# Patient Record
Sex: FEMALE | Race: WHITE | NOT HISPANIC OR LATINO | Employment: OTHER | URBAN - METROPOLITAN AREA
[De-identification: names, ages, dates, MRNs, and addresses within clinical notes are randomized per-mention and may not be internally consistent; named-entity substitution may affect disease eponyms.]

---

## 2018-01-18 ENCOUNTER — RECORDS - HEALTHEAST (OUTPATIENT)
Dept: LAB | Facility: CLINIC | Age: 73
End: 2018-01-18

## 2018-01-18 ENCOUNTER — RECORDS - HEALTHEAST (OUTPATIENT)
Dept: ADMINISTRATIVE | Facility: OTHER | Age: 73
End: 2018-01-18

## 2018-01-19 LAB
25(OH)D3 SERPL-MCNC: 27.7 NG/ML (ref 30–80)
ALBUMIN SERPL-MCNC: 3.6 G/DL (ref 3.5–5)
ALP SERPL-CCNC: 88 U/L (ref 45–120)
ALT SERPL W P-5'-P-CCNC: 15 U/L (ref 0–45)
ANION GAP SERPL CALCULATED.3IONS-SCNC: 9 MMOL/L (ref 5–18)
AST SERPL W P-5'-P-CCNC: 24 U/L (ref 0–40)
BASOPHILS # BLD AUTO: 0 THOU/UL (ref 0–0.2)
BASOPHILS NFR BLD AUTO: 1 % (ref 0–2)
BILIRUB SERPL-MCNC: 0.7 MG/DL (ref 0–1)
BUN SERPL-MCNC: 18 MG/DL (ref 8–28)
CALCIUM SERPL-MCNC: 9.5 MG/DL (ref 8.5–10.5)
CHLORIDE BLD-SCNC: 105 MMOL/L (ref 98–107)
CHOLEST SERPL-MCNC: 191 MG/DL
CO2 SERPL-SCNC: 27 MMOL/L (ref 22–31)
CREAT SERPL-MCNC: 0.99 MG/DL (ref 0.6–1.1)
EOSINOPHIL # BLD AUTO: 0.1 THOU/UL (ref 0–0.4)
EOSINOPHIL NFR BLD AUTO: 3 % (ref 0–6)
ERYTHROCYTE [DISTWIDTH] IN BLOOD BY AUTOMATED COUNT: 12.4 % (ref 11–14.5)
FASTING STATUS PATIENT QL REPORTED: NORMAL
GFR SERPL CREATININE-BSD FRML MDRD: 55 ML/MIN/1.73M2
GLUCOSE BLD-MCNC: 105 MG/DL (ref 70–125)
HBA1C MFR BLD: 6 % (ref 4.2–6.1)
HCT VFR BLD AUTO: 37.3 % (ref 35–47)
HDLC SERPL-MCNC: 50 MG/DL
HGB BLD-MCNC: 12.2 G/DL (ref 12–16)
LDLC SERPL CALC-MCNC: 115 MG/DL
LYMPHOCYTES # BLD AUTO: 1.3 THOU/UL (ref 0.8–4.4)
LYMPHOCYTES NFR BLD AUTO: 31 % (ref 20–40)
MCH RBC QN AUTO: 31.8 PG (ref 27–34)
MCHC RBC AUTO-ENTMCNC: 32.7 G/DL (ref 32–36)
MCV RBC AUTO: 97 FL (ref 80–100)
MONOCYTES # BLD AUTO: 0.5 THOU/UL (ref 0–0.9)
MONOCYTES NFR BLD AUTO: 11 % (ref 2–10)
NEUTROPHILS # BLD AUTO: 2.4 THOU/UL (ref 2–7.7)
NEUTROPHILS NFR BLD AUTO: 55 % (ref 50–70)
PLATELET # BLD AUTO: 164 THOU/UL (ref 140–440)
PMV BLD AUTO: 10 FL (ref 8.5–12.5)
POTASSIUM BLD-SCNC: 4.3 MMOL/L (ref 3.5–5)
PROT SERPL-MCNC: 6.6 G/DL (ref 6–8)
RBC # BLD AUTO: 3.84 MILL/UL (ref 3.8–5.4)
SODIUM SERPL-SCNC: 141 MMOL/L (ref 136–145)
TRIGL SERPL-MCNC: 132 MG/DL
WBC: 4.3 THOU/UL (ref 4–11)

## 2019-01-17 ENCOUNTER — RECORDS - HEALTHEAST (OUTPATIENT)
Dept: LAB | Facility: CLINIC | Age: 74
End: 2019-01-17

## 2019-01-17 ENCOUNTER — RECORDS - HEALTHEAST (OUTPATIENT)
Dept: ADMINISTRATIVE | Facility: OTHER | Age: 74
End: 2019-01-17

## 2019-01-17 LAB
ALBUMIN SERPL-MCNC: 3.6 G/DL (ref 3.5–5)
ALP SERPL-CCNC: 98 U/L (ref 45–120)
ALT SERPL W P-5'-P-CCNC: 13 U/L (ref 0–45)
ANION GAP SERPL CALCULATED.3IONS-SCNC: 15 MMOL/L (ref 5–18)
AST SERPL W P-5'-P-CCNC: 20 U/L (ref 0–40)
BASOPHILS # BLD AUTO: 0 THOU/UL (ref 0–0.2)
BASOPHILS NFR BLD AUTO: 1 % (ref 0–2)
BILIRUB SERPL-MCNC: 0.7 MG/DL (ref 0–1)
BUN SERPL-MCNC: 29 MG/DL (ref 8–28)
CALCIUM SERPL-MCNC: 9.6 MG/DL (ref 8.5–10.5)
CHLORIDE BLD-SCNC: 104 MMOL/L (ref 98–107)
CHOLEST SERPL-MCNC: 185 MG/DL
CO2 SERPL-SCNC: 21 MMOL/L (ref 22–31)
CREAT SERPL-MCNC: 1.27 MG/DL (ref 0.6–1.1)
EOSINOPHIL # BLD AUTO: 0.1 THOU/UL (ref 0–0.4)
EOSINOPHIL NFR BLD AUTO: 3 % (ref 0–6)
ERYTHROCYTE [DISTWIDTH] IN BLOOD BY AUTOMATED COUNT: 12.2 % (ref 11–14.5)
FASTING STATUS PATIENT QL REPORTED: NORMAL
GFR SERPL CREATININE-BSD FRML MDRD: 41 ML/MIN/1.73M2
GLUCOSE BLD-MCNC: 118 MG/DL (ref 70–125)
HCT VFR BLD AUTO: 40.5 % (ref 35–47)
HDLC SERPL-MCNC: 53 MG/DL
HGB BLD-MCNC: 13 G/DL (ref 12–16)
LDLC SERPL CALC-MCNC: 113 MG/DL
LYMPHOCYTES # BLD AUTO: 1.3 THOU/UL (ref 0.8–4.4)
LYMPHOCYTES NFR BLD AUTO: 25 % (ref 20–40)
MCH RBC QN AUTO: 31.5 PG (ref 27–34)
MCHC RBC AUTO-ENTMCNC: 32.1 G/DL (ref 32–36)
MCV RBC AUTO: 98 FL (ref 80–100)
MONOCYTES # BLD AUTO: 0.5 THOU/UL (ref 0–0.9)
MONOCYTES NFR BLD AUTO: 9 % (ref 2–10)
NEUTROPHILS # BLD AUTO: 3.2 THOU/UL (ref 2–7.7)
NEUTROPHILS NFR BLD AUTO: 63 % (ref 50–70)
PLATELET # BLD AUTO: 183 THOU/UL (ref 140–440)
PMV BLD AUTO: 10 FL (ref 8.5–12.5)
POTASSIUM BLD-SCNC: 4.6 MMOL/L (ref 3.5–5)
PROT SERPL-MCNC: 6.6 G/DL (ref 6–8)
RBC # BLD AUTO: 4.13 MILL/UL (ref 3.8–5.4)
SODIUM SERPL-SCNC: 140 MMOL/L (ref 136–145)
TRIGL SERPL-MCNC: 97 MG/DL
WBC: 5.1 THOU/UL (ref 4–11)

## 2019-01-18 LAB
25(OH)D3 SERPL-MCNC: 35 NG/ML (ref 30–80)
HBA1C MFR BLD: 5.9 % (ref 4.2–6.1)

## 2019-01-23 ENCOUNTER — RECORDS - HEALTHEAST (OUTPATIENT)
Dept: ADMINISTRATIVE | Facility: OTHER | Age: 74
End: 2019-01-23

## 2020-07-20 ENCOUNTER — RECORDS - HEALTHEAST (OUTPATIENT)
Dept: LAB | Facility: CLINIC | Age: 75
End: 2020-07-20

## 2020-07-20 LAB
ALBUMIN SERPL-MCNC: 3.8 G/DL (ref 3.5–5)
ALP SERPL-CCNC: 90 U/L (ref 45–120)
ALT SERPL W P-5'-P-CCNC: 13 U/L (ref 0–45)
ANION GAP SERPL CALCULATED.3IONS-SCNC: 14 MMOL/L (ref 5–18)
AST SERPL W P-5'-P-CCNC: 25 U/L (ref 0–40)
BILIRUB SERPL-MCNC: 0.7 MG/DL (ref 0–1)
BUN SERPL-MCNC: 22 MG/DL (ref 8–28)
CALCIUM SERPL-MCNC: 9.7 MG/DL (ref 8.5–10.5)
CHLORIDE BLD-SCNC: 106 MMOL/L (ref 98–107)
CHOLEST SERPL-MCNC: 184 MG/DL
CO2 SERPL-SCNC: 22 MMOL/L (ref 22–31)
CREAT SERPL-MCNC: 1.08 MG/DL (ref 0.6–1.1)
FASTING STATUS PATIENT QL REPORTED: ABNORMAL
GFR SERPL CREATININE-BSD FRML MDRD: 50 ML/MIN/1.73M2
GLUCOSE BLD-MCNC: 101 MG/DL (ref 70–125)
HDLC SERPL-MCNC: 46 MG/DL
LDLC SERPL CALC-MCNC: 119 MG/DL
POTASSIUM BLD-SCNC: 4.3 MMOL/L (ref 3.5–5)
PROT SERPL-MCNC: 6.8 G/DL (ref 6–8)
SODIUM SERPL-SCNC: 142 MMOL/L (ref 136–145)
TRIGL SERPL-MCNC: 97 MG/DL

## 2020-07-21 LAB — 25(OH)D3 SERPL-MCNC: 28.9 NG/ML (ref 30–80)

## 2021-05-25 ENCOUNTER — RECORDS - HEALTHEAST (OUTPATIENT)
Dept: ADMINISTRATIVE | Facility: CLINIC | Age: 76
End: 2021-05-25

## 2021-05-26 ENCOUNTER — RECORDS - HEALTHEAST (OUTPATIENT)
Dept: ADMINISTRATIVE | Facility: CLINIC | Age: 76
End: 2021-05-26

## 2021-05-28 ENCOUNTER — RECORDS - HEALTHEAST (OUTPATIENT)
Dept: ADMINISTRATIVE | Facility: CLINIC | Age: 76
End: 2021-05-28

## 2021-05-29 ENCOUNTER — RECORDS - HEALTHEAST (OUTPATIENT)
Dept: ADMINISTRATIVE | Facility: CLINIC | Age: 76
End: 2021-05-29

## 2021-09-27 ENCOUNTER — LAB REQUISITION (OUTPATIENT)
Dept: LAB | Facility: CLINIC | Age: 76
End: 2021-09-27
Payer: COMMERCIAL

## 2021-09-27 ENCOUNTER — TRANSFERRED RECORDS (OUTPATIENT)
Dept: HEALTH INFORMATION MANAGEMENT | Facility: CLINIC | Age: 76
End: 2021-09-27

## 2021-09-27 DIAGNOSIS — M06.9 RHEUMATOID ARTHRITIS, UNSPECIFIED (H): ICD-10-CM

## 2021-09-27 DIAGNOSIS — E78.2 MIXED HYPERLIPIDEMIA: ICD-10-CM

## 2021-09-27 DIAGNOSIS — E55.9 VITAMIN D DEFICIENCY, UNSPECIFIED: ICD-10-CM

## 2021-09-27 DIAGNOSIS — I10 ESSENTIAL (PRIMARY) HYPERTENSION: ICD-10-CM

## 2021-09-27 LAB
ALBUMIN SERPL-MCNC: 3.4 G/DL (ref 3.5–5)
ALP SERPL-CCNC: 68 U/L (ref 45–120)
ALT SERPL W P-5'-P-CCNC: 11 U/L (ref 0–45)
ANION GAP SERPL CALCULATED.3IONS-SCNC: 14 MMOL/L (ref 5–18)
AST SERPL W P-5'-P-CCNC: 20 U/L (ref 0–40)
BILIRUB SERPL-MCNC: 0.7 MG/DL (ref 0–1)
BUN SERPL-MCNC: 30 MG/DL (ref 8–28)
CALCIUM SERPL-MCNC: 9.3 MG/DL (ref 8.5–10.5)
CHLORIDE BLD-SCNC: 107 MMOL/L (ref 98–107)
CHOLEST SERPL-MCNC: 174 MG/DL
CO2 SERPL-SCNC: 20 MMOL/L (ref 22–31)
CREAT SERPL-MCNC: 1.16 MG/DL (ref 0.6–1.1)
ERYTHROCYTE [DISTWIDTH] IN BLOOD BY AUTOMATED COUNT: 12.9 % (ref 10–15)
GFR SERPL CREATININE-BSD FRML MDRD: 46 ML/MIN/1.73M2
GLUCOSE BLD-MCNC: 114 MG/DL (ref 70–125)
HCT VFR BLD AUTO: 37 % (ref 35–47)
HDLC SERPL-MCNC: 49 MG/DL
HGB BLD-MCNC: 12 G/DL (ref 11.7–15.7)
LDLC SERPL CALC-MCNC: 106 MG/DL
MCH RBC QN AUTO: 31 PG (ref 26.5–33)
MCHC RBC AUTO-ENTMCNC: 32.4 G/DL (ref 31.5–36.5)
MCV RBC AUTO: 96 FL (ref 78–100)
PLATELET # BLD AUTO: 149 10E3/UL (ref 150–450)
POTASSIUM BLD-SCNC: 4.2 MMOL/L (ref 3.5–5)
PROT SERPL-MCNC: 6.4 G/DL (ref 6–8)
RBC # BLD AUTO: 3.87 10E6/UL (ref 3.8–5.2)
SODIUM SERPL-SCNC: 141 MMOL/L (ref 136–145)
TRIGL SERPL-MCNC: 97 MG/DL
WBC # BLD AUTO: 4.2 10E3/UL (ref 4–11)

## 2021-09-27 PROCEDURE — 82306 VITAMIN D 25 HYDROXY: CPT | Mod: ORL | Performed by: FAMILY MEDICINE

## 2021-09-27 PROCEDURE — 80053 COMPREHEN METABOLIC PANEL: CPT | Mod: ORL | Performed by: FAMILY MEDICINE

## 2021-09-27 PROCEDURE — 85027 COMPLETE CBC AUTOMATED: CPT | Mod: ORL | Performed by: FAMILY MEDICINE

## 2021-09-27 PROCEDURE — 36415 COLL VENOUS BLD VENIPUNCTURE: CPT | Mod: ORL | Performed by: FAMILY MEDICINE

## 2021-09-27 PROCEDURE — 80061 LIPID PANEL: CPT | Mod: ORL | Performed by: FAMILY MEDICINE

## 2021-09-28 LAB — DEPRECATED CALCIDIOL+CALCIFEROL SERPL-MC: 49 UG/L (ref 30–80)

## 2022-11-22 ENCOUNTER — LAB REQUISITION (OUTPATIENT)
Dept: LAB | Facility: CLINIC | Age: 77
End: 2022-11-22
Payer: COMMERCIAL

## 2022-11-22 ENCOUNTER — TRANSFERRED RECORDS (OUTPATIENT)
Dept: HEALTH INFORMATION MANAGEMENT | Facility: CLINIC | Age: 77
End: 2022-11-22

## 2022-11-22 DIAGNOSIS — M06.9 RHEUMATOID ARTHRITIS, UNSPECIFIED (H): ICD-10-CM

## 2022-11-22 DIAGNOSIS — I10 ESSENTIAL (PRIMARY) HYPERTENSION: ICD-10-CM

## 2022-11-22 DIAGNOSIS — E78.2 MIXED HYPERLIPIDEMIA: ICD-10-CM

## 2022-11-22 LAB
ALBUMIN SERPL BCG-MCNC: 3.7 G/DL (ref 3.5–5.2)
ALP SERPL-CCNC: 95 U/L (ref 35–104)
ALT SERPL W P-5'-P-CCNC: 25 U/L (ref 10–35)
ANION GAP SERPL CALCULATED.3IONS-SCNC: 12 MMOL/L (ref 7–15)
AST SERPL W P-5'-P-CCNC: 44 U/L (ref 10–35)
BILIRUB SERPL-MCNC: 0.8 MG/DL
BUN SERPL-MCNC: 31.1 MG/DL (ref 8–23)
CALCIUM SERPL-MCNC: 9.4 MG/DL (ref 8.8–10.2)
CHLORIDE SERPL-SCNC: 106 MMOL/L (ref 98–107)
CHOLEST SERPL-MCNC: 142 MG/DL
CREAT SERPL-MCNC: 1.33 MG/DL (ref 0.51–0.95)
DEPRECATED HCO3 PLAS-SCNC: 26 MMOL/L (ref 22–29)
ERYTHROCYTE [DISTWIDTH] IN BLOOD BY AUTOMATED COUNT: 15.3 % (ref 10–15)
GFR SERPL CREATININE-BSD FRML MDRD: 42 ML/MIN/1.73M2
GLUCOSE SERPL-MCNC: 86 MG/DL (ref 70–99)
HCT VFR BLD AUTO: 38.5 % (ref 35–47)
HDLC SERPL-MCNC: 47 MG/DL
HGB BLD-MCNC: 11.9 G/DL (ref 11.7–15.7)
LDLC SERPL CALC-MCNC: 79 MG/DL
MCH RBC QN AUTO: 31.6 PG (ref 26.5–33)
MCHC RBC AUTO-ENTMCNC: 30.9 G/DL (ref 31.5–36.5)
MCV RBC AUTO: 102 FL (ref 78–100)
NONHDLC SERPL-MCNC: 95 MG/DL
PLATELET # BLD AUTO: 119 10E3/UL (ref 150–450)
POTASSIUM SERPL-SCNC: 4.4 MMOL/L (ref 3.4–5.3)
PROT SERPL-MCNC: 6.2 G/DL (ref 6.4–8.3)
RBC # BLD AUTO: 3.77 10E6/UL (ref 3.8–5.2)
SODIUM SERPL-SCNC: 144 MMOL/L (ref 136–145)
TRIGL SERPL-MCNC: 78 MG/DL
WBC # BLD AUTO: 4.5 10E3/UL (ref 4–11)

## 2022-11-22 PROCEDURE — 80053 COMPREHEN METABOLIC PANEL: CPT | Mod: ORL | Performed by: STUDENT IN AN ORGANIZED HEALTH CARE EDUCATION/TRAINING PROGRAM

## 2022-11-22 PROCEDURE — 85027 COMPLETE CBC AUTOMATED: CPT | Mod: ORL | Performed by: STUDENT IN AN ORGANIZED HEALTH CARE EDUCATION/TRAINING PROGRAM

## 2022-11-22 PROCEDURE — 80061 LIPID PANEL: CPT | Mod: ORL | Performed by: STUDENT IN AN ORGANIZED HEALTH CARE EDUCATION/TRAINING PROGRAM

## 2023-01-30 ENCOUNTER — APPOINTMENT (OUTPATIENT)
Dept: CT IMAGING | Facility: CLINIC | Age: 78
DRG: 291 | End: 2023-01-30
Attending: EMERGENCY MEDICINE
Payer: COMMERCIAL

## 2023-01-30 ENCOUNTER — HOSPITAL ENCOUNTER (INPATIENT)
Facility: CLINIC | Age: 78
LOS: 7 days | Discharge: NURSING, REHAB OR LONG-TERM CARE | DRG: 291 | End: 2023-02-06
Attending: EMERGENCY MEDICINE | Admitting: HOSPITALIST
Payer: COMMERCIAL

## 2023-01-30 ENCOUNTER — TRANSFERRED RECORDS (OUTPATIENT)
Dept: HEALTH INFORMATION MANAGEMENT | Facility: CLINIC | Age: 78
End: 2023-01-30

## 2023-01-30 ENCOUNTER — APPOINTMENT (OUTPATIENT)
Dept: RADIOLOGY | Facility: CLINIC | Age: 78
DRG: 291 | End: 2023-01-30
Attending: EMERGENCY MEDICINE
Payer: COMMERCIAL

## 2023-01-30 DIAGNOSIS — R06.2 WHEEZING: ICD-10-CM

## 2023-01-30 DIAGNOSIS — G62.9 NEUROPATHY: Primary | ICD-10-CM

## 2023-01-30 DIAGNOSIS — I10 BENIGN ESSENTIAL HYPERTENSION: ICD-10-CM

## 2023-01-30 DIAGNOSIS — R09.02 HYPOXIA: ICD-10-CM

## 2023-01-30 DIAGNOSIS — I48.91 ATRIAL FIBRILLATION, UNSPECIFIED TYPE (H): ICD-10-CM

## 2023-01-30 DIAGNOSIS — I50.31 ACUTE HEART FAILURE WITH PRESERVED EJECTION FRACTION (HFPEF) (H): ICD-10-CM

## 2023-01-30 DIAGNOSIS — F51.01 PRIMARY INSOMNIA: ICD-10-CM

## 2023-01-30 LAB
ANION GAP SERPL CALCULATED.3IONS-SCNC: 13 MMOL/L (ref 5–18)
ATRIAL RATE - MUSE: 51 BPM
ATRIAL RATE - MUSE: 84 BPM
BASOPHILS # BLD AUTO: 0 10E3/UL (ref 0–0.2)
BASOPHILS NFR BLD AUTO: 0 %
BNP SERPL-MCNC: 1597 PG/ML (ref 0–137)
BUN SERPL-MCNC: 26 MG/DL (ref 8–28)
CALCIUM SERPL-MCNC: 9.5 MG/DL (ref 8.5–10.5)
CHLORIDE BLD-SCNC: 102 MMOL/L (ref 98–107)
CO2 SERPL-SCNC: 27 MMOL/L (ref 22–31)
CREAT SERPL-MCNC: 1.03 MG/DL (ref 0.6–1.1)
D DIMER PPP FEU-MCNC: 1.02 UG/ML FEU (ref 0–0.5)
DIASTOLIC BLOOD PRESSURE - MUSE: 100 MMHG
DIASTOLIC BLOOD PRESSURE - MUSE: 73 MMHG
EOSINOPHIL # BLD AUTO: 0 10E3/UL (ref 0–0.7)
EOSINOPHIL NFR BLD AUTO: 0 %
ERYTHROCYTE [DISTWIDTH] IN BLOOD BY AUTOMATED COUNT: 14.3 % (ref 10–15)
FLUAV RNA SPEC QL NAA+PROBE: NEGATIVE
FLUBV RNA RESP QL NAA+PROBE: NEGATIVE
GFR SERPL CREATININE-BSD FRML MDRD: 56 ML/MIN/1.73M2
GLUCOSE BLD-MCNC: 114 MG/DL (ref 70–125)
HCT VFR BLD AUTO: 37.1 % (ref 35–47)
HGB BLD-MCNC: 11.9 G/DL (ref 11.7–15.7)
HOLD SPECIMEN: NORMAL
IMM GRANULOCYTES # BLD: 0 10E3/UL
IMM GRANULOCYTES NFR BLD: 0 %
INTERPRETATION ECG - MUSE: NORMAL
INTERPRETATION ECG - MUSE: NORMAL
LYMPHOCYTES # BLD AUTO: 0.8 10E3/UL (ref 0.8–5.3)
LYMPHOCYTES NFR BLD AUTO: 13 %
MCH RBC QN AUTO: 31.3 PG (ref 26.5–33)
MCHC RBC AUTO-ENTMCNC: 32.1 G/DL (ref 31.5–36.5)
MCV RBC AUTO: 98 FL (ref 78–100)
MONOCYTES # BLD AUTO: 0.6 10E3/UL (ref 0–1.3)
MONOCYTES NFR BLD AUTO: 9 %
NEUTROPHILS # BLD AUTO: 5 10E3/UL (ref 1.6–8.3)
NEUTROPHILS NFR BLD AUTO: 78 %
NRBC # BLD AUTO: 0 10E3/UL
NRBC BLD AUTO-RTO: 0 /100
P AXIS - MUSE: NORMAL DEGREES
P AXIS - MUSE: NORMAL DEGREES
PLATELET # BLD AUTO: 134 10E3/UL (ref 150–450)
POTASSIUM BLD-SCNC: 3.1 MMOL/L (ref 3.5–5)
PR INTERVAL - MUSE: NORMAL MS
PR INTERVAL - MUSE: NORMAL MS
QRS DURATION - MUSE: 102 MS
QRS DURATION - MUSE: 104 MS
QT - MUSE: 370 MS
QT - MUSE: 566 MS
QTC - MUSE: 341 MS
QTC - MUSE: 546 MS
R AXIS - MUSE: -45 DEGREES
R AXIS - MUSE: -46 DEGREES
RBC # BLD AUTO: 3.8 10E6/UL (ref 3.8–5.2)
RSV RNA SPEC NAA+PROBE: NEGATIVE
SARS-COV-2 RNA RESP QL NAA+PROBE: NEGATIVE
SODIUM SERPL-SCNC: 142 MMOL/L (ref 136–145)
SYSTOLIC BLOOD PRESSURE - MUSE: 139 MMHG
SYSTOLIC BLOOD PRESSURE - MUSE: 172 MMHG
T AXIS - MUSE: -8 DEGREES
T AXIS - MUSE: 61 DEGREES
TROPONIN I SERPL-MCNC: 0.12 NG/ML (ref 0–0.29)
VENTRICULAR RATE- MUSE: 51 BPM
VENTRICULAR RATE- MUSE: 56 BPM
WBC # BLD AUTO: 6.4 10E3/UL (ref 4–11)

## 2023-01-30 PROCEDURE — 99285 EMERGENCY DEPT VISIT HI MDM: CPT | Mod: 25,CS

## 2023-01-30 PROCEDURE — 84484 ASSAY OF TROPONIN QUANT: CPT | Performed by: EMERGENCY MEDICINE

## 2023-01-30 PROCEDURE — 85014 HEMATOCRIT: CPT | Performed by: EMERGENCY MEDICINE

## 2023-01-30 PROCEDURE — 87637 SARSCOV2&INF A&B&RSV AMP PRB: CPT | Performed by: EMERGENCY MEDICINE

## 2023-01-30 PROCEDURE — C9803 HOPD COVID-19 SPEC COLLECT: HCPCS

## 2023-01-30 PROCEDURE — 250N000011 HC RX IP 250 OP 636: Performed by: EMERGENCY MEDICINE

## 2023-01-30 PROCEDURE — 71045 X-RAY EXAM CHEST 1 VIEW: CPT

## 2023-01-30 PROCEDURE — 85379 FIBRIN DEGRADATION QUANT: CPT | Performed by: EMERGENCY MEDICINE

## 2023-01-30 PROCEDURE — 93005 ELECTROCARDIOGRAM TRACING: CPT

## 2023-01-30 PROCEDURE — 250N000013 HC RX MED GY IP 250 OP 250 PS 637: Performed by: EMERGENCY MEDICINE

## 2023-01-30 PROCEDURE — 71275 CT ANGIOGRAPHY CHEST: CPT

## 2023-01-30 PROCEDURE — 83880 ASSAY OF NATRIURETIC PEPTIDE: CPT | Performed by: EMERGENCY MEDICINE

## 2023-01-30 PROCEDURE — 120N000013 HC R&B IMCU

## 2023-01-30 PROCEDURE — 93005 ELECTROCARDIOGRAM TRACING: CPT | Performed by: EMERGENCY MEDICINE

## 2023-01-30 PROCEDURE — 96374 THER/PROPH/DIAG INJ IV PUSH: CPT | Mod: 59

## 2023-01-30 PROCEDURE — 80048 BASIC METABOLIC PNL TOTAL CA: CPT | Performed by: EMERGENCY MEDICINE

## 2023-01-30 PROCEDURE — 36415 COLL VENOUS BLD VENIPUNCTURE: CPT | Performed by: EMERGENCY MEDICINE

## 2023-01-30 RX ORDER — MULTIVIT-MIN/IRON/FOLIC ACID/K 18-600-40
1000 CAPSULE ORAL DAILY
COMMUNITY

## 2023-01-30 RX ORDER — TRAMADOL HYDROCHLORIDE 50 MG/1
50-100 TABLET ORAL EVERY 6 HOURS PRN
Status: ON HOLD | COMMUNITY
End: 2024-02-07

## 2023-01-30 RX ORDER — IOPAMIDOL 755 MG/ML
100 INJECTION, SOLUTION INTRAVASCULAR ONCE
Status: COMPLETED | OUTPATIENT
Start: 2023-01-30 | End: 2023-01-30

## 2023-01-30 RX ORDER — FUROSEMIDE 10 MG/ML
20 INJECTION INTRAMUSCULAR; INTRAVENOUS ONCE
Status: COMPLETED | OUTPATIENT
Start: 2023-01-30 | End: 2023-01-30

## 2023-01-30 RX ORDER — HYDROXYCHLOROQUINE SULFATE 200 MG/1
200 TABLET, FILM COATED ORAL 2 TIMES DAILY
Status: ON HOLD | COMMUNITY
End: 2023-02-03

## 2023-01-30 RX ORDER — ATENOLOL 50 MG/1
50 TABLET ORAL DAILY
Status: ON HOLD | COMMUNITY
End: 2023-02-03

## 2023-01-30 RX ORDER — LORAZEPAM 1 MG/1
1 TABLET ORAL ONCE
Status: COMPLETED | OUTPATIENT
Start: 2023-01-30 | End: 2023-01-30

## 2023-01-30 RX ORDER — LISINOPRIL/HYDROCHLOROTHIAZIDE 10-12.5 MG
1 TABLET ORAL DAILY
Status: ON HOLD | COMMUNITY
End: 2023-02-03

## 2023-01-30 RX ORDER — GABAPENTIN 300 MG/1
1200 CAPSULE ORAL AT BEDTIME
Status: ON HOLD | COMMUNITY
End: 2023-02-03

## 2023-01-30 RX ADMIN — FUROSEMIDE 20 MG: 10 INJECTION, SOLUTION INTRAVENOUS at 21:07

## 2023-01-30 RX ADMIN — LORAZEPAM 1 MG: 1 TABLET ORAL at 22:28

## 2023-01-30 RX ADMIN — IOPAMIDOL 100 ML: 755 INJECTION, SOLUTION INTRAVENOUS at 20:46

## 2023-01-30 ASSESSMENT — ENCOUNTER SYMPTOMS
FEVER: 0
COUGH: 1
SHORTNESS OF BREATH: 1
APPETITE CHANGE: 1

## 2023-01-30 ASSESSMENT — ACTIVITIES OF DAILY LIVING (ADL)
ADLS_ACUITY_SCORE: 35
ADLS_ACUITY_SCORE: 35

## 2023-01-31 ENCOUNTER — TELEPHONE (OUTPATIENT)
Dept: CARDIOLOGY | Facility: CLINIC | Age: 78
End: 2023-01-31
Payer: COMMERCIAL

## 2023-01-31 ENCOUNTER — APPOINTMENT (OUTPATIENT)
Dept: CARDIOLOGY | Facility: CLINIC | Age: 78
DRG: 291 | End: 2023-01-31
Attending: HOSPITALIST
Payer: COMMERCIAL

## 2023-01-31 ENCOUNTER — APPOINTMENT (OUTPATIENT)
Dept: OCCUPATIONAL THERAPY | Facility: CLINIC | Age: 78
DRG: 291 | End: 2023-01-31
Attending: HOSPITALIST
Payer: COMMERCIAL

## 2023-01-31 DIAGNOSIS — I50.9 ACUTE DECOMPENSATED HEART FAILURE (H): Primary | ICD-10-CM

## 2023-01-31 PROBLEM — I27.20 PULMONARY HYPERTENSION (H): Status: ACTIVE | Noted: 2023-01-31

## 2023-01-31 PROBLEM — E87.6 HYPOKALEMIA: Status: ACTIVE | Noted: 2023-01-31

## 2023-01-31 LAB
ANION GAP SERPL CALCULATED.3IONS-SCNC: 11 MMOL/L (ref 5–18)
BASOPHILS # BLD AUTO: 0 10E3/UL (ref 0–0.2)
BASOPHILS NFR BLD AUTO: 0 %
BUN SERPL-MCNC: 20 MG/DL (ref 8–28)
CALCIUM SERPL-MCNC: 9.2 MG/DL (ref 8.5–10.5)
CHLORIDE BLD-SCNC: 103 MMOL/L (ref 98–107)
CO2 SERPL-SCNC: 30 MMOL/L (ref 22–31)
CREAT SERPL-MCNC: 0.87 MG/DL (ref 0.6–1.1)
CREAT SERPL-MCNC: 0.91 MG/DL (ref 0.6–1.1)
EOSINOPHIL # BLD AUTO: 0 10E3/UL (ref 0–0.7)
EOSINOPHIL NFR BLD AUTO: 0 %
ERYTHROCYTE [DISTWIDTH] IN BLOOD BY AUTOMATED COUNT: 14 % (ref 10–15)
GFR SERPL CREATININE-BSD FRML MDRD: 65 ML/MIN/1.73M2
GFR SERPL CREATININE-BSD FRML MDRD: 69 ML/MIN/1.73M2
GLUCOSE BLD-MCNC: 90 MG/DL (ref 70–125)
HCT VFR BLD AUTO: 35.8 % (ref 35–47)
HGB BLD-MCNC: 11.2 G/DL (ref 11.7–15.7)
HOLD SPECIMEN: NORMAL
IMM GRANULOCYTES # BLD: 0 10E3/UL
IMM GRANULOCYTES NFR BLD: 1 %
LVEF ECHO: NORMAL
LYMPHOCYTES # BLD AUTO: 0.9 10E3/UL (ref 0.8–5.3)
LYMPHOCYTES NFR BLD AUTO: 14 %
MAGNESIUM SERPL-MCNC: 1.8 MG/DL (ref 1.8–2.6)
MAGNESIUM SERPL-MCNC: 1.8 MG/DL (ref 1.8–2.6)
MCH RBC QN AUTO: 31.3 PG (ref 26.5–33)
MCHC RBC AUTO-ENTMCNC: 31.3 G/DL (ref 31.5–36.5)
MCV RBC AUTO: 100 FL (ref 78–100)
MONOCYTES # BLD AUTO: 0.7 10E3/UL (ref 0–1.3)
MONOCYTES NFR BLD AUTO: 11 %
NEUTROPHILS # BLD AUTO: 5 10E3/UL (ref 1.6–8.3)
NEUTROPHILS NFR BLD AUTO: 74 %
NRBC # BLD AUTO: 0 10E3/UL
NRBC BLD AUTO-RTO: 0 /100
PLATELET # BLD AUTO: 121 10E3/UL (ref 150–450)
POTASSIUM BLD-SCNC: 3.2 MMOL/L (ref 3.5–5)
POTASSIUM BLD-SCNC: 3.2 MMOL/L (ref 3.5–5)
POTASSIUM BLD-SCNC: 3.9 MMOL/L (ref 3.5–5)
PROCALCITONIN SERPL-MCNC: 0.13 NG/ML (ref 0–0.49)
RBC # BLD AUTO: 3.58 10E6/UL (ref 3.8–5.2)
SODIUM SERPL-SCNC: 144 MMOL/L (ref 136–145)
TROPONIN I SERPL-MCNC: 0.11 NG/ML (ref 0–0.29)
TROPONIN I SERPL-MCNC: 0.12 NG/ML (ref 0–0.29)
WBC # BLD AUTO: 6.7 10E3/UL (ref 4–11)

## 2023-01-31 PROCEDURE — 250N000013 HC RX MED GY IP 250 OP 250 PS 637: Performed by: FAMILY MEDICINE

## 2023-01-31 PROCEDURE — 97166 OT EVAL MOD COMPLEX 45 MIN: CPT | Mod: GO

## 2023-01-31 PROCEDURE — 250N000011 HC RX IP 250 OP 636: Performed by: HOSPITALIST

## 2023-01-31 PROCEDURE — 120N000013 HC R&B IMCU

## 2023-01-31 PROCEDURE — 255N000002 HC RX 255 OP 636: Performed by: FAMILY MEDICINE

## 2023-01-31 PROCEDURE — 99207 PR APP CREDIT; MD BILLING SHARED VISIT: CPT | Performed by: FAMILY MEDICINE

## 2023-01-31 PROCEDURE — 99223 1ST HOSP IP/OBS HIGH 75: CPT | Mod: AI | Performed by: HOSPITALIST

## 2023-01-31 PROCEDURE — 93306 TTE W/DOPPLER COMPLETE: CPT | Mod: 26 | Performed by: INTERNAL MEDICINE

## 2023-01-31 PROCEDURE — 82565 ASSAY OF CREATININE: CPT | Performed by: FAMILY MEDICINE

## 2023-01-31 PROCEDURE — 85004 AUTOMATED DIFF WBC COUNT: CPT | Performed by: HOSPITALIST

## 2023-01-31 PROCEDURE — 97535 SELF CARE MNGMENT TRAINING: CPT | Mod: GO

## 2023-01-31 PROCEDURE — 84132 ASSAY OF SERUM POTASSIUM: CPT | Performed by: FAMILY MEDICINE

## 2023-01-31 PROCEDURE — 83735 ASSAY OF MAGNESIUM: CPT | Performed by: HOSPITALIST

## 2023-01-31 PROCEDURE — 250N000013 HC RX MED GY IP 250 OP 250 PS 637: Performed by: HOSPITALIST

## 2023-01-31 PROCEDURE — 250N000013 HC RX MED GY IP 250 OP 250 PS 637: Performed by: INTERNAL MEDICINE

## 2023-01-31 PROCEDURE — 84484 ASSAY OF TROPONIN QUANT: CPT | Performed by: HOSPITALIST

## 2023-01-31 PROCEDURE — 99222 1ST HOSP IP/OBS MODERATE 55: CPT | Performed by: INTERNAL MEDICINE

## 2023-01-31 PROCEDURE — 250N000011 HC RX IP 250 OP 636: Performed by: FAMILY MEDICINE

## 2023-01-31 PROCEDURE — 84145 PROCALCITONIN (PCT): CPT | Performed by: FAMILY MEDICINE

## 2023-01-31 PROCEDURE — 80048 BASIC METABOLIC PNL TOTAL CA: CPT | Performed by: HOSPITALIST

## 2023-01-31 PROCEDURE — 36415 COLL VENOUS BLD VENIPUNCTURE: CPT | Performed by: FAMILY MEDICINE

## 2023-01-31 PROCEDURE — 36415 COLL VENOUS BLD VENIPUNCTURE: CPT | Performed by: HOSPITALIST

## 2023-01-31 RX ORDER — ONDANSETRON 2 MG/ML
4 INJECTION INTRAMUSCULAR; INTRAVENOUS EVERY 6 HOURS PRN
Status: DISCONTINUED | OUTPATIENT
Start: 2023-01-31 | End: 2023-02-06 | Stop reason: HOSPADM

## 2023-01-31 RX ORDER — LISINOPRIL 10 MG/1
10 TABLET ORAL DAILY
Status: DISCONTINUED | OUTPATIENT
Start: 2023-01-31 | End: 2023-02-01

## 2023-01-31 RX ORDER — ATORVASTATIN CALCIUM 10 MG/1
20 TABLET, FILM COATED ORAL EVERY EVENING
Status: DISCONTINUED | OUTPATIENT
Start: 2023-01-31 | End: 2023-02-06 | Stop reason: HOSPADM

## 2023-01-31 RX ORDER — POTASSIUM CHLORIDE 1500 MG/1
40 TABLET, EXTENDED RELEASE ORAL ONCE
Status: COMPLETED | OUTPATIENT
Start: 2023-01-31 | End: 2023-01-31

## 2023-01-31 RX ORDER — HYDROXYCHLOROQUINE SULFATE 200 MG/1
200 TABLET, FILM COATED ORAL 2 TIMES DAILY
Status: DISCONTINUED | OUTPATIENT
Start: 2023-01-31 | End: 2023-02-06 | Stop reason: HOSPADM

## 2023-01-31 RX ORDER — ATROPINE SULFATE 0.1 MG/ML
0.4 INJECTION INTRAVENOUS EVERY 5 MIN PRN
Status: DISCONTINUED | OUTPATIENT
Start: 2023-01-31 | End: 2023-02-06 | Stop reason: HOSPADM

## 2023-01-31 RX ORDER — NALOXONE HYDROCHLORIDE 0.4 MG/ML
0.4 INJECTION, SOLUTION INTRAMUSCULAR; INTRAVENOUS; SUBCUTANEOUS
Status: DISCONTINUED | OUTPATIENT
Start: 2023-01-31 | End: 2023-02-06 | Stop reason: HOSPADM

## 2023-01-31 RX ORDER — NALOXONE HYDROCHLORIDE 0.4 MG/ML
0.2 INJECTION, SOLUTION INTRAMUSCULAR; INTRAVENOUS; SUBCUTANEOUS
Status: DISCONTINUED | OUTPATIENT
Start: 2023-01-31 | End: 2023-02-06 | Stop reason: HOSPADM

## 2023-01-31 RX ORDER — GABAPENTIN 400 MG/1
1200 CAPSULE ORAL AT BEDTIME
Status: DISCONTINUED | OUTPATIENT
Start: 2023-01-31 | End: 2023-01-31

## 2023-01-31 RX ORDER — LIDOCAINE 40 MG/G
CREAM TOPICAL
Status: DISCONTINUED | OUTPATIENT
Start: 2023-01-31 | End: 2023-02-06 | Stop reason: HOSPADM

## 2023-01-31 RX ORDER — DOCUSATE SODIUM 100 MG/1
100 CAPSULE, LIQUID FILLED ORAL 2 TIMES DAILY PRN
Status: DISCONTINUED | OUTPATIENT
Start: 2023-01-31 | End: 2023-02-06 | Stop reason: HOSPADM

## 2023-01-31 RX ORDER — PROCHLORPERAZINE 25 MG
12.5 SUPPOSITORY, RECTAL RECTAL EVERY 12 HOURS PRN
Status: DISCONTINUED | OUTPATIENT
Start: 2023-01-31 | End: 2023-02-06 | Stop reason: HOSPADM

## 2023-01-31 RX ORDER — ACETAMINOPHEN 650 MG/1
650 SUPPOSITORY RECTAL EVERY 4 HOURS PRN
Status: DISCONTINUED | OUTPATIENT
Start: 2023-01-31 | End: 2023-02-06 | Stop reason: HOSPADM

## 2023-01-31 RX ORDER — GABAPENTIN 300 MG/1
600 CAPSULE ORAL AT BEDTIME
Status: DISCONTINUED | OUTPATIENT
Start: 2023-01-31 | End: 2023-02-06 | Stop reason: HOSPADM

## 2023-01-31 RX ORDER — ENOXAPARIN SODIUM 100 MG/ML
40 INJECTION SUBCUTANEOUS EVERY 24 HOURS
Status: DISCONTINUED | OUTPATIENT
Start: 2023-01-31 | End: 2023-02-04

## 2023-01-31 RX ORDER — GUAIFENESIN 200 MG/10ML
10 LIQUID ORAL EVERY 4 HOURS PRN
Status: DISCONTINUED | OUTPATIENT
Start: 2023-01-31 | End: 2023-02-06 | Stop reason: HOSPADM

## 2023-01-31 RX ORDER — PROCHLORPERAZINE MALEATE 5 MG
5 TABLET ORAL EVERY 6 HOURS PRN
Status: DISCONTINUED | OUTPATIENT
Start: 2023-01-31 | End: 2023-02-06 | Stop reason: HOSPADM

## 2023-01-31 RX ORDER — ONDANSETRON 4 MG/1
4 TABLET, ORALLY DISINTEGRATING ORAL EVERY 6 HOURS PRN
Status: DISCONTINUED | OUTPATIENT
Start: 2023-01-31 | End: 2023-02-06 | Stop reason: HOSPADM

## 2023-01-31 RX ORDER — ACETAMINOPHEN 325 MG/1
650 TABLET ORAL EVERY 4 HOURS PRN
Status: DISCONTINUED | OUTPATIENT
Start: 2023-01-31 | End: 2023-02-06 | Stop reason: HOSPADM

## 2023-01-31 RX ORDER — TRAMADOL HYDROCHLORIDE 50 MG/1
100 TABLET ORAL EVERY MORNING
Status: DISCONTINUED | OUTPATIENT
Start: 2023-01-31 | End: 2023-02-06 | Stop reason: HOSPADM

## 2023-01-31 RX ORDER — POTASSIUM CHLORIDE 1.5 G/1.58G
40 POWDER, FOR SOLUTION ORAL ONCE
Status: COMPLETED | OUTPATIENT
Start: 2023-01-31 | End: 2023-01-31

## 2023-01-31 RX ORDER — LANOLIN ALCOHOL/MO/W.PET/CERES
3 CREAM (GRAM) TOPICAL
Status: DISCONTINUED | OUTPATIENT
Start: 2023-01-31 | End: 2023-02-06 | Stop reason: HOSPADM

## 2023-01-31 RX ORDER — FUROSEMIDE 10 MG/ML
40 INJECTION INTRAMUSCULAR; INTRAVENOUS EVERY 12 HOURS
Status: DISCONTINUED | OUTPATIENT
Start: 2023-01-31 | End: 2023-02-02

## 2023-01-31 RX ADMIN — POTASSIUM CHLORIDE 40 MEQ: 1500 TABLET, EXTENDED RELEASE ORAL at 02:07

## 2023-01-31 RX ADMIN — PERFLUTREN 3 ML: 6.52 INJECTION, SUSPENSION INTRAVENOUS at 11:20

## 2023-01-31 RX ADMIN — TRAMADOL HYDROCHLORIDE 100 MG: 50 TABLET, COATED ORAL at 08:57

## 2023-01-31 RX ADMIN — GABAPENTIN 600 MG: 300 CAPSULE ORAL at 02:07

## 2023-01-31 RX ADMIN — GUAIFENESIN 10 ML: 100 SOLUTION ORAL at 18:38

## 2023-01-31 RX ADMIN — ATORVASTATIN CALCIUM 20 MG: 10 TABLET, FILM COATED ORAL at 22:55

## 2023-01-31 RX ADMIN — GABAPENTIN 600 MG: 300 CAPSULE ORAL at 22:55

## 2023-01-31 RX ADMIN — ENOXAPARIN SODIUM 40 MG: 100 INJECTION SUBCUTANEOUS at 18:40

## 2023-01-31 RX ADMIN — GUAIFENESIN 10 ML: 100 SOLUTION ORAL at 11:53

## 2023-01-31 RX ADMIN — FUROSEMIDE 40 MG: 10 INJECTION, SOLUTION INTRAVENOUS at 18:38

## 2023-01-31 RX ADMIN — FLUOXETINE 20 MG: 20 CAPSULE ORAL at 08:57

## 2023-01-31 RX ADMIN — POTASSIUM CHLORIDE 40 MEQ: 1500 TABLET, EXTENDED RELEASE ORAL at 11:53

## 2023-01-31 RX ADMIN — FUROSEMIDE 40 MG: 10 INJECTION, SOLUTION INTRAVENOUS at 05:47

## 2023-01-31 RX ADMIN — POTASSIUM CHLORIDE 40 MEQ: 1.5 POWDER, FOR SOLUTION ORAL at 18:57

## 2023-01-31 RX ADMIN — LISINOPRIL 10 MG: 10 TABLET ORAL at 08:57

## 2023-01-31 ASSESSMENT — ACTIVITIES OF DAILY LIVING (ADL)
ADLS_ACUITY_SCORE: 49
ADLS_ACUITY_SCORE: 35
FALL_HISTORY_WITHIN_LAST_SIX_MONTHS: YES
VISION_MANAGEMENT: GLASSES
TOILETING_ASSISTANCE: TOILETING DIFFICULTY, ASSISTANCE 1 PERSON
ADLS_ACUITY_SCORE: 35
WALKING_OR_CLIMBING_STAIRS_DIFFICULTY: YES
TOILETING: 1-->ASSISTANCE (EQUIPMENT/PERSON) NEEDED
TOILETING: 1-->ASSISTANCE (EQUIPMENT/PERSON) NEEDED (NOT DEVELOPMENTALLY APPROPRIATE)
ADLS_ACUITY_SCORE: 35
TRANSFERRING: 0-->INDEPENDENT
DOING_ERRANDS_INDEPENDENTLY_DIFFICULTY: YES
ADLS_ACUITY_SCORE: 39
DRESS: 1-->ASSISTANCE (EQUIPMENT/PERSON) NEEDED (NOT DEVELOPMENTALLY APPROPRIATE)
WEAR_GLASSES_OR_BLIND: YES
DEPENDENT_IADLS:: TRANSPORTATION
ADLS_ACUITY_SCORE: 35
TOILETING_ISSUES: YES
ADLS_ACUITY_SCORE: 45
ADLS_ACUITY_SCORE: 37
ADLS_ACUITY_SCORE: 39
DRESSING/BATHING_DIFFICULTY: YES
ADLS_ACUITY_SCORE: 35
NUMBER_OF_TIMES_PATIENT_HAS_FALLEN_WITHIN_LAST_SIX_MONTHS: 2
ADLS_ACUITY_SCORE: 49
DRESSING/BATHING: BATHING DIFFICULTY, ASSISTANCE 1 PERSON
TRANSFERRING: 1-->ASSISTANCE (EQUIPMENT/PERSON) NEEDED (NOT DEVELOPMENTALLY APPROPRIATE)
DIFFICULTY_EATING/SWALLOWING: NO
DRESS: 1-->ASSISTANCE (EQUIPMENT/PERSON) NEEDED
ADLS_ACUITY_SCORE: 35
EQUIPMENT_CURRENTLY_USED_AT_HOME: WALKER, STANDARD
WALKING_OR_CLIMBING_STAIRS: AMBULATION DIFFICULTY, ASSISTANCE 1 PERSON;TRANSFERRING DIFFICULTY, ASSISTANCE 1 PERSON
BATHING: 1-->ASSISTANCE NEEDED
CHANGE_IN_FUNCTIONAL_STATUS_SINCE_ONSET_OF_CURRENT_ILLNESS/INJURY: NO
CONCENTRATING,_REMEMBERING_OR_MAKING_DECISIONS_DIFFICULTY: NO

## 2023-01-31 NOTE — PHARMACY-ADMISSION MEDICATION HISTORY
Pharmacy Note - Admission Medication History    Pertinent Provider Information:      ______________________________________________________________________    Prior To Admission (PTA) med list completed and updated in EMR.       PTA Med List   Medication Sig Last Dose     atenolol (TENORMIN) 50 MG tablet Take 50 mg by mouth daily 1/29/2023     FLUoxetine (PROZAC) 20 MG capsule Take 20 mg by mouth daily 1/29/2023     gabapentin (NEURONTIN) 300 MG capsule Take 1,200 mg by mouth At Bedtime 1/29/2023     hydroxychloroquine (PLAQUENIL) 200 MG tablet Take 200 mg by mouth 2 times daily 1/29/2023 at am     lisinopril-hydrochlorothiazide (ZESTORETIC) 10-12.5 MG tablet Take 1 tablet by mouth daily 1/29/2023     traMADol (ULTRAM) 50 MG tablet Take 100 mg by mouth every morning 1/29/2023 at am     Vitamin D, Cholecalciferol, 25 MCG (1000 UT) TABS Take 1,000 Units by mouth daily 1/29/2023       Information source(s): Patient and CareEverywhere/[facility]  Method of interview communication: in-person    Summary of Changes to PTA Med List  New: All the medications listed were added to the PTA Med List during this encounter.    Discontinued: none  Changed: none    Patient was asked about OTC/herbal products specifically.  PTA med list reflects this.    In the past week, patient estimated taking medication this percent of the time:  greater than 90%.    Medication Affordability:  Not including over the counter (OTC) medications, was there a time in the past 12 months when you did not take your medications as prescribed because of cost?: No    Allergies were reviewed, assessed, and updated with the patient.      Patient does not use any multi-dose medications prior to admission.    The information provided in this note is only as accurate as the sources available at the time of the update(s).    Thank you for the opportunity to participate in the care of this patient.    Leonid Rinaldi RP  1/30/2023 8:58 PM

## 2023-01-31 NOTE — PROGRESS NOTES
01/31/23 1515   Appointment Info   Signing Clinician's Name / Credentials (OT) Joe Canchola, OTR/L   Living Environment   People in Home other (see comments)  (Pt's Niece and kids live in the basement.  Available to help as needed.)   Current Living Arrangements house  (Able to live on one floor.)   Living Environment Comments Tub/shower that is a walk in with seat and GB, RTS with GB   Self-Care   Usual Activity Tolerance moderate   Current Activity Tolerance fair   Equipment Currently Used at Home walker, rolling  (4WW)   Instrumental Activities of Daily Living (IADL)   IADL Comments Pt's family assist with all IADLs.  Also has hired help coming to the house.   General Information   Onset of Illness/Injury or Date of Surgery 01/30/23   Referring Physician Dr. Bhupinder Sanchez   Patient/Family Therapy Goal Statement (OT) To return home.   Additional Occupational Profile Info/Pertinent History of Current Problem Adm with acute on chronic CHF, hypoxia.  PMH:  HTN, chronic joint pain, CHF, arthritis, obesity, hypokalemia.   Cognitive Status Examination   Orientation Status person;place;time   Follows Commands WFL   Visual Perception   Visual Impairment/Limitations corrective lenses full-time   Sensory   Sensory Comments Numbness in her R hand 2,3,and 4th fingertips   Range of Motion Comprehensive   General Range of Motion upper extremity range of motion deficits identified   General Upper Extremity Assessment (Range of Motion)   Comment: Upper Extremity ROM Shoulder flex 0-110 flex.   Upper Extremity: Range of Motion shoulder, left: UE ROM;shoulder, right: UE ROM   Bed Mobility   Bed Mobility rolling left;rolling right;supine-sit;sit-supine   Rolling Left [place] (Bed Mobility) maximum assist (25% patient effort);2 person assist   Rolling Right [place] (Bed Mobility) maximum assist (25% patient effort);2 person assist   Supine-Sit [place] (Bed Mobility) maximum assist (25% patient effort)    Sit-Supine [place] (Bed Mobility) maximum assist (25% patient effort)   Assistive Device (Bed Mobility) bed rails   Transfers   Transfers   (Pt not ready to stand today.  Did agree to sit EOB with help.)   Balance   Balance Assessment sitting balance: static   Balance Comments good   Activities of Daily Living   BADL Assessment/Intervention lower body dressing   Lower Body Dressing Assessment/Training   Position (Lower Body Dressing) supine   [place] Level (Lower Body Dressing) dependent (less than 25% patient effort)   Clinical Impression   Criteria for Skilled Therapeutic Interventions Met (OT) Yes, treatment indicated   OT Diagnosis decreased indep with ADLs and trsfs due to acute on chronic CHF.   OT Problem List-Impairments impacting ADL problems related to;activity tolerance impaired;balance;flexibility;mobility;sensation;strength   Assessment of Occupational Performance 5 or more Performance Deficits   Identified Performance Deficits dressing, bathing, toileting, trsfs, G/G   Planned Therapy Interventions (OT) ADL retraining;bed mobility training;ROM;strengthening;transfer training;home program guidelines;progressive activity/exercise;risk factor education   Clinical Decision Making Complexity (OT) moderate complexity   Risk & Benefits of therapy have been explained evaluation/treatment results reviewed;participants included;patient;daughter   OT Total Evaluation Time   OT Eval, Moderate Complexity Minutes (50569) 15   OT Goals   Therapy Frequency (OT) Daily   OT Predicted Duration/Target Date for Goal Attainment 02/03/23   OT Goals Hygiene/Grooming;Lower Body Dressing;Bed Mobility;Transfers;OT Goal 1;OT Goal 2   OT: Hygiene/Grooming supervision/stand-by assist;from wheelchair   OT: Lower Body Dressing Moderate assist   OT: Bed Mobility Moderate assist   OT: Transfer Moderate assist   OT: Goal 1 Pt will be SBA with home exercise program for HF   OT: Goal 2 Pt will be able to state how to manage  her signs and sx of HF using the stoplight form.   Interventions   Interventions Quick Adds Self-Care/Home Management   Self-Care/Home Management   Self-Care/Home Mgmt/ADL, Compensatory, Meal Prep Minutes (23779) 30   Symptoms Noted During/After Treatment (Meal Preparation/Planning Training) fatigue;shortness of breath   Treatment Detail/Skilled Intervention Pt in bed when therapist arrived.  Pt's Dtr present and supportive.  Pt on 3L NC O2 with sats 96% at rest.  With activity O2 sats drop into the mid 80's.  Reviewed PLB with Pt and Dtr.  Pt had a positive response to using PLB returning O2 sats into the 90's.  Therapist reviewed HF handouts with Pt and Dtr with focus on the Stoplight form to monitor signs and sx of HF at home.  Will need further review.  Concern that Pt has been loosing wt and does not have a scale at home to weigh herself.  Worked on bed mobility, rolling from side to side with max A of 2 and use of the bedside rails.  Pt has difficulty using her legs to help her roll.  At home Pt sleeps in a recliner that also is a lift chair.  Max A of one to trsf from supine to sitting EOB.  Sat EOB for approx 5 min with focus on PLB.  Able to keep midline sitting balance.  Max A to return to supine.  Dep for boost up in the bed.  NA present when therapist left the room.   Lower Body Dressing Training Assistance dependent (less than 25% patient effort)   [place] Level (Toilet Training)   (Using Purewick.)   OT Discharge Planning   OT Plan Try sit to stand and if able trsf to chair or commode.  Review HF stoplight form.  Begin home exercise program for HF.  G/H from chair.  Monitor vitals.   OT Discharge Recommendation (DC Rec) (S)  Transitional Care Facility   OT Rationale for DC Rec Pt is weak and needing max A of 2 to move in the bed and to sit EOB.  Unable to stand today.  Oriented x3.  On 3L NC O2 with sats dropping into the mid 80's with activity.  Will need further OT to increase indep with ADLs  and trsfs at the TCU.   OT Brief overview of current status Max A of 2 for bed mobility.  Dep for dressing.  Dep for toileting - using Purewick.   Total Session Time   Timed Code Treatment Minutes 30   Total Session Time (sum of timed and untimed services) 45

## 2023-01-31 NOTE — ED NOTES
Bed: WWED-03  Expected date: 1/30/23  Expected time: 6:59 PM  Means of arrival:   Comments:  Ki ContrerasF JOSE LUIS/Weak

## 2023-01-31 NOTE — CONSULTS
[facility] [address], [place] [address]   [email or web address]   OFFICE: [phone]        Impression and Plan     1.  Shortness of breath/pulmonary edema.  B-type natriuretic peptide elevated at 1597 pg/mL consistent with some degree of myocardial strain.  CT radiographic imaging suggests pulmonary edema.  Echocardiogram has already been ordered and will follow up results accordingly.  Agree with trial of diuresis and will follow clinical efficacy.    2.  Coronary artery disease.  Dulce has known coronary artery disease by virtue of CT scan this admission revealing severe coronary calcification.  Troponins x3 on presentation suggest against recent myocardial injury.  May consider additional ischemic evaluation pending clinical course and echocardiographic findings.    3.  Arrhythmia.  ECGs reviewed.  Sinus rhythm with sinus arrhythmia/bradycardia and intermittent high junctional escape.    Continue to defer ß-blocker therapy, atenolol, which patient had been on prior to admission.  Will simply continue to follow and monitor/normalize electrolytes as needed.    4.  Hypertension.  Blood pressure has been trending high since presentation.  Continue home lisinopril, but increase from 10 mg daily to 20 mg daily.    5.  Dyslipidemia.  Lipid profile 22 November 2022 revealed LDL 79 mg/dL and HDL 47 mg/dL. Although lipid profile fairly reasonable, given evidence of severe coronary calcification would advocate trying to suppress LDL less than 70 mg/dL if possible.  Specifically, would advocate initiation of statin therapy in part given the acute plaque stabilizing as well as other pleiotropic effects of the therapy.  Initiate atorvastatin 20 mg daily.    Plan was discussed with daughter, Irais.    Primary Cardiologist: Dr. Hank Wooten (initial consultation this admission)    History of Present Illness    Dulce Pritchard is a 77 year old female  "who was admitted with worsening shortness of breath and cough.  Shortness of breath present for approximately 2 weeks.    On interview, Dulce denies any chest pain.  She feels her lower extremity edema and shortness of breath is already somewhat improved.  Presently denies fevers or chills.  Denies any subjective palpitations or lightheadedness.    Further review of systems is otherwise negative/noncontributory (medical record and 13 point review of systems reviewed as well and pertinent positives noted).    Cardiac Diagnostics   Telemetry (personally reviewed): Sinus rhythm.    Twelve-lead ECG (personally reviewed) 30 January 2023: Sinus rhythm.  PACs.      Medical History  Surgical History   Past Medical History:   Diagnosis Date     Depressive disorder      Hypertension              Family History/Social History/Risk Factors       Physical Examination   BP (!) 146/81   Pulse (!) 45   Temp 97.7  F (36.5  C) (Oral)   Resp 28   Ht 1.6 m (5' 3\")   Wt 98.9 kg (218 lb)   SpO2 95%   BMI 38.62 kg/m    Wt Readings from Last 5 Encounters:   01/30/23 98.9 kg (218 lb)     Wt Readings from Last 3 Encounters:   01/30/23 98.9 kg (218 lb)       Intake/Output Summary (Last 24 hours) at 1/31/2023 0722  Last data filed at 1/31/2023 0642  Gross per 24 hour   Intake 0 ml   Output 1400 ml   Net -1400 ml         The patient is alert and oriented times three. Sclerae are anicteric. Mucosal membranes are moist. No significant adenopathy/thyromegally appreciated. Lungs are diminished with some scattered fine crackles. On cardiovascular exam, the patient has a slightly irregular S1 and S2. Abdomen is soft and non-tender. Extremities reveal no clubbing, cyanosis.         Imaging      CT scan of chest 30 January 2023:  No acute pulmonary embolism.  Enlarged right and left atria. Severe atheromatous coronary calcifications.  Small right trace left pleural effusions.  Bronchial wall thickening and peribronchial and interstitial " opacities in both lungs, most mixed interstitial and alveolar lung edema. Pattern suggests acute congestive failure.  Coronary calcification: Severe    Chest radiograph 30 January 2023:  Modest cardiomegaly.   Slight interstitial prominence but no focal pneumonia or pleural effusion evident.  DJD both shoulders.      Lab Results     Recent Labs   Lab 01/30/23 1926   WBC 6.4   HGB 11.9   MCV 98   *      POTASSIUM 3.1*   CHLORIDE 102   CO2 27   BUN 26   CR 1.03   ANIONGAP 13   EDGAR 9.5        Recent Labs   Lab Test 01/30/23 1926   BNP 1,597*     No lab results found in last 7 days.    Invalid input(s): LDLCALC  Lab Results   Component Value Date     01/30/2023    CO2 27 01/30/2023    BUN 26 01/30/2023     Lab Results   Component Value Date    WBC 6.4 01/30/2023    HGB 11.9 01/30/2023    HCT 37.1 01/30/2023    MCV 98 01/30/2023     01/30/2023     Lab Results   Component Value Date    CHOL 142 11/22/2022    TRIG 78 11/22/2022    HDL 47 11/22/2022     Recent Labs   Lab Test 11/22/22  1354   CHOL 142   HDL 47*   LDL 79   TRIG 78     No results found for: INR  Lab Results   Component Value Date    TROPONINI 0.12 01/31/2023    TROPONINI 0.12 01/30/2023     No results found for: TSH      Current Inpatient Scheduled Medications   Scheduled Meds:    FLUoxetine  20 mg Oral Daily     furosemide  40 mg Intravenous Q12H     gabapentin  600 mg Oral At Bedtime     [Held by provider] hydroxychloroquine  200 mg Oral BID     lisinopril  10 mg Oral Daily     sodium chloride (PF)  3 mL Intracatheter Q8H     traMADol  100 mg Oral QAM     Continuous Infusions:       Medications Prior to Admission   Prior to Admission medications    Medication Sig Start Date End Date Taking? Authorizing Provider   atenolol (TENORMIN) 50 MG tablet Take 50 mg by mouth daily   Yes Unknown, Entered By History   FLUoxetine (PROZAC) 20 MG capsule Take 20 mg by mouth daily   Yes Unknown, Entered By History   gabapentin (NEURONTIN)  300 MG capsule Take 1,200 mg by mouth At Bedtime   Yes Unknown, Entered By History   hydroxychloroquine (PLAQUENIL) 200 MG tablet Take 200 mg by mouth 2 times daily   Yes Unknown, Entered By History   lisinopril-hydrochlorothiazide (ZESTORETIC) 10-12.5 MG tablet Take 1 tablet by mouth daily   Yes Unknown, Entered By History   traMADol (ULTRAM) 50 MG tablet Take 100 mg by mouth every morning   Yes Unknown, Entered By History   Vitamin D, Cholecalciferol, 25 MCG (1000 UT) TABS Take 1,000 Units by mouth daily   Yes Unknown, Entered By History

## 2023-01-31 NOTE — PLAN OF CARE
Problem: Fatigue  Goal: Improved Activity Tolerance  Outcome: Progressing     Problem: Gas Exchange Impaired  Goal: Optimal Gas Exchange  Outcome: Progressing     Patient Aox4, pleasant, cooperative. Denies pain. Vitally stable on 3L oxygen via NC. Tolerating 2g sodium diet. Up with assist of 2, joe, walker.

## 2023-01-31 NOTE — CONSULTS
Care Management Initial Consult    General Information  Assessment completed with: Irais Morris  Type of CM/SW Visit: Initial Assessment    Primary Care Provider verified and updated as needed: Yes   Readmission within the last 30 days: no previous admission in last 30 days      Reason for Consult: discharge planning  Advance Care Planning:            Communication Assessment  Patient's communication style: spoken language (English or Bilingual)             Cognitive  Cognitive/Neuro/Behavioral: WDL                      Living Environment:   People in home: alone     Current living Arrangements: house      Able to return to prior arrangements: yes       Family/Social Support:  Care provided by: self  Provides care for: no one, unable/limited ability to care for self  Marital Status:   Children          Description of Support System: Supportive, Involved         Current Resources:   Patient receiving home care services: No     Community Resources: None  Equipment currently used at home: walker, standard  Supplies currently used at home: None    Employment/Financial:  Employment Status: retired        Financial Concerns: No concerns identified   Referral to Financial Worker: No       Lifestyle & Psychosocial Needs:  Social Determinants of Health     Tobacco Use: Not on file   Alcohol Use: Not on file   Financial Resource Strain: Not on file   Food Insecurity: Not on file   Transportation Needs: Not on file   Physical Activity: Not on file   Stress: Not on file   Social Connections: Not on file   Intimate Partner Violence: Not on file   Depression: Not on file   Housing Stability: Not on file       Functional Status:  Prior to admission patient needed assistance:   Dependent ADLs:: Ambulation-walker  Dependent IADLs:: Transportation       Mental Health Status:  Mental Health Status: No Current Concerns       Chemical Dependency Status:  Chemical Dependency Status: No Current Concerns          "    Values/Beliefs:  Spiritual, Cultural Beliefs, [employer] Practices, Values that affect care: no               Additional Information:  SW completed initial assessment with Pt's daughter, Irais.  Pt was living in a house alone prior to admit (pt rents her basement to family friends.  At baseline, Pt was not receiving any in home services, however Irais states, \"she was failing at home\".  Pt uses a walker at baseline.  Irais lives in [place], and her sister lives in [place].  Irais has questions about Pt potentially transitioning to comfort cares and discharging home on hospice.  SW answered questions regarding private pay for caregivers, etc.  Irais is working on setting up care givers.  Irais reports they would need a hospital bed at home and reports they would like to use [facility] if that is the appropriate plan of care at time of discharge.   CM following care progression and will follow up with Irais pending progression of care.      Likely will need [facility] transport.     JUSTIN Bolom        "

## 2023-01-31 NOTE — ED PROVIDER NOTES
EMERGENCY DEPARTMENT ENCOUNTER      NAME: Dulce Pritchard  AGE: 77 year old female  YOB: 1945  MRN: [id number]  EVALUATION DATE & TIME: 2023  7:07 PM    PCP: Colin Christie    ED PROVIDER: Jamaal Nelson M.D.      Chief Complaint   Patient presents with     Shortness of Breath     Cough         FINAL IMPRESSION:  1.  Acute dyspnea.  2.  Acute hypoxia.  3.  Acute CHF.    ED COURSE & MEDICAL DECISION MAKIN:21 PM I met with the patient to gather history and to perform my initial exam. We discussed plans for the ED course, including diagnostic testing and treatment. PPE worn: cloth mask.  Patient with shortness of breath and cough for the last 2 weeks which has been increasing day by day.  Medics noted oxygenation 85% on room air.  Patient states vaccinated for influenza and COVID.  9:59 PM I rechecked and updated the patient.  Patient will congestive heart failure findings.  Patient will be admitted to cardiac telemetry inpatient.  Hospitalist is in agreement.  Patient and family in agreement.  BNP elevated at almost 1600.  D-dimer 1.02.  No evidence for PE.  Findings of congestive heart failure.      Pertinent Labs & Imaging studies reviewed. (See chart for details)      77 year old female presents to the Emergency Department for evaluation of shortness of breath.    At the conclusion of the encounter I discussed the results of all of the tests and the disposition. The questions were answered. The patient or family acknowledged understanding and was agreeable with the care plan.       Medical Decision Making    History:    Supplemental history from: Documented in chart, if applicable and Family Member/Significant Other    External Record(s) reviewed: Documented in chart, if applicable. and Other: Both inpatient and outpatient computer records reviewed.    Work Up:    Chart documentation includes differential considered and any EKGs or imaging independently interpreted by provider,  where specified.  Differential diagnosis includes viral syndrome, pneumonia, CHF, cardiac causes, etc.    In additional to work up documented, I considered the following work up: Documented in chart, if applicable.    External consultation:    Discussion of management with another provider: Patient will require admission given hypoxia and we will speak with admitting hospitalist.    Complicating factors:    Care impacted by chronic illness: N/A    Care affected by social determinants of health: N/A    Disposition considerations: Patient will require admission given hypoxia.              MEDICATIONS GIVEN IN THE EMERGENCY:  Medications   furosemide (LASIX) injection 20 mg (20 mg Intravenous Given 1/30/23 2107)   iopamidol (ISOVUE-370) solution 100 mL (100 mLs Intravenous Given 1/30/23 2046)       NEW PRESCRIPTIONS STARTED AT TODAY'S ER VISIT  New Prescriptions    No medications on file          =================================================================    HPI    Patient information was obtained from: Patient and daughter    Use of : N/A           Dulce Pritchard is a 77 year old female with a pertinent history of CHF who presents to this ED via EMS for evaluation of shortness of breath and cough.    Patient presents with shortness of breath. 85% on room air at home. She reports a productive cough with beige/tan phlegm. She endorses her symptoms have been worsening over the past 2 weeks with loss of appetite and increased falls. Patient denies blood in her cough, though her daughter thinks she has had some today. No fevers. Patient last vaccinated in November. She endorses new bilateral leg swelling. No tobacco, alcohol, or drug use.    Patient does not identify any waxing or waning symptoms otherwise, exacerbating or alleviating features,associated symptoms except as mentioned. She denies any pain related complaints.    REVIEW OF SYSTEMS   Review of Systems   Constitutional: Positive for appetite  "change. Negative for fever.   Respiratory: Positive for cough and shortness of breath.    Cardiovascular: Positive for leg swelling.   Musculoskeletal: Positive for gait problem.   All other systems reviewed and are negative.       PAST MEDICAL HISTORY:  No past medical history on file.    PAST SURGICAL HISTORY:  No past surgical history on file.        CURRENT MEDICATIONS:    atenolol (TENORMIN) 50 MG tablet  FLUoxetine (PROZAC) 20 MG capsule  gabapentin (NEURONTIN) 300 MG capsule  hydroxychloroquine (PLAQUENIL) 200 MG tablet  lisinopril-hydrochlorothiazide (ZESTORETIC) 10-12.5 MG tablet  traMADol (ULTRAM) 50 MG tablet  Vitamin D, Cholecalciferol, 25 MCG (1000 UT) TABS        ALLERGIES:  No Known Allergies    FAMILY HISTORY:  No family history on file.    SOCIAL HISTORY:   Social History     Socioeconomic History     Marital status:    No drugs, alcohol, or tobacco.    VITALS:  BP (!) 165/72   Pulse (!) 47   Temp 97.8  F (36.6  C) (Oral)   Resp 28   Ht 1.6 m (5' 3\")   Wt 98.9 kg (218 lb)   SpO2 97%   BMI 38.62 kg/m      PHYSICAL EXAM    Vital Signs:  BP (!) 165/72   Pulse (!) 47   Temp 97.8  F (36.6  C) (Oral)   Resp 28   Ht 1.6 m (5' 3\")   Wt 98.9 kg (218 lb)   SpO2 97%   BMI 38.62 kg/m    General:  On entering the room  is in no apparent distress.    Neck:  Neck supple with full range of motion and nontender.    Back:  Back and spine are nontender.  No costovertebral angle tenderness.    HEENT:  Oropharynx clear with moist mucous membranes.  HEENT unremarkable.  Perhaps 6 cm JVD.  Pulmonary:  Chest clear to auscultation without rhonchi rales or wheezing.    Cardiovascular:  Cardiac regular rate and rhythm without murmurs rubs or gallops.    Abdomen:  Abdomen soft nontender.  There is no rebound or guarding.    Muskuloskeletal:  she moves all 4 without any difficulty and has normal neurovascular exams.  Extremities without clubbing, cyanosis.  2-3+ ankle edema bilaterally.  Legs and calves are " nontender.    Neuro:  she is alert and oriented ×3 and moves all extremities symmetrically.    Psych:  Normal affect.    Skin:  Unremarkable and warm and dry.       LAB:  All pertinent labs reviewed and interpreted.  Labs Ordered and Resulted from Time of ED Arrival to Time of ED Departure   BASIC METABOLIC PANEL - Abnormal       Result Value    Sodium 142      Potassium 3.1 (*)     Chloride 102      Carbon Dioxide (CO2) 27      Anion Gap 13      Urea Nitrogen 26      Creatinine 1.03      Calcium 9.5      Glucose 114      GFR Estimate 56 (*)    B-TYPE NATRIURETIC PEPTIDE ([facility] ONLY) - Abnormal    BNP 1,597 (*)    D DIMER QUANTITATIVE - Abnormal    D-Dimer Quantitative 1.02 (*)    CBC WITH PLATELETS AND DIFFERENTIAL - Abnormal    WBC Count 6.4      RBC Count 3.80      Hemoglobin 11.9      Hematocrit 37.1      MCV 98      MCH 31.3      MCHC 32.1      RDW 14.3      Platelet Count 134 (*)     % Neutrophils 78      % Lymphocytes 13      % Monocytes 9      % Eosinophils 0      % Basophils 0      % Immature Granulocytes 0      NRBCs per 100 WBC 0      Absolute Neutrophils 5.0      Absolute Lymphocytes 0.8      Absolute Monocytes 0.6      Absolute Eosinophils 0.0      Absolute Basophils 0.0      Absolute Immature Granulocytes 0.0      Absolute NRBCs 0.0     TROPONIN I - Normal    Troponin I 0.12     INFLUENZA A/B & SARS-COV2 PCR MULTIPLEX - Normal    Influenza A PCR Negative      Influenza B PCR Negative      RSV PCR Negative      SARS CoV2 PCR Negative         RADIOLOGY:  Reviewed all pertinent imaging. Please see official radiology report.  CT Chest Pulmonary Embolism w Contrast   Final Result   IMPRESSION:      1.  No acute pulmonary embolism.   2.  Enlarged right and left atria. Severe atheromatous coronary calcifications.   3.  Small right trace left pleural effusions.   4.  Bronchial wall thickening and peribronchial and interstitial opacities in both lungs, most mixed interstitial and alveolar lung edema. Pattern  suggests acute congestive failure.      XR Chest Port 1 View   Final Result   IMPRESSION: Modest cardiomegaly. Slight interstitial prominence but no focal pneumonia or pleural effusion evident.   DJD both shoulders.                 EKG:    Atrial fibrillation 56, left axis deviation, nonspecific ST segment changes.  Waveforms very similar to previous EKG but A. fib is replaced previous normal sinus rhythm on May 25, 2005.    I have independently reviewed and interpreted the EKG(s) documented above.    PROCEDURES:         I, Nan Kidd, am serving as a scribe to document services personally performed by Dr. Nelson based on my observation and the provider's statements to me. I, Jamaal Nelson MD attest that Nan Kidd is acting in a scribe capacity, has observed my performance of the services and has documented them in accordance with my direction.    Jamaal Nelson M.D.  Emergency Medicine  [facility] EMERGENCY ROOM  [address] [address]  [phone]  Dept: [phone]     Jamaal Nelson MD  01/30/23 3562

## 2023-01-31 NOTE — PROGRESS NOTES
[facility] MEDICINE  PROGRESS NOTE     Code Status: Full Code       Identification/Summary:   Dulce Pritchard is a 77 year old female PMH significant for HTN, chronic joint pains, inflammatory arthritis, depression. 1/30 presented with complaints of cough, weakness, shortness of breath x1 month. Oxygen saturation of 85% on room air per EMS, blood pressure 172/73, heart rate 55.  BNP elevated at 1600. CT chest negative for PE, findings consistent with congestive heart failure, also severe coronary calcifications.  EKG looks like possibly intermittent heart block versus A. fib with slow ventricular response.  Initiated on Lasix.  Cardiology consultation.  Echo showed EF 55% but evidence of severe pulmonary hypertension and abnormal septal wall motion.  Palliative care consulted.    Assessment and plan:  Acute diastolic congestive heart failure  Acute hypoxemic respiratory failure  Severe coronary calcifications  Sinus arrhythmia  Essential hypertension  Questionable intermittent heart block, SSS?  Echocardiogram shows EF of 55% with septal wall motion abnormality consistent with conduction defect and severe pulmonary hypertension.  Receiving intravenous Lasix.    Negative troponins.  Procalcitonin 0.13.  No fevers.  Hold off on antibiotics.  Appreciate cardiology consultation.  Hold atenolol and HCTZ.    Continue lisinopril and dose increased to 20 mg daily.  Continue intravenous Lasix diuresis.  May need ischemic work-up prior to discharge.  Severe pulmonary hypertension  Changes noted on echocardiogram.  Patient reports she is a heavy snorer.  Has never had a sleep study before.  Hyperlipidemia  Given severe coronary calcification atorvastatin 20 mg daily started per cardiology.  Recheck lipid panel in 1 month.  Inflammatory arthritis  Followed by rheumatology  Takes Plaquenil, tramadol, gabapentin  Continue tramadol, decrease gabapentin dose to maximum recommended at 600 mg at  "bedtime  Hold Plaquenil given possible intermittent heart block  Hypokalemia  Replacement via protocol.  Goals of care  Patient notes she was doing fairly well up until about a month ago.  Since then has been progressively getting weaker and weaker.  Requires a power chair to get up and down stairs.  Agreeable to meet with palliative care services.    Anticoagulation   Enoxaparin (Lovenox) SQ    COVID-19 PCR influenza A/B/RSV negative from 1/30/2023  Noted.  Standard precautions.  Fluids: Saline lock  Pain meds: Tylenol as needed  Therapy: Once clinically improving will order PT OT and care management.  [place]:Not present  Lines: None       Current Diet  Orders Placed This Encounter      2 Gram Sodium Diet Other - please comment    Supplements  None    Barriers to Discharge: Intravenous diuresis, hypoxia    Disposition: Likely here at least 2 to 3 days    Clinically Significant Risk Factors Present on Admission        # Hypokalemia: Lowest K = 3.1 mmol/L in last 2 days, will replace as needed         # Thrombocytopenia: Lowest platelets = 121 in last 2 days, will monitor for bleeding   # Hypertension: home medication list includes antihypertensive(s)      # Obesity: Estimated body mass index is 38.62 kg/m  as calculated from the following:    Height as of this encounter: 1.6 m (5' 3\").    Weight as of this encounter: 98.9 kg (218 lb).           Interval History/Subjective:  Patient feels like her breathing is doing a little bit better but still coughing quite a bit.  Notes that she was doing fairly well up until about 1 month ago when her strength seemed to decrease.  Patient has been needing to use a power chair to navigate stairs.  No chest pain.  Notes that she does sleep in a recliner.  She is a heavy snorer.  Daughter present and supportive.  Patient and daughter agreeable to meeting with palliative care services.  Questions answered to verbalized satisfaction.    Physical Exam/Objective:  Temp:  [97.6  F (36.4 "  C)-98.4  F (36.9  C)] 98.4  F (36.9  C)  Pulse:  [43-73] 68  Resp:  [24-34] 26  BP: (120-178)/(57-81) 167/73  SpO2:  [89 %-99 %] 95 %  Wt Readings from Last 4 Encounters:   01/30/23 98.9 kg (218 lb)     Body mass index is 38.62 kg/m .    Constitutional: awake, alert, cooperative, no apparent distress, and appears stated age, fatigued and morbidly obese  ENT: Normocephalic, without obvious abnormality, atraumatic, external ears without lesions, oral pharynx with moist mucous membranes, tonsils without erythema or exudates, gums normal and good dentition.  Respiratory: Rhonchi at the bases.  Clear upper fields.  Cardiovascular: Irregular rhythm  GI: No scars, normal bowel sounds, soft, non-distended, non-tender, no masses palpated, no hepatosplenomegally  Skin: normal skin color, texture, turgor, no redness, warmth, or swelling, and no rashes  Musculoskeletal: Strength 3 out of 5 and equal.  2+ lower extremity edema bilaterally.    Neurologic: Cranial nerves II-XII are grossly intact. Sensory:  Sensory intact  Neuropsychiatric: General: normal, calm and normal eye contact Level of consciousness: alert / normal Affect: normal Orientation: oriented to self, place, time and situation Memory and insight: impaired:       Medications:   Personally Reviewed.  Medications       atorvastatin  20 mg Oral QPM     FLUoxetine  20 mg Oral Daily     furosemide  40 mg Intravenous Q12H     gabapentin  600 mg Oral At Bedtime     [Held by provider] hydroxychloroquine  200 mg Oral BID     lisinopril  10 mg Oral Daily     sodium chloride (PF)  3 mL Intracatheter Q8H     traMADol  100 mg Oral QAM       Data reviewed today: I personally reviewed all new medications, labs, imaging/diagnostics reports over the past 24 hours. Pertinent findings include:    Imaging:   Recent Results (from the past 24 hour(s))   XR Chest Port 1 View    Narrative    EXAM: XR CHEST PORT 1 VIEW  LOCATION: [facility]  DATE/TIME: 1/30/2023  8:00 PM    INDICATION: Short of breath and coughing  COMPARISON: None.      Impression    IMPRESSION: Modest cardiomegaly. Slight interstitial prominence but no focal pneumonia or pleural effusion evident.  DJD both shoulders.   CT Chest Pulmonary Embolism w Contrast    Narrative    EXAM: CT CHEST PULMONARY EMBOLISM W CONTRAST  LOCATION: [facility]  DATE/TIME: 1/30/2023 8:58 PM    INDICATION: Short of breath, elevated D dimer  COMPARISON: None.  TECHNIQUE: CT chest pulmonary angiogram during arterial phase injection of IV contrast. Multiplanar reformats and MIP reconstructions were performed. Dose reduction techniques were used.   CONTRAST: 100 mL [id number]    FINDINGS:  ANGIOGRAM CHEST: Pulmonary arteries are normal caliber and negative for pulmonary emboli. Minimal degenerative calcification of the aortic root. Mild patchy atheromatous calcifications of the aortic arch, proximal great vessels and descending thoracic   aorta. No thoracic aortic aneurysm.    LUNGS AND PLEURA: Small right pleural effusion and trace left pleural fluid. Related atelectasis. Anterior bowing of the membranous airways indicates imaging at the expiratory phase of the respiratory cycle. In addition there is symmetric airway wall   thickening. The lung parenchyma is heterogeneous attenuation. Patchy multifocal peribronchial inflammation is present in both lungs.    MEDIASTINUM: Both the right and left atria are enlarged. There are mild mitral annular calcifications. Cardiac ventricles are normal in size. No pericardial effusion. Esophagus is decompressed. There are mildly enlarged lymph nodes at both [place] and in   the AP window. No enlarged paratracheal or subcarinal lymph nodes.    CORONARY ARTERY CALCIFICATION: Severe.    UPPER ABDOMEN: Reflux of IV contrast into the distended hepatic veins and IVC. Splenomegaly.    MUSCULOSKELETAL: Diffuse thoracic spine degenerative osteophytes and disc space narrowing. No  aggressive or destructive bone lesions. Bilateral glenohumeral osteoarthrosis.      Impression    IMPRESSION:    1.  No acute pulmonary embolism.  2.  Enlarged right and left atria. Severe atheromatous coronary calcifications.  3.  Small right trace left pleural effusions.  4.  Bronchial wall thickening and peribronchial and interstitial opacities in both lungs, most mixed interstitial and alveolar lung edema. Pattern suggests acute congestive failure.   Echocardiogram Complete   Result Value    LVEF  50-55% (borderline)    [facility]    [id number]  [id number]  [id number]^PJ^DERRICK^[place] [address]     Name: STEFANIE DUMONT  MRN: [id number]  : 1945  Study Date: 2023 10:14 AM  Age: 77 yrs  Gender: Female  Patient [facility]  Reason For Study: Heart Failure  Ordering Physician: DERRICK OLIVA  Performed By: MYCHAL     BSA: 2.0 m2  Height: 63 in  Weight: 218 lb  HR: 51  BP: 167/73 mmHg  ______________________________________________________________________________  Procedure  Complete Portable Echo Adult. Definity (NDC #78122-341) given intravenously.  ______________________________________________________________________________  Interpretation Summary     The left ventricle is mildly dilated.  Left ventricular function is decreased. The ejection fraction is 50-55%  (borderline).  The left atrium is mildly dilated.  The right atrium is mildly dilated.  Severe (>55mmHg) pulmonary hypertension is present.  IVC diameter >2.1 cm collapsing <50% with sniff suggests a high RA pressure  estimated at 15 mmHg or greater.  There is mild to moderate (1-2+) mitral regurgitation.  ______________________________________________________________________________  Left Ventricle  The left ventricle is mildly dilated. Left ventricular function is decreased.  The ejection fraction is 50-55% (borderline). There is normal left ventricular  wall thickness. Left  ventricular diastolic function is abnormal. There is  borderline global hypokinesia of the left ventricle. Septal motion is  consistent with conduction abnormality.     Right Ventricle  Normal right ventricle size and systolic function.     Atria  The left atrium is mildly dilated. The right atrium is mildly dilated. There  is no color Doppler evidence of an atrial shunt.     Mitral Valve  Mitral valve leaflets appear normal. There is mild to moderate mitral annular  calcification. There is mild to moderate (1-2+) mitral regurgitation.     Tricuspid Valve  Tricuspid valve leaflets appear normal. There is mild (1+) tricuspid  regurgitation. The right ventricular systolic pressure is approximated at 49.7  mmHg plus the right atrial pressure. IVC diameter >2.1 cm collapsing <50% with  sniff suggests a high RA pressure estimated at 15 mmHg or greater. Severe  (>55mmHg) pulmonary hypertension is present.     Aortic Valve  There is mild trileaflet aortic sclerosis.     Pulmonic Valve  Normal pulmonic valve.     Vessels  Normal size ascending aorta.     Pericardium  There is no pericardial effusion.     ______________________________________________________________________________  MMode/2D Measurements & Calculations  IVSd: 0.94 cm  LVIDd: 5.2 cm  LVIDs: 3.4 cm  LVPWd: 1.00 cm  FS: 34.4 %     LV mass(C)d: 186.1 grams  LV mass(C)dI: 92.8 grams/m2  Ao root diam: 2.6 cm  LA dimension: 4.6 cm  asc Aorta Diam: 3.0 cm  LA/Ao: 1.8  LVOT diam: 1.7 cm  LVOT area: 2.3 cm2  LA Volume Indexed (AL/bp): 31.9 ml/m2  RWT: 0.38     Time Measurements  MM HR: 52.0 BPM     Doppler Measurements & Calculations  MV E max dominga: 130.0 cm/sec  MV A max dominga: 42.2 cm/sec  MV E/A: 3.1  MV dec time: 0.26 sec  LV V1 max PG: 3.2 mmHg  LV V1 max: 89.4 cm/sec  LV V1 VTI: 17.7 cm  SV(LVOT): 41.4 ml  SI(LVOT): 20.6 ml/m2  PA acc time: 0.11 sec  TR max dominga: 352.5 cm/sec  TR max P.7 mmHg  E/E' av.0  Lateral E/e': 16.6  Medial E/e': 21.3      ______________________________________________________________________________  Report approved by: Josselyn Serrano 01/31/2023 11:35 AM             Labs:  Echocardiogram Complete   Final Result      CT Chest Pulmonary Embolism w Contrast   Final Result   IMPRESSION:      1.  No acute pulmonary embolism.   2.  Enlarged right and left atria. Severe atheromatous coronary calcifications.   3.  Small right trace left pleural effusions.   4.  Bronchial wall thickening and peribronchial and interstitial opacities in both lungs, most mixed interstitial and alveolar lung edema. Pattern suggests acute congestive failure.      XR Chest Port 1 View   Final Result   IMPRESSION: Modest cardiomegaly. Slight interstitial prominence but no focal pneumonia or pleural effusion evident.   DJD both shoulders.        Recent Results (from the past 24 hour(s))   ECG 12-LEAD WITH MUSE ([facility])    Collection Time: 01/30/23  7:12 PM   Result Value Ref Range    Systolic Blood Pressure 139 mmHg    Diastolic Blood Pressure 100 mmHg    Ventricular Rate 56 BPM    Atrial Rate 51 BPM    PA Interval  ms    QRS Duration 102 ms     ms    QTc 546 ms    P Axis  degrees    R AXIS -45 degrees    T Axis -8 degrees    Interpretation ECG       Atrial fibrillation with slow ventricular response with premature ventricular or aberrantly conducted complexes  Left axis deviation  Nonspecific T wave abnormality  Abnormal ECG  When compared with ECG of 25-MAY-2005 07:37,  Atrial fibrillation has replaced Sinus rhythm  Nonspecific T wave abnormality, worse in Inferior leads  Nonspecific T wave abnormality now evident in Anterolateral leads  QT has lengthened  Confirmed by SEE ED PROVIDER NOTE FOR, ECG INTERPRETATION ([id number]),  Gwen Watts ([id number]) on 1/30/2023 9:05:47 PM     ECG 12-LEAD WITH MUSE ([facility])    Collection Time: 01/30/23  7:19 PM   Result Value Ref Range    Systolic Blood Pressure 172 mmHg    Diastolic Blood Pressure 73 mmHg    Ventricular  Rate 51 BPM    Atrial Rate 84 BPM    MD Interval  ms    QRS Duration 104 ms     ms    QTc 341 ms    P Axis  degrees    R AXIS -46 degrees    T Axis 61 degrees    Interpretation ECG       Atrial fibrillation with slow ventricular response with a competing junctional pacemaker  Left anterior fascicular block  Nonspecific T wave abnormality  Abnormal ECG  When compared with ECG of 30-JAN-2023 19:12,  Nonspecific T wave abnormality no longer evident in Inferior leads  QT has shortened  Confirmed by SEE ED PROVIDER NOTE FOR, ECG INTERPRETATION ([id number]),  Gwen Watts ([id number]) on 1/30/2023 9:06:01 PM     Extra Blue Top Tube    Collection Time: 01/30/23  7:26 PM   Result Value Ref Range    Hold Specimen JIC    Extra Red Top Tube    Collection Time: 01/30/23  7:26 PM   Result Value Ref Range    Hold Specimen JIC    Extra Green Top (Lithium Heparin) Tube    Collection Time: 01/30/23  7:26 PM   Result Value Ref Range    Hold Specimen JIC    Extra Purple Top Tube    Collection Time: 01/30/23  7:26 PM   Result Value Ref Range    Hold Specimen JIC    Extra Purple Top Tube    Collection Time: 01/30/23  7:26 PM   Result Value Ref Range    Hold Specimen JIC    Extra Green Top (Lithium Heparin) ON ICE    Collection Time: 01/30/23  7:26 PM   Result Value Ref Range    Hold Specimen JIC    Basic metabolic panel    Collection Time: 01/30/23  7:26 PM   Result Value Ref Range    Sodium 142 136 - 145 mmol/L    Potassium 3.1 (L) 3.5 - 5.0 mmol/L    Chloride 102 98 - 107 mmol/L    Carbon Dioxide (CO2) 27 22 - 31 mmol/L    Anion Gap 13 5 - 18 mmol/L    Urea Nitrogen 26 8 - 28 mg/dL    Creatinine 1.03 0.60 - 1.10 mg/dL    Calcium 9.5 8.5 - 10.5 mg/dL    Glucose 114 70 - 125 mg/dL    GFR Estimate 56 (L) >60 mL/min/1.73m2   B-Type Natriuretic Peptide ([facility] Only)    Collection Time: 01/30/23  7:26 PM   Result Value Ref Range    BNP 1,597 (H) 0 - 137 pg/mL   Troponin I    Collection Time: 01/30/23  7:26 PM   Result Value Ref Range     Troponin I 0.12 0.00 - 0.29 ng/mL   D dimer quantitative    Collection Time: 01/30/23  7:26 PM   Result Value Ref Range    D-Dimer Quantitative 1.02 (H) 0.00 - 0.50 ug/mL FEU   CBC with platelets and differential    Collection Time: 01/30/23  7:26 PM   Result Value Ref Range    WBC Count 6.4 4.0 - 11.0 10e3/uL    RBC Count 3.80 3.80 - 5.20 10e6/uL    Hemoglobin 11.9 11.7 - 15.7 g/dL    Hematocrit 37.1 35.0 - 47.0 %    MCV 98 78 - 100 fL    MCH 31.3 26.5 - 33.0 pg    MCHC 32.1 31.5 - 36.5 g/dL    RDW 14.3 10.0 - 15.0 %    Platelet Count 134 (L) 150 - 450 10e3/uL    % Neutrophils 78 %    % Lymphocytes 13 %    % Monocytes 9 %    % Eosinophils 0 %    % Basophils 0 %    % Immature Granulocytes 0 %    NRBCs per 100 WBC 0 <1 /100    Absolute Neutrophils 5.0 1.6 - 8.3 10e3/uL    Absolute Lymphocytes 0.8 0.8 - 5.3 10e3/uL    Absolute Monocytes 0.6 0.0 - 1.3 10e3/uL    Absolute Eosinophils 0.0 0.0 - 0.7 10e3/uL    Absolute Basophils 0.0 0.0 - 0.2 10e3/uL    Absolute Immature Granulocytes 0.0 <=0.4 10e3/uL    Absolute NRBCs 0.0 10e3/uL   Symptomatic Influenza A/B & SARS-CoV2 (COVID-19) Virus PCR Multiplex Nasopharyngeal    Collection Time: 01/30/23  7:49 PM    Specimen: Nasopharyngeal; Swab   Result Value Ref Range    Influenza A PCR Negative Negative    Influenza B PCR Negative Negative    RSV PCR Negative Negative    SARS CoV2 PCR Negative Negative   Troponin I    Collection Time: 01/31/23  1:10 AM   Result Value Ref Range    Troponin I 0.12 0.00 - 0.29 ng/mL   Magnesium    Collection Time: 01/31/23  1:10 AM   Result Value Ref Range    Magnesium 1.8 1.8 - 2.6 mg/dL   Basic metabolic panel    Collection Time: 01/31/23  7:22 AM   Result Value Ref Range    Sodium 144 136 - 145 mmol/L    Potassium 3.2 (L) 3.5 - 5.0 mmol/L    Chloride 103 98 - 107 mmol/L    Carbon Dioxide (CO2) 30 22 - 31 mmol/L    Anion Gap 11 5 - 18 mmol/L    Urea Nitrogen 20 8 - 28 mg/dL    Creatinine 0.91 0.60 - 1.10 mg/dL    Calcium 9.2 8.5 - 10.5 mg/dL     Glucose 90 70 - 125 mg/dL    GFR Estimate 65 >60 mL/min/1.73m2   Troponin I    Collection Time: 01/31/23  7:22 AM   Result Value Ref Range    Troponin I 0.11 0.00 - 0.29 ng/mL   CBC with platelets and differential    Collection Time: 01/31/23  7:22 AM   Result Value Ref Range    WBC Count 6.7 4.0 - 11.0 10e3/uL    RBC Count 3.58 (L) 3.80 - 5.20 10e6/uL    Hemoglobin 11.2 (L) 11.7 - 15.7 g/dL    Hematocrit 35.8 35.0 - 47.0 %     78 - 100 fL    MCH 31.3 26.5 - 33.0 pg    MCHC 31.3 (L) 31.5 - 36.5 g/dL    RDW 14.0 10.0 - 15.0 %    Platelet Count 121 (L) 150 - 450 10e3/uL    % Neutrophils 74 %    % Lymphocytes 14 %    % Monocytes 11 %    % Eosinophils 0 %    % Basophils 0 %    % Immature Granulocytes 1 %    NRBCs per 100 WBC 0 <1 /100    Absolute Neutrophils 5.0 1.6 - 8.3 10e3/uL    Absolute Lymphocytes 0.9 0.8 - 5.3 10e3/uL    Absolute Monocytes 0.7 0.0 - 1.3 10e3/uL    Absolute Eosinophils 0.0 0.0 - 0.7 10e3/uL    Absolute Basophils 0.0 0.0 - 0.2 10e3/uL    Absolute Immature Granulocytes 0.0 <=0.4 10e3/uL    Absolute NRBCs 0.0 10e3/uL   Magnesium    Collection Time: 01/31/23  7:22 AM   Result Value Ref Range    Magnesium 1.8 1.8 - 2.6 mg/dL   Echocardiogram Complete    Collection Time: 01/31/23 11:19 AM   Result Value Ref Range    LVEF  50-55% (borderline)        Pending Labs:  Unresulted Labs Ordered in the Past 30 Days of this Admission     No orders found for last 31 day(s).          Bhupinder Sanchez MD  [facility]  Phone: #[phone]

## 2023-01-31 NOTE — UTILIZATION REVIEW
Admission Status; Secondary Review Determination   Under the authority of the Utilization Management Committee, the utilization review process indicated a secondary review on Dulce Pritchard. The review outcome is based on review of the medical records, discussions with staff, and applying clinical experience noted on the date of the review.   (x) Inpatient Status Appropriate - This patient's medical care is consistent with medical management for inpatient care and reasonable inpatient medical practice.     RATIONALE FOR DETERMINATION   Dulce Pritchard is a 77 yr old female with HTN who presented with cough, SOB and weakness.  BNP 1,597 and CT with bronchial wall thickening and peribronchial and interstitial opacities in both lungs, most mixed interstitial and alveolar lung edema. Pattern suggests acute congestive failure.  Patient seen by cardiology who agrees to continue IV furosemide twice daily and close electrolyte monitoring as patient with hypokalemia due to diuresis.  Patient also was hypoxic to 85% on RA on presentation and continues on 2L NC.    At the time of admission with the information available to the attending physician more than 2 nights Hospital complex care was anticipated, based on patient risk of adverse outcome if treated as outpatient and complex care required. Inpatient admission is appropriate based on the Medicare guidelines.   The information on this document is developed by the utilization review team in order for the business office to ensure compliance. This only denotes the appropriateness of proper admission status and does not reflect the quality of care rendered.   The definitions of Inpatient Status and Observation Status used in making the determination above are those provided in the CMS Coverage Manual, Chapter 1 and Chapter 6, section 70.4.   Sincerely,   Larisa Figueroa MD  Utilization Review  Physician Advisor  [facility]

## 2023-01-31 NOTE — H&P
"[facility] [place] MEDICINE ADMISSION HISTORY AND PHYSICAL     Assessment & Plan       Dulce Pritchard is a 77 year old female who presented with complaints of cough, weakness, shortness of breath.    Medical history is notable for hypertension, chronic joint pains.  Her home med list includes atenolol, fluoxetine, gabapentin, Plaquenil, lisinopril/HCTZ, tramadol.    Initial evaluation revealed oxygen saturation of 85% on room air per EMS, blood pressure elevated at 172/73, heart rate slow at 55 initially.  Potassium slightly low at 3.1, BNP elevated at 1600, influenza, RSV, COVID-negative.  CT chest negative for PE, findings consistent with congestive heart failure, also severe coronary calcifications.  EKG looks like possibly intermittent heart block versus A. fib with slow ventricular response.    Initial treatment included Lasix, Ativan.      Assessment and plan:  Acute congestive heart failure  Essential hypertension  Severe coronary calcifications  Questionable intermittent heart block, SSS?  Check echocardiogram  Monitor on telemetry  Consult cardiology  Trend troponins  Hold atenolol for now  Continue lisinopril without HCTZ  Continue to treat with IV Lasix  Hold Plaquenil given possible intermittent heart block    Inflammatory arthritis  Followed by rheumatology  Takes Plaquenil, tramadol, gabapentin  Continue tramadol, decrease gabapentin dose to maximum recommended at 600 mg at bedtime  Hold Plaquenil    Clinically Significant Risk Factors Present on Admission        # Hypokalemia: Lowest K = 3.1 mmol/L in last 2 days, will replace as needed           # Hypertension: home medication list includes antihypertensive(s)      # Obesity: Estimated body mass index is 38.62 kg/m  as calculated from the following:    Height as of this encounter: 1.6 m (5' 3\").    Weight as of this encounter: 98.9 kg (218 lb).             DVTP: Mechanical Prophylaxis/ Sequential Compression Devices  Code " Status: No Order  Disposition: Inpatient   Expected LOS: 2 days   Goals for the hospitalization: Improvement in shortness of breath  Diet: Cardiac  Fluids: None    Disposition Plan      Expected Discharge Date: 02/01/2023               Chief Complaint  weakness, cough, shortness of breath     HISTORY   Dulce Pritchard is a 77 year old female who presented with complaints of shortness of breath.    Per ED provider:  Dulce Pritchard is a 77 year old female with a pertinent history of CHF who presents to this ED via EMS for evaluation of shortness of breath and cough.     Patient presents with shortness of breath. 85% on room air at home. She reports a productive cough with beige/tan phlegm. She endorses her symptoms have been worsening over the past 2 weeks with loss of appetite and increased falls. Patient denies blood in her cough, though her daughter thinks she has had some today. No fevers. Patient last vaccinated in November. She endorses new bilateral leg swelling. No tobacco, alcohol, or drug use.     Patient does not identify any waxing or waning symptoms otherwise, exacerbating or alleviating features,associated symptoms except as mentioned. She denies any pain related complaints.    She describes a cough and shortness of breath for about 2 weeks.  She has been sleeping in a chair due to shortness of breath when she lays down.  No fever.  Denies any significant chest pain.  She does have some lower extremity swelling.  Denies any dysuria.  No abdominal pain, nausea, vomiting.     Past Medical History     History of essential hypertension, inflammatory arthritis    Surgical History   History of cholecystectomy  Family History    No family history on file.   Social History        Allergies   No Known Allergies  Prior to Admission Medications      Prior to Admission Medications   Prescriptions Last Dose Informant Patient Reported? Taking?   FLUoxetine (PROZAC) 20 MG capsule 1/29/2023  Yes Yes   Sig: Take 20  mg by mouth daily   Vitamin D, Cholecalciferol, 25 MCG (1000 UT) TABS 1/29/2023  Yes Yes   Sig: Take 1,000 Units by mouth daily   atenolol (TENORMIN) 50 MG tablet 1/29/2023  Yes Yes   Sig: Take 50 mg by mouth daily   gabapentin (NEURONTIN) 300 MG capsule 1/29/2023  Yes Yes   Sig: Take 1,200 mg by mouth At Bedtime   hydroxychloroquine (PLAQUENIL) 200 MG tablet 1/29/2023 at am  Yes Yes   Sig: Take 200 mg by mouth 2 times daily   lisinopril-hydrochlorothiazide (ZESTORETIC) 10-12.5 MG tablet 1/29/2023  Yes Yes   Sig: Take 1 tablet by mouth daily   traMADol (ULTRAM) 50 MG tablet 1/29/2023 at am  Yes Yes   Sig: Take 100 mg by mouth every morning      Facility-Administered Medications: None      Review of Systems     A 12 point comprehensive review of systems was negative except as noted above in HPI.    PHYSICAL EXAMINATION     Vitals      Temp:  [97.6  F (36.4  C)-97.8  F (36.6  C)] 97.6  F (36.4  C)  Pulse:  [47-73] 47  Resp:  [24-34] 28  BP: (120-178)/(57-73) 130/60  SpO2:  [89 %-99 %] 96 %    Examination   Physical Exam:    Gen: no acute distress, comfortable, slightly drowsy  ENT: no scleral icterus  Pulm: lungs are at apices  CV: Heart rate is irregular at times, slow and regular at other times, heart rate during my encounter varied from the 40s to the 90s.  GI: abdomen is soft, non-tender, non-distended with active bowel sounds.  MSK: no obvious deformities of the extremities  Derm: Not pale, no jaundice  Psych: appropriate affect      Pertinent Radiology     Radiology Results:   Recent Results (from the past 24 hour(s))   XR Chest Port 1 View    Narrative    EXAM: XR CHEST PORT 1 VIEW  LOCATION: [facility]  DATE/TIME: 1/30/2023 8:00 PM    INDICATION: Short of breath and coughing  COMPARISON: None.      Impression    IMPRESSION: Modest cardiomegaly. Slight interstitial prominence but no focal pneumonia or pleural effusion evident.  DJD both shoulders.   CT Chest Pulmonary Embolism w  Contrast    Narrative    EXAM: CT CHEST PULMONARY EMBOLISM W CONTRAST  LOCATION: [facility]  DATE/TIME: 1/30/2023 8:58 PM    INDICATION: Short of breath, elevated D dimer  COMPARISON: None.  TECHNIQUE: CT chest pulmonary angiogram during arterial phase injection of IV contrast. Multiplanar reformats and MIP reconstructions were performed. Dose reduction techniques were used.   CONTRAST: 100 mL [id number]    FINDINGS:  ANGIOGRAM CHEST: Pulmonary arteries are normal caliber and negative for pulmonary emboli. Minimal degenerative calcification of the aortic root. Mild patchy atheromatous calcifications of the aortic arch, proximal great vessels and descending thoracic   aorta. No thoracic aortic aneurysm.    LUNGS AND PLEURA: Small right pleural effusion and trace left pleural fluid. Related atelectasis. Anterior bowing of the membranous airways indicates imaging at the expiratory phase of the respiratory cycle. In addition there is symmetric airway wall   thickening. The lung parenchyma is heterogeneous attenuation. Patchy multifocal peribronchial inflammation is present in both lungs.    MEDIASTINUM: Both the right and left atria are enlarged. There are mild mitral annular calcifications. Cardiac ventricles are normal in size. No pericardial effusion. Esophagus is decompressed. There are mildly enlarged lymph nodes at both [place] and in   the AP window. No enlarged paratracheal or subcarinal lymph nodes.    CORONARY ARTERY CALCIFICATION: Severe.    UPPER ABDOMEN: Reflux of IV contrast into the distended hepatic veins and IVC. Splenomegaly.    MUSCULOSKELETAL: Diffuse thoracic spine degenerative osteophytes and disc space narrowing. No aggressive or destructive bone lesions. Bilateral glenohumeral osteoarthrosis.      Impression    IMPRESSION:    1.  No acute pulmonary embolism.  2.  Enlarged right and left atria. Severe atheromatous coronary calcifications.  3.  Small right trace left pleural  effusions.  4.  Bronchial wall thickening and peribronchial and interstitial opacities in both lungs, most mixed interstitial and alveolar lung edema. Pattern suggests acute congestive failure.     EKG Results: personally reviewed.     Advance Care Planning        Bonifacio Garcia DO  [facility]   Phone: #[phone]

## 2023-02-01 ENCOUNTER — APPOINTMENT (OUTPATIENT)
Dept: OCCUPATIONAL THERAPY | Facility: CLINIC | Age: 78
DRG: 291 | End: 2023-02-01
Payer: COMMERCIAL

## 2023-02-01 PROBLEM — R06.2 WHEEZING: Status: ACTIVE | Noted: 2023-02-01

## 2023-02-01 LAB
HOLD SPECIMEN: NORMAL
MAGNESIUM SERPL-MCNC: 1.6 MG/DL (ref 1.8–2.6)
POTASSIUM BLD-SCNC: 3.4 MMOL/L (ref 3.5–5)
POTASSIUM BLD-SCNC: 3.6 MMOL/L (ref 3.5–5)

## 2023-02-01 PROCEDURE — 250N000012 HC RX MED GY IP 250 OP 636 PS 637: Performed by: FAMILY MEDICINE

## 2023-02-01 PROCEDURE — 99418 PROLNG IP/OBS E/M EA 15 MIN: CPT | Performed by: CLINICAL NURSE SPECIALIST

## 2023-02-01 PROCEDURE — 97535 SELF CARE MNGMENT TRAINING: CPT | Mod: GO

## 2023-02-01 PROCEDURE — 99232 SBSQ HOSP IP/OBS MODERATE 35: CPT | Performed by: INTERNAL MEDICINE

## 2023-02-01 PROCEDURE — 250N000009 HC RX 250: Performed by: FAMILY MEDICINE

## 2023-02-01 PROCEDURE — 84132 ASSAY OF SERUM POTASSIUM: CPT | Performed by: FAMILY MEDICINE

## 2023-02-01 PROCEDURE — 94640 AIRWAY INHALATION TREATMENT: CPT

## 2023-02-01 PROCEDURE — 250N000011 HC RX IP 250 OP 636: Performed by: HOSPITALIST

## 2023-02-01 PROCEDURE — 250N000013 HC RX MED GY IP 250 OP 250 PS 637: Performed by: INTERNAL MEDICINE

## 2023-02-01 PROCEDURE — 250N000011 HC RX IP 250 OP 636: Performed by: FAMILY MEDICINE

## 2023-02-01 PROCEDURE — 97110 THERAPEUTIC EXERCISES: CPT | Mod: GO

## 2023-02-01 PROCEDURE — 250N000013 HC RX MED GY IP 250 OP 250 PS 637: Performed by: HOSPITALIST

## 2023-02-01 PROCEDURE — 94640 AIRWAY INHALATION TREATMENT: CPT | Mod: 76

## 2023-02-01 PROCEDURE — 99233 SBSQ HOSP IP/OBS HIGH 50: CPT | Performed by: FAMILY MEDICINE

## 2023-02-01 PROCEDURE — 36415 COLL VENOUS BLD VENIPUNCTURE: CPT | Performed by: FAMILY MEDICINE

## 2023-02-01 PROCEDURE — 99223 1ST HOSP IP/OBS HIGH 75: CPT | Performed by: CLINICAL NURSE SPECIALIST

## 2023-02-01 PROCEDURE — 120N000013 HC R&B IMCU

## 2023-02-01 PROCEDURE — 83735 ASSAY OF MAGNESIUM: CPT | Performed by: FAMILY MEDICINE

## 2023-02-01 PROCEDURE — 250N000013 HC RX MED GY IP 250 OP 250 PS 637: Performed by: FAMILY MEDICINE

## 2023-02-01 PROCEDURE — 999N000157 HC STATISTIC RCP TIME EA 10 MIN

## 2023-02-01 RX ORDER — DOXYCYCLINE 100 MG/1
100 CAPSULE ORAL 2 TIMES DAILY
Status: DISCONTINUED | OUTPATIENT
Start: 2023-02-01 | End: 2023-02-06 | Stop reason: HOSPADM

## 2023-02-01 RX ORDER — GUAIFENESIN 600 MG/1
1200 TABLET, EXTENDED RELEASE ORAL 2 TIMES DAILY
Status: DISCONTINUED | OUTPATIENT
Start: 2023-02-01 | End: 2023-02-06 | Stop reason: HOSPADM

## 2023-02-01 RX ORDER — ALBUTEROL SULFATE 0.83 MG/ML
2.5 SOLUTION RESPIRATORY (INHALATION) EVERY 4 HOURS PRN
Status: DISCONTINUED | OUTPATIENT
Start: 2023-02-01 | End: 2023-02-06 | Stop reason: HOSPADM

## 2023-02-01 RX ORDER — POTASSIUM CHLORIDE 1.5 G/1.58G
40 POWDER, FOR SOLUTION ORAL ONCE
Status: COMPLETED | OUTPATIENT
Start: 2023-02-01 | End: 2023-02-01

## 2023-02-01 RX ORDER — PREDNISONE 20 MG/1
40 TABLET ORAL DAILY
Status: DISCONTINUED | OUTPATIENT
Start: 2023-02-01 | End: 2023-02-06 | Stop reason: HOSPADM

## 2023-02-01 RX ORDER — LISINOPRIL 20 MG/1
20 TABLET ORAL DAILY
Status: DISCONTINUED | OUTPATIENT
Start: 2023-02-01 | End: 2023-02-06 | Stop reason: HOSPADM

## 2023-02-01 RX ORDER — IPRATROPIUM BROMIDE AND ALBUTEROL SULFATE 2.5; .5 MG/3ML; MG/3ML
3 SOLUTION RESPIRATORY (INHALATION)
Status: DISCONTINUED | OUTPATIENT
Start: 2023-02-01 | End: 2023-02-06 | Stop reason: HOSPADM

## 2023-02-01 RX ORDER — ASPIRIN 81 MG/1
81 TABLET ORAL DAILY
Status: DISCONTINUED | OUTPATIENT
Start: 2023-02-01 | End: 2023-02-06 | Stop reason: HOSPADM

## 2023-02-01 RX ADMIN — DOXYCYCLINE 100 MG: 100 CAPSULE ORAL at 21:07

## 2023-02-01 RX ADMIN — GUAIFENESIN 1200 MG: 600 TABLET ORAL at 21:07

## 2023-02-01 RX ADMIN — GUAIFENESIN 10 ML: 100 SOLUTION ORAL at 14:16

## 2023-02-01 RX ADMIN — ENOXAPARIN SODIUM 40 MG: 100 INJECTION SUBCUTANEOUS at 17:04

## 2023-02-01 RX ADMIN — FUROSEMIDE 40 MG: 10 INJECTION, SOLUTION INTRAVENOUS at 06:23

## 2023-02-01 RX ADMIN — POTASSIUM CHLORIDE 40 MEQ: 1.5 POWDER, FOR SOLUTION ORAL at 06:23

## 2023-02-01 RX ADMIN — IPRATROPIUM BROMIDE AND ALBUTEROL SULFATE 3 ML: 2.5; .5 SOLUTION RESPIRATORY (INHALATION) at 19:42

## 2023-02-01 RX ADMIN — GUAIFENESIN 10 ML: 100 SOLUTION ORAL at 08:32

## 2023-02-01 RX ADMIN — Medication 3 MG: at 21:07

## 2023-02-01 RX ADMIN — FLUOXETINE 20 MG: 20 CAPSULE ORAL at 08:29

## 2023-02-01 RX ADMIN — ATORVASTATIN CALCIUM 20 MG: 10 TABLET, FILM COATED ORAL at 21:06

## 2023-02-01 RX ADMIN — GUAIFENESIN 10 ML: 100 SOLUTION ORAL at 01:58

## 2023-02-01 RX ADMIN — PREDNISONE 40 MG: 20 TABLET ORAL at 14:16

## 2023-02-01 RX ADMIN — GABAPENTIN 600 MG: 300 CAPSULE ORAL at 21:07

## 2023-02-01 RX ADMIN — FUROSEMIDE 40 MG: 10 INJECTION, SOLUTION INTRAVENOUS at 17:04

## 2023-02-01 RX ADMIN — ASPIRIN 81 MG: 81 TABLET, COATED ORAL at 08:29

## 2023-02-01 RX ADMIN — IPRATROPIUM BROMIDE AND ALBUTEROL SULFATE 3 ML: 2.5; .5 SOLUTION RESPIRATORY (INHALATION) at 13:38

## 2023-02-01 RX ADMIN — GUAIFENESIN 10 ML: 100 SOLUTION ORAL at 21:17

## 2023-02-01 RX ADMIN — LISINOPRIL 20 MG: 20 TABLET ORAL at 08:29

## 2023-02-01 ASSESSMENT — ACTIVITIES OF DAILY LIVING (ADL)
ADLS_ACUITY_SCORE: 49

## 2023-02-01 NOTE — PROGRESS NOTES
SP[facility]UAL HE[facility] SERVICES ([facility])  SPIRITUAL ASSESSMENT Progress Note  [facility]. [address]    REFERRAL SOURCE: Consult     Dulce had not yet received communion or anointing at the time of my visit. I have left a message for the Staff  to see her when next on-site.     PLAN: Left a sticky note message for the Staff .      Colleen Chin, Ph.D., [facility]      [facility] available 24/7 for emergency requests/referrals, either by having the on-call  paged or by entering an ASAP/STAT consult in Epic (this will also page the on-call ).

## 2023-02-01 NOTE — PROGRESS NOTES
[facility] MEDICINE  PROGRESS NOTE     Code Status: Full Code       Identification/Summary:   Dulce Pritchard is a 77 year old female PMH significant for HTN, chronic joint pains, inflammatory arthritis, depression. 1/30 presented with complaints of cough, weakness, shortness of breath x1 month. Oxygen saturation of 85% on room air per EMS, blood pressure 172/73, heart rate 55.  BNP elevated at 1600. CT chest negative for PE, findings consistent with congestive heart failure, also severe coronary calcifications.  EKG looks like possibly intermittent heart block versus A. fib with slow ventricular response.  Initiated on Lasix.  Cardiology consultation.  Echo showed EF 55% but evidence of severe pulmonary hypertension and abnormal septal wall motion.  Palliative care consulted.  2/1 adding DuoNebs, prednisone and doxycycline for COPD exacerbation.  Monitor response.  Likely here 2-3 more days.    Assessment and plan:  Acute diastolic congestive heart failure  Acute hypoxemic respiratory failure  Severe coronary calcifications  Sinus arrhythmia  Essential hypertension  Questionable intermittent heart block, SSS?  Echocardiogram shows EF of 55% with septal wall motion abnormality consistent with conduction defect and severe pulmonary hypertension.  Receiving intravenous Lasix.    Negative troponins.  Procalcitonin 0.13.  No fevers.  Hold off on antibiotics.  Hold atenolol and HCTZ.    Continue lisinopril and dose increased to 20 mg daily.  Continue intravenous Lasix diuresis.  May need ischemic work-up prior to discharge.  Appreciate cardiology consultation.  Expiratory wheezing  No prior history of COPD but was a longtime smoker.  Wheezing noted today on exam.  We will empirically treat with duo nebs 4 times daily, prednisone 40 mg daily and doxycycline per COPD protocols.  Guaifenesin as needed.  Monitor response.  Severe pulmonary hypertension  Changes noted on  "echocardiogram.  Patient reports she is a heavy snorer.  Has never had a sleep study before.  Recommend outpatient sleep study.  Hyperlipidemia  Given severe coronary calcification atorvastatin 20 mg daily started per cardiology.  Recheck lipid panel in 1 month.  Inflammatory arthritis  Followed by rheumatology as an outpatient.  Takes Plaquenil, tramadol, gabapentin  Continue tramadol, decrease gabapentin dose to maximum recommended at 600 mg at bedtime  Hold Plaquenil given possible intermittent heart block.  Hypokalemia  Hypomagnesemia  Replacement via protocols.  Goals of care  Patient notes she was doing fairly well up until about a month ago.  Since then has been progressively getting weaker and weaker.  Requires a power chair to get up and down stairs.  Appreciated meeting with palliative care services.     Anticoagulation   Enoxaparin (Lovenox) SQ     COVID-19 PCR influenza A/B/RSV negative from 1/30/2023  Noted.  Standard precautions.  Fluids: Saline lock  Pain meds: Tylenol as needed  Therapy:  PT OT consult for 2/2.  May need TCU.  [place]:Not present  Lines: None       Current Diet  Orders Placed This Encounter      2 Gram Sodium Diet Other - please comment    Supplements  None    Barriers to Discharge: Hypoxia, intravenous diuresis, steroids and DuoNebs    Disposition: Likely here at least 2-3 more days    Clinically Significant Risk Factors        # Hypokalemia: Lowest K = 3.1 mmol/L in last 2 days, will replace as needed     # Hypomagnesemia: Lowest Mg = 1.6 mg/dL in last 2 days, will replace as needed     # Thrombocytopenia: Lowest platelets = 121 in last 2 days, will monitor for bleeding         # Obesity: Estimated body mass index is 35.96 kg/m  as calculated from the following:    Height as of this encounter: 1.6 m (5' 3\").    Weight as of this encounter: 92.1 kg (203 lb)., PRESENT ON ADMISSION         Interval History/Subjective:  Patient feeling a little bit better today but still quite weak.  " Cough less severe.  Still requiring supplemental oxygen.  Daughter present and supportive.  Denies any prior history of emphysema, COPD.  Was a regular smoker until approximately 15 years ago.  Questions answered to verbalized satisfaction.    Physical Exam/Objective:  Temp:  [98.2  F (36.8  C)-98.9  F (37.2  C)] 98.5  F (36.9  C)  Pulse:  [53-78] 54  Resp:  [18-26] 18  BP: (133-190)/(59-81) 142/65  SpO2:  [93 %-97 %] 93 %  Wt Readings from Last 4 Encounters:   02/01/23 92.1 kg (203 lb)     Body mass index is 35.96 kg/m .    Constitutional: fatigued, alert, cooperative, no apparent distress, appears stated age and moderately obese, weak voice.  Not coughing as frequently as previously.  ENT: Normocephalic, without obvious abnormality, atraumatic, external ears without lesions, oral pharynx with moist mucous membranes, tonsils without erythema or exudates, gums normal and good dentition.  Respiratory: Expiratory wheezes heard throughout.  Rhonchi as well.  Cardiovascular: Normal apical impulse, regular rate and rhythm, normal S1 and S2, no S3 or S4, and no murmur noted  GI: No scars, normal bowel sounds, soft, non-distended, non-tender, no masses palpated, no hepatosplenomegally  Skin: normal skin color, texture, turgor, no redness, warmth, or swelling, and no rashes  Musculoskeletal: There is no redness, warmth, or swelling of the joints.  Motor strength is 4 out of 5 all extremities bilaterally.  Tone is normal.  Trace lower extremity edema bilaterally  Neurologic: Cranial nerves II-XII are grossly intact. Sensory:  Sensory intact  Neuropsychiatric: General: normal, calm and normal eye contact Level of consciousness: alert / normal Affect: normal Orientation: oriented to self, place, time and situation Memory and insight: normal, memory for past and recent events intact and thought process normal      Medications:   Personally Reviewed.  Medications       aspirin  81 mg Oral Daily     atorvastatin  20 mg Oral QPM      enoxaparin ANTICOAGULANT  40 mg Subcutaneous Q24H     FLUoxetine  20 mg Oral Daily     furosemide  40 mg Intravenous Q12H     gabapentin  600 mg Oral At Bedtime     [Held by provider] hydroxychloroquine  200 mg Oral BID     lisinopril  20 mg Oral Daily     sodium chloride (PF)  3 mL Intracatheter Q8H     traMADol  100 mg Oral QAM       Data reviewed today: I personally reviewed all new medications, labs, imaging/diagnostics reports over the past 24 hours. Pertinent findings include:    Imaging:   No results found for this or any previous visit (from the past 24 hour(s)).    Labs:  Echocardiogram Complete   Final Result      CT Chest Pulmonary Embolism w Contrast   Final Result   IMPRESSION:      1.  No acute pulmonary embolism.   2.  Enlarged right and left atria. Severe atheromatous coronary calcifications.   3.  Small right trace left pleural effusions.   4.  Bronchial wall thickening and peribronchial and interstitial opacities in both lungs, most mixed interstitial and alveolar lung edema. Pattern suggests acute congestive failure.      XR Chest Port 1 View   Final Result   IMPRESSION: Modest cardiomegaly. Slight interstitial prominence but no focal pneumonia or pleural effusion evident.   DJD both shoulders.        Recent Results (from the past 24 hour(s))   Potassium    Collection Time: 01/31/23  2:47 PM   Result Value Ref Range    Potassium 3.2 (L) 3.5 - 5.0 mmol/L   Creatinine    Collection Time: 01/31/23  2:47 PM   Result Value Ref Range    Creatinine 0.87 0.60 - 1.10 mg/dL    GFR Estimate 69 >60 mL/min/1.73m2   Procalcitonin    Collection Time: 01/31/23  3:14 PM   Result Value Ref Range    Procalcitonin 0.13 0.00 - 0.49 ng/mL   Extra Purple Top Tube    Collection Time: 01/31/23  3:14 PM   Result Value Ref Range    Hold Specimen JI    Potassium    Collection Time: 01/31/23 10:44 PM   Result Value Ref Range    Potassium 3.9 3.5 - 5.0 mmol/L   Magnesium    Collection Time: 02/01/23  5:19 AM   Result  Value Ref Range    Magnesium 1.6 (L) 1.8 - 2.6 mg/dL   Potassium    Collection Time: 02/01/23  5:19 AM   Result Value Ref Range    Potassium 3.4 (L) 3.5 - 5.0 mmol/L   Potassium    Collection Time: 02/01/23 11:20 AM   Result Value Ref Range    Potassium 3.6 3.5 - 5.0 mmol/L   Extra Purple Top Tube    Collection Time: 02/01/23 11:20 AM   Result Value Ref Range    Hold Specimen JIC        Pending Labs:  Unresulted Labs Ordered in the Past 30 Days of this Admission     No orders found from 12/31/2022 to 1/31/2023.          Bhupinder Sanchez MD  [facility]  Phone: #[phone]

## 2023-02-01 NOTE — PLAN OF CARE
Goal Outcome Evaluation: ongoing.       Plan of Care Reviewed With: patient    Overall Patient Progress: improvingOverall Patient Progress: improving     Pt A&Ox4. Denies chest pain. States SOB is the same. Denies pain. On 3-4L of O2 via NC. Purewick in place. Repositioning every 2 hours. Blanchable redness on coccyx- mepilex in place. Tele reads NSR to SB to junctional- asymptomatic. Non-productive cough. Will continue to monitor.       Problem: Heart Failure  Goal: Optimal Coping  Outcome: Progressing  Goal: Optimal Cardiac Output  Outcome: Progressing  Goal: Stable Heart Rate and Rhythm  Outcome: Progressing  Goal: Optimal Functional Ability  Outcome: Progressing  Intervention: Optimize Functional Ability  Recent Flowsheet Documentation  Taken 1/31/2023 2343 by Johanna Mayers RN  Activity Management: activity adjusted per tolerance  Taken 1/31/2023 1930 by Johanna Mayers RN  Activity Management: activity adjusted per tolerance  Goal: Fluid and Electrolyte Balance  Outcome: Progressing  Goal: Improved Oral Intake  Outcome: Progressing  Goal: Effective Oxygenation and Ventilation  Outcome: Progressing  Intervention: Promote Airway Secretion Clearance  Recent Flowsheet Documentation  Taken 1/31/2023 2343 by Johanna Mayers RN  Cough And Deep Breathing: done with encouragement  Activity Management: activity adjusted per tolerance  Taken 1/31/2023 1930 by Johanna Mayers RN  Cough And Deep Breathing: done with encouragement  Activity Management: activity adjusted per tolerance  Intervention: Optimize Oxygenation and Ventilation  Recent Flowsheet Documentation  Taken 1/31/2023 2343 by Johanna Mayers, RN  Head of Bed (HOB) Positioning: HOB at 30-45 degrees  Goal: Effective Breathing Pattern During Sleep  Outcome: Progressing  Intervention: Monitor and Manage Obstructive Sleep Apnea  Recent Flowsheet Documentation  Taken 1/31/2023 2343 by Johanna Mayers, RN  Medication Review/Management:  medications reviewed  Taken 1/31/2023 1930 by Johanna Mayers, RN  Medication Review/Management: medications reviewed

## 2023-02-01 NOTE — PLAN OF CARE
Problem: Fatigue  Goal: Improved Activity Tolerance  Outcome: Progressing     Problem: Gas Exchange Impaired  Goal: Optimal Gas Exchange  Outcome: Progressing     Patient alert, oriented, calm, cooperative. Denies pain. Vitally stable on 3L oxy mask. Patient is a mouth breather. Oxygen improves via oxy mask vs. NC. Have been using bedpan d/t heavy assist of 3 prior. Q2 turning. Nutritional supplements ordered.

## 2023-02-01 NOTE — PLAN OF CARE
Goal Outcome Evaluation:      Plan of Care Reviewed With: patient, child    Overall Patient Progress: improvingOverall Patient Progress: improving    Outcome Evaluation: Remains on 3L O2 via NC. SB on tele. Other VSS. Cough congested, non-productive. Up to bedside commode with A3. Purewick in place. CORE consult placed. Daughter at bedside and supportive.      Problem: Plan of Care - These are the overarching goals to be used throughout the patient stay.    Goal: Optimal Comfort and Wellbeing  Outcome: Progressing     Problem: Fatigue  Goal: Improved Activity Tolerance  Outcome: Progressing  Intervention: Promote Improved Energy  Recent Flowsheet Documentation  Taken 1/31/2023 1601 by Gracie Hein, RN  Activity Management: activity encouraged     Problem: Gas Exchange Impaired  Goal: Optimal Gas Exchange  Outcome: Progressing     Problem: Heart Failure  Goal: Fluid and Electrolyte Balance  Outcome: Progressing

## 2023-02-01 NOTE — PROGRESS NOTES
[facility] HEART CARE [address], [place] [address]   [email or web address]   OFFICE: [phone]            Impression and Plan     1.  Shortness of breath/pulmonary edema.  B-type natriuretic peptide elevated at 1597 pg/mL consistent with some degree of myocardial strain.  CT radiographic imaging on admission suggests pulmonary edema.  Echocardiogram this admission revealed normal left ventricular systolic performance with ejection fraction of 50-55% though with findings consistent with impaired diastolic filling.    Dulce had 3.3 L urine output through the day yesterday and additional 0.7 liters urine output thus far this morning.  Suspect some continued fluid retention.  Would advocate continuing current diuretic regimen for now.     2.  Coronary artery disease.  Dulce has known coronary artery disease by virtue of CT scan this admission revealing severe coronary calcification.  Troponins x3 on presentation suggest against recent myocardial injury.    Would advocate initiation of daily 81 mg aspirin.     3.  Arrhythmia.  ECGs reviewed.  Sinus rhythm with sinus arrhythmia/bradycardia and intermittent high junctional escape.    Telemetry this morning sinus rhythm with heart rate in the 50s.  Continue to defer ß-blocker therapy, atenolol, which patient had been on prior to admission.  Will simply continue to follow and monitor/normalize electrolytes as needed.     4.  Mitral insufficiency.  This is felt mild-moderate in degree on echocardiogram this admission.    5.  Hypertension.  Blood pressure has been trending high since presentation.  Continue lisinopril 20 mg daily (increased from 10 mg yesterday).     6.  Dyslipidemia.  Lipid profile 22 November 2022 revealed LDL 79 mg/dL and HDL 47 mg/dL. Although lipid profile fairly reasonable, given evidence of severe coronary calcification would advocate trying to suppress LDL less than 70 mg/dL if possible.  "  Continue atorvastatin 20 mg daily (initiated this admission).     Plan was discussed with daughter, Irais, yesterday.     Primary Cardiologist: Dr. Hank Wooten (initial consultation this admission)       Anupama Cardona states that subjectively she does feel somewhat improved today in terms of her breathing.  Continues to deny chest pain    Cardiac Diagnostics   Telemetry (personally reviewed): Sinus rhythm.    Echocardiogram 31 January 2023:  Mild left ventricular enlargement with normal left ventricular systolic performance.  Ejection fraction 50-55%.  Abnormal septal motion consistent with conduction abnormality.  Mild-moderate mitral insufficiency.  Normal right ventricular size and systolic performance.  Mild biatrial enlargement.  Right ventricular systolic pressure relative to right atrial pressure is mildly increased.  The pulmonary artery pressure is estimated to be 45-50 mmHg plus right atrial pressure.    Twelve-lead ECG (personally reviewed) 30 January 2023: Sinus rhythm.  PACs.       Physical Examination       BP (!) 159/67 (BP Location: Left arm)   Pulse 53   Temp 98.2  F (36.8  C) (Oral)   Resp 22   Ht 1.6 m (5' 3\")   Wt 92.1 kg (203 lb)   SpO2 94%   BMI 35.96 kg/m          Vitals:    01/30/23 1917 02/01/23 0206   Weight: 98.9 kg (218 lb) 92.1 kg (203 lb)              Intake/Output Summary (Last 24 hours) at 2/1/2023 0620  Last data filed at 2/1/2023 0428  Gross per 24 hour   Intake 0 ml   Output 3775 ml   Net -3775 ml       The patient is alert. Sclerae are anicteric. Mucosal membranes are moist.No significant adenopathy/thyromegally appreciated. Lungs with some expiratory wheezes and scattered fine crackles. On cardiovascular exam, the patient has a regular S1 and S2. Abdomen is soft and non-tender. Extremities reveal no clubbing, cyanosis           Imaging      CT scan of chest 30 January 2023:  No acute pulmonary embolism.  Enlarged right and left atria. Severe " atheromatous coronary calcifications.  Small right trace left pleural effusions.  Bronchial wall thickening and peribronchial and interstitial opacities in both lungs, most mixed interstitial and alveolar lung edema. Pattern suggests acute congestive failure.  Coronary calcification: Severe     Chest radiograph 30 January 2023:  Modest cardiomegaly.   Slight interstitial prominence but no focal pneumonia or pleural effusion evident.  DJD both shoulders.     Lab Results     Recent Labs   Lab 02/01/23  0519 01/31/23  2244 01/31/23  1447 01/31/23  0722 01/30/23 1926   WBC  --   --   --  6.7 6.4   HGB  --   --   --  11.2* 11.9   MCV  --   --   --  100 98   PLT  --   --   --  121* 134*   NA  --   --   --  144 142   POTASSIUM 3.4* 3.9 3.2* 3.2* 3.1*   CHLORIDE  --   --   --  103 102   CO2  --   --   --  30 27   BUN  --   --   --  20 26   CR  --   --  0.87 0.91 1.03   ANIONGAP  --   --   --  11 13   EDGAR  --   --   --  9.2 9.5   GLC  --   --   --  90 114     Recent Labs   Lab Test 01/30/23 1926   BNP 1,597*     No lab results found in last 7 days.    Invalid input(s): LDLCALC  Lab Results   Component Value Date     01/31/2023    CO2 30 01/31/2023    BUN 20 01/31/2023     Lab Results   Component Value Date    WBC 6.7 01/31/2023    HGB 11.2 01/31/2023    HCT 35.8 01/31/2023     01/31/2023     01/31/2023     Lab Results   Component Value Date    CHOL 142 11/22/2022    TRIG 78 11/22/2022    HDL 47 11/22/2022     No results found for: INR  Lab Results   Component Value Date    TROPONINI 0.11 01/31/2023    TROPONINI 0.12 01/31/2023    TROPONINI 0.12 01/30/2023     No results found for: TSH        Current Inpatient Scheduled Medications   Scheduled Meds:    atorvastatin  20 mg Oral QPM     enoxaparin ANTICOAGULANT  40 mg Subcutaneous Q24H     FLUoxetine  20 mg Oral Daily     furosemide  40 mg Intravenous Q12H     gabapentin  600 mg Oral At Bedtime     [Held by provider] hydroxychloroquine  200 mg Oral BID      lisinopril  10 mg Oral Daily     potassium chloride  40 mEq Oral or Feeding Tube Once     sodium chloride (PF)  3 mL Intracatheter Q8H     traMADol  100 mg Oral QAM     Continuous Infusions:         Medications Prior to Admission   Prior to Admission medications    Medication Sig Start Date End Date Taking? Authorizing Provider   atenolol (TENORMIN) 50 MG tablet Take 50 mg by mouth daily   Yes Unknown, Entered By History   FLUoxetine (PROZAC) 20 MG capsule Take 20 mg by mouth daily   Yes Unknown, Entered By History   gabapentin (NEURONTIN) 300 MG capsule Take 1,200 mg by mouth At Bedtime   Yes Unknown, Entered By History   hydroxychloroquine (PLAQUENIL) 200 MG tablet Take 200 mg by mouth 2 times daily   Yes Unknown, Entered By History   lisinopril-hydrochlorothiazide (ZESTORETIC) 10-12.5 MG tablet Take 1 tablet by mouth daily   Yes Unknown, Entered By History   traMADol (ULTRAM) 50 MG tablet Take 100 mg by mouth every morning   Yes Unknown, Entered By History   Vitamin D, Cholecalciferol, 25 MCG (1000 UT) TABS Take 1,000 Units by mouth daily   Yes Unknown, Entered By History

## 2023-02-01 NOTE — CONSULTS
[facility]  Palliative Care Consultation Note    Patient: Dulce Pritchard  Date of Admission:  1/30/2023    Requesting Clinician / Team: Dr. Sanchez  Reason for consult: Goals of care  Patient and family support    Code status: Full Code    Impression & Recommendations:  SYMPTOM ASSESSMENT  1.  Shortness of breath secondary to pulmonary edema, CHF exacerbation, pulmonary hypertension  -Management per cardiology and HMS  -Continue Lasix 40 mg IV every 12 hours.  Patient diuresing well.  -Continue oxygen to keep saturations greater than 92%.    2.  Anorexia and weight loss-75 pound weight loss since summer 2022.  15 pound weight loss since Christmas 2022.  - Nutrition consult ordered.    3. Generalized weakness 2/2 FTT, weight loss, CHF, CAD  - Recommend PT and OT when more stable.  - Reposition for comfort.    ADVANCED CARE PLANNING  - Code status: FULL CODE  - Surrogate decision makers: Irais   - No HCD or POLST on file.    GOALS OF CARE DISCUSSION  - Goals are life prolonging.  - Discussions regarding goals of care ongoing.  - Patient struggling with new knowledge of severe CAD and lacks insight in significance of her current condition.    Discussed with Giovani Silva RN and Dr. Sanchez.      Thank you for the opportunity to participate in the care of this patient and family. Our team: will continue to follow.     During regular M-F work hours -- if you are not sure who specifically to contact -- please contact us by calling us directly at the Palliative Care Main Line [phone]    After regular work hours and on weekends/holidays, you can leave a message at [phone]      History of Present Illness:  History gathered today from: patient, family/loved ones, medical chart  Dulce Pritchard is a 77 year old female admitted to [facility] on 1/30/2023 with complaints of increased SOB, cough and weakness. BNP 1597 on admission and imaging demonstrating pulmonary edema. Now  found to have severe CAD on CT scan. Echocardiogram shows LVEF 50-55%, mild-moderate mitral insufficiency, abnormal septal motion and elevated pulmonary artery pressure. PMH of depression and HTN.    Today, the patient was seen for:  Goals of care discussion; patient and family support, SOB, anorexia, weight loss    Prognosis, Goals, & Planning:      Functional Status just prior to hospitalization: 3 (Capable of only limited self-care; needs help with ADLs; in bed/chair >50% of waking hours)      Prognosis, Goals, and/or Advance Care Planning were addressed today: Yes        Summary/Comments: Met inpatient room with patient and her daughter Irais.  Reviewed Palliative Care is specialized medical care for people with serious illness, focused on providing patients with relief from symptoms, pain and stresses of serious illness.  The goal is to improve quality of life for both the patient and the family.  Attempted to assess patient's understanding of her medical condition.  She is quite fatigued and dyspneic.  Voice is wet sounding and whispers.  In reviewing the new information of severe CAD, patient appears to lack clinical insight into the severity of her current physical condition.  At this time, goals are restorative and life-prolonging.  Discussed benefit versus burden of resuscitation and intubation.  Patient would want to be resuscitated and intubated at this time.    Stepped out of the room to offer some support to daughter, Irais.  She feels that her mother has been declining over the last year and especially over the last 7 months.  She acknowledges that patient has had a 75 pound weight loss since summer 2022.  Patient has lost 2 of her brothers, 2 nephews and niece in law all in the last year.  Patient is only eating about 200-500 rosales a day and has been spending more and more time sitting in chair and laying in bed.  Weakness has been progressive.  Knowledge of heart disease equates to aziza Irais  and her sister, Em, have seen over the last year.  Irais expresses concern that patient is nearing end-of-life.  Stressed that presently goals are to continue to work on diuresis and see how her condition changes.    Irais also describes that patient has been at times in conflict with various family members and that there is some mental health concern.  This is been a long life situation for the patient.  Feels that her depression is currently controlled but that other potential mental health issues impede patient's ability to adequately understand the complexity of medical treatments and problems.  She does feel that her mother can make her own decisions yet this may be complicated by not having a higher health literacy.      Patient's decision making preferences: independently          Patient has decision-making capacity today for complex decisions: Partial (needs assistance with complex decisions)            I have concerns about the patient/family's health literacy today: Yes           Patient has a completed Health Care Directive: No.       Code status: Full Code    Coping, Meaning, & Spirituality:   Mood, coping, and/or meaning in the context of serious illness were addressed today: Yes  Summary/Comments: Patient became slightly anxious when talking about her medical problems and treatments here.    Key Palliative Symptom Data:  # Pain severity the last 12 hours: none  # Dyspnea severity the last 12 hours: low  # Nausea severity the last 12 hours: none  # Anxiety severity the last 12 hours: low  Anorexia: Moderate-severe  Weight loss: 75 pounds since summer 2022; 15 pounds since Christmas 2022  Weakness: Severe    ROS:  Comprehensive ROS is reviewed and is negative except as here & per HPI: Patient denies dizziness, headache, blurred vision, difficulty urinating or having BM or numbness or tingling in hands or feet.     Past Medical History:  Past Medical History:   Diagnosis Date     Depressive disorder       Hypertension         Past Surgical History:  History reviewed. No pertinent surgical history.      Family History:  History reviewed. No pertinent family history.     Social:     Living situation: Lives in her own home with a family member who lives in basement, a cousin.    Key family / caregivers: daughterIrais     Allergies:  No Known Allergies     Medications:  I have reviewed this patient's medication profile and medications from this hospitalization.     Lab Results: personally reviewed.   Lab Results   Component Value Date     2023    CO2 30 2023    BUN 20 2023     Lab Results   Component Value Date    WBC 6.7 2023    HGB 11.2 2023    HCT 35.8 2023     2023     2023     AST   Date Value Ref Range Status   2022 44 (H) 10 - 35 U/L Final     ALT   Date Value Ref Range Status   2022 25 10 - 35 U/L Final     Alkaline Phosphatase   Date Value Ref Range Status   2022 95 35 - 104 U/L Final     Albumin   Date Value Ref Range Status   2022 3.7 3.5 - 5.2 g/dL Final   2021 3.4 (L) 3.5 - 5.0 g/dL Final       RADIOLOGY:  Echocardiogram Complete    Result Date: 2023  [id number]^PJ^[place]^[place] [address]  Name: STEFANIE DUMONT MRN: [id number] : 1945 Study Date: 2023 10:14 AM Age: 77 yrs Gender: Female Patient Location: [facility] Reason For Study: Heart Failure Ordering Physician: DERRICK OLIVA Performed By: MYCHAL  BSA: 2.0 m2 Height: 63 in Weight: 218 lb HR: 51 BP: 167/73 mmHg ______________________________________________________________________________ Procedure Complete Portable Echo Adult. Definity (NDC #22236-264) given intravenously. ______________________________________________________________________________ Interpretation Summary  The left ventricle is mildly dilated. Left ventricular function is decreased. The ejection  fraction is 50-55% (borderline). The left atrium is mildly dilated. The right atrium is mildly dilated. Severe (>55mmHg) pulmonary hypertension is present. IVC diameter >2.1 cm collapsing <50% with sniff suggests a high RA pressure estimated at 15 mmHg or greater. There is mild to moderate (1-2+) mitral regurgitation. ______________________________________________________________________________ Left Ventricle The left ventricle is mildly dilated. Left ventricular function is decreased. The ejection fraction is 50-55% (borderline). There is normal left ventricular wall thickness. Left ventricular diastolic function is abnormal. There is borderline global hypokinesia of the left ventricle. Septal motion is consistent with conduction abnormality.  Right Ventricle Normal right ventricle size and systolic function.  Atria The left atrium is mildly dilated. The right atrium is mildly dilated. There is no color Doppler evidence of an atrial shunt.  Mitral Valve Mitral valve leaflets appear normal. There is mild to moderate mitral annular calcification. There is mild to moderate (1-2+) mitral regurgitation.  Tricuspid Valve Tricuspid valve leaflets appear normal. There is mild (1+) tricuspid regurgitation. The right ventricular systolic pressure is approximated at 49.7 mmHg plus the right atrial pressure. IVC diameter >2.1 cm collapsing <50% with sniff suggests a high RA pressure estimated at 15 mmHg or greater. Severe (>55mmHg) pulmonary hypertension is present.  Aortic Valve There is mild trileaflet aortic sclerosis.  Pulmonic Valve Normal pulmonic valve.  Vessels Normal size ascending aorta.  Pericardium There is no pericardial effusion.  ______________________________________________________________________________ MMode/2D Measurements & Calculations IVSd: 0.94 cm LVIDd: 5.2 cm LVIDs: 3.4 cm LVPWd: 1.00 cm FS: 34.4 %  LV mass(C)d: 186.1 grams LV mass(C)dI: 92.8 grams/m2 Ao root diam: 2.6 cm LA dimension: 4.6 cm asc  Aorta Diam: 3.0 cm LA/Ao: 1.8 LVOT diam: 1.7 cm LVOT area: 2.3 cm2 LA Volume Indexed (AL/bp): 31.9 ml/m2 RWT: 0.38  Time Measurements MM HR: 52.0 BPM  Doppler Measurements & Calculations MV E max dominga: 130.0 cm/sec MV A max dominga: 42.2 cm/sec MV E/A: 3.1 MV dec time: 0.26 sec LV V1 max PG: 3.2 mmHg LV V1 max: 89.4 cm/sec LV V1 VTI: 17.7 cm SV(LVOT): 41.4 ml SI(LVOT): 20.6 ml/m2 PA acc time: 0.11 sec TR max dominga: 352.5 cm/sec TR max P.7 mmHg E/E' av.0 Lateral E/e': 16.6 Medial E/e': 21.3  ______________________________________________________________________________ Report approved by: Josselyn Serrano 2023 11:35 AM       XR Chest Port 1 View    Result Date: 2023  EXAM: XR CHEST PORT 1 VIEW LOCATION: [facility] DATE/TIME: 2023 8:00 PM INDICATION: Short of breath and coughing COMPARISON: None.     IMPRESSION: Modest cardiomegaly. Slight interstitial prominence but no focal pneumonia or pleural effusion evident. DJD both shoulders.    CT Chest Pulmonary Embolism w Contrast    Result Date: 2023  EXAM: CT CHEST PULMONARY EMBOLISM W CONTRAST LOCATION: [facility] DATE/TIME: 2023 8:58 PM INDICATION: Short of breath, elevated D dimer COMPARISON: None. TECHNIQUE: CT chest pulmonary angiogram during arterial phase injection of IV contrast. Multiplanar reformats and MIP reconstructions were performed. Dose reduction techniques were used. CONTRAST: 100 mL Isovue-370 FINDINGS: ANGIOGRAM CHEST: Pulmonary arteries are normal caliber and negative for pulmonary emboli. Minimal degenerative calcification of the aortic root. Mild patchy atheromatous calcifications of the aortic arch, proximal great vessels and descending thoracic aorta. No thoracic aortic aneurysm. LUNGS AND PLEURA: Small right pleural effusion and trace left pleural fluid. Related atelectasis. Anterior bowing of the membranous airways indicates imaging at the expiratory phase of  the respiratory cycle. In addition there is symmetric airway wall thickening. The lung parenchyma is heterogeneous attenuation. Patchy multifocal peribronchial inflammation is present in both lungs. MEDIASTINUM: Both the right and left atria are enlarged. There are mild mitral annular calcifications. Cardiac ventricles are normal in size. No pericardial effusion. Esophagus is decompressed. There are mildly enlarged lymph nodes at both [place] and in the AP window. No enlarged paratracheal or subcarinal lymph nodes. CORONARY ARTERY CALCIFICATION: Severe. UPPER ABDOMEN: Reflux of IV contrast into the distended hepatic veins and IVC. Splenomegaly. MUSCULOSKELETAL: Diffuse thoracic spine degenerative osteophytes and disc space narrowing. No aggressive or destructive bone lesions. Bilateral glenohumeral osteoarthrosis.     IMPRESSION: 1.  No acute pulmonary embolism. 2.  Enlarged right and left atria. Severe atheromatous coronary calcifications. 3.  Small right trace left pleural effusions. 4.  Bronchial wall thickening and peribronchial and interstitial opacities in both lungs, most mixed interstitial and alveolar lung edema. Pattern suggests acute congestive failure.        Physical Exam:  Temp:  [98.2  F (36.8  C)-98.9  F (37.2  C)] 98.4  F (36.9  C)  Pulse:  [53-78] 53  Resp:  [22-26] 22  BP: (133-190)/(59-81) 160/70  SpO2:  [94 %-97 %] 94 %  Wt Readings from Last 3 Encounters:   02/01/23 92.1 kg (203 lb)      General appearance: alert, appears stated age, cooperative, fatigued and mild distress  Head: Normocephalic, without obvious abnormality, atraumatic  Eyes: Lids and lashes normal.  Sclera anicteric  Nose: no discharge  Throat: lips, mucosa, and tongue normal; teeth and gums normal  Lungs: Labored, accessory muscle use noted.  Prolonged expiratory phase.  Wheezes and coarse anteriorly bilaterally  Heart: Regular rate,  irregular rhythm  Abdomen: Soft, nontender, nondistended, positive bowel sounds  Extremities: 1+  lower extremity edema noted  Pulses: 2+ and symmetric  Neurologic: Alert.  Oriented x4    TTS: I have personally spent a total of 90 minutes on unit in review of medical record, consultation with the medical providers and assessment of patient today, with more than 50% of this time spent in counseling, coordination of care, and discussion with patient and family re: diagnostic results, prognosis, symptom management, risks and benefits of management options, and development of plan of care as noted above    BRE Sánchez, CNS  Palliative Care  [phone]

## 2023-02-01 NOTE — CONSULTS
"CLINICAL NUTRITION SERVICES - ASSESSMENT NOTE     Nutrition Prescription    RECOMMENDATIONS FOR MDs/PROVIDERS TO ORDER:  Encourage oral intake    Malnutrition Status:    Severe malnutrition in the context of chronic illness    Recommendations already ordered by Registered Dietitian (RD):  Chocolate ensure enlive and cherry gelatein daily    Future/Additional Recommendations:  Monitor oral intake and weight trends     REASON FOR ASSESSMENT  Dulce Pritchard is a/an 77 year old female assessed by the dietitian for Provider Order - anorexia, 75 lb unintentional weight loss since summer 2022    NUTRITION HISTORY  Patient did not speak much during RD visit and family member at bedside provided much of the nutrition history. Patient has had a very poor appetite since last January. There have been many traumatic deaths in the family recently and the patient has emotional distress which has resulted in poor appetite. Family member reports that the patient is only eating very small bites of meals which amount to 200-500 kcals per day. RD recommended a nutrition supplement and patient agreed to have ensure enlive and Gelatein. She likes chocolate, vanilla, cherry, and coconut flavored foods. RD gave brief diet education about nutrition supplements available at stores and approaches to increase oral intake.     Skin intact, last BM 2/1    CURRENT NUTRITION ORDERS  Diet: 2 g Sodium  Intake/Tolerance: 25% intake noted    LABS  Labs reviewed - Mg 1.6(L)    MEDICATIONS  Medications reviewed - lipitor, lasix, prednisone    ANTHROPOMETRICS  Height: 160 cm (5' 3\")  Most Recent Weight: 92.1 kg (203 lb)    IBW: 52 kg  BMI: Obesity Grade II BMI 35-39.9  Weight History: No other current weight history on file  Wt Readings from Last 10 Encounters:   02/01/23 92.1 kg (203 lb)   Dosing Weight: 62 kg adjusted weight    ASSESSED NUTRITION NEEDS  Estimated Energy Needs: 5396-3768 kcals/day (25 - 30 kcals/kg)  Justification: " Maintenance  Estimated Protein Needs: 62-74 grams protein/day (1 - 1.2 grams of pro/kg)  Justification: Maintenance  Estimated Fluid Needs: (1 mL/kcal)   Justification: Maintenance    MALNUTRITION  % Intake: </=50% for >/= 1 month (severe)  % Weight Loss: Unable to assess d/t lack of weight records  Subcutaneous Fat Loss: None observed  Muscle Loss: None observed  Fluid Accumulation/Edema: Moderate  Malnutrition Diagnosis: Severe malnutrition in the context of chronic illness    NUTRITION DIAGNOSIS  Malnutrition related to poor appetite caused by emotional distress as evidenced by severe weight loss reported by patient, decreased oral intake for >1 year, and moderate edema     INTERVENTIONS  Implementation  Chocolate ensure enlive and cherry gelatein daily    Goals  Patient to consume % of nutritionally adequate meal trays TID, or the equivalent with supplements/snacks.  Maintain weight     Monitoring/Evaluation  Monitor oral intake and weight trends  Brittany Couch RDN, LD

## 2023-02-01 NOTE — PROGRESS NOTES
Care Management Follow Up    Length of Stay (days): 2    Expected Discharge Date: 02/02/2023     Concerns to be Addressed: discharge planning     Patient plan of care discussed at interdisciplinary rounds: Yes    Anticipated Discharge Disposition:  TCU     Anticipated Discharge Services:  TCU  Anticipated Discharge DME:  Per treatment team     Patient/family educated on Medicare website which has current facility and service quality ratings:  Not at this time   Education Provided on the Discharge Plan:  yes  Patient/Family in Agreement with the Plan:  yes    Referrals Placed by CM/KEERTHI:  Awaiting final recs and choices from family   Private pay costs discussed: not applicable at this time     Additional Information:  SW reviewed palliative care note indicating Pt's wishes for life prolonging treatment.  OT recs TCU.  PT consulted, pending recommendations.  KEERTHI met with Pt and daughter, Irais to discuss discharge planning and TCU.  Pt and daughter were receptive to plan for TCU.  Unable to finish discussion with Pt / daughter due to additional visitor being present.  CM to follow up for TCU choices.       JUSTIN Bloom

## 2023-02-01 NOTE — CONSULTS
Clinical Nutrition Services - Education Note      Assessed learning needs, learning preferences, and willingness to learn    Nutrition Education (Content): 2g sodium diet  a) Provided handout on heart failure nutrition therapy from the nutrition case manual.  b) Discussed low sodium diet guidelines and food recommendations.     Nutrition Education (Application):  a) Discussed eating habits and recommended alternative food choices    Patient verbalizes understanding of diet by stating that she will look over the handout provided and will not salt her foods.    Anticipate good compliance    Diet Education - refer to Education Flowsheet    Brittany Couch RDN, LD

## 2023-02-02 ENCOUNTER — APPOINTMENT (OUTPATIENT)
Dept: OCCUPATIONAL THERAPY | Facility: CLINIC | Age: 78
DRG: 291 | End: 2023-02-02
Payer: COMMERCIAL

## 2023-02-02 ENCOUNTER — APPOINTMENT (OUTPATIENT)
Dept: PHYSICAL THERAPY | Facility: CLINIC | Age: 78
DRG: 291 | End: 2023-02-02
Attending: FAMILY MEDICINE
Payer: COMMERCIAL

## 2023-02-02 DIAGNOSIS — F33.9 EPISODE OF RECURRENT MAJOR DEPRESSIVE DISORDER, UNSPECIFIED DEPRESSION EPISODE SEVERITY (H): ICD-10-CM

## 2023-02-02 DIAGNOSIS — I50.31 ACUTE HEART FAILURE WITH PRESERVED EJECTION FRACTION (HFPEF) (H): Primary | ICD-10-CM

## 2023-02-02 DIAGNOSIS — I27.20 PULMONARY HYPERTENSION (H): ICD-10-CM

## 2023-02-02 LAB
MAGNESIUM SERPL-MCNC: 1.6 MG/DL (ref 1.8–2.6)
POTASSIUM BLD-SCNC: 3.4 MMOL/L (ref 3.5–5)
POTASSIUM BLD-SCNC: 4.1 MMOL/L (ref 3.5–5)

## 2023-02-02 PROCEDURE — 94640 AIRWAY INHALATION TREATMENT: CPT

## 2023-02-02 PROCEDURE — 250N000013 HC RX MED GY IP 250 OP 250 PS 637: Performed by: INTERNAL MEDICINE

## 2023-02-02 PROCEDURE — 84132 ASSAY OF SERUM POTASSIUM: CPT | Performed by: STUDENT IN AN ORGANIZED HEALTH CARE EDUCATION/TRAINING PROGRAM

## 2023-02-02 PROCEDURE — 83735 ASSAY OF MAGNESIUM: CPT | Performed by: FAMILY MEDICINE

## 2023-02-02 PROCEDURE — 97535 SELF CARE MNGMENT TRAINING: CPT | Mod: GO

## 2023-02-02 PROCEDURE — 250N000011 HC RX IP 250 OP 636: Performed by: HOSPITALIST

## 2023-02-02 PROCEDURE — 999N000032 HC STATISTIC CHRONIC DISEASE SPECIALIST RT CONSULT

## 2023-02-02 PROCEDURE — 97162 PT EVAL MOD COMPLEX 30 MIN: CPT | Mod: GP

## 2023-02-02 PROCEDURE — 84132 ASSAY OF SERUM POTASSIUM: CPT | Performed by: FAMILY MEDICINE

## 2023-02-02 PROCEDURE — 120N000013 HC R&B IMCU

## 2023-02-02 PROCEDURE — 94640 AIRWAY INHALATION TREATMENT: CPT | Mod: 76

## 2023-02-02 PROCEDURE — 99233 SBSQ HOSP IP/OBS HIGH 50: CPT | Performed by: CLINICAL NURSE SPECIALIST

## 2023-02-02 PROCEDURE — 999N000033 HC STATISTIC CHRONIC PULMONARY DISEASE SPECIALIST

## 2023-02-02 PROCEDURE — 250N000011 HC RX IP 250 OP 636: Performed by: FAMILY MEDICINE

## 2023-02-02 PROCEDURE — 36415 COLL VENOUS BLD VENIPUNCTURE: CPT | Performed by: FAMILY MEDICINE

## 2023-02-02 PROCEDURE — 99232 SBSQ HOSP IP/OBS MODERATE 35: CPT | Performed by: STUDENT IN AN ORGANIZED HEALTH CARE EDUCATION/TRAINING PROGRAM

## 2023-02-02 PROCEDURE — 250N000013 HC RX MED GY IP 250 OP 250 PS 637: Performed by: FAMILY MEDICINE

## 2023-02-02 PROCEDURE — 250N000009 HC RX 250: Performed by: FAMILY MEDICINE

## 2023-02-02 PROCEDURE — 97530 THERAPEUTIC ACTIVITIES: CPT | Mod: GP

## 2023-02-02 PROCEDURE — 999N000157 HC STATISTIC RCP TIME EA 10 MIN

## 2023-02-02 PROCEDURE — 99232 SBSQ HOSP IP/OBS MODERATE 35: CPT | Performed by: INTERNAL MEDICINE

## 2023-02-02 PROCEDURE — 250N000013 HC RX MED GY IP 250 OP 250 PS 637: Performed by: HOSPITALIST

## 2023-02-02 PROCEDURE — 97110 THERAPEUTIC EXERCISES: CPT | Mod: GO

## 2023-02-02 PROCEDURE — 36415 COLL VENOUS BLD VENIPUNCTURE: CPT | Performed by: STUDENT IN AN ORGANIZED HEALTH CARE EDUCATION/TRAINING PROGRAM

## 2023-02-02 PROCEDURE — 250N000012 HC RX MED GY IP 250 OP 636 PS 637: Performed by: FAMILY MEDICINE

## 2023-02-02 RX ORDER — FUROSEMIDE 40 MG
40 TABLET ORAL DAILY
Status: DISCONTINUED | OUTPATIENT
Start: 2023-02-03 | End: 2023-02-06 | Stop reason: HOSPADM

## 2023-02-02 RX ORDER — POTASSIUM CHLORIDE 1500 MG/1
40 TABLET, EXTENDED RELEASE ORAL ONCE
Status: COMPLETED | OUTPATIENT
Start: 2023-02-02 | End: 2023-02-02

## 2023-02-02 RX ADMIN — ASPIRIN 81 MG: 81 TABLET, COATED ORAL at 08:18

## 2023-02-02 RX ADMIN — IPRATROPIUM BROMIDE AND ALBUTEROL SULFATE 3 ML: 2.5; .5 SOLUTION RESPIRATORY (INHALATION) at 20:48

## 2023-02-02 RX ADMIN — FLUOXETINE 20 MG: 20 CAPSULE ORAL at 08:18

## 2023-02-02 RX ADMIN — DOXYCYCLINE 100 MG: 100 CAPSULE ORAL at 08:17

## 2023-02-02 RX ADMIN — ENOXAPARIN SODIUM 40 MG: 100 INJECTION SUBCUTANEOUS at 17:06

## 2023-02-02 RX ADMIN — LISINOPRIL 20 MG: 20 TABLET ORAL at 08:17

## 2023-02-02 RX ADMIN — POTASSIUM CHLORIDE 40 MEQ: 1500 TABLET, EXTENDED RELEASE ORAL at 07:07

## 2023-02-02 RX ADMIN — FUROSEMIDE 40 MG: 10 INJECTION, SOLUTION INTRAVENOUS at 07:07

## 2023-02-02 RX ADMIN — PREDNISONE 40 MG: 20 TABLET ORAL at 08:18

## 2023-02-02 RX ADMIN — ATORVASTATIN CALCIUM 20 MG: 10 TABLET, FILM COATED ORAL at 21:03

## 2023-02-02 RX ADMIN — IPRATROPIUM BROMIDE AND ALBUTEROL SULFATE 3 ML: 2.5; .5 SOLUTION RESPIRATORY (INHALATION) at 07:40

## 2023-02-02 RX ADMIN — IPRATROPIUM BROMIDE AND ALBUTEROL SULFATE 3 ML: 2.5; .5 SOLUTION RESPIRATORY (INHALATION) at 13:26

## 2023-02-02 RX ADMIN — IPRATROPIUM BROMIDE AND ALBUTEROL SULFATE 3 ML: 2.5; .5 SOLUTION RESPIRATORY (INHALATION) at 02:30

## 2023-02-02 RX ADMIN — GUAIFENESIN 1200 MG: 600 TABLET ORAL at 08:18

## 2023-02-02 RX ADMIN — GABAPENTIN 600 MG: 300 CAPSULE ORAL at 21:02

## 2023-02-02 RX ADMIN — GUAIFENESIN 1200 MG: 600 TABLET ORAL at 21:03

## 2023-02-02 RX ADMIN — Medication 3 MG: at 21:03

## 2023-02-02 RX ADMIN — DOXYCYCLINE 100 MG: 100 CAPSULE ORAL at 21:03

## 2023-02-02 ASSESSMENT — ACTIVITIES OF DAILY LIVING (ADL)
ADLS_ACUITY_SCORE: 45
ADLS_ACUITY_SCORE: 51
ADLS_ACUITY_SCORE: 45
ADLS_ACUITY_SCORE: 49
ADLS_ACUITY_SCORE: 45
ADLS_ACUITY_SCORE: 49
ADLS_ACUITY_SCORE: 49
ADLS_ACUITY_SCORE: 45
ADLS_ACUITY_SCORE: 51
ADLS_ACUITY_SCORE: 49
ADLS_ACUITY_SCORE: 51
ADLS_ACUITY_SCORE: 51

## 2023-02-02 NOTE — PROGRESS NOTES
[facility]  Palliative Care Daily Progress Note      Code status: Full Code     Impressions, Recommendations & Counseling     SYMPTOM ASSESSMENT:  1.  Shortness of breath secondary to pulmonary edema, CHF exacerbation, pulmonary hypertension  -Management per cardiology and [facility]  - Lasix 40 mg every 12 hours - changed to oral today.  -Continue oxygen to keep saturations greater than 92%.     2.  Anorexia and weight loss-75 pound weight loss since summer 2022.  15 pound weight loss since Christmas 2022.  - Nutrition consult ordered. Appreciate recommendations.  - Patient drinking nutritional shakes.     3. Generalized weakness 2/2 FTT, weight loss, CHF, CAD  - Up to recliner today.  - working with PT and OT.  - Agrees with TCU at discharge.     ADVANCED CARE PLANNING  - Code status: FULL CODE  - Surrogate decision makers: Irais Howard and Em Pritchard  - No HCD or POLST on file.  - Blank copy of Honoring Choices given to patient and daughter, Em. They will review and work to complete. Also discussed ongoing conversations can occur in outpatient palliative care clinic.     GOALS OF CARE DISCUSSION  - Goals are life prolonging.  - Discussions regarding goals of care ongoing.  - Daughter interested in following up with outpatient palliative care clinic with medical and psychosocial provider for ongoing goals of care discussion and symptom management. Referral placed for both.    Palliative care will follow along peripherally. Please call with questions or needs [phone].      HPI          Dulce Pritchard is a 77 year old female admitted to [facility] on 1/30/2023 with complaints of increased SOB, cough and weakness. BNP 1597 on admission and imaging demonstrating pulmonary edema. Now found to have severe CAD on CT scan. Echocardiogram shows LVEF 50-55%, mild-moderate mitral insufficiency, abnormal septal motion and elevated pulmonary artery pressure. PMH of depression and  HTN.     Today, the patient was seen for:  Goals of care discussion; patient and family support, SOB, anorexia, weight loss    Prognosis, Goals, or Advance Care Planning was addressed today with: Yes.  Mood, coping, and/or meaning in the context of serious illness were addressed today: Yes.            Interval History:     Chart review/discussion with unit or clinical team members:   UO >5.5 L in last 48 hours. Less congested and dyspneic. VSS. Afebrile. Up to recliner. Alert. NAD. Diuretics changed to po. Working with PT and OT.    Per patient or family/caregivers today:  Met with patient and daughter, Em, with [facility].  Reviewed Palliative Care is specialized medical care for people with serious illness, focused on providing patients with relief from symptoms, pain and stresses of serious illness.  The goal is to improve quality of life for both the patient and the family.  Current medical condition and treatments reviewed and questions answered. Reviewed that pulmonary HTN and CHF are chronic conditions, that there is fine balance with medication management and there will be exacerbations with possible hospitalizations going forward.   Reviewed Role of honoring choices document. Encouraged patient to complete document identifying her 2 daughters, Irais and Em, as her decision makers based on discussion today.   Briefly reviewed benefit vs burden of resuscitation and intubation. DTREm, verbalized understanding. Patient unable to discuss this further presently as all this information overwhelming. Stressed no decisions needed to be made. Providing information for more discussion in the future.    Key Palliative Symptoms:  # Pain severity the last 12 hours: none  # Dyspnea severity the last 12 hours: low  # Nausea severity the last 12 hours: none  # Anxiety severity the last 12 hours: moderate  Constipation: None  Weakness and fatigue: severe           Review of Systems:      Additional ROS was reviewed and is unremarkable.          Medications:     I have reviewed this patient's medication profile and medications during this hospitalization.           Physical Exam:   Temp:  [97.9  F (36.6  C)-98.5  F (36.9  C)] 98.4  F (36.9  C)  Pulse:  [54-61] 55  Resp:  [18-20] 20  BP: (124-177)/(60-74) 146/73  SpO2:  [92 %-98 %] 97 %  General appearance: alert, cooperative, fatigued   Head: Normocephalic, without obvious abnormality, atraumatic  Eyes: Lids and lashes normal.  Sclera anicteric  Nose: no discharge  Throat: lips, mucosa, and tongue normal  Lungs: slightly labored, no accessory muscle use noted.   Heart: Regular rate,  irregular rhythm  Neurologic: Alert.  Oriented x4           Data Reviewed:     Pertinent Labs  Lab Results: personally reviewed.   Lab Results   Component Value Date     2023    CO2 30 2023    BUN 20 2023     Lab Results   Component Value Date    WBC 6.7 2023    HGB 11.2 2023    HCT 35.8 2023     2023     2023     AST   Date Value Ref Range Status   2022 44 (H) 10 - 35 U/L Final     ALT   Date Value Ref Range Status   2022 25 10 - 35 U/L Final     Alkaline Phosphatase   Date Value Ref Range Status   2022 95 35 - 104 U/L Final     Albumin   Date Value Ref Range Status   2022 3.7 3.5 - 5.2 g/dL Final   2021 3.4 (L) 3.5 - 5.0 g/dL Final         Radiology Results  Echocardiogram Complete    Result Date: 2023  [id number]^PJ^DERRICK^[place] [address]  Name: STEFANIE DUMONT MRN: [id number] : 1945 Study Date: 2023 10:14 AM Age: 77 yrs Gender: Female Patient Location: [facility] Reason For Study: Heart Failure Ordering Physician: DERRICK OLIVA Performed By: MYCHAL  BSA: 2.0 m2 Height: 63 in Weight: 218 lb HR: 51 BP: 167/73 mmHg  ______________________________________________________________________________ Procedure Complete Portable Echo Adult. Definity (NDC #13881-195) given intravenously. ______________________________________________________________________________ Interpretation Summary  The left ventricle is mildly dilated. Left ventricular function is decreased. The ejection fraction is 50-55% (borderline). The left atrium is mildly dilated. The right atrium is mildly dilated. Severe (>55mmHg) pulmonary hypertension is present. IVC diameter >2.1 cm collapsing <50% with sniff suggests a high RA pressure estimated at 15 mmHg or greater. There is mild to moderate (1-2+) mitral regurgitation. ______________________________________________________________________________ Left Ventricle The left ventricle is mildly dilated. Left ventricular function is decreased. The ejection fraction is 50-55% (borderline). There is normal left ventricular wall thickness. Left ventricular diastolic function is abnormal. There is borderline global hypokinesia of the left ventricle. Septal motion is consistent with conduction abnormality.  Right Ventricle Normal right ventricle size and systolic function.  Atria The left atrium is mildly dilated. The right atrium is mildly dilated. There is no color Doppler evidence of an atrial shunt.  Mitral Valve Mitral valve leaflets appear normal. There is mild to moderate mitral annular calcification. There is mild to moderate (1-2+) mitral regurgitation.  Tricuspid Valve Tricuspid valve leaflets appear normal. There is mild (1+) tricuspid regurgitation. The right ventricular systolic pressure is approximated at 49.7 mmHg plus the right atrial pressure. IVC diameter >2.1 cm collapsing <50% with sniff suggests a high RA pressure estimated at 15 mmHg or greater. Severe (>55mmHg) pulmonary hypertension is present.  Aortic Valve There is mild trileaflet aortic sclerosis.  Pulmonic Valve Normal pulmonic valve.  Vessels  Normal size ascending aorta.  Pericardium There is no pericardial effusion.  ______________________________________________________________________________ MMode/2D Measurements & Calculations IVSd: 0.94 cm LVIDd: 5.2 cm LVIDs: 3.4 cm LVPWd: 1.00 cm FS: 34.4 %  LV mass(C)d: 186.1 grams LV mass(C)dI: 92.8 grams/m2 Ao root diam: 2.6 cm LA dimension: 4.6 cm asc Aorta Diam: 3.0 cm LA/Ao: 1.8 LVOT diam: 1.7 cm LVOT area: 2.3 cm2 LA Volume Indexed (AL/bp): 31.9 ml/m2 RWT: 0.38  Time Measurements MM HR: 52.0 BPM  Doppler Measurements & Calculations MV E max dominga: 130.0 cm/sec MV A max dominga: 42.2 cm/sec MV E/A: 3.1 MV dec time: 0.26 sec LV V1 max PG: 3.2 mmHg LV V1 max: 89.4 cm/sec LV V1 VTI: 17.7 cm SV(LVOT): 41.4 ml SI(LVOT): 20.6 ml/m2 PA acc time: 0.11 sec TR max dominga: 352.5 cm/sec TR max P.7 mmHg E/E' av.0 Lateral E/e': 16.6 Medial E/e': 21.3  ______________________________________________________________________________ Report approved by: Josselyn Serrano 2023 11:35 AM       XR Chest Port 1 View    Result Date: 2023  EXAM: XR CHEST PORT 1 VIEW LOCATION: [facility] DATE/TIME: 2023 8:00 PM INDICATION: Short of breath and coughing COMPARISON: None.     IMPRESSION: Modest cardiomegaly. Slight interstitial prominence but no focal pneumonia or pleural effusion evident. DJD both shoulders.    CT Chest Pulmonary Embolism w Contrast    Result Date: 2023  EXAM: CT CHEST PULMONARY EMBOLISM W CONTRAST LOCATION: [facility] DATE/TIME: 2023 8:58 PM INDICATION: Short of breath, elevated D dimer COMPARISON: None. TECHNIQUE: CT chest pulmonary angiogram during arterial phase injection of IV contrast. Multiplanar reformats and MIP reconstructions were performed. Dose reduction techniques were used. CONTRAST: 100 mL Isovue-370 FINDINGS: ANGIOGRAM CHEST: Pulmonary arteries are normal caliber and negative for pulmonary emboli. Minimal degenerative  calcification of the aortic root. Mild patchy atheromatous calcifications of the aortic arch, proximal great vessels and descending thoracic aorta. No thoracic aortic aneurysm. LUNGS AND PLEURA: Small right pleural effusion and trace left pleural fluid. Related atelectasis. Anterior bowing of the membranous airways indicates imaging at the expiratory phase of the respiratory cycle. In addition there is symmetric airway wall thickening. The lung parenchyma is heterogeneous attenuation. Patchy multifocal peribronchial inflammation is present in both lungs. MEDIASTINUM: Both the right and left atria are enlarged. There are mild mitral annular calcifications. Cardiac ventricles are normal in size. No pericardial effusion. Esophagus is decompressed. There are mildly enlarged lymph nodes at both [place] and in the AP window. No enlarged paratracheal or subcarinal lymph nodes. CORONARY ARTERY CALCIFICATION: Severe. UPPER ABDOMEN: Reflux of IV contrast into the distended hepatic veins and IVC. Splenomegaly. MUSCULOSKELETAL: Diffuse thoracic spine degenerative osteophytes and disc space narrowing. No aggressive or destructive bone lesions. Bilateral glenohumeral osteoarthrosis.     IMPRESSION: 1.  No acute pulmonary embolism. 2.  Enlarged right and left atria. Severe atheromatous coronary calcifications. 3.  Small right trace left pleural effusions. 4.  Bronchial wall thickening and peribronchial and interstitial opacities in both lungs, most mixed interstitial and alveolar lung edema. Pattern suggests acute congestive failure.     TTS: I have personally spent a total of 50 minutes today on unit in review of medical record, consultation with the medical providers and assessment of patient today with more than 50% of this time spent in counseling and discussion with patient and family re: diagnostic results, prognosis and development of plan of care as noted above.    SAMSON Baptiste, BRE, CNS  Palliative  Care  [phone]

## 2023-02-02 NOTE — PROGRESS NOTES
02/02/23 1030   Appointment Info   Signing Clinician's Name / Credentials (PT) Nay Skaggs, PT, DPT   Living Environment   People in Home alone  (Patient's niece lives in basement)   Current Living Arrangements house   Home Accessibility   (ramp to enter, stair lift to second)   Self-Care   Equipment Currently Used at Home walker, rolling  (4WW)   Fall history within last six months yes   Number of times patient has fallen within last six months 3   Activity/Exercise/Self-Care Comment wheelchair used for going to and from appointment   General Information   Onset of Illness/Injury or Date of Surgery 01/30/23   Referring Physician Dr. Alcides Verduzco   Patient/Family Therapy Goals Statement (PT) Be able to move and get out of bed   Pertinent History of Current Problem (include personal factors and/or comorbidities that impact the POC) Hypoxia, A. Fib, CHF   Existing Precautions/Restrictions fall   Weight-Bearing Status - LLE full weight-bearing   Weight-Bearing Status - RLE full weight-bearing   Bed Mobility   Bed Mobility supine-sit   Supine-Sit [place] (Bed Mobility) minimum assist (75% patient effort);2 person assist;verbal cues   Transfers   Transfers sit-stand transfer   Maintains Weight-bearing Status (Transfers) able to maintain   Sit-Stand Transfer   Sit-Stand [place] (Transfers) moderate assist (50% patient effort);2 person assist;verbal cues   Assistive Device (Sit-Stand Transfers) walker, front-wheeled   Gait/Stairs (Locomotion)   [place] Level (Gait) moderate assist (50% patient effort);verbal cues;2 person assist   Assistive Device (Gait) walker, front-wheeled   Distance in Feet SPT left   Pattern (Gait) step-to   Deviations/Abnormal Patterns (Gait) gait speed decreased   Maintains Weight-bearing Status (Gait) able to maintain   Clinical Impression   Criteria for Skilled Therapeutic Intervention Yes, treatment indicated   PT Diagnosis (PT) Impaired functional mobility   Influenced by  the following impairments weakness, pain, impaired balance   Functional limitations due to impairments transfers, gait, bed mobility   Clinical Presentation (PT Evaluation Complexity) Evolving/Changing   Clinical Presentation Rationale Pt presents as medically diagnosed   Clinical Decision Making (Complexity) moderate complexity   Planned Therapy Interventions (PT) gait training;home exercise program;bed mobility training;patient/family education;transfer training;strengthening;balance training   Anticipated Equipment Needs at Discharge (PT) walker, rolling;gait belt   Risk & Benefits of therapy have been explained evaluation/treatment results reviewed;care plan/treatment goals reviewed;patient   PT Total Evaluation Time   PT Eval, Moderate Complexity Minutes (03070) 10   Physical Therapy Goals   PT Frequency Daily   PT Predicted Duration/Target Date for Goal Attainment 02/09/23   PT Goals Transfers;Gait   PT: Transfers Supervision/stand-by assist;Sit to/from stand;Assistive device   PT: Gait Rolling walker;50 feet  (CGA)   Interventions   Interventions Quick Adds Gait Training;Therapeutic Activity   Therapeutic Activity   Therapeutic Activities: dynamic activities to improve functional performance Minutes (15585) 25   Symptoms Noted During/After Treatment Fatigue   Treatment Detail/Skilled Intervention Supine to sit with use of bed rail, min assist of 2, verbal cueing for use of rail and technique. Sitting balance on EOB with CGA, verbal cueing for breathing techniques. Sp02 84% on 2.5L 02, increased to 3L 02 for rest of session. Sit<>stand with FWW, mod assist of 2 from EOB, verbal cueing for posture and hand placement. Stand pivot transfer with FWW, mod assist of 2 to right, verbal cueing for posture, LE advancement. Sit<>Stand with FWW, from chair with min assist of 2, verbal cueing for hand placement.   PT Discharge Planning   PT Plan Transfers, gait with assist, LE exercises   PT Discharge Recommendation (DC  Rec) (S)  Transitional Care Facility   PT Rationale for DC Rec Patient needing assist of 2 for bed mobility and transfers, no stairs at home, assist from family. TCU for continued strengthening, balance and mobility training   PT Brief overview of current status SPT with mod assist of 2 and FWW   Total Session Time   Timed Code Treatment Minutes 25   Total Session Time (sum of timed and untimed services) 35

## 2023-02-02 NOTE — PLAN OF CARE
Problem: Fatigue  Goal: Improved Activity Tolerance  Outcome: Not Progressing     Problem: Malnutrition  Goal: Improved Nutritional Intake  Outcome: Not Progressing    Pt noted to be very fatigued this shift. After working with PT & OT, pt reported wanting to rest as much as possible. Bed pan & saad utilized this shift. Writer noted moisture damage found underneath her R abdominal fold, interdry placed. Declined dinner tonight stating she had no appetite. Oxygen saturations WDL on 3L oxymask. Tele showing sinus vaishali. Awaiting TCU placement.

## 2023-02-02 NOTE — PROGRESS NOTES
Pt remain on 4L NC with sat in the mid 90's. Neb treatment given, pt tolerated well.     02/02/23 0230   Tech Time   $Tech Time (10 minute increments) 2   Nebulizer Assessment & Treatment   $RT Use ONLY Delivery Method Nebulizer - Initial   Nebulizer Device Mask   Pretreatment Heart Rate (beats/min) 58   Pretreatment Resp Rate (breaths/min) 18   Pretreatment O2 sats - (TCU only) 98   Pretreat Breath Sounds - Bilat - All Lobes crackles, coarse;wheezes, expiratory   Breath Sounds Post-Respiratory Treatment   Posttreatment Heart Rate (beats/min) 57   Posttreatment Resp Rate (breaths/min) 18   Post treatment O2 Sats - (TCU only) 99   Breath Sounds Posttreatment All Fields All Fields   Breath Sounds Posttreatment All Fields no change       Jenise Lambert, RT

## 2023-02-02 NOTE — PLAN OF CARE
Problem: Plan of Care - These are the overarching goals to be used throughout the patient stay.    Goal: Absence of Hospital-Acquired Illness or Injury  Intervention: Prevent Skin Injury  Recent Flowsheet Documentation  Taken 2/2/2023 0439 by Felicia Spencer RN  Body Position: neutral body alignment   Goal Outcome Evaluation:  Pt is a/ox4. Pt denied any pain and SOB. Pt refused repositioning and applying pillows to help maintain pressure. Nurse encouraged repositioning and deep breathing.   Tele: SB  Resp: coarse, crackles, 4L via NC  Continue POC

## 2023-02-02 NOTE — PROGRESS NOTES
[facility] HEART Formerly [facility]   [address], [place] [address]   [email or web address]   OFFICE: [phone]            Impression and Plan     1.  Shortness of breath/pulmonary edema.  B-type natriuretic peptide elevated at 1597 pg/mL consistent with some degree of myocardial strain.  CT radiographic imaging on admission suggests pulmonary edema.  Echocardiogram this admission revealed normal left ventricular systolic performance with ejection fraction of 50-55% though with findings consistent with impaired diastolic filling.    Dulce had an additional 2.5 L urine output through the day yesterday.  Weight down from admission.  Jugular venous pressure does appear more reasonable.    Will transition to enteral furosemide 40 mg twice daily and continue to follow.     2.  Coronary artery disease.  Dulce has known coronary artery disease by virtue of CT scan this admission revealing severe coronary calcification.  Troponins x3 on presentation suggest against recent myocardial injury.      Continue 81 mg aspirin.     3.  Arrhythmia.  ECGs reviewed.  Sinus rhythm with sinus arrhythmia/bradycardia and intermittent high junctional escape.    Telemetry this morning sinus rhythm with heart rate in the 50s.    Continue to defer ?-blocker therapy, atenolol, which patient had been on prior to admission.    Will simply continue to follow and monitor/normalize electrolytes as needed.     4.  Mitral insufficiency.  This is felt mild-moderate in degree on echocardiogram this admission.    5.  Hypertension.    Blood pressures most recently more reasonable.  Blood pressure this morning 124/60 mmHg.    Continue lisinopril 20 mg daily (increased from 10 mg this admission).     6.  Dyslipidemia.  Lipid profile 22 November 2022 revealed LDL 79 mg/dL and HDL 47 mg/dL. Although lipid profile fairly reasonable, given evidence of severe coronary calcification would advocate trying to suppress LDL  "less than 70 mg/dL if possible.     Continue atorvastatin 20 mg daily (initiated this admission).     7.  COPD exacerbation.  Management plan as outlined by Hospital Service.     Primary Cardiologist: Dr. Hank Wooten (initial consultation this admission)       Anupama Cardona states that subjectively she does feel somewhat improved today in terms of her breathing.  Continues to deny chest pain    Cardiac Diagnostics   Telemetry (personally reviewed): Sinus rhythm.    Echocardiogram 31 January 2023:  1. Mild left ventricular enlargement with normal left ventricular systolic performance.  Ejection fraction 50-55%.  2. Abnormal septal motion consistent with conduction abnormality.  3. Mild-moderate mitral insufficiency.  4. Normal right ventricular size and systolic performance.  5. Mild biatrial enlargement.  6. Right ventricular systolic pressure relative to right atrial pressure is mildly increased.  The pulmonary artery pressure is estimated to be 45-50 mmHg plus right atrial pressure.    Twelve-lead ECG (personally reviewed) 30 January 2023: Sinus rhythm.  PACs.       Physical Examination       /60 (BP Location: Right arm)   Pulse 55   Temp 98.2  F (36.8  C) (Oral)   Resp 18   Ht 1.6 m (5' 3\")   Wt 93.6 kg (206 lb 6.4 oz)   SpO2 97%   BMI 36.56 kg/m          Vitals:    01/30/23 1917 02/01/23 0206 02/02/23 0013   Weight: 98.9 kg (218 lb) 92.1 kg (203 lb) 93.6 kg (206 lb 6.4 oz)              Intake/Output Summary (Last 24 hours) at 2/1/2023 0620  Last data filed at 2/1/2023 0428  Gross per 24 hour   Intake 0 ml   Output 3775 ml   Net -3775 ml       The patient is alert. Sclerae are anicteric. Mucosal membranes are moist.No significant adenopathy/thyromegally appreciated. Lungs with some expiratory wheezes with coarse bronchogenic breath sounds and some scattered fine crackles. On cardiovascular exam, the patient has a regular S1 and S2. Abdomen is soft and non-tender. Extremities " reveal no clubbing, cyanosis.  Lower extremity edema would appear improved.           Imaging      CT scan of chest 30 January 2023:  1. No acute pulmonary embolism.  2. Enlarged right and left atria. Severe atheromatous coronary calcifications.  3. Small right trace left pleural effusions.  4. Bronchial wall thickening and peribronchial and interstitial opacities in both lungs, most mixed interstitial and alveolar lung edema. Pattern suggests acute congestive failure.  5. Coronary calcification: Severe     Chest radiograph 30 January 2023:  1. Modest cardiomegaly.   2. Slight interstitial prominence but no focal pneumonia or pleural effusion evident.  3. DJD both shoulders.     Lab Results     Recent Labs   Lab 02/02/23  0514 02/01/23  1120 02/01/23  0519 01/31/23  2244 01/31/23  1447 01/31/23  0722 01/30/23 1926   WBC  --   --   --   --   --  6.7 6.4   HGB  --   --   --   --   --  11.2* 11.9   MCV  --   --   --   --   --  100 98   PLT  --   --   --   --   --  121* 134*   NA  --   --   --   --   --  144 142   POTASSIUM 3.4* 3.6 3.4*   < > 3.2* 3.2* 3.1*   CHLORIDE  --   --   --   --   --  103 102   CO2  --   --   --   --   --  30 27   BUN  --   --   --   --   --  20 26   CR  --   --   --   --  0.87 0.91 1.03   ANIONGAP  --   --   --   --   --  11 13   EDGAR  --   --   --   --   --  9.2 9.5   GLC  --   --   --   --   --  90 114    < > = values in this interval not displayed.     Recent Labs   Lab Test 01/30/23 1926   BNP 1,597*     No lab results found in last 7 days.    Invalid input(s): LDLCALC  Lab Results   Component Value Date     01/31/2023    CO2 30 01/31/2023    BUN 20 01/31/2023     Lab Results   Component Value Date    WBC 6.7 01/31/2023    HGB 11.2 01/31/2023    HCT 35.8 01/31/2023     01/31/2023     01/31/2023     Lab Results   Component Value Date    CHOL 142 11/22/2022    TRIG 78 11/22/2022    HDL 47 11/22/2022     Lab Results   Component Value Date    TROPONINI 0.11 01/31/2023     TROPONINI 0.12 01/31/2023    TROPONINI 0.12 01/30/2023           Current Inpatient Scheduled Medications   Scheduled Meds:    aspirin  81 mg Oral Daily     atorvastatin  20 mg Oral QPM     doxycycline hyclate  100 mg Oral BID     enoxaparin ANTICOAGULANT  40 mg Subcutaneous Q24H     FLUoxetine  20 mg Oral Daily     furosemide  40 mg Intravenous Q12H     gabapentin  600 mg Oral At Bedtime     guaiFENesin  1,200 mg Oral BID     [Held by provider] hydroxychloroquine  200 mg Oral BID     ipratropium - albuterol 0.5 mg/2.5 mg/3 mL  3 mL Nebulization Q6H     lisinopril  20 mg Oral Daily     potassium chloride  40 mEq Oral Once     predniSONE  40 mg Oral Daily     sodium chloride (PF)  3 mL Intracatheter Q8H     traMADol  100 mg Oral QAM     Continuous Infusions:         Medications Prior to Admission   Prior to Admission medications    Medication Sig Start Date End Date Taking? Authorizing Provider   atenolol (TENORMIN) 50 MG tablet Take 50 mg by mouth daily   Yes Unknown, Entered By History   FLUoxetine (PROZAC) 20 MG capsule Take 20 mg by mouth daily   Yes Unknown, Entered By History   gabapentin (NEURONTIN) 300 MG capsule Take 1,200 mg by mouth At Bedtime   Yes Unknown, Entered By History   hydroxychloroquine (PLAQUENIL) 200 MG tablet Take 200 mg by mouth 2 times daily   Yes Unknown, Entered By History   lisinopril-hydrochlorothiazide (ZESTORETIC) 10-12.5 MG tablet Take 1 tablet by mouth daily   Yes Unknown, Entered By History   traMADol (ULTRAM) 50 MG tablet Take 100 mg by mouth every morning   Yes Unknown, Entered By History   Vitamin D, Cholecalciferol, 25 MCG (1000 UT) TABS Take 1,000 Units by mouth daily   Yes Unknown, Entered By History

## 2023-02-02 NOTE — PROGRESS NOTES
Care Management Follow Up    Length of Stay (days): 3    Expected Discharge Date: 02/03/2023     Concerns to be Addressed: discharge planning       Additional Information:  CM met with daughter Mita and patient. TCU requests given, and I sent referrals to the ones they requested. Patient not quite medically cleared for discharge. Needs PT/OT rec's, as will be helpful with placement.       Stacie Boswell RN

## 2023-02-02 NOTE — PLAN OF CARE
Problem: Fatigue  Goal: Improved Activity Tolerance  Outcome: Progressing     Problem: Gas Exchange Impaired  Goal: Optimal Gas Exchange  Outcome: Progressing   Goal Outcome Evaluation:             Patient VSS. Patient denies pain. O2 at 2L Patient refused tramadol this AM. Patient up to chair toda with PT. Stand and pivot to commode with assist of 2. RN gave patient bed bath and washed hair.

## 2023-02-02 NOTE — PROGRESS NOTES
PALLIATIVE CARE SOCIAL WORK Progress Note     Covisit with Palliative NP, Pts dtrs and Pt.     Met individually with dtr Mita earlier in the day. Provided listening and emotional support. Mita heading back to [place] tomorrow. She is hoping for team to provide support to her sister Em as well. Made plan to return later in the day to meet with Pt and Em.     Met with Em and Pt. Pt was awake, alert, oriented. NP discussed overall medical status and things to think of moving forward. Discussed HCD and goals of care for future. Left HCD/honoring choices with family for review. They are aware that PC is available for more support as needed.   Pt states that her mood is low, given all the bad news she has been given. She does not identify any ways she mariluz, but is open to family support.     Plan: PCSW remains available for needs and support.     LILLI Banks, [facility]  Palliative Care Team  Team Pager: [phone]

## 2023-02-02 NOTE — CONSULTS
COPD Consult Note    2/2/2023, 9:02 AM     Reason for Consult: COPD education  Admission diagnosis: Hypoxia [R09.02]  Atrial fibrillation, unspecified type (H) [I48.91]  Acute heart failure with preserved ejection fraction (HFpEF) (H) [I50.31]     History: Dulce is a 77 year old with a history of hypertension, CHF with preserved EF, depression, pulmonary hypertension, CAD, former smoker, and arthritis. Dulce is on room air at baseline, has never seen pulmonary and has never had a lung condition mentioned to her she might have in the past.She presented to the ED with increasing cough, and weakness x2 weeks. Found to be hypoxic with room air saturations in the mid 80's. She has also had a loss of appetite for awhile now losing a significant amount of weight over the last several months, increasing amount of falls, and having some lower extremity swelling. Dulce is being treated with IV antibiotics, steroids, flutter valve, supplemental oxygen, and bronchodilators.     Imaging:  XR CHEST PORT 1 VIEW  DATE/TIME: 1/30/2023 8:00 PM  COMPARISON: None  IMPRESSION: Modest cardiomegaly. Slight interstitial prominence but no focal pneumonia or pleural effusion evident.  DJD both shoulders.    CT CHEST PULMONARY EMBOLISM W CONTRAST  DATE/TIME: 1/30/2023 8:58 PM  COMPARISON: None.                                                             IMPRESSION:   1.  No acute pulmonary embolism.  2.  Enlarged right and left atria. Severe atheromatous coronary calcifications.  3.  Small right trace left pleural effusions.  4.  Bronchial wall thickening and peribronchial and interstitial opacities in both lungs, most mixed interstitial and alveolar lung edema. Pattern suggests acute congestive failure.    Assessment:  Patient will not be seen at this time due to not having any indicators that she has COPD or any form of lung disease per imaging and no mention seen in chart review also. Dulce has had no prior COPD symptoms other than  cough which could also be her CHF considering her BNP is elevated at 1597, this can cause cough and/or wheezing, cardiology has been consulted and is following with patient. Palliative has been consulted and per their note it appears that patient has been declining over the last year and more significantly over the last several months. Therefore, we will not see patient at this time.     Recommendations:  -OT/PT evaluation prior to discharge  -On going conversations with Palliative care about goals of care  -Hospice consult, informational at least  -Consider outpatient pulmonary consult with full lung function testing if it is strongly believed patient may have COPD.  -Home oxygen evaluation prior to discharge    We ask that you please update the patient's chart to reflect an alternative diagnosis.  If you have questions please call COPD Education at [phone], thank you.    Aurelia Kee, RT, Chronic Pulmonary Disease Specialist

## 2023-02-02 NOTE — PROGRESS NOTES
[facility] MEDICINE  PROGRESS NOTE     Code Status: Full Code    Identification/Summary:   Dulce Pritchard is a 77 year old female PMH significant for HTN, chronic joint pains, inflammatory arthritis, depression. 1/30 presented with complaints of cough, weakness, shortness of breath x1 month. Oxygen saturation of 85% on room air per EMS, blood pressure 172/73, heart rate 55.  BNP elevated at 1600. CT chest negative for PE, findings consistent with congestive heart failure, also severe coronary calcifications.  EKG possibly intermittent heart block versus A. fib with slow ventricular response.  Initiated on Lasix.  Cardiology consultated.  Echo showed EF 55% but evidence of severe pulmonary hypertension and abnormal septal wall motion.  Palliative care consulted.  On 2/1 added DuoNebs, prednisone and doxycycline for possible COPD exacerbation (vs symptom control) though recommend outpatient follow-up with pulmonary to establish a formal diagnosis. Cardiology transitioning to PO diuresis 2/2.    dispo- possibly next few days-- pending TCU but clinically improving    Assessment and plan:  Acute diastolic congestive heart failure  Acute hypoxemic respiratory failure  Severe coronary calcifications  Sinus arrhythmia  Essential hypertension  Questionable intermittent heart block, SSS?  Echocardiogram shows EF of 55% with septal wall motion abnormality consistent with conduction defect and severe pulmonary hypertension.  Receiving intravenous Lasix.    Negative troponins.  Procalcitonin 0.13.  No fevers.  Hold off on antibiotics.  Hold atenolol and HCTZ.    Continue lisinopril and dose increased to 20 mg daily.  Continue intravenous Lasix diuresis.  May need ischemic work-up prior to discharge.  Appreciate cardiology consultation.   -Transition to 40 mg p.o. twice  Expiratory wheezing  No prior history of COPD but was a longtime smoker.  Wheezing noted today on exam.  Hospitalist on  2/1 empirically treat with duo nebs 4 times daily, prednisone 40 mg daily and doxycycline per COPD protocols.  Guaifenesin as needed.  Patient new to writer 2/2/2023 and discussed w/ copd RT team; recommend formal diagnosis w/ PFTs  Monitor response.    Severe pulmonary hypertension  Changes noted on echocardiogram.  Patient reports she is a heavy snorer.  Has never had a sleep study before.  Recommend outpatient sleep study.    Hyperlipidemia  Given severe coronary calcification atorvastatin 20 mg daily started per cardiology.  Recheck lipid panel in 1 month.    Inflammatory arthritis  Followed by rheumatology as an outpatient.  Takes Plaquenil, tramadol, gabapentin  Continue tramadol, decrease gabapentin dose to maximum recommended at 600 mg at bedtime  Hold Plaquenil given possible intermittent heart block.    Hypokalemia  Hypomagnesemia  Replacement via protocols.    Goals of care  Patient notes she was doing fairly well up until about a month ago.  Since then has been progressively getting weaker and weaker.  Requires a power chair to get up and down stairs.  Appreciated meeting with palliative care services.     Anticoagulation   Enoxaparin (Lovenox) SQ     COVID-19 PCR influenza A/B/RSV negative from 1/30/2023  Noted.  Standard precautions.  Fluids: Saline lock  Pain meds: Tylenol as needed  Therapy:  PT OT consulted.  May need TCU.  [place]:Not present  Lines: None       Current Diet  Orders Placed This Encounter      2 Gram Sodium Diet Other - please comment    Supplements  Active Nourishment Order   Procedures     Snacks/Supplements Adult: Gelatein Plus; With Meals     Snacks/Supplements Adult: Ensure Enlive; With Meals     Barriers to Discharge: Hypoxia, intravenous diuresis, steroids and DuoNebs    Disposition: Likely here a few more days 2-3?    Clinically Significant Risk Factors             # Hypomagnesemia: Lowest Mg = 1.6 mg/dL in last 2 days, will replace as needed             # Obesity: Estimated  "body mass index is 36.56 kg/m  as calculated from the following:    Height as of this encounter: 1.6 m (5' 3\").    Weight as of this encounter: 93.6 kg (206 lb 6.4 oz)., PRESENT ON ADMISSION  # Severe Malnutrition: based on nutrition assessment, PRESENT ON ADMISSION         Alcides Verduzco   [facility]   Securely message with the Vocera Web Console (learn more here)  Text page via Fire[employer] Paging/Directory       ------------------------  Interval History/Subjective:  Patient feeling a lot better than yesterday; still on 3L.  Daughter present and supportive.  Denies any prior history of emphysema, COPD.  Was a regular smoker until approximately 15 years ago.  Questions answered to verbalized satisfaction.    Physical Exam/Objective:  Temp:  [97.9  F (36.6  C)-98.6  F (37  C)] 98.6  F (37  C)  Pulse:  [55-61] 55  Resp:  [18-20] 20  BP: (124-177)/(56-74) 129/56  SpO2:  [94 %-98 %] 97 %  Wt Readings from Last 4 Encounters:   02/02/23 93.6 kg (206 lb 6.4 oz)     Body mass index is 36.56 kg/m .    Constitutional: fatigued, alert, cooperative, no apparent distress, appears stated age and moderately obese, weak voice.  Not coughing as frequently as previously.  ENT: Normocephalic, without obvious abnormality, atraumatic, external ears without lesions, oral pharynx with moist mucous membranes, tonsils without erythema or exudates, gums normal and good dentition.  Respiratory: Expiratory wheezes throughout.  Rhonchi as well.  Cardiovascular: Normal apical impulse, regular rate and rhythm, normal S1 and S2, no S3 or S4, and no murmur noted  GI: No scars, normal bowel sounds, soft, non-distended, non-tender, no masses palpated, no hepatosplenomegally  Skin: normal skin color, texture, turgor, no redness, warmth, or swelling, and no rashes  Musculoskeletal: There is no redness, warmth, or swelling of the joints.  Motor strength is 4 out of 5 all extremities bilaterally.  Tone is normal.  " Trace lower extremity edema bilaterally  Neurologic: Cranial nerves II-XII are grossly intact. Sensory:  Sensory intact  Neuropsychiatric: General: normal, calm and normal eye contact Level of consciousness: alert / normal Affect: normal Orientation: oriented to self, place, time and situation Memory and insight: normal, memory for past and recent events intact and thought process normal      Medications:   Personally Reviewed.  Medications       aspirin  81 mg Oral Daily     atorvastatin  20 mg Oral QPM     doxycycline hyclate  100 mg Oral BID     enoxaparin ANTICOAGULANT  40 mg Subcutaneous Q24H     FLUoxetine  20 mg Oral Daily     [START ON 2/3/2023] furosemide  40 mg Oral Daily     gabapentin  600 mg Oral At Bedtime     guaiFENesin  1,200 mg Oral BID     [Held by provider] hydroxychloroquine  200 mg Oral BID     ipratropium - albuterol 0.5 mg/2.5 mg/3 mL  3 mL Nebulization Q6H     lisinopril  20 mg Oral Daily     predniSONE  40 mg Oral Daily     sodium chloride (PF)  3 mL Intracatheter Q8H     traMADol  100 mg Oral QAM       Data reviewed today: I personally reviewed all new medications, labs, imaging/diagnostics reports over the past 24 hours. Pertinent findings include:    Imaging:   No results found for this or any previous visit (from the past 24 hour(s)).    Labs:  Echocardiogram Complete   Final Result      CT Chest Pulmonary Embolism w Contrast   Final Result   IMPRESSION:      1.  No acute pulmonary embolism.   2.  Enlarged right and left atria. Severe atheromatous coronary calcifications.   3.  Small right trace left pleural effusions.   4.  Bronchial wall thickening and peribronchial and interstitial opacities in both lungs, most mixed interstitial and alveolar lung edema. Pattern suggests acute congestive failure.      XR Chest Port 1 View   Final Result   IMPRESSION: Modest cardiomegaly. Slight interstitial prominence but no focal pneumonia or pleural effusion evident.   DJD both shoulders.         Recent Results (from the past 24 hour(s))   Magnesium    Collection Time: 02/02/23  5:14 AM   Result Value Ref Range    Magnesium 1.6 (L) 1.8 - 2.6 mg/dL   Potassium    Collection Time: 02/02/23  5:14 AM   Result Value Ref Range    Potassium 3.4 (L) 3.5 - 5.0 mmol/L   Potassium    Collection Time: 02/02/23 11:17 AM   Result Value Ref Range    Potassium 4.1 3.5 - 5.0 mmol/L       Pending Labs:  Unresulted Labs Ordered in the Past 30 Days of this Admission     No orders found from 12/31/2022 to 1/31/2023.

## 2023-02-03 ENCOUNTER — APPOINTMENT (OUTPATIENT)
Dept: PHYSICAL THERAPY | Facility: CLINIC | Age: 78
DRG: 291 | End: 2023-02-03
Payer: COMMERCIAL

## 2023-02-03 ENCOUNTER — APPOINTMENT (OUTPATIENT)
Dept: OCCUPATIONAL THERAPY | Facility: CLINIC | Age: 78
DRG: 291 | End: 2023-02-03
Payer: COMMERCIAL

## 2023-02-03 LAB
CREAT SERPL-MCNC: 0.73 MG/DL (ref 0.6–1.1)
GFR SERPL CREATININE-BSD FRML MDRD: 85 ML/MIN/1.73M2
HOLD SPECIMEN: NORMAL
MAGNESIUM SERPL-MCNC: 1.7 MG/DL (ref 1.8–2.6)
PLATELET # BLD AUTO: 98 10E3/UL (ref 150–450)
POTASSIUM BLD-SCNC: 3.3 MMOL/L (ref 3.5–5)
POTASSIUM BLD-SCNC: 3.6 MMOL/L (ref 3.5–5)

## 2023-02-03 PROCEDURE — 250N000013 HC RX MED GY IP 250 OP 250 PS 637: Performed by: FAMILY MEDICINE

## 2023-02-03 PROCEDURE — 99232 SBSQ HOSP IP/OBS MODERATE 35: CPT | Performed by: STUDENT IN AN ORGANIZED HEALTH CARE EDUCATION/TRAINING PROGRAM

## 2023-02-03 PROCEDURE — 250N000012 HC RX MED GY IP 250 OP 636 PS 637: Performed by: FAMILY MEDICINE

## 2023-02-03 PROCEDURE — 250N000013 HC RX MED GY IP 250 OP 250 PS 637: Performed by: STUDENT IN AN ORGANIZED HEALTH CARE EDUCATION/TRAINING PROGRAM

## 2023-02-03 PROCEDURE — 97530 THERAPEUTIC ACTIVITIES: CPT | Mod: GP

## 2023-02-03 PROCEDURE — 999N000157 HC STATISTIC RCP TIME EA 10 MIN

## 2023-02-03 PROCEDURE — 120N000013 HC R&B IMCU

## 2023-02-03 PROCEDURE — 85049 AUTOMATED PLATELET COUNT: CPT | Performed by: FAMILY MEDICINE

## 2023-02-03 PROCEDURE — 99232 SBSQ HOSP IP/OBS MODERATE 35: CPT | Performed by: INTERNAL MEDICINE

## 2023-02-03 PROCEDURE — 250N000009 HC RX 250: Performed by: FAMILY MEDICINE

## 2023-02-03 PROCEDURE — 97535 SELF CARE MNGMENT TRAINING: CPT | Mod: GO

## 2023-02-03 PROCEDURE — 94640 AIRWAY INHALATION TREATMENT: CPT | Mod: 76

## 2023-02-03 PROCEDURE — 250N000013 HC RX MED GY IP 250 OP 250 PS 637: Performed by: HOSPITALIST

## 2023-02-03 PROCEDURE — 36415 COLL VENOUS BLD VENIPUNCTURE: CPT | Performed by: STUDENT IN AN ORGANIZED HEALTH CARE EDUCATION/TRAINING PROGRAM

## 2023-02-03 PROCEDURE — 82565 ASSAY OF CREATININE: CPT | Performed by: FAMILY MEDICINE

## 2023-02-03 PROCEDURE — 250N000011 HC RX IP 250 OP 636: Performed by: FAMILY MEDICINE

## 2023-02-03 PROCEDURE — 84132 ASSAY OF SERUM POTASSIUM: CPT | Performed by: STUDENT IN AN ORGANIZED HEALTH CARE EDUCATION/TRAINING PROGRAM

## 2023-02-03 PROCEDURE — 36415 COLL VENOUS BLD VENIPUNCTURE: CPT | Performed by: FAMILY MEDICINE

## 2023-02-03 PROCEDURE — 97116 GAIT TRAINING THERAPY: CPT | Mod: GP

## 2023-02-03 PROCEDURE — 250N000013 HC RX MED GY IP 250 OP 250 PS 637: Performed by: INTERNAL MEDICINE

## 2023-02-03 PROCEDURE — 83735 ASSAY OF MAGNESIUM: CPT | Performed by: FAMILY MEDICINE

## 2023-02-03 PROCEDURE — 94640 AIRWAY INHALATION TREATMENT: CPT

## 2023-02-03 PROCEDURE — 94799 UNLISTED PULMONARY SVC/PX: CPT

## 2023-02-03 RX ORDER — POTASSIUM CHLORIDE 20MEQ/15ML
40 LIQUID (ML) ORAL ONCE
Status: COMPLETED | OUTPATIENT
Start: 2023-02-03 | End: 2023-02-03

## 2023-02-03 RX ORDER — ATORVASTATIN CALCIUM 20 MG/1
20 TABLET, FILM COATED ORAL EVERY EVENING
Qty: 30 TABLET | Refills: 0 | Status: SHIPPED | OUTPATIENT
Start: 2023-02-03

## 2023-02-03 RX ORDER — FUROSEMIDE 40 MG
40 TABLET ORAL 2 TIMES DAILY
Qty: 60 TABLET | Refills: 0 | Status: SHIPPED | OUTPATIENT
Start: 2023-02-03

## 2023-02-03 RX ORDER — LANOLIN ALCOHOL/MO/W.PET/CERES
3 CREAM (GRAM) TOPICAL
Qty: 14 TABLET | Refills: 0 | Status: SHIPPED | OUTPATIENT
Start: 2023-02-03 | End: 2023-11-08

## 2023-02-03 RX ORDER — GUAIFENESIN 600 MG/1
600 TABLET, EXTENDED RELEASE ORAL 2 TIMES DAILY
Qty: 14 TABLET | Refills: 0 | Status: ON HOLD | OUTPATIENT
Start: 2023-02-03 | End: 2024-02-07

## 2023-02-03 RX ORDER — LISINOPRIL 20 MG/1
20 TABLET ORAL DAILY
Qty: 30 TABLET | Refills: 0 | Status: ON HOLD | OUTPATIENT
Start: 2023-02-04 | End: 2023-06-26

## 2023-02-03 RX ORDER — IPRATROPIUM BROMIDE AND ALBUTEROL SULFATE 2.5; .5 MG/3ML; MG/3ML
3 SOLUTION RESPIRATORY (INHALATION) EVERY 6 HOURS
Qty: 90 ML | Refills: 0 | Status: SHIPPED | OUTPATIENT
Start: 2023-02-03 | End: 2023-06-22

## 2023-02-03 RX ORDER — PREDNISONE 20 MG/1
40 TABLET ORAL DAILY
Qty: 8 TABLET | Refills: 0 | Status: SHIPPED | OUTPATIENT
Start: 2023-02-04 | End: 2023-02-06

## 2023-02-03 RX ORDER — GABAPENTIN 300 MG/1
600 CAPSULE ORAL AT BEDTIME
Qty: 30 CAPSULE | Refills: 0 | Status: SHIPPED | OUTPATIENT
Start: 2023-02-03

## 2023-02-03 RX ORDER — DOXYCYCLINE 100 MG/1
100 CAPSULE ORAL 2 TIMES DAILY
Qty: 8 CAPSULE | Refills: 0 | Status: SHIPPED | OUTPATIENT
Start: 2023-02-03 | End: 2023-02-06

## 2023-02-03 RX ADMIN — ATORVASTATIN CALCIUM 20 MG: 10 TABLET, FILM COATED ORAL at 20:59

## 2023-02-03 RX ADMIN — GUAIFENESIN 10 ML: 100 SOLUTION ORAL at 06:27

## 2023-02-03 RX ADMIN — PREDNISONE 40 MG: 20 TABLET ORAL at 08:47

## 2023-02-03 RX ADMIN — IPRATROPIUM BROMIDE AND ALBUTEROL SULFATE 3 ML: 2.5; .5 SOLUTION RESPIRATORY (INHALATION) at 07:45

## 2023-02-03 RX ADMIN — GABAPENTIN 600 MG: 300 CAPSULE ORAL at 21:00

## 2023-02-03 RX ADMIN — LISINOPRIL 20 MG: 20 TABLET ORAL at 08:47

## 2023-02-03 RX ADMIN — POTASSIUM CHLORIDE 40 MEQ: 20 SOLUTION ORAL at 06:25

## 2023-02-03 RX ADMIN — FLUOXETINE 20 MG: 20 CAPSULE ORAL at 08:47

## 2023-02-03 RX ADMIN — DOXYCYCLINE 100 MG: 100 CAPSULE ORAL at 20:59

## 2023-02-03 RX ADMIN — ASPIRIN 81 MG: 81 TABLET, COATED ORAL at 08:47

## 2023-02-03 RX ADMIN — GUAIFENESIN 1200 MG: 600 TABLET ORAL at 20:59

## 2023-02-03 RX ADMIN — IPRATROPIUM BROMIDE AND ALBUTEROL SULFATE 3 ML: 2.5; .5 SOLUTION RESPIRATORY (INHALATION) at 13:33

## 2023-02-03 RX ADMIN — FUROSEMIDE 40 MG: 40 TABLET ORAL at 08:47

## 2023-02-03 RX ADMIN — GUAIFENESIN 1200 MG: 600 TABLET ORAL at 08:47

## 2023-02-03 RX ADMIN — DOXYCYCLINE 100 MG: 100 CAPSULE ORAL at 08:50

## 2023-02-03 RX ADMIN — ENOXAPARIN SODIUM 40 MG: 100 INJECTION SUBCUTANEOUS at 17:44

## 2023-02-03 RX ADMIN — IPRATROPIUM BROMIDE AND ALBUTEROL SULFATE 3 ML: 2.5; .5 SOLUTION RESPIRATORY (INHALATION) at 19:50

## 2023-02-03 RX ADMIN — TRAMADOL HYDROCHLORIDE 100 MG: 50 TABLET, COATED ORAL at 08:47

## 2023-02-03 ASSESSMENT — ACTIVITIES OF DAILY LIVING (ADL)
ADLS_ACUITY_SCORE: 47
ADLS_ACUITY_SCORE: 45
ADLS_ACUITY_SCORE: 45
ADLS_ACUITY_SCORE: 47
ADLS_ACUITY_SCORE: 47
ADLS_ACUITY_SCORE: 45
ADLS_ACUITY_SCORE: 47
ADLS_ACUITY_SCORE: 47
ADLS_ACUITY_SCORE: 45
ADLS_ACUITY_SCORE: 45
ADLS_ACUITY_SCORE: 47
ADLS_ACUITY_SCORE: 47

## 2023-02-03 NOTE — PLAN OF CARE
Goal Outcome Evaluation: ongoing.       Plan of Care Reviewed With: patient    Overall Patient Progress: improvingOverall Patient Progress: improving       Pt A&Ox4. Denies chest pain. States SOB is the same. Denies pain. On 3-4L of O2 via NC. Purewick in place. Repositioning as tolerated every 2-3 hours. Blanchable redness on coccyx- mepilex in place. Tele reads NSR to SB to junctional- asymptomatic. Non-productive cough- prn medication given. Will continue to monitor.          Problem: Gas Exchange Impaired  Goal: Optimal Gas Exchange  Outcome: Progressing  Intervention: Optimize Oxygenation and Ventilation  Recent Flowsheet Documentation  Taken 2/3/2023 0337 by Johanna Mayers RN  Head of Bed (HOB) Positioning: HOB at 20-30 degrees  Taken 2/3/2023 0045 by Johanna Mayers RN  Head of Bed (HOB) Positioning: HOB at 20-30 degrees     Problem: Heart Failure  Goal: Optimal Coping  Outcome: Progressing  Goal: Optimal Cardiac Output  Outcome: Progressing  Goal: Stable Heart Rate and Rhythm  Outcome: Progressing  Goal: Optimal Functional Ability  Outcome: Progressing  Intervention: Optimize Functional Ability  Recent Flowsheet Documentation  Taken 2/3/2023 0337 by Johanna Mayers RN  Activity Management: activity adjusted per tolerance  Taken 2/3/2023 0045 by Johanna Mayers RN  Activity Management: activity adjusted per tolerance  Goal: Fluid and Electrolyte Balance  Outcome: Progressing  Goal: Improved Oral Intake  Outcome: Progressing  Goal: Effective Oxygenation and Ventilation  Outcome: Progressing  Intervention: Promote Airway Secretion Clearance  Recent Flowsheet Documentation  Taken 2/3/2023 0337 by Johanna Mayers RN  Cough And Deep Breathing: done with encouragement  Activity Management: activity adjusted per tolerance  Taken 2/3/2023 0045 by Johanna Mayers RN  Cough And Deep Breathing: done with encouragement  Activity Management: activity adjusted per tolerance  Intervention: Optimize  Oxygenation and Ventilation  Recent Flowsheet Documentation  Taken 2/3/2023 0337 by Johanna Mayers, RN  Head of Bed (HOB) Positioning: HOB at 20-30 degrees  Taken 2/3/2023 0045 by Johanna Mayers RN  Head of Bed (HOB) Positioning: HOB at 20-30 degrees  Goal: Effective Breathing Pattern During Sleep  Outcome: Progressing  Intervention: Monitor and Manage Obstructive Sleep Apnea  Recent Flowsheet Documentation  Taken 2/3/2023 0337 by Johanna Mayers RN  Medication Review/Management: medications reviewed  Taken 2/3/2023 0045 by Johanna Mayers RN  Medication Review/Management: medications reviewed     Problem: Malnutrition  Goal: Improved Nutritional Intake  Outcome: Progressing

## 2023-02-03 NOTE — PROGRESS NOTES
Pt on 2L NC with sat in the mid 90's. Neb treatment given, pt tolerated well.    Jenise Lambert, RT

## 2023-02-03 NOTE — DISCHARGE SUMMARY
[facility]  Hospitalist Discharge Summary      Date of Admission:  1/30/2023  Date of Discharge:  2/3/2023  Discharging Provider: Alcides Verduzco MD  Discharge Service: Hospitalist Service    Discharge Diagnoses   Shortness of breath and pulmonary edema  Acute heart failure with preserved ejection fraction, with history of dyslipidemia and hypertension  Coronary artery disease with negative troponin x3  Arrhythmia with electrolyte abnormalities  Wheezing and shortness of breath-empirically treated for possible COPD exacerbation-recommend outpatient PFTs to establish formal diagnosis    Follow-ups Needed After Discharge   Follow-up Appointments     Follow Up and recommended labs and tests      Follow up with primary care provider in a week (or TCU physician) 1 week.    The following labs/tests are recommended: bmp and hgb and Mg. Ensure   cardiology outpatient follow-up.               Unresulted Labs Ordered in the Past 30 Days of this Admission     No orders found from 12/31/2022 to 1/31/2023.      These results will be followed up by [facility]    Discharge Disposition   Discharged to rehabilitation facility  Condition at discharge: [facility]       Dulce Pritchard is a 77 year old female PMH significant for HTN, chronic joint pains, inflammatory arthritis, depression. 1/30 presented with complaints of cough, weakness, shortness of breath x1 month. Oxygen saturation of 85% on room air per EMS, blood pressure 172/73, heart rate 55.  BNP elevated at 1600. CT chest negative for PE, findings consistent with congestive heart failure, also severe coronary calcifications.  EKG possibly intermittent heart block versus A. fib with slow ventricular response.      Initiated on IV Lasix.  Cardiology consultated.  Echo showed EF 55% but evidence of severe pulmonary hypertension and abnormal septal wall motion.  Palliative care consulted.  On 2/1 added DuoNebs, prednisone and doxycycline for  possible COPD exacerbation (vs symptom control) though recommend outpatient follow-up with pulmonary medicine to establish a formal diagnosis of COPD. Cardiology transitioned to PO diuresis 2/2.    On 2/3, she remained hemodynamically and vitally stable though still required 2-3 L of supplemental oxygen; her diuresis regimen was transitioned fully to an oral regimen of 40 mg twice daily Lasix; other medication adjustments include stopping hydrochlorothiazide and atenolol and will be discharged with new 20 mg lisinopril daily, baby aspirin, and nighttime statin, as well as 4 more days of empiric doxycycline and prednisone.  Patient is discharging to a transitional care unit for further rehabilitation and strengthening, conditioning, and should have follow-up with her primary care provider and/or facility provider within a week as well as cardiology and heart failure as an outpatient longitudinally.      Cares were actively discussed with the patient's family members.  Of note, on discharge in AM the patient has a magnesium 1.7 which is being replaced on replacement protocol, normalized potassium and creatinine, but also noted with a slowly downtrending/stable thrombocytopenia at 98-as such, do not recommend Lovenox for DVT prophylaxis while at TCU.  She should have basic BMP and CBC and magnesium labs to be rechecked.    Consultations This Hospital Stay   CORE CLINIC EVALUATION IP CONSULT  OCCUPATIONAL THERAPY ADULT IP CONSULT  NUTRITION SERVICES ADULT IP CONSULT  CARE MANAGEMENT / SOCIAL WORK IP CONSULT  CARDIOLOGY IP CONSULT  PALLIATIVE CARE ADULT IP CONSULT  CORE CLINIC EVALUATION IP CONSULT  SPIRITUAL HEALTH SERVICES IP CONSULT  NUTRITION SERVICES ADULT IP CONSULT  IP RESPIRATORY CARE CHRONIC PULMONARY DISEASE SPECIALIST  OCCUPATIONAL THERAPY ADULT IP CONSULT  PHYSICAL THERAPY ADULT IP CONSULT  PHYSICAL THERAPY ADULT IP CONSULT  OCCUPATIONAL THERAPY ADULT IP CONSULT  IP RESPIRATORY CARE CHRONIC PULMONARY DISEASE  SPECIALIST    Code Status   Full Code    Time Spent on this Encounter   I, Alcides Verduzco MD, personally saw the patient today and spent greater than 30 minutes discharging this patient.       Alcides Verduzco MD  [facility] 3 ICU  [address] [facility] [address]  Phone: [phone]  Fax: [phone]  ______________________________________________________________________    Physical Exam   Vital Signs: Temp: 98.2  F (36.8  C) Temp src: Oral BP: (!) 144/64 Pulse: 62   Resp: 20 SpO2: 95 % O2 Device: Nasal cannula Oxygen Delivery: 3 LPM  Weight: 203 lbs 8 oz    General: alert, oriented, and in no acute distress; sitting up in chair  Pulmonary: coarse but relatively clear, no conspicuous rales, biateral expiratory wheezes without any evidence of accessory muscle use; on 2-3L of nasal cannula  CVS: regular rate and rhythm, no murmurs, rubs, or gallops; no blatant jugular venous distention; no extremity edema and extremities are warm to the touch; mild nonpitting bilateral calf edema  GI: soft, nontender, BS+, no rebound or guarding, no conspicuous organomegaly   Neuro: nonfocal, moves all extremities of own volition  Psych: appropriate      Primary Care Physician   Colin Christie    Discharge Orders      General info for SNF    Length of Stay Estimate: Short Term Care: Estimated # of Days <30  Condition at Discharge: Improving  Level of care:skilled   Rehabilitation Potential: Fair  Admission H&P remains valid and up-to-date: Yes  Recent Chemotherapy: N/A  Use Nursing Home Standing Orders: Yes     Mantoux instructions    Give two-step Mantoux (PPD) Per Facility Policy Yes     Follow Up and recommended labs and tests    Follow up with primary care provider in a week (or TCU physician) 1 week.  The following labs/tests are recommended: bmp and hgb and Mg. Ensure cardiology outpatient follow-up.     Reason for your hospital stay    Volume overload from a heart muscle issue  (also known as heart failure)     Activity - Up with nursing assistance     Physical Therapy Adult Consult    Evaluate and treat as clinically indicated.    Reason:  weakness and frailty     Occupational Therapy Adult Consult    Evaluate and treat as clinically indicated.    Reason:  weakness and frailty     Fall precautions     Diet    Follow this diet upon discharge: Orders Placed This Encounter      Snacks/Supplements Adult: Gelatein Plus; With Meals      Snacks/Supplements Adult: Ensure Enlive; With Meals      2 Gram Sodium Diet Other - please comment       Significant Results and Procedures   Results for orders placed or performed during the hospital encounter of 01/30/23   XR Chest Port 1 View    Narrative    EXAM: XR CHEST PORT 1 VIEW  LOCATION: [facility]  DATE/TIME: 1/30/2023 8:00 PM    INDICATION: Short of breath and coughing  COMPARISON: None.      Impression    IMPRESSION: Modest cardiomegaly. Slight interstitial prominence but no focal pneumonia or pleural effusion evident.  DJD both shoulders.   CT Chest Pulmonary Embolism w Contrast    Narrative    EXAM: CT CHEST PULMONARY EMBOLISM W CONTRAST  LOCATION: [facility]  DATE/TIME: 1/30/2023 8:58 PM    INDICATION: Short of breath, elevated D dimer  COMPARISON: None.  TECHNIQUE: CT chest pulmonary angiogram during arterial phase injection of IV contrast. Multiplanar reformats and MIP reconstructions were performed. Dose reduction techniques were used.   CONTRAST: 100 mL [id number]    FINDINGS:  ANGIOGRAM CHEST: Pulmonary arteries are normal caliber and negative for pulmonary emboli. Minimal degenerative calcification of the aortic root. Mild patchy atheromatous calcifications of the aortic arch, proximal great vessels and descending thoracic   aorta. No thoracic aortic aneurysm.    LUNGS AND PLEURA: Small right pleural effusion and trace left pleural fluid. Related atelectasis. Anterior bowing of the  membranous airways indicates imaging at the expiratory phase of the respiratory cycle. In addition there is symmetric airway wall   thickening. The lung parenchyma is heterogeneous attenuation. Patchy multifocal peribronchial inflammation is present in both lungs.    MEDIASTINUM: Both the right and left atria are enlarged. There are mild mitral annular calcifications. Cardiac ventricles are normal in size. No pericardial effusion. Esophagus is decompressed. There are mildly enlarged lymph nodes at both [place] and in   the AP window. No enlarged paratracheal or subcarinal lymph nodes.    CORONARY ARTERY CALCIFICATION: Severe.    UPPER ABDOMEN: Reflux of IV contrast into the distended hepatic veins and IVC. Splenomegaly.    MUSCULOSKELETAL: Diffuse thoracic spine degenerative osteophytes and disc space narrowing. No aggressive or destructive bone lesions. Bilateral glenohumeral osteoarthrosis.      Impression    IMPRESSION:    1.  No acute pulmonary embolism.  2.  Enlarged right and left atria. Severe atheromatous coronary calcifications.  3.  Small right trace left pleural effusions.  4.  Bronchial wall thickening and peribronchial and interstitial opacities in both lungs, most mixed interstitial and alveolar lung edema. Pattern suggests acute congestive failure.   Echocardiogram Complete     Value    LVEF  50-55% (borderline)    [facility]    [id number]  [id number]  [id number]^PJ^DERRICK^[place] [address]     Name: STEFANIE DUMONT  MRN: [id number]  : 1945  Study Date: 2023 10:14 AM  Age: 77 yrs  Gender: Female  Patient Location: [facility]  Reason For Study: Heart Failure  Ordering Physician: DERRICK OLIVA  Performed By: MYCHAL     BSA: 2.0 m2  Height: 63 in  Weight: 218 lb  HR: 51  BP: 167/73 mmHg  ______________________________________________________________________________  Procedure  Complete Portable Echo Adult. Definity (NDC #29537-991) given  intravenously.  ______________________________________________________________________________  Interpretation Summary     The left ventricle is mildly dilated.  Left ventricular function is decreased. The ejection fraction is 50-55%  (borderline).  The left atrium is mildly dilated.  The right atrium is mildly dilated.  Severe (>55mmHg) pulmonary hypertension is present.  IVC diameter >2.1 cm collapsing <50% with sniff suggests a high RA pressure  estimated at 15 mmHg or greater.  There is mild to moderate (1-2+) mitral regurgitation.  ______________________________________________________________________________  Left Ventricle  The left ventricle is mildly dilated. Left ventricular function is decreased.  The ejection fraction is 50-55% (borderline). There is normal left ventricular  wall thickness. Left ventricular diastolic function is abnormal. There is  borderline global hypokinesia of the left ventricle. Septal motion is  consistent with conduction abnormality.     Right Ventricle  Normal right ventricle size and systolic function.     Atria  The left atrium is mildly dilated. The right atrium is mildly dilated. There  is no color Doppler evidence of an atrial shunt.     Mitral Valve  Mitral valve leaflets appear normal. There is mild to moderate mitral annular  calcification. There is mild to moderate (1-2+) mitral regurgitation.     Tricuspid Valve  Tricuspid valve leaflets appear normal. There is mild (1+) tricuspid  regurgitation. The right ventricular systolic pressure is approximated at 49.7  mmHg plus the right atrial pressure. IVC diameter >2.1 cm collapsing <50% with  sniff suggests a high RA pressure estimated at 15 mmHg or greater. Severe  (>55mmHg) pulmonary hypertension is present.     Aortic Valve  There is mild trileaflet aortic sclerosis.     Pulmonic Valve  Normal pulmonic valve.     Vessels  Normal size ascending aorta.     Pericardium  There is no pericardial effusion.      ______________________________________________________________________________  MMode/2D Measurements & Calculations  IVSd: 0.94 cm  LVIDd: 5.2 cm  LVIDs: 3.4 cm  LVPWd: 1.00 cm  FS: 34.4 %     LV mass(C)d: 186.1 grams  LV mass(C)dI: 92.8 grams/m2  Ao root diam: 2.6 cm  LA dimension: 4.6 cm  asc Aorta Diam: 3.0 cm  LA/Ao: 1.8  LVOT diam: 1.7 cm  LVOT area: 2.3 cm2  LA Volume Indexed (AL/bp): 31.9 ml/m2  RWT: 0.38     Time Measurements  MM HR: 52.0 BPM     Doppler Measurements & Calculations  MV E max dominga: 130.0 cm/sec  MV A max dominga: 42.2 cm/sec  MV E/A: 3.1  MV dec time: 0.26 sec  LV V1 max PG: 3.2 mmHg  LV V1 max: 89.4 cm/sec  LV V1 VTI: 17.7 cm  SV(LVOT): 41.4 ml  SI(LVOT): 20.6 ml/m2  PA acc time: 0.11 sec  TR max dominga: 352.5 cm/sec  TR max P.7 mmHg  E/E' av.0  Lateral E/e': 16.6  Medial E/e': 21.3     ______________________________________________________________________________  Report approved by: Josselyn Serrano 2023 11:35 AM               Discharge Medications   Current Discharge Medication List      START taking these medications    Details   aspirin (ASA) 81 MG EC tablet Take 1 tablet (81 mg) by mouth daily  Qty: 30 tablet, Refills: 0    Associated Diagnoses: Acute heart failure with preserved ejection fraction (HFpEF) (H)      atorvastatin (LIPITOR) 20 MG tablet Take 1 tablet (20 mg) by mouth every evening  Qty: 30 tablet, Refills: 0    Associated Diagnoses: Acute heart failure with preserved ejection fraction (HFpEF) (H)      doxycycline hyclate (VIBRAMYCIN) 100 MG capsule Take 1 capsule (100 mg) by mouth 2 times daily  Qty: 8 capsule, Refills: 0    Associated Diagnoses: Wheezing      furosemide (LASIX) 40 MG tablet Take 1 tablet (40 mg) by mouth 2 times daily  Qty: 60 tablet, Refills: 0    Associated Diagnoses: Acute heart failure with preserved ejection fraction (HFpEF) (H)      guaiFENesin (MUCINEX) 600 MG 12 hr tablet Take 1 tablet (600 mg) by mouth 2 times daily  Qty: 14  tablet, Refills: 0    Associated Diagnoses: Wheezing      ipratropium - albuterol 0.5 mg/2.5 mg/3 mL (DUONEB) 0.5-2.5 (3) MG/3ML neb solution Take 1 vial (3 mLs) by nebulization every 6 hours  Qty: 90 mL, Refills: 0    Associated Diagnoses: Wheezing      lisinopril (ZESTRIL) 20 MG tablet Take 1 tablet (20 mg) by mouth daily  Qty: 30 tablet, Refills: 0    Associated Diagnoses: Acute heart failure with preserved ejection fraction (HFpEF) (H); Benign essential hypertension      melatonin 3 MG tablet Take 1 tablet (3 mg) by mouth nightly as needed for sleep  Qty: 14 tablet, Refills: 0    Associated Diagnoses: Primary insomnia      predniSONE (DELTASONE) 20 MG tablet Take 2 tablets (40 mg) by mouth daily for 4 days  Qty: 8 tablet, Refills: 0    Associated Diagnoses: Wheezing         CONTINUE these medications which have CHANGED    Details   gabapentin (NEURONTIN) 300 MG capsule Take 2 capsules (600 mg) by mouth At Bedtime  Qty: 30 capsule, Refills: 0    Associated Diagnoses: Neuropathy         CONTINUE these medications which have NOT CHANGED    Details   FLUoxetine (PROZAC) 20 MG capsule Take 20 mg by mouth daily      traMADol (ULTRAM) 50 MG tablet Take 100 mg by mouth every morning      Vitamin D, Cholecalciferol, 25 MCG (1000 UT) TABS Take 1,000 Units by mouth daily         STOP taking these medications       atenolol (TENORMIN) 50 MG tablet Comments:   Reason for Stopping:         hydroxychloroquine (PLAQUENIL) 200 MG tablet Comments:   Reason for Stopping:         lisinopril-hydrochlorothiazide (ZESTORETIC) 10-12.5 MG tablet Comments:   Reason for Stopping:             Allergies   No Known Allergies

## 2023-02-03 NOTE — PROGRESS NOTES
Care Management Follow Up    Length of Stay (days): 4    Expected Discharge Date: [facility]  CM just spoke with facility. Patients insurance [employer] auth has not come through yet. Do not anticipate she will leave today. Will need to check with them tomorrow.      Marie DE LEÓN CM

## 2023-02-03 NOTE — PROGRESS NOTES
Pt on O2 2L NC, O2 sats high 90s. BS coarse/diminished/wheezing pre and post neb. Flutter valve done qid independently. Pt received schedule Duoneb q6h. RT will continue to follow.    Jed Marsh, RT

## 2023-02-03 NOTE — PROGRESS NOTES
Care Management Follow Up    Length of Stay (days): 4    Expected Discharge Date: 02/03/2023     Concerns to be Addressed: discharge planning         Additional Information:  SAMUEL was able to secure [facility] in [place], however they need to submit for auth from her [employer] insurance. They will let us know once approved, and we can send her. Number to call for admissions, . She will need a ride set up.      Stacie Boswell RN

## 2023-02-03 NOTE — PROGRESS NOTES
` [facility] [address], [place] [address]   [email or web address]   OFFICE: [phone]            Impression and Plan     1.  Shortness of breath/pulmonary edema.  B-type natriuretic peptide elevated at 1597 pg/mL consistent with some degree of myocardial strain.  CT radiographic imaging on admission suggests pulmonary edema.  Echocardiogram this admission revealed normal left ventricular systolic performance with ejection fraction of 50-55% though with findings consistent with impaired diastolic filling.    Continue enteral furosemide 40 mg twice daily and continue to follow.     2.  Coronary artery disease.  Dulce has known coronary artery disease by virtue of CT scan this admission revealing severe coronary calcification.  Troponins x3 on presentation suggest against recent myocardial injury.      Continue 81 mg aspirin.     3.  Arrhythmia.  ECGs reviewed.  Sinus rhythm with sinus arrhythmia/bradycardia and intermittent high junctional escape earlier in hospitalization.    Telemetry this morning sinus rhythm with heart rate in the 50s.  He has had some intermittent second-degree heart block type I.    Continue to defer ?-blocker therapy, atenolol, which patient had been on prior to admission.    Will simply continue to follow and monitor/normalize electrolytes as needed.     4.  Mitral insufficiency.  This is felt mild-moderate in degree on echocardiogram this admission.    5.  Hypertension.    Blood pressures most recently more reasonable.  Blood pressure this morning 100/50 to 127/57 mmHg.    Continue lisinopril 20 mg daily (increased from 10 mg this admission).     6.  Dyslipidemia.  Lipid profile 22 November 2022 revealed LDL 79 mg/dL and HDL 47 mg/dL. Although lipid profile fairly reasonable, given evidence of severe coronary calcification would advocate trying to suppress LDL less than 70 mg/dL if possible.     Continue atorvastatin 20 mg daily  "(initiated this admission).     7.  COPD exacerbation.  Management plan as outlined by Hospital Service.     Primary Cardiologist: Dr. Hank Wooten (initial consultation this admission)       Anupama Cardona reports cough this morning.  Feels breathing is about the same as yesterday.  No chest pain.    Cardiac Diagnostics   Telemetry (personally reviewed): Sinus rhythm.    Echocardiogram 31 January 2023:  1. Mild left ventricular enlargement with normal left ventricular systolic performance.  Ejection fraction 50-55%.  2. Abnormal septal motion consistent with conduction abnormality.  3. Mild-moderate mitral insufficiency.  4. Normal right ventricular size and systolic performance.  5. Mild biatrial enlargement.  6. Right ventricular systolic pressure relative to right atrial pressure is mildly increased.  The pulmonary artery pressure is estimated to be 45-50 mmHg plus right atrial pressure.    Twelve-lead ECG (personally reviewed) 30 January 2023: Sinus rhythm.  PACs.       Physical Examination       /50 (BP Location: Left arm)   Pulse 55   Temp 98.4  F (36.9  C) (Oral)   Resp 20   Ht 1.6 m (5' 3\")   Wt 92.3 kg (203 lb 8 oz)   SpO2 96%   BMI 36.05 kg/m          Vitals:    01/30/23 1917 02/01/23 0206 02/02/23 0013 02/03/23 0325   Weight: 98.9 kg (218 lb) 92.1 kg (203 lb) 93.6 kg (206 lb 6.4 oz) 92.3 kg (203 lb 8 oz)              Intake/Output Summary (Last 24 hours) at 2/1/2023 0620  Last data filed at 2/1/2023 0428  Gross per 24 hour   Intake 0 ml   Output 3775 ml   Net -3775 ml     The patient is alert. Sclerae are anicteric. Mucosal membranes are moist.No significant adenopathy/thyromegally appreciated. Lungs with some expiratory wheezes with coarse bronchogenic breath sounds and some scattered fine crackles. On cardiovascular exam, the patient has a regular S1 and S2. Abdomen is soft and non-tender. Extremities reveal no clubbing, cyanosis.  Lower extremity edema would appear " improved.           Imaging      CT scan of chest 30 January 2023:  1. No acute pulmonary embolism.  2. Enlarged right and left atria. Severe atheromatous coronary calcifications.  3. Small right trace left pleural effusions.  4. Bronchial wall thickening and peribronchial and interstitial opacities in both lungs, most mixed interstitial and alveolar lung edema. Pattern suggests acute congestive failure.  5. Coronary calcification: Severe     Chest radiograph 30 January 2023:  1. Modest cardiomegaly.   2. Slight interstitial prominence but no focal pneumonia or pleural effusion evident.  3. DJD both shoulders.     Lab Results     Recent Labs   Lab 02/03/23  0500 02/02/23  1117 02/02/23  0514 01/31/23  2244 01/31/23  1447 01/31/23  0722 01/30/23 1926   WBC  --   --   --   --   --  6.7 6.4   HGB  --   --   --   --   --  11.2* 11.9   MCV  --   --   --   --   --  100 98   PLT 98*  --   --   --   --  121* 134*   NA  --   --   --   --   --  144 142   POTASSIUM 3.3* 4.1 3.4*   < > 3.2* 3.2* 3.1*   CHLORIDE  --   --   --   --   --  103 102   CO2  --   --   --   --   --  30 27   BUN  --   --   --   --   --  20 26   CR 0.73  --   --   --  0.87 0.91 1.03   ANIONGAP  --   --   --   --   --  11 13   EDGAR  --   --   --   --   --  9.2 9.5   GLC  --   --   --   --   --  90 114    < > = values in this interval not displayed.     Recent Labs   Lab Test 01/30/23 1926   BNP 1,597*     No lab results found in last 7 days.    Invalid input(s): LDLCALC  Lab Results   Component Value Date     01/31/2023    CO2 30 01/31/2023    BUN 20 01/31/2023     Lab Results   Component Value Date    WBC 6.7 01/31/2023    HGB 11.2 01/31/2023    HCT 35.8 01/31/2023     01/31/2023     01/31/2023     Lab Results   Component Value Date    CHOL 142 11/22/2022    TRIG 78 11/22/2022    HDL 47 11/22/2022     Lab Results   Component Value Date    TROPONINI 0.11 01/31/2023    TROPONINI 0.12 01/31/2023    TROPONINI 0.12 01/30/2023            Current Inpatient Scheduled Medications   Scheduled Meds:    aspirin  81 mg Oral Daily     atorvastatin  20 mg Oral QPM     doxycycline hyclate  100 mg Oral BID     enoxaparin ANTICOAGULANT  40 mg Subcutaneous Q24H     FLUoxetine  20 mg Oral Daily     furosemide  40 mg Oral Daily     gabapentin  600 mg Oral At Bedtime     guaiFENesin  1,200 mg Oral BID     [Held by provider] hydroxychloroquine  200 mg Oral BID     ipratropium - albuterol 0.5 mg/2.5 mg/3 mL  3 mL Nebulization Q6H     lisinopril  20 mg Oral Daily     predniSONE  40 mg Oral Daily     sodium chloride (PF)  3 mL Intracatheter Q8H     traMADol  100 mg Oral QAM     Continuous Infusions:         Medications Prior to Admission   Prior to Admission medications    Medication Sig Start Date End Date Taking? Authorizing Provider   atenolol (TENORMIN) 50 MG tablet Take 50 mg by mouth daily   Yes Unknown, Entered By History   FLUoxetine (PROZAC) 20 MG capsule Take 20 mg by mouth daily   Yes Unknown, Entered By History   gabapentin (NEURONTIN) 300 MG capsule Take 1,200 mg by mouth At Bedtime   Yes Unknown, Entered By History   hydroxychloroquine (PLAQUENIL) 200 MG tablet Take 200 mg by mouth 2 times daily   Yes Unknown, Entered By History   lisinopril-hydrochlorothiazide (ZESTORETIC) 10-12.5 MG tablet Take 1 tablet by mouth daily   Yes Unknown, Entered By History   traMADol (ULTRAM) 50 MG tablet Take 100 mg by mouth every morning   Yes Unknown, Entered By History   Vitamin D, Cholecalciferol, 25 MCG (1000 UT) TABS Take 1,000 Units by mouth daily   Yes Unknown, Entered By History

## 2023-02-03 NOTE — PLAN OF CARE
Goal Outcome Evaluation:  Pt A+O, able to make needs known. Denies pain. VSS on 2L NC (up to 4L NC during the day with activity). Up to chair this afternoon. SB/SR on tele. AX2. Voiding using commode and pure wick. 2g Na and 1.8 L FR. MD notified of Mg 1.6, no replacement per standard replacement protocol, okay per MD. Daughters at bedside for support this evening.       Plan of Care Reviewed With: patient  Cheyenne Jones RN      Problem: Plan of Care - These are the overarching goals to be used throughout the patient stay.    Goal: Plan of Care Review  Description: The Plan of Care Review/Shift note should be completed every shift.  The Outcome Evaluation is a brief statement about your assessment that the patient is improving, declining, or no change.  This information will be displayed automatically on your shift note.  Outcome: Progressing  Flowsheets (Taken 2/2/2023 2022)  Plan of Care Reviewed With: patient     Problem: Gas Exchange Impaired  Goal: Optimal Gas Exchange  Outcome: Progressing

## 2023-02-04 LAB
HOLD SPECIMEN: NORMAL
MAGNESIUM SERPL-MCNC: 1.4 MG/DL (ref 1.8–2.6)
MAGNESIUM SERPL-MCNC: 2.4 MG/DL (ref 1.8–2.6)
POTASSIUM BLD-SCNC: 3.7 MMOL/L (ref 3.5–5)

## 2023-02-04 PROCEDURE — 250N000009 HC RX 250: Performed by: FAMILY MEDICINE

## 2023-02-04 PROCEDURE — 250N000013 HC RX MED GY IP 250 OP 250 PS 637: Performed by: FAMILY MEDICINE

## 2023-02-04 PROCEDURE — 84132 ASSAY OF SERUM POTASSIUM: CPT | Performed by: STUDENT IN AN ORGANIZED HEALTH CARE EDUCATION/TRAINING PROGRAM

## 2023-02-04 PROCEDURE — 250N000011 HC RX IP 250 OP 636: Performed by: STUDENT IN AN ORGANIZED HEALTH CARE EDUCATION/TRAINING PROGRAM

## 2023-02-04 PROCEDURE — 99232 SBSQ HOSP IP/OBS MODERATE 35: CPT | Performed by: STUDENT IN AN ORGANIZED HEALTH CARE EDUCATION/TRAINING PROGRAM

## 2023-02-04 PROCEDURE — 120N000004 HC R&B MS OVERFLOW

## 2023-02-04 PROCEDURE — 94640 AIRWAY INHALATION TREATMENT: CPT | Mod: 76

## 2023-02-04 PROCEDURE — 83735 ASSAY OF MAGNESIUM: CPT | Performed by: STUDENT IN AN ORGANIZED HEALTH CARE EDUCATION/TRAINING PROGRAM

## 2023-02-04 PROCEDURE — 250N000012 HC RX MED GY IP 250 OP 636 PS 637: Performed by: FAMILY MEDICINE

## 2023-02-04 PROCEDURE — 99207 PR APP CREDIT; MD BILLING SHARED VISIT: CPT | Performed by: HOSPITALIST

## 2023-02-04 PROCEDURE — 94799 UNLISTED PULMONARY SVC/PX: CPT

## 2023-02-04 PROCEDURE — 250N000013 HC RX MED GY IP 250 OP 250 PS 637: Performed by: INTERNAL MEDICINE

## 2023-02-04 PROCEDURE — 36415 COLL VENOUS BLD VENIPUNCTURE: CPT | Performed by: STUDENT IN AN ORGANIZED HEALTH CARE EDUCATION/TRAINING PROGRAM

## 2023-02-04 PROCEDURE — 250N000013 HC RX MED GY IP 250 OP 250 PS 637: Performed by: HOSPITALIST

## 2023-02-04 PROCEDURE — 999N000157 HC STATISTIC RCP TIME EA 10 MIN

## 2023-02-04 PROCEDURE — 99207 PR NO CHARGE LOS: CPT | Performed by: INTERNAL MEDICINE

## 2023-02-04 PROCEDURE — 94640 AIRWAY INHALATION TREATMENT: CPT

## 2023-02-04 RX ORDER — MAGNESIUM SULFATE 4 G/50ML
4 INJECTION INTRAVENOUS ONCE
Status: COMPLETED | OUTPATIENT
Start: 2023-02-04 | End: 2023-02-04

## 2023-02-04 RX ADMIN — ATORVASTATIN CALCIUM 20 MG: 10 TABLET, FILM COATED ORAL at 19:26

## 2023-02-04 RX ADMIN — IPRATROPIUM BROMIDE AND ALBUTEROL SULFATE 3 ML: 2.5; .5 SOLUTION RESPIRATORY (INHALATION) at 15:22

## 2023-02-04 RX ADMIN — IPRATROPIUM BROMIDE AND ALBUTEROL SULFATE 3 ML: 2.5; .5 SOLUTION RESPIRATORY (INHALATION) at 07:13

## 2023-02-04 RX ADMIN — GUAIFENESIN 1200 MG: 600 TABLET ORAL at 19:26

## 2023-02-04 RX ADMIN — GUAIFENESIN 1200 MG: 600 TABLET ORAL at 08:33

## 2023-02-04 RX ADMIN — GABAPENTIN 600 MG: 300 CAPSULE ORAL at 19:26

## 2023-02-04 RX ADMIN — DOXYCYCLINE 100 MG: 100 CAPSULE ORAL at 08:33

## 2023-02-04 RX ADMIN — ASPIRIN 81 MG: 81 TABLET, COATED ORAL at 08:33

## 2023-02-04 RX ADMIN — MAGNESIUM SULFATE HEPTAHYDRATE 4 G: 80 INJECTION, SOLUTION INTRAVENOUS at 05:30

## 2023-02-04 RX ADMIN — FLUOXETINE 20 MG: 20 CAPSULE ORAL at 08:33

## 2023-02-04 RX ADMIN — DOXYCYCLINE 100 MG: 100 CAPSULE ORAL at 19:27

## 2023-02-04 RX ADMIN — PREDNISONE 40 MG: 20 TABLET ORAL at 08:34

## 2023-02-04 RX ADMIN — IPRATROPIUM BROMIDE AND ALBUTEROL SULFATE 3 ML: 2.5; .5 SOLUTION RESPIRATORY (INHALATION) at 20:01

## 2023-02-04 RX ADMIN — TRAMADOL HYDROCHLORIDE 100 MG: 50 TABLET, COATED ORAL at 08:33

## 2023-02-04 RX ADMIN — Medication 3 MG: at 21:48

## 2023-02-04 RX ADMIN — FUROSEMIDE 40 MG: 40 TABLET ORAL at 08:34

## 2023-02-04 RX ADMIN — LISINOPRIL 20 MG: 20 TABLET ORAL at 08:33

## 2023-02-04 ASSESSMENT — ACTIVITIES OF DAILY LIVING (ADL)
ADLS_ACUITY_SCORE: 49
ADLS_ACUITY_SCORE: 47
ADLS_ACUITY_SCORE: 49
ADLS_ACUITY_SCORE: 47
ADLS_ACUITY_SCORE: 49

## 2023-02-04 NOTE — PROGRESS NOTES
Patient is alert and oriented. Pt denies pain or SOB. Pt is ambulating with the assist of 1, gait belt and walker. Pt is A fib/junctional rhythm on tele.     Joanne Potter RN

## 2023-02-04 NOTE — PROGRESS NOTES
Pt remains on O2 2L NC. O2 sats mid 90s. BS crackle/coarse/diminished pre and post neb. Flutter valve done independently. RT tried to wean off O2 and pt O2 drop to 89%, placed pt back to 2L.  RT will continue to follow.    Jed Marsh, RT

## 2023-02-04 NOTE — PROGRESS NOTES
[facility] HEART CARE [address], [place] [address]   [email or web address]   OFFICE: [phone]                 Cardiology chart check.

## 2023-02-04 NOTE — PROGRESS NOTES
Patient is alert and oriented. Pt denies pain or SOB. Pt in a jucntional rhythm on tele. Tele was discontinued later in the shift. Pt is supposed to discharge to TCU, still awaiting insurance authorization.     Joanne Potter RN

## 2023-02-04 NOTE — PROGRESS NOTES
[facility] MEDICINE  PROGRESS NOTE     Code Status: Full Code    Identification/Summary:       Dulce Pritchard is a 77 year old female PMH significant for HTN, chronic joint pains, inflammatory arthritis, depression. 1/30 presented with complaints of cough, weakness, shortness of breath x1 month. Oxygen saturation of 85% on room air per EMS, blood pressure 172/73, heart rate 55.  BNP elevated at 1600. CT chest negative for PE, findings consistent with congestive heart failure, also severe coronary calcifications.  EKG possibly intermittent heart block versus A. fib with slow ventricular response.       Initiated on IV Lasix.  Cardiology consultated.  Echo showed EF 55% but evidence of severe pulmonary hypertension and abnormal septal wall motion.  Palliative care consulted.  On 2/1 added DuoNebs, prednisone and doxycycline for possible COPD exacerbation (vs symptom control) though recommend outpatient follow-up with pulmonary medicine to establish a formal diagnosis of COPD. Cardiology transitioned to PO diuresis 2/2.     On 2/3, she remained hemodynamically and vitally stable though still required 2-3 L of supplemental oxygen; her diuresis regimen was transitioned fully to an oral regimen of 40 mg twice daily Lasix; other medication adjustments include stopping hydrochlorothiazide and atenolol and will be discharged with new 20 mg lisinopril daily, baby aspirin, and nighttime statin, as well as 4 more days of empiric doxycycline and prednisone.  Patient is discharging to a transitional care unit for further rehabilitation and strengthening, conditioning, and should have follow-up with her primary care provider and/or facility provider within a week as well as cardiology and heart failure as an outpatient longitudinally.       Cares were actively discussed with the patient's family members.  Of note, on discharge in AM the patient has a magnesium 1.7 which is being replaced on  replacement protocol, normalized potassium and creatinine, but also noted with a slowly downtrending/stable thrombocytopenia at 98-as such, do not recommend Lovenox for DVT prophylaxis while at TCU.  She should have basic BMP and CBC and magnesium labs to be rechecked.     Now unfortunately secondary to insurance and logistics and prior authorization, discharge had to be postponed.  Remains vitally hemodynamically stable. Pending placement.       Assessment and plan:  Acute diastolic congestive heart failure  Acute hypoxemic respiratory failure  Severe coronary calcifications  Sinus arrhythmia  Essential hypertension  Questionable intermittent heart block, SSS?  Echocardiogram shows EF of 55% with septal wall motion abnormality consistent with conduction defect and severe pulmonary hypertension.  Receiving intravenous Lasix.    Negative troponins.  Procalcitonin 0.13.  No fevers.  Hold off on antibiotics.  Hold atenolol and HCTZ.    Continue lisinopril and dose increased to 20 mg daily.  Continue intravenous Lasix diuresis.  May need ischemic work-up prior to discharge.  Appreciate cardiology consultation.   -Transition to 40 mg p.o. twice daily  - stable    Expiratory wheezing  No prior history of COPD but was a longtime smoker.  Wheezing noted today on exam.  Hospitalist on 2/1 empirically treat with duo nebs 4 times daily, prednisone 40 mg daily and doxycycline per COPD protocols.  Guaifenesin as needed.  Patient new to writer 2/2/2023 and discussed w/ copd RT team; recommend formal diagnosis w/ PFTs  Monitor response.   - planning to complete empiric course or may stop on discharge, per hospitalist    Severe pulmonary hypertension  Changes noted on echocardiogram.  Patient reports she is a heavy snorer.  Has never had a sleep study before.  Recommend outpatient sleep study.    Hyperlipidemia  Given severe coronary calcification atorvastatin 20 mg daily started per cardiology.  Recheck lipid panel in 1  "month.    Inflammatory arthritis  Followed by rheumatology as an outpatient.  Takes Plaquenil, tramadol, gabapentin  Continue tramadol, decrease gabapentin dose to maximum recommended at 600 mg at bedtime  Hold Plaquenil given possible intermittent heart block.    Hypokalemia  Hypomagnesemia  Replacement via protocols.    Goals of care  Patient notes she was doing fairly well up until about a month ago.  Since then has been progressively getting weaker and weaker.  Requires a power chair to get up and down stairs.  Appreciated meeting with palliative care services.     Anticoagulation   thrombocytopenia  - stop Enoxaparin (Lovenox) subcutaneous given platelets of 98  - may be 2/2 to acute sickness, trend intermittently; transfuse if < 20  - PCDs for now-- should be discharging today (was set for yesterday); otherwise consider resuming ppdx such as heparin     COVID-19 PCR influenza A/B/RSV negative from 1/30/2023  Noted.  Standard precautions.  Fluids: Saline lock  Pain meds: Tylenol as needed  Therapy:  PT OT consulted.  needs TCU.    [place]:Not present  Lines: None       Current Diet  Orders Placed This Encounter      2 Gram Sodium Diet Other - please comment      Diet    Supplements  Active Nourishment Order   Procedures     Snacks/Supplements Adult: Gelatein Plus; With Meals     Snacks/Supplements Adult: Ensure Enlive; With Meals       Clinically Significant Risk Factors         # Hypokalemia: Lowest K = 3.3 mmol/L in last 2 days, will replace as needed     # Hypomagnesemia: Lowest Mg = 1.4 mg/dL in last 2 days, will replace as needed     # Thrombocytopenia: Lowest platelets = 98 in last 2 days, will monitor for bleeding         # Obesity: Estimated body mass index is 36.05 kg/m  as calculated from the following:    Height as of this encounter: 1.6 m (5' 3\").    Weight as of this encounter: 92.3 kg (203 lb 8 oz).   # Severe Malnutrition: based on nutrition assessment        Alcides Verduzco   Hospitalist Service  [facility]   Securely message with the Vocera Web Console (learn more here)  Text page via [employer] Paging/Directory       ------------------------  Interval History/Subjective:  No new pain or sob  Stable  Was supposed to be discharged yesterday but now waiting on prior auth  - no new issues today; currently enjoying food/breakfast     Physical Exam/Objective:  Temp:  [97.5  F (36.4  C)-98.2  F (36.8  C)] 97.6  F (36.4  C)  Pulse:  [52-63] 61  Resp:  [16-20] 18  BP: (117-156)/(56-72) 142/64  SpO2:  [94 %-99 %] 94 %  Wt Readings from Last 4 Encounters:   02/03/23 92.3 kg (203 lb 8 oz)     Body mass index is 36.05 kg/m .    Constitutional: fatigued, alert, cooperative, no apparent distress, appears stated age and moderately obese, weak voice.  Not coughing as frequently as previously.  ENT: Normocephalic, without obvious abnormality, atraumatic, external ears without lesions, oral pharynx with moist mucous membranes, tonsils without erythema or exudates, gums normal and good dentition.  Respiratory: Expiratory wheezes throughout.  Rhonchi as well. On 2LPM  Cardiovascular: Normal apical impulse, regular rate and rhythm, normal S1 and S2, no S3 or S4, and no murmur noted  GI: No scars, normal bowel sounds, soft, non-distended, non-tender, no masses palpated, no hepatosplenomegally  Skin: normal skin color, texture, turgor, no redness, warmth, or swelling, and no rashes  Musculoskeletal: There is no redness, warmth, or swelling of the joints.  Motor strength is 4 out of 5 all extremities bilaterally.  Tone is normal.  Trace lower extremity edema bilaterally  Neurologic: Cranial nerves II-XII are grossly intact. Sensory:  Sensory intact  Neuropsychiatric: General: normal, calm and normal eye contact Level of consciousness: alert / normal Affect: normal Orientation: oriented to self, place, time and situation Memory and insight: normal, memory for past and recent events intact and thought process  normal      Medications:   Personally Reviewed.  Medications       aspirin  81 mg Oral Daily     atorvastatin  20 mg Oral QPM     doxycycline hyclate  100 mg Oral BID     enoxaparin ANTICOAGULANT  40 mg Subcutaneous Q24H     FLUoxetine  20 mg Oral Daily     furosemide  40 mg Oral Daily     gabapentin  600 mg Oral At Bedtime     guaiFENesin  1,200 mg Oral BID     [Held by provider] hydroxychloroquine  200 mg Oral BID     ipratropium - albuterol 0.5 mg/2.5 mg/3 mL  3 mL Nebulization Q6H     lisinopril  20 mg Oral Daily     predniSONE  40 mg Oral Daily     sodium chloride (PF)  3 mL Intracatheter Q8H     traMADol  100 mg Oral QAM       Data reviewed today: I personally reviewed all new medications, labs, imaging/diagnostics reports over the past 24 hours. Pertinent findings include:    Imaging:   No results found for this or any previous visit (from the past 24 hour(s)).    Labs:  Echocardiogram Complete   Final Result      CT Chest Pulmonary Embolism w Contrast   Final Result   IMPRESSION:      1.  No acute pulmonary embolism.   2.  Enlarged right and left atria. Severe atheromatous coronary calcifications.   3.  Small right trace left pleural effusions.   4.  Bronchial wall thickening and peribronchial and interstitial opacities in both lungs, most mixed interstitial and alveolar lung edema. Pattern suggests acute congestive failure.      XR Chest Port 1 View   Final Result   IMPRESSION: Modest cardiomegaly. Slight interstitial prominence but no focal pneumonia or pleural effusion evident.   DJD both shoulders.        Recent Results (from the past 24 hour(s))   Magnesium    Collection Time: 02/04/23  4:17 AM   Result Value Ref Range    Magnesium 1.4 (L) 1.8 - 2.6 mg/dL   Potassium    Collection Time: 02/04/23  4:17 AM   Result Value Ref Range    Potassium 3.7 3.5 - 5.0 mmol/L       Pending Labs:  Unresulted Labs Ordered in the Past 30 Days of this Admission     No orders found from 12/31/2022 to 1/31/2023.

## 2023-02-04 NOTE — DISCHARGE SUMMARY
[facility]  Hospitalist Discharge Summary      Date of Admission:  1/30/2023  Date of Discharge:  2/4/2023   Discharging Provider: Sukhdev Verduzco MD  Discharge Service: Hospitalist Service    Discharge Diagnoses   Shortness of breath and pulmonary edema  Acute on chronic diastolic heart failure with preserved ejection fraction, with history of dyslipidemia and hypertension  Coronary artery disease with negative troponin x3  Arrhythmia with electrolyte abnormalities  Wheezing and shortness of breath-empirically treated for possible COPD exacerbation-recommend outpatient PFTs to establish formal diagnosis    Follow-ups Needed After Discharge   Follow-up Appointments     Follow Up and recommended labs and tests      Follow up with primary care provider in a week (or TCU physician) 1 week.    The following labs/tests are recommended: bmp and hgb and Mg. Ensure   cardiology outpatient follow-up.         Follow Up and recommended labs and tests      Follow up with Nursing home physician.      Recommend following up with your own primary care physician within 1-2   weeks for post-hospital visit as well.               Unresulted Labs Ordered in the Past 30 Days of this Admission     No orders found from 12/31/2022 to 1/31/2023.      These results will be followed up by [facility]    Discharge Disposition   Discharged to rehabilitation facility  Condition at discharge: [facility]       Dulce Pritchard is a 77 year old female PMH significant for HTN, chronic joint pains, inflammatory arthritis, depression. 1/30 presented with complaints of cough, weakness, shortness of breath x1 month. Oxygen saturation of 85% on room air per EMS, blood pressure 172/73, heart rate 55.  BNP elevated at 1600. CT chest negative for PE, findings consistent with congestive heart failure, also severe coronary calcifications.  EKG possibly intermittent heart block versus A. fib with slow ventricular  response.      Initiated on IV Lasix.  Cardiology consultated.  Echo showed EF 55% but evidence of severe pulmonary hypertension and abnormal septal wall motion.  Palliative care consulted.  On 2/1 added DuoNebs, prednisone and doxycycline for possible COPD exacerbation (vs symptom control) though recommend outpatient follow-up with pulmonary medicine to establish a formal diagnosis of COPD. Cardiology transitioned to PO diuresis 2/2.    On 2/3, she remained hemodynamically and vitally stable though still required 2-3 L of supplemental oxygen; her diuresis regimen was transitioned fully to an oral regimen of 40 mg twice daily Lasix; other medication adjustments include stopping hydrochlorothiazide and atenolol and will be discharged with new 20 mg lisinopril daily, baby aspirin, and nighttime statin, as well as 4 more days of empiric doxycycline and prednisone.  Patient is discharging to a transitional care unit for further rehabilitation and strengthening, conditioning, and should have follow-up with her primary care provider and/or facility provider within a week as well as cardiology and heart failure as an outpatient longitudinally. Consider sleep study longitudinally.    Cares were actively discussed with the patient's family members.  Also noted with a slowly downtrending/stable thrombocytopenia at 98 -as such, do not recommend Lovenox for DVT prophylaxis while at TCU.  She should have basic BMP and CBC and magnesium labs to be rechecked. She fully completed a steroid burst and antibiotic (anti-inflammatory) course for possible COPD acute exacerbation by day of discharge.  Plan for discharge to TCU on 2/6/23.     Consultations This Hospital Stay   CORE CLINIC EVALUATION IP CONSULT  OCCUPATIONAL THERAPY ADULT IP CONSULT  NUTRITION SERVICES ADULT IP CONSULT  CARE MANAGEMENT / SOCIAL WORK IP CONSULT  CARDIOLOGY IP CONSULT  PALLIATIVE CARE ADULT IP CONSULT  CORE CLINIC EVALUATION IP CONSULT  SPIRITUAL HEALTH  SERVICES IP CONSULT  NUTRITION SERVICES ADULT IP CONSULT  IP RESPIRATORY CARE CHRONIC PULMONARY DISEASE SPECIALIST  OCCUPATIONAL THERAPY ADULT IP CONSULT  PHYSICAL THERAPY ADULT IP CONSULT  PHYSICAL THERAPY ADULT IP CONSULT  OCCUPATIONAL THERAPY ADULT IP CONSULT  PHYSICAL THERAPY ADULT IP CONSULT  OCCUPATIONAL THERAPY ADULT IP CONSULT  IP RESPIRATORY CARE CHRONIC PULMONARY DISEASE SPECIALIST    Code Status   Full Code    Time Spent on this Encounter   ISukhdev MD, personally saw the patient today and spent greater than 30 minutes discharging this patient.       Sukhdev Verduzco MD  [address]  [address] [place] [id number]  Phone: [phone]  Fax: [phone]  ______________________________________________________________________    Physical Exam   Vital Signs: Temp: 97  F (36.1  C) Temp src: Oral BP: (!) 151/65 Pulse: 62   Resp: 12 SpO2: 93 % O2 Device: Nasal cannula Oxygen Delivery: 2 LPM  Weight: 211 lbs 0 oz    GENERAL:  Alert, appears comfortable, in no acute distress, appears stated age   HEAD:  Normocephalic, without obvious abnormality, atraumatic   EYES:  PERRL, conjunctiva/corneas clear, no scleral icterus, EOM's intact   NOSE: Nares normal, septum midline, mucosa normal, no drainage   THROAT: Lips, mucosa, and tongue normal; teeth and gums normal, mouth moist   NECK: Supple, symmetrical, trachea midline   BACK:   Symmetric, no curvature, ROM normal   LUNGS:   Clear to auscultation bilaterally, no rales, rhonchi, or wheezing, symmetric chest rise on inhalation, respirations unlabored   CHEST WALL:  No tenderness or deformity   HEART:  Regular rate and rhythm, S1 and S2 normal, no murmur, rub, or gallop    ABDOMEN:   Soft, non-tender, bowel sounds active all four quadrants, no masses, no organomegaly, no rebound or guarding   EXTREMITIES: Extremities normal, atraumatic, no cyanosis or edema    SKIN: Dry to touch, no exanthems in the visualized  areas   NEURO: Alert, oriented x 4, moves all four extremities freely/spontaneously   PSYCH: Cooperative, behavior is appropriate             Primary Care Physician   Colin Christie    Discharge Orders      Primary Care - Care Coordination Referral      General info for SNF    Length of Stay Estimate: Short Term Care: Estimated # of Days <30  Condition at Discharge: Improving  Level of care:skilled   Rehabilitation Potential: Fair  Admission H&P remains valid and up-to-date: Yes  Recent Chemotherapy: N/A  Use Nursing Home Standing Orders: Yes     Mantoux instructions    Give two-step Mantoux (PPD) Per Facility Policy Yes     Follow Up and recommended labs and tests    Follow up with primary care provider in a week (or TCU physician) 1 week.  The following labs/tests are recommended: bmp and hgb and Mg. Ensure cardiology outpatient follow-up.     Reason for your hospital stay    Volume overload from a heart muscle issue (also known as heart failure)     Activity - Up with nursing assistance     General info for SNF    Length of Stay Estimate: Short Term Care: Estimated # of Days <30  Condition at Discharge: Improving  Level of care:skilled   Rehabilitation Potential: Good  Admission H&P remains valid and up-to-date: Yes  Recent Chemotherapy: N/A  Use Nursing Home Standing Orders: Yes     Mantoux instructions    Give two-step Mantoux (PPD) Per Facility Policy Yes     Follow Up and recommended labs and tests    Follow up with Nursing home physician.      Recommend following up with your own primary care physician within 1-2 weeks for post-hospital visit as well.     Activity - Up with nursing assistance     Reason for your hospital stay    Acute congestive heart failure, COPD acute exacerbation     Physical Therapy Adult Consult    Evaluate and treat as clinically indicated.    Reason:  weakness and frailty     Occupational Therapy Adult Consult    Evaluate and treat as clinically indicated.    Reason:   weakness and frailty     Physical Therapy Adult Consult    Evaluate and treat as clinically indicated.    Reason:  Physical deconditioning and/or ALDs/iADLs and mobility, fall risk. Recommended by our PT.     Occupational Therapy Adult Consult    Evaluate and treat as clinically indicated.    Reason:  Physical deconditioning and/or ALDs/iADLs and mobility, fall risk. Recommended by our OT.     Fall precautions     Fall precautions     Diet    Follow this diet upon discharge: Orders Placed This Encounter      Snacks/Supplements Adult: Gelatein Plus; With Meals      Snacks/Supplements Adult: Ensure Enlive; With Meals      2 Gram Sodium Diet Other - please comment     Diet    Follow this diet upon discharge: Orders Placed This Encounter      Snacks/Supplements Adult: Gelatein Plus; With Meals      Snacks/Supplements Adult: Ensure Enlive; With Meals      2 Gram Sodium Diet Other - please comment      Diet       Significant Results and Procedures   Results for orders placed or performed during the hospital encounter of 01/30/23   XR Chest Port 1 View    Narrative    EXAM: XR CHEST PORT 1 VIEW  LOCATION: [facility]  DATE/TIME: 1/30/2023 8:00 PM    INDICATION: Short of breath and coughing  COMPARISON: None.      Impression    IMPRESSION: Modest cardiomegaly. Slight interstitial prominence but no focal pneumonia or pleural effusion evident.  DJD both shoulders.   CT Chest Pulmonary Embolism w Contrast    Narrative    EXAM: CT CHEST PULMONARY EMBOLISM W CONTRAST  LOCATION: [facility]  DATE/TIME: 1/30/2023 8:58 PM    INDICATION: Short of breath, elevated D dimer  COMPARISON: None.  TECHNIQUE: CT chest pulmonary angiogram during arterial phase injection of IV contrast. Multiplanar reformats and MIP reconstructions were performed. Dose reduction techniques were used.   CONTRAST: 100 mL Isovue-370    FINDINGS:  ANGIOGRAM CHEST: Pulmonary arteries are normal caliber and negative  for pulmonary emboli. Minimal degenerative calcification of the aortic root. Mild patchy atheromatous calcifications of the aortic arch, proximal great vessels and descending thoracic   aorta. No thoracic aortic aneurysm.    LUNGS AND PLEURA: Small right pleural effusion and trace left pleural fluid. Related atelectasis. Anterior bowing of the membranous airways indicates imaging at the expiratory phase of the respiratory cycle. In addition there is symmetric airway wall   thickening. The lung parenchyma is heterogeneous attenuation. Patchy multifocal peribronchial inflammation is present in both lungs.    MEDIASTINUM: Both the right and left atria are enlarged. There are mild mitral annular calcifications. Cardiac ventricles are normal in size. No pericardial effusion. Esophagus is decompressed. There are mildly enlarged lymph nodes at both [place] and in   the AP window. No enlarged paratracheal or subcarinal lymph nodes.    CORONARY ARTERY CALCIFICATION: Severe.    UPPER ABDOMEN: Reflux of IV contrast into the distended hepatic veins and IVC. Splenomegaly.    MUSCULOSKELETAL: Diffuse thoracic spine degenerative osteophytes and disc space narrowing. No aggressive or destructive bone lesions. Bilateral glenohumeral osteoarthrosis.      Impression    IMPRESSION:    1.  No acute pulmonary embolism.  2.  Enlarged right and left atria. Severe atheromatous coronary calcifications.  3.  Small right trace left pleural effusions.  4.  Bronchial wall thickening and peribronchial and interstitial opacities in both lungs, most mixed interstitial and alveolar lung edema. Pattern suggests acute congestive failure.   Echocardiogram Complete     Value    LVEF  50-55% (borderline)    [facility]    [id number]  [id number]  [id number]  [id number]^PJ^DERRICK^[place] [address]     Name: STEFANIE DUMONT  MRN: [id number]  : 1945  Study Date: 2023 10:14 AM  Age: 77 yrs  Gender:  Female  Patient Location: [facility]  Reason For Study: Heart Failure  Ordering Physician: DERRICK OLIVA  Performed By: MYCHAL     BSA: 2.0 m2  Height: 63 in  Weight: 218 lb  HR: 51  BP: 167/73 mmHg  ______________________________________________________________________________  Procedure  Complete Portable Echo Adult. Definity (NDC #32549-780) given intravenously.  ______________________________________________________________________________  Interpretation Summary     The left ventricle is mildly dilated.  Left ventricular function is decreased. The ejection fraction is 50-55%  (borderline).  The left atrium is mildly dilated.  The right atrium is mildly dilated.  Severe (>55mmHg) pulmonary hypertension is present.  IVC diameter >2.1 cm collapsing <50% with sniff suggests a high RA pressure  estimated at 15 mmHg or greater.  There is mild to moderate (1-2+) mitral regurgitation.  ______________________________________________________________________________  Left Ventricle  The left ventricle is mildly dilated. Left ventricular function is decreased.  The ejection fraction is 50-55% (borderline). There is normal left ventricular  wall thickness. Left ventricular diastolic function is abnormal. There is  borderline global hypokinesia of the left ventricle. Septal motion is  consistent with conduction abnormality.     Right Ventricle  Normal right ventricle size and systolic function.     Atria  The left atrium is mildly dilated. The right atrium is mildly dilated. There  is no color Doppler evidence of an atrial shunt.     Mitral Valve  Mitral valve leaflets appear normal. There is mild to moderate mitral annular  calcification. There is mild to moderate (1-2+) mitral regurgitation.     Tricuspid Valve  Tricuspid valve leaflets appear normal. There is mild (1+) tricuspid  regurgitation. The right ventricular systolic pressure is approximated at 49.7  mmHg plus the right atrial pressure. IVC diameter >2.1 cm collapsing <50%  with  sniff suggests a high RA pressure estimated at 15 mmHg or greater. Severe  (>55mmHg) pulmonary hypertension is present.     Aortic Valve  There is mild trileaflet aortic sclerosis.     Pulmonic Valve  Normal pulmonic valve.     Vessels  Normal size ascending aorta.     Pericardium  There is no pericardial effusion.     ______________________________________________________________________________  MMode/2D Measurements & Calculations  IVSd: 0.94 cm  LVIDd: 5.2 cm  LVIDs: 3.4 cm  LVPWd: 1.00 cm  FS: 34.4 %     LV mass(C)d: 186.1 grams  LV mass(C)dI: 92.8 grams/m2  Ao root diam: 2.6 cm  LA dimension: 4.6 cm  asc Aorta Diam: 3.0 cm  LA/Ao: 1.8  LVOT diam: 1.7 cm  LVOT area: 2.3 cm2  LA Volume Indexed (AL/bp): 31.9 ml/m2  RWT: 0.38     Time Measurements  MM HR: 52.0 BPM     Doppler Measurements & Calculations  MV E max dominga: 130.0 cm/sec  MV A max dominga: 42.2 cm/sec  MV E/A: 3.1  MV dec time: 0.26 sec  LV V1 max PG: 3.2 mmHg  LV V1 max: 89.4 cm/sec  LV V1 VTI: 17.7 cm  SV(LVOT): 41.4 ml  SI(LVOT): 20.6 ml/m2  PA acc time: 0.11 sec  TR max dominga: 352.5 cm/sec  TR max P.7 mmHg  E/E' av.0  Lateral E/e': 16.6  Medial E/e': 21.3     ______________________________________________________________________________  Report approved by: Josselyn Serrano 2023 11:35 AM               Discharge Medications   Current Discharge Medication List      START taking these medications    Details   aspirin (ASA) 81 MG EC tablet Take 1 tablet (81 mg) by mouth daily  Qty: 30 tablet, Refills: 0    Associated Diagnoses: Acute heart failure with preserved ejection fraction (HFpEF) (H)      atorvastatin (LIPITOR) 20 MG tablet Take 1 tablet (20 mg) by mouth every evening  Qty: 30 tablet, Refills: 0    Associated Diagnoses: Acute heart failure with preserved ejection fraction (HFpEF) (H)      furosemide (LASIX) 40 MG tablet Take 1 tablet (40 mg) by mouth 2 times daily  Qty: 60 tablet, Refills: 0    Associated Diagnoses: Acute  heart failure with preserved ejection fraction (HFpEF) (H)      guaiFENesin (MUCINEX) 600 MG 12 hr tablet Take 1 tablet (600 mg) by mouth 2 times daily  Qty: 14 tablet, Refills: 0    Associated Diagnoses: Wheezing      ipratropium - albuterol 0.5 mg/2.5 mg/3 mL (DUONEB) 0.5-2.5 (3) MG/3ML neb solution Take 1 vial (3 mLs) by nebulization every 6 hours  Qty: 90 mL, Refills: 0    Associated Diagnoses: Wheezing      lisinopril (ZESTRIL) 20 MG tablet Take 1 tablet (20 mg) by mouth daily  Qty: 30 tablet, Refills: 0    Associated Diagnoses: Acute heart failure with preserved ejection fraction (HFpEF) (H); Benign essential hypertension      melatonin 3 MG tablet Take 1 tablet (3 mg) by mouth nightly as needed for sleep  Qty: 14 tablet, Refills: 0    Associated Diagnoses: Primary insomnia         CONTINUE these medications which have CHANGED    Details   gabapentin (NEURONTIN) 300 MG capsule Take 2 capsules (600 mg) by mouth At Bedtime  Qty: 30 capsule, Refills: 0    Associated Diagnoses: Neuropathy         CONTINUE these medications which have NOT CHANGED    Details   FLUoxetine (PROZAC) 20 MG capsule Take 20 mg by mouth daily      traMADol (ULTRAM) 50 MG tablet Take 100 mg by mouth every morning      Vitamin D, Cholecalciferol, 25 MCG (1000 UT) TABS Take 1,000 Units by mouth daily         STOP taking these medications       atenolol (TENORMIN) 50 MG tablet Comments:   Reason for Stopping:         hydroxychloroquine (PLAQUENIL) 200 MG tablet Comments:   Reason for Stopping:         lisinopril-hydrochlorothiazide (ZESTORETIC) 10-12.5 MG tablet Comments:   Reason for Stopping:             Allergies   No Known Allergies

## 2023-02-04 NOTE — PROGRESS NOTES
Care Management Follow Up    Length of Stay (days): 5    Expected Discharge Date: 02/04/2023     Concerns to be Addressed: discharge planning     Patient plan of care discussed at interdisciplinary rounds: Yes    Anticipated Discharge Disposition:       Anticipated Discharge Services:    Anticipated Discharge DME:      Patient/family educated on Medicare website which has current facility and service quality ratings:    Education Provided on the Discharge Plan:    Patient/Family in Agreement with the Plan:      Referrals Placed by CM/SW:    Private pay costs discussed: Not applicable    Additional Information:  Message left for [facility] to follow up on insurance auth. Awaiting call back     12:05 PM  Updated daughter     Clary Saenz RN

## 2023-02-05 LAB
HGB BLD-MCNC: 10.7 G/DL (ref 11.7–15.7)
MAGNESIUM SERPL-MCNC: 2.1 MG/DL (ref 1.8–2.6)
PLATELET # BLD AUTO: 112 10E3/UL (ref 150–450)
POTASSIUM BLD-SCNC: 4 MMOL/L (ref 3.5–5)

## 2023-02-05 PROCEDURE — 36415 COLL VENOUS BLD VENIPUNCTURE: CPT | Performed by: STUDENT IN AN ORGANIZED HEALTH CARE EDUCATION/TRAINING PROGRAM

## 2023-02-05 PROCEDURE — 94640 AIRWAY INHALATION TREATMENT: CPT

## 2023-02-05 PROCEDURE — 85049 AUTOMATED PLATELET COUNT: CPT | Performed by: STUDENT IN AN ORGANIZED HEALTH CARE EDUCATION/TRAINING PROGRAM

## 2023-02-05 PROCEDURE — 999N000157 HC STATISTIC RCP TIME EA 10 MIN

## 2023-02-05 PROCEDURE — 83735 ASSAY OF MAGNESIUM: CPT | Performed by: STUDENT IN AN ORGANIZED HEALTH CARE EDUCATION/TRAINING PROGRAM

## 2023-02-05 PROCEDURE — 99232 SBSQ HOSP IP/OBS MODERATE 35: CPT | Performed by: HOSPITALIST

## 2023-02-05 PROCEDURE — 120N000004 HC R&B MS OVERFLOW

## 2023-02-05 PROCEDURE — 250N000013 HC RX MED GY IP 250 OP 250 PS 637: Performed by: FAMILY MEDICINE

## 2023-02-05 PROCEDURE — 94799 UNLISTED PULMONARY SVC/PX: CPT

## 2023-02-05 PROCEDURE — 250N000012 HC RX MED GY IP 250 OP 636 PS 637: Performed by: FAMILY MEDICINE

## 2023-02-05 PROCEDURE — 85018 HEMOGLOBIN: CPT | Performed by: STUDENT IN AN ORGANIZED HEALTH CARE EDUCATION/TRAINING PROGRAM

## 2023-02-05 PROCEDURE — 94640 AIRWAY INHALATION TREATMENT: CPT | Mod: 76

## 2023-02-05 PROCEDURE — 250N000013 HC RX MED GY IP 250 OP 250 PS 637: Performed by: INTERNAL MEDICINE

## 2023-02-05 PROCEDURE — 250N000013 HC RX MED GY IP 250 OP 250 PS 637: Performed by: HOSPITALIST

## 2023-02-05 PROCEDURE — 250N000009 HC RX 250: Performed by: FAMILY MEDICINE

## 2023-02-05 PROCEDURE — 84132 ASSAY OF SERUM POTASSIUM: CPT | Performed by: STUDENT IN AN ORGANIZED HEALTH CARE EDUCATION/TRAINING PROGRAM

## 2023-02-05 RX ADMIN — IPRATROPIUM BROMIDE AND ALBUTEROL SULFATE 3 ML: 2.5; .5 SOLUTION RESPIRATORY (INHALATION) at 13:39

## 2023-02-05 RX ADMIN — FUROSEMIDE 40 MG: 40 TABLET ORAL at 08:28

## 2023-02-05 RX ADMIN — FLUOXETINE 20 MG: 20 CAPSULE ORAL at 08:27

## 2023-02-05 RX ADMIN — TRAMADOL HYDROCHLORIDE 100 MG: 50 TABLET, COATED ORAL at 08:28

## 2023-02-05 RX ADMIN — GABAPENTIN 600 MG: 300 CAPSULE ORAL at 20:21

## 2023-02-05 RX ADMIN — ATORVASTATIN CALCIUM 20 MG: 10 TABLET, FILM COATED ORAL at 20:21

## 2023-02-05 RX ADMIN — IPRATROPIUM BROMIDE AND ALBUTEROL SULFATE 3 ML: 2.5; .5 SOLUTION RESPIRATORY (INHALATION) at 19:54

## 2023-02-05 RX ADMIN — PREDNISONE 40 MG: 20 TABLET ORAL at 08:27

## 2023-02-05 RX ADMIN — DOXYCYCLINE 100 MG: 100 CAPSULE ORAL at 20:21

## 2023-02-05 RX ADMIN — GUAIFENESIN 10 ML: 100 SOLUTION ORAL at 08:33

## 2023-02-05 RX ADMIN — GUAIFENESIN 1200 MG: 600 TABLET ORAL at 20:21

## 2023-02-05 RX ADMIN — ASPIRIN 81 MG: 81 TABLET, COATED ORAL at 08:27

## 2023-02-05 RX ADMIN — Medication 3 MG: at 21:59

## 2023-02-05 RX ADMIN — LISINOPRIL 20 MG: 20 TABLET ORAL at 08:28

## 2023-02-05 RX ADMIN — GUAIFENESIN 1200 MG: 600 TABLET ORAL at 08:27

## 2023-02-05 RX ADMIN — IPRATROPIUM BROMIDE AND ALBUTEROL SULFATE 3 ML: 2.5; .5 SOLUTION RESPIRATORY (INHALATION) at 07:44

## 2023-02-05 RX ADMIN — DOXYCYCLINE 100 MG: 100 CAPSULE ORAL at 08:27

## 2023-02-05 ASSESSMENT — ACTIVITIES OF DAILY LIVING (ADL)
ADLS_ACUITY_SCORE: 49

## 2023-02-05 NOTE — PROGRESS NOTES
[facility] MEDICINE  PROGRESS NOTE     Code Status: Full Code    Identification/Summary:       Dulce Pritchard is a 77 year old female PMH significant for HTN, chronic joint pains, inflammatory arthritis, depression. 1/30 presented with complaints of cough, weakness, shortness of breath x1 month. Oxygen saturation of 85% on room air per EMS, blood pressure 172/73, heart rate 55.  BNP elevated at 1600. CT chest negative for PE, findings consistent with congestive heart failure, also severe coronary calcifications.  EKG possibly intermittent heart block versus A. fib with slow ventricular response.       Initiated on IV Lasix.  Cardiology consultated.  Echo showed EF 55% but evidence of severe pulmonary hypertension and abnormal septal wall motion.  Palliative care consulted.  On 2/1 added DuoNebs, prednisone and doxycycline for possible COPD exacerbation (vs symptom control) though recommend outpatient follow-up with pulmonary medicine to establish a formal diagnosis of COPD. Cardiology transitioned to PO diuresis 2/2.     On 2/3, she remained hemodynamically and vitally stable though still required 2-3 L of supplemental oxygen; her diuresis regimen was transitioned fully to an oral regimen of 40 mg twice daily Lasix; other medication adjustments include stopping hydrochlorothiazide and atenolol and will be discharged with new 20 mg lisinopril daily, baby aspirin, and nighttime statin, as well as 4 more days of empiric doxycycline and prednisone.  Patient is discharging to a transitional care unit for further rehabilitation and strengthening, conditioning, and should have follow-up with her primary care provider and/or facility provider within a week as well as cardiology and heart failure as an outpatient longitudinally.       Cares were actively discussed with the patient's family members.  Of note, on discharge in AM the patient has a magnesium 1.7 which is being replaced on  replacement protocol, normalized potassium and creatinine, but also noted with a slowly downtrending/stable thrombocytopenia at 98-as such, do not recommend Lovenox for DVT prophylaxis while at TCU.  She should have basic BMP and CBC and magnesium labs to be rechecked.     Now unfortunately secondary to insurance and logistics and prior authorization, discharge had to be postponed.  Remains vitally hemodynamically stable. Still pending placement. Attending hospitalist to be notified asap when bed approval and insurance authorizations are all set and patient can be discharged with a safe disposition.      Assessment and plan:  Acute diastolic congestive heart failure  Acute hypoxemic respiratory failure  Severe coronary calcifications  Sinus arrhythmia  Essential hypertension  Questionable intermittent heart block, SSS?  Echocardiogram shows EF of 55% with septal wall motion abnormality consistent with conduction defect and severe pulmonary hypertension.  Receiving intravenous Lasix.    Negative troponins.  Procalcitonin 0.13.  No fevers.  Hold off on antibiotics.  Hold atenolol and HCTZ.    Continue lisinopril and dose increased to 20 mg daily.  Continue intravenous Lasix diuresis.  May need ischemic work-up prior to discharge.  Appreciate cardiology consultation.   -Transition to 40 mg p.o. twice daily  - stable    Expiratory wheezing  No prior history of COPD but was a longtime smoker.  Wheezing noted today on exam.  Hospitalist on 2/1 empirically treat with duo nebs 4 times daily, prednisone 40 mg daily and doxycycline per COPD protocols.  Guaifenesin as needed.  Patient new to writer 2/2/2023 and discussed w/ copd RT team; recommend formal diagnosis w/ PFTs  Monitor response.   - planning to complete empiric course or may stop on discharge, per hospitalist    Severe pulmonary hypertension  Changes noted on echocardiogram.  Patient reports she is a heavy snorer.  Has never had a sleep study before.  Recommend  "outpatient sleep study.    Hyperlipidemia  Given severe coronary calcification atorvastatin 20 mg daily started per cardiology.  Recheck lipid panel in 1 month.    Inflammatory arthritis  Followed by rheumatology as an outpatient.  Takes Plaquenil, tramadol, gabapentin  Continue tramadol, decrease gabapentin dose to maximum recommended at 600 mg at bedtime  Hold Plaquenil given possible intermittent heart block.    Hypokalemia  Hypomagnesemia  Replacement via protocols.    Goals of care  Patient notes she was doing fairly well up until about a month ago.  Since then has been progressively getting weaker and weaker.  Requires a power chair to get up and down stairs.  Appreciated meeting with palliative care services.     Anticoagulation   thrombocytopenia  - stop Enoxaparin (Lovenox) subcutaneous given platelets of 98  - may be 2/2 to acute sickness, trend intermittently; transfuse if < 20  - PCDs for now-- should be discharging today (was set for yesterday); otherwise consider resuming ppdx such as heparin     COVID-19 PCR influenza A/B/RSV negative from 1/30/2023  Noted.  Standard precautions.  Fluids: Saline lock  Pain meds: Tylenol as needed  Therapy:  PT OT consulted.  needs TCU.    [place]:Not present  Lines: None       Current Diet  Orders Placed This Encounter      2 Gram Sodium Diet Other - please comment      Diet    Supplements  Active Nourishment Order   Procedures     Snacks/Supplements Adult: Gelatein Plus; With Meals     Snacks/Supplements Adult: Ensure Enlive; With Meals       Clinically Significant Risk Factors             # Hypomagnesemia: Lowest Mg = 1.4 mg/dL in last 2 days, will replace as needed     # Thrombocytopenia: Lowest platelets = 112 in last 2 days, will monitor for bleeding         # Obesity: Estimated body mass index is 36.17 kg/m  as calculated from the following:    Height as of this encounter: 1.6 m (5' 3\").    Weight as of this encounter: 92.6 kg (204 lb 3.2 oz).   # Severe " Malnutrition: based on nutrition assessment      Medical Decision Making     39 MINUTES SPENT BY ME on the date of service doing chart review, history, exam, documentation & further activities per the note.         Sukhdev Verduzco MD  Internal Medicine  Hospitalist  [facility]  Phone: #[phone]  Securely message with the Vocera Web Console (learn more here)  Text page via [employer] Paging/Directory     ------------------------  Interval History/Subjective:  No acute events overnight.    No report of chest pain, shortness of breath, or other new complaints. Discussed plan of care with patient. Answered all questions to patient's verbalized understanding and satisfaction.    Physical Exam/Objective:  Temp:  [97.5  F (36.4  C)-97.7  F (36.5  C)] 97.5  F (36.4  C)  Pulse:  [59-64] 64  Resp:  [16-18] 16  BP: (125-142)/(56-65) 125/56  SpO2:  [93 %-95 %] 94 %  Wt Readings from Last 4 Encounters:   02/05/23 92.6 kg (204 lb 3.2 oz)     Body mass index is 36.17 kg/m .    GENERAL:  Alert, appears comfortable, in no acute distress, appears stated age   HEAD:  Normocephalic, without obvious abnormality, atraumatic   EYES:  PERRL, conjunctiva/corneas clear, no scleral icterus, EOM's intact   NOSE: Nares normal, septum midline, mucosa normal, no drainage   THROAT: Lips, mucosa, and tongue normal; teeth and gums normal, mouth moist   NECK: Supple, symmetrical, trachea midline   BACK:   Symmetric, no curvature, ROM normal   LUNGS:   Clear to auscultation bilaterally, no rales, rhonchi, or wheezing, symmetric chest rise on inhalation, respirations unlabored   CHEST WALL:  No tenderness or deformity   HEART:  Regular rate and rhythm, S1 and S2 normal, no murmur, rub, or gallop    ABDOMEN:   Soft, non-tender, bowel sounds active all four quadrants, no masses, no organomegaly, no rebound or guarding   EXTREMITIES: Extremities normal, atraumatic, no cyanosis or edema    SKIN: Dry to touch, no exanthems in the  visualized areas   NEURO: Alert, oriented x 4, moves all four extremities freely/spontaneously   PSYCH: Cooperative, behavior is appropriate      Medications:   Personally Reviewed.  Medications       aspirin  81 mg Oral Daily     atorvastatin  20 mg Oral QPM     doxycycline hyclate  100 mg Oral BID     FLUoxetine  20 mg Oral Daily     furosemide  40 mg Oral Daily     gabapentin  600 mg Oral At Bedtime     guaiFENesin  1,200 mg Oral BID     [Held by provider] hydroxychloroquine  200 mg Oral BID     ipratropium - albuterol 0.5 mg/2.5 mg/3 mL  3 mL Nebulization Q6H     lisinopril  20 mg Oral Daily     predniSONE  40 mg Oral Daily     sodium chloride (PF)  3 mL Intracatheter Q8H     traMADol  100 mg Oral QAM       Data reviewed today: I personally reviewed all new medications, labs, imaging/diagnostics reports over the past 24 hours. Pertinent findings include:    Imaging:   No results found for this or any previous visit (from the past 24 hour(s)).    Labs:  Echocardiogram Complete   Final Result      CT Chest Pulmonary Embolism w Contrast   Final Result   IMPRESSION:      1.  No acute pulmonary embolism.   2.  Enlarged right and left atria. Severe atheromatous coronary calcifications.   3.  Small right trace left pleural effusions.   4.  Bronchial wall thickening and peribronchial and interstitial opacities in both lungs, most mixed interstitial and alveolar lung edema. Pattern suggests acute congestive failure.      XR Chest Port 1 View   Final Result   IMPRESSION: Modest cardiomegaly. Slight interstitial prominence but no focal pneumonia or pleural effusion evident.   DJD both shoulders.        Recent Results (from the past 24 hour(s))   Magnesium    Collection Time: 02/04/23 11:26 AM   Result Value Ref Range    Magnesium 2.4 1.8 - 2.6 mg/dL   Extra Purple Top Tube    Collection Time: 02/04/23 11:26 AM   Result Value Ref Range    Hold Specimen JIC    Potassium    Collection Time: 02/05/23  4:10 AM   Result Value  Ref Range    Potassium 4.0 3.5 - 5.0 mmol/L   Hemoglobin    Collection Time: 02/05/23  4:10 AM   Result Value Ref Range    Hemoglobin 10.7 (L) 11.7 - 15.7 g/dL   Platelet count    Collection Time: 02/05/23  4:10 AM   Result Value Ref Range    Platelet Count 112 (L) 150 - 450 10e3/uL   Magnesium    Collection Time: 02/05/23  4:10 AM   Result Value Ref Range    Magnesium 2.1 1.8 - 2.6 mg/dL       Pending Labs:  Unresulted Labs Ordered in the Past 30 Days of this Admission     No orders found from 12/31/2022 to 1/31/2023.

## 2023-02-05 NOTE — PLAN OF CARE
Pt on 2L via NC to keep 02 sats 90% and greater. No dyspnea noted or reported. VS WDL. Pt alert and oriented and calls for assistance. Denies pain or discomfort.   Problem: Heart Failure  Goal: Effective Oxygenation and Ventilation  Outcome: Progressing  Goal: Effective Breathing Pattern During Sleep  Outcome: Progressing  Intervention: Monitor and Manage Obstructive Sleep Apnea  Recent Flowsheet Documentation  Taken 2/4/2023 1630 by Marcelina Zhao, RN  Medication Review/Management: medications reviewed

## 2023-02-05 NOTE — PROGRESS NOTES
Pt remains on O2 2L NC, O2 sats mid 90s. BS coarse, crackles, and diminished pre and post neb. Flutter valve done independently. Pt received Duoneb q6h. RT will continue to follow.    Jed Marsh, RT

## 2023-02-05 NOTE — PROGRESS NOTES
Patient is alert and oriented. Pt denies pain or SOB. Pt is on 1L nasal cannula, O2 saturations above 90%. Pt using call light appropriately, making needs known. Pt still awaiting insurance approval for TCU placement.    Joanne Potter RN

## 2023-02-06 ENCOUNTER — APPOINTMENT (OUTPATIENT)
Dept: PHYSICAL THERAPY | Facility: CLINIC | Age: 78
DRG: 291 | End: 2023-02-06
Payer: COMMERCIAL

## 2023-02-06 VITALS
HEART RATE: 75 BPM | HEIGHT: 63 IN | WEIGHT: 211 LBS | OXYGEN SATURATION: 93 % | SYSTOLIC BLOOD PRESSURE: 120 MMHG | BODY MASS INDEX: 37.39 KG/M2 | DIASTOLIC BLOOD PRESSURE: 58 MMHG | RESPIRATION RATE: 20 BRPM | TEMPERATURE: 97.5 F

## 2023-02-06 LAB
CREAT SERPL-MCNC: 0.82 MG/DL (ref 0.6–1.1)
GFR SERPL CREATININE-BSD FRML MDRD: 74 ML/MIN/1.73M2
MAGNESIUM SERPL-MCNC: 1.9 MG/DL (ref 1.8–2.6)
PLATELET # BLD AUTO: 110 10E3/UL (ref 150–450)
POTASSIUM BLD-SCNC: 3.7 MMOL/L (ref 3.5–5)

## 2023-02-06 PROCEDURE — 999N000157 HC STATISTIC RCP TIME EA 10 MIN

## 2023-02-06 PROCEDURE — 85049 AUTOMATED PLATELET COUNT: CPT | Performed by: FAMILY MEDICINE

## 2023-02-06 PROCEDURE — 94799 UNLISTED PULMONARY SVC/PX: CPT

## 2023-02-06 PROCEDURE — 94640 AIRWAY INHALATION TREATMENT: CPT | Mod: 76

## 2023-02-06 PROCEDURE — 250N000013 HC RX MED GY IP 250 OP 250 PS 637: Performed by: FAMILY MEDICINE

## 2023-02-06 PROCEDURE — 272N000202 HC AEROBIKA WITH MANOMETER

## 2023-02-06 PROCEDURE — 97116 GAIT TRAINING THERAPY: CPT | Mod: GP

## 2023-02-06 PROCEDURE — 99239 HOSP IP/OBS DSCHRG MGMT >30: CPT | Performed by: HOSPITALIST

## 2023-02-06 PROCEDURE — 250N000009 HC RX 250: Performed by: FAMILY MEDICINE

## 2023-02-06 PROCEDURE — 250N000013 HC RX MED GY IP 250 OP 250 PS 637: Performed by: INTERNAL MEDICINE

## 2023-02-06 PROCEDURE — 82565 ASSAY OF CREATININE: CPT | Performed by: FAMILY MEDICINE

## 2023-02-06 PROCEDURE — 97110 THERAPEUTIC EXERCISES: CPT | Mod: GP

## 2023-02-06 PROCEDURE — 94640 AIRWAY INHALATION TREATMENT: CPT

## 2023-02-06 PROCEDURE — 97530 THERAPEUTIC ACTIVITIES: CPT | Mod: GP

## 2023-02-06 PROCEDURE — 250N000012 HC RX MED GY IP 250 OP 636 PS 637: Performed by: FAMILY MEDICINE

## 2023-02-06 PROCEDURE — 83735 ASSAY OF MAGNESIUM: CPT | Performed by: STUDENT IN AN ORGANIZED HEALTH CARE EDUCATION/TRAINING PROGRAM

## 2023-02-06 PROCEDURE — 250N000013 HC RX MED GY IP 250 OP 250 PS 637: Performed by: HOSPITALIST

## 2023-02-06 PROCEDURE — 36415 COLL VENOUS BLD VENIPUNCTURE: CPT | Performed by: FAMILY MEDICINE

## 2023-02-06 PROCEDURE — U0005 INFEC AGEN DETEC AMPLI PROBE: HCPCS | Mod: ORL | Performed by: FAMILY MEDICINE

## 2023-02-06 PROCEDURE — 84132 ASSAY OF SERUM POTASSIUM: CPT | Performed by: STUDENT IN AN ORGANIZED HEALTH CARE EDUCATION/TRAINING PROGRAM

## 2023-02-06 RX ADMIN — LISINOPRIL 20 MG: 20 TABLET ORAL at 08:16

## 2023-02-06 RX ADMIN — DOXYCYCLINE 100 MG: 100 CAPSULE ORAL at 08:16

## 2023-02-06 RX ADMIN — PREDNISONE 40 MG: 20 TABLET ORAL at 08:16

## 2023-02-06 RX ADMIN — FUROSEMIDE 40 MG: 40 TABLET ORAL at 08:16

## 2023-02-06 RX ADMIN — IPRATROPIUM BROMIDE AND ALBUTEROL SULFATE 3 ML: 2.5; .5 SOLUTION RESPIRATORY (INHALATION) at 07:36

## 2023-02-06 RX ADMIN — ASPIRIN 81 MG: 81 TABLET, COATED ORAL at 08:16

## 2023-02-06 RX ADMIN — IPRATROPIUM BROMIDE AND ALBUTEROL SULFATE 3 ML: 2.5; .5 SOLUTION RESPIRATORY (INHALATION) at 13:26

## 2023-02-06 RX ADMIN — IPRATROPIUM BROMIDE AND ALBUTEROL SULFATE 3 ML: 2.5; .5 SOLUTION RESPIRATORY (INHALATION) at 01:12

## 2023-02-06 RX ADMIN — GUAIFENESIN 1200 MG: 600 TABLET ORAL at 08:16

## 2023-02-06 RX ADMIN — FLUOXETINE 20 MG: 20 CAPSULE ORAL at 08:16

## 2023-02-06 ASSESSMENT — ACTIVITIES OF DAILY LIVING (ADL)
ADLS_ACUITY_SCORE: 45
ADLS_ACUITY_SCORE: 45
ADLS_ACUITY_SCORE: 49

## 2023-02-06 NOTE — PROGRESS NOTES
Pt on O2 2L NC, O2 sats low to mid 90s. BS diminished/coarse/crackle pre and post neb. Flutter valve done qid independently. Pt received Duoneb q6h.     Jed Marsh, RT

## 2023-02-06 NOTE — PLAN OF CARE
Problem: Gas Exchange Impaired  Goal: Optimal Gas Exchange  Outcome: Progressing  Intervention: Optimize Oxygenation and Ventilation  Recent Flowsheet Documentation  Taken 2/6/2023 0011 by Gracie Farmer RN  Head of Bed (HOB) Positioning: HOB at 20-30 degrees  Taken 2/5/2023 2000 by Gracie Farmer RN  Head of Bed (HOB) Positioning: HOB at 20-30 degrees  Taken 2/5/2023 1900 by Gracie Farmer RN  Head of Bed (HOB) Positioning: HOB at 20-30 degrees     Problem: Heart Failure  Goal: Effective Breathing Pattern During Sleep  Outcome: Progressing   Goal Outcome Evaluation:                    Pt sleeping soundly overnight.  Denies pain.  Pt assisted to reposition. Pt using purewick overnight for incontinence.  VSS.

## 2023-02-06 NOTE — PROGRESS NOTES
Care Management Discharge Note    Discharge Date: 02/06/2023       Discharge Disposition: Transitional Care    Discharge Services: None    Discharge DME: Oxygen    Discharge Transportation: health plan transportation    Private pay costs discussed: transportation costs    PAS Confirmation Code:  [id number])  Patient/family educated on Medicare website which has current facility and service quality ratings: yes    Education Provided on the Discharge Plan:  yes  Persons Notified of Discharge Plans: patient   Patient/Family in Agreement with the Plan: yes    Handoff Referral Completed: yes, orders sent to TCU     Additional Information:    9:27 AM  KEERTHI reviewed chart.  Pt has been accepted at [facility] for TCU pending insurance authorization.  SW left voice mail for admissions coordinator to inquired if authorization has been received.  SW requested call back.     10:14 AM  SW received call back from [facility] indicating they are still waiting on [employer] auth.     11:55 AM  [facility] received [employer] insurance authorization.  KEERTHI met with Pt.  Pt request [facility] wheel chair transport.  Transport at 1400.  PAS completed and on chart. Orders faxed to facility.     STEVEN CraigW LSW

## 2023-02-06 NOTE — PROGRESS NOTES
[facility] was introduced to the patient today including our role in the home management of their heart failure.  Heart Failure Education Materials were shared today with the patient.  Introduction to the expectations including daily weight tracking using the Daily Weight Log  Home B/P monitoring as appropriate( to bring in home monitoring cuff to the clinic appointment for verification)  Low Sodium diet of 2000mg or less daily, review of label reading, aim for fresh and avoid processed items  Daily Fluid restriction of 2000ml  considerations  Exercise importance as able explained  Monitor and report s/s of heart failure. How to report these changes.  Follow up appointments and testing expectations.    [facility]   [phone] Nurse Line  [facility]   [address] [place] [address]    Pt originally lives at home with niece and nephew. States that niece does the cooking. She was not following any sort of sodium or fluid restriction prior to hospitalization. She also was not taking daily wts. Informed of the importance to begin daily wt monitoring and when to call with wt gain and/or worsening symptoms. Pt stated understanding and agreement. We reviewed the low sodium diet of 2gNa per day and fluid restriction of 50-60 oz per day. Explained how sodium and fluid restriction play into managing her HF status and why it is so important for her to follow as closely as she can. We discussed [facility] and Pt is interested. Informed to fill out the application and return at cardiology follow up appt. Advised she share the HF folder with family, especially niece, so that everyone is on the same page and can help support the Pt in this change. We also discussed HRS - Pt is interested. Informed that once Pt is out of rehab, to call RN line so we can get the kit ordered for her.    Kathy HARRISN

## 2023-02-06 NOTE — PLAN OF CARE
Pt discharged to TCU. Medical transport provided with 2L NC. VSS at time of discharge. Education completed with pt and reviewed with daughter, Em. All questions answered, AVS printed and a copy sent with transport for facility.     Catrina Cabrales RN on 2/6/2023 at 3:00 PM

## 2023-02-06 NOTE — PROGRESS NOTES
Occupational Therapy Discharge Summary    Reason for therapy discharge:    Discharged to transitional care facility.    Progress towards therapy goal(s). See goals on Care Plan in [facility] electronic health record for goal details.  Goals partially met.  Barriers to achieving goals:   discharge from facility.    Therapy recommendation(s):    Continued therapy is recommended.  Rationale/Recommendations:  At TCU to increase fxl strength and ind with ADLs.

## 2023-02-06 NOTE — PROGRESS NOTES
Given scheduled nebs, breath sounds diminished with crackles pre and post nebs.  On 2 L NC.  RT will continue to follow.

## 2023-02-06 NOTE — PROGRESS NOTES
CLINICAL NUTRITION SERVICES - REASSESSMENT NOTE     Nutrition Prescription    RECOMMENDATIONS FOR MDs/PROVIDERS TO ORDER:  none    Malnutrition Status:    Severe malnutrition noted previously     Recommendations already ordered by Registered Dietitian (RD):  Continue with plan     Future/Additional Recommendations:  none     EVALUATION OF THE PROGRESS TOWARD GOALS   Diet: 2 g Sodium  Nutrition Supplement/Support Gelatien plus and Ensure Enlive   Intake: 75%     NEW FINDINGS   No new findings, she is eating better and taking supplements     ANTHROPOMETRICS  Admission wt: 98kg  Most Recent Weight: 95kg  Pt down 7# this admission if admission wt was correct (3%)       PHYSICAL FINDINGS  Per flowsheet:  Edema-+2 bilateral LE   GI: WDL   Skin-abdominal and breast moisture wounds noted     LABS  Reviewed    MEDICATIONS  Reviewed, lasix, prednisone      NUTRITION DIAGNOSIS  Malnutrition related to poor appetite caused by emotional distress as evidenced by severe weight loss reported by patient, decreased oral intake for >1 year, and moderate edema -ongoing    Goals:  Patient to consume % of nutritionally adequate meal trays TID, or the equivalent with supplements/snacks. -progressing  Maintain weight -wt is down, not met    INTERVENTIONS  Implementation  Continue with current plan       Monitoring/Evaluation  Progress toward goals will be monitored and evaluated per protocol.

## 2023-02-06 NOTE — PROGRESS NOTES
[facility] MEDICINE  PROGRESS NOTE     Code Status: Full Code    Identification/Summary:       Dulce Pritchard is a 77 year old female PMH significant for HTN, chronic joint pains, inflammatory arthritis, depression. 1/30 presented with complaints of cough, weakness, shortness of breath x1 month, BNP elevated at 1600. CT chest negative for PE, findings consistent with congestive heart failure, also severe coronary calcifications.  EKG possibly intermittent heart block versus A. fib with slow ventricular response.       Initiated on IV Lasix.  Cardiology consultated.  Echo showed EF 55% but evidence of severe pulmonary hypertension and abnormal septal wall motion.  Palliative care consulted.  On 2/1 added DuoNebs, prednisone and doxycycline for possible COPD exacerbation (vs symptom control) though recommend outpatient follow-up with pulmonary medicine to establish a formal diagnosis of COPD. Cardiology transitioned to PO diuresis on 2/2.     On 2/3, her diuresis regimen was transitioned to an oral regimen of 40 mg twice daily Lasix; other medication adjustments include stopping hydrochlorothiazide and atenolol and will be discharged with new 20 mg lisinopril daily, baby aspirin, and nighttime statin, as well as completing course of empiric doxycycline and prednisone.  Patient is discharging to a transitional care unit for further rehabilitation and strengthening, conditioning, and should have follow-up with her primary care provider and/or facility provider within a week as well as cardiology and heart failure as an outpatient longitudinally. Cares were actively discussed with the patient's family members.  Has been noted with a slowly downtrending/stable thrombocytopenia at 98-as such, do not recommend Lovenox for DVT prophylaxis while at TCU.  She should have basic BMP and CBC and magnesium labs to be rechecked     Unfortunately, on 2/3/23, discharge was cancelled due to TCU  coverage issues with prior authorization. Now still awaiting prior authorization and insurance approval.  Remains vitally hemodynamically stable. Still pending placement. Attending hospitalist to be notified asap when bed approval and insurance authorizations are all set and patient can be discharged with a safe disposition.     Assessment and plan:  Acute diastolic congestive heart failure  Acute hypoxemic respiratory failure  Severe coronary calcifications  Sinus arrhythmia  Essential hypertension  Questionable intermittent heart block, SSS?  Echocardiogram shows EF of 55% with septal wall motion abnormality consistent with conduction defect and severe pulmonary hypertension.  Transitioned to PO lasix on 2/3/23 per cardiology.    Negative troponins.  Procalcitonin 0.13.  No fevers.  Hold off on antibiotics.  Continue lisinopril and dose increased to 20 mg daily.  Continue intravenous Lasix diuresis.  May need ischemic work-up prior to discharge.  Appreciate cardiology consultation.    Expiratory wheezing  No prior history of COPD but was a longtime smoker.  Wheezing noted today on exam.  Hospitalist on 2/1 empirically treated with duo nebs 4 times daily, prednisone 40 mg daily and doxycycline per COPD protocols.  Guaifenesin as needed.  Patient new to writer 2/2/2023 and discussed w/ copd RT team; recommend formal diagnosis w/ PFTs  Monitor response.  Planning to complete empiric course or may stop on discharge, per hospitalist    Severe pulmonary hypertension  Changes noted on echocardiogram.  Patient reports she is a heavy snorer.  Has never had a sleep study before.  Recommend outpatient sleep study.    Hyperlipidemia  Given severe coronary calcification atorvastatin 20 mg daily started per cardiology.  Recheck lipid panel in 1 month.    Inflammatory arthritis  Followed by rheumatology as an outpatient.  Takes Plaquenil, tramadol, gabapentin  Continue tramadol, decrease gabapentin dose to maximum recommended at  "600 mg at bedtime  Hold Plaquenil given possible intermittent heart block.    Hypokalemia  Hypomagnesemia  Replacement via protocols.    Goals of care  Patient notes she was doing fairly well up until about a month ago.  Since then has been progressively getting weaker and weaker.  Requires a power chair to get up and down stairs.  Appreciated meeting with palliative care services.     Anticoagulation   Thrombocytopenia  - stop Enoxaparin (Lovenox) subcutaneous given platelets of 98  - may be 2/2 to acute sickness, trend intermittently; transfuse if < 20  - SCDs for now-- PLTs still low, has been 98 --> 112, --> 110. Would still encourage working with PT, OT, SCDs, and ambulation as tolerated      COVID-19 PCR influenza A/B/RSV negative from 1/30/2023  Noted.  Standard precautions.  Fluids: Saline lock  Pain meds: Tylenol as needed  Therapy:  PT OT consulted.  needs TCU.    [place]:Not present  Lines: None       Current Diet  Orders Placed This Encounter      2 Gram Sodium Diet Other - please comment      Diet    Supplements  Active Nourishment Order   Procedures     Snacks/Supplements Adult: Gelatein Plus; With Meals     Snacks/Supplements Adult: Ensure Enlive; With Meals       Clinically Significant Risk Factors                 # Thrombocytopenia: Lowest platelets = 110 in last 2 days, will monitor for bleeding         # Obesity: Estimated body mass index is 37.38 kg/m  as calculated from the following:    Height as of this encounter: 1.6 m (5' 3\").    Weight as of this encounter: 95.7 kg (211 lb).   # Severe Malnutrition: based on nutrition assessment      Medical Decision Making     35 MINUTES SPENT BY ME on the date of service doing chart review, history, exam, documentation & further activities per the note.         Sukhdev Verduzco MD  Internal Medicine  Hospitalist  [facility]  Phone: #[phone]  Securely message with the Vocera Web Console (learn more here)  Text page via [employer] " Paging/Directory     ------------------------  Interval History/Subjective:  No acute events overnight.    No report of chest pain, shortness of breath, or other new complaints. Discussed plan of care with patient. Answered all questions to patient's verbalized understanding and satisfaction.    Physical Exam/Objective:  Temp:  [97.6  F (36.4  C)-98  F (36.7  C)] 97.6  F (36.4  C)  Pulse:  [62-70] 62  Resp:  [14-18] 16  BP: (139-145)/(58-63) 142/63  SpO2:  [94 %-97 %] 97 %  Wt Readings from Last 4 Encounters:   02/06/23 95.7 kg (211 lb)     Body mass index is 37.38 kg/m .    GENERAL:  Alert, appears comfortable, in no acute distress, appears stated age   HEAD:  Normocephalic, without obvious abnormality, atraumatic   EYES:  PERRL, conjunctiva/corneas clear, no scleral icterus, EOM's intact   NOSE: Nares normal, septum midline, mucosa normal, no drainage   THROAT: Lips, mucosa, and tongue normal; teeth and gums normal, mouth moist   NECK: Supple, symmetrical, trachea midline   BACK:   Symmetric, no curvature, ROM normal   LUNGS:   Clear to auscultation bilaterally, no rales, rhonchi, or wheezing, symmetric chest rise on inhalation, respirations unlabored   CHEST WALL:  No tenderness or deformity   HEART:  Regular rate and rhythm, S1 and S2 normal, no murmur, rub, or gallop    ABDOMEN:   Soft, non-tender, bowel sounds active all four quadrants, no masses, no organomegaly, no rebound or guarding   EXTREMITIES: Extremities normal, atraumatic, no cyanosis or edema    SKIN: Dry to touch, no exanthems in the visualized areas   NEURO: Alert, oriented x 4, moves all four extremities freely/spontaneously   PSYCH: Cooperative, behavior is appropriate      Medications:   Personally Reviewed.  Medications       aspirin  81 mg Oral Daily     atorvastatin  20 mg Oral QPM     doxycycline hyclate  100 mg Oral BID     FLUoxetine  20 mg Oral Daily     furosemide  40 mg Oral Daily     gabapentin  600 mg Oral At Bedtime     guaiFENesin   1,200 mg Oral BID     [Held by provider] hydroxychloroquine  200 mg Oral BID     ipratropium - albuterol 0.5 mg/2.5 mg/3 mL  3 mL Nebulization Q6H     lisinopril  20 mg Oral Daily     predniSONE  40 mg Oral Daily     sodium chloride (PF)  3 mL Intracatheter Q8H     traMADol  100 mg Oral QAM       Data reviewed today: I personally reviewed all new medications, labs, imaging/diagnostics reports over the past 24 hours. Pertinent findings include:    Imaging:   No results found for this or any previous visit (from the past 24 hour(s)).    Labs:  Echocardiogram Complete   Final Result      CT Chest Pulmonary Embolism w Contrast   Final Result   IMPRESSION:      1.  No acute pulmonary embolism.   2.  Enlarged right and left atria. Severe atheromatous coronary calcifications.   3.  Small right trace left pleural effusions.   4.  Bronchial wall thickening and peribronchial and interstitial opacities in both lungs, most mixed interstitial and alveolar lung edema. Pattern suggests acute congestive failure.      XR Chest Port 1 View   Final Result   IMPRESSION: Modest cardiomegaly. Slight interstitial prominence but no focal pneumonia or pleural effusion evident.   DJD both shoulders.        Recent Results (from the past 24 hour(s))   Platelet count    Collection Time: 02/06/23  4:44 AM   Result Value Ref Range    Platelet Count 110 (L) 150 - 450 10e3/uL   Creatinine    Collection Time: 02/06/23  4:44 AM   Result Value Ref Range    Creatinine 0.82 0.60 - 1.10 mg/dL    GFR Estimate 74 >60 mL/min/1.73m2   Potassium    Collection Time: 02/06/23  4:44 AM   Result Value Ref Range    Potassium 3.7 3.5 - 5.0 mmol/L   Magnesium    Collection Time: 02/06/23  4:44 AM   Result Value Ref Range    Magnesium 1.9 1.8 - 2.6 mg/dL       Pending Labs:  Unresulted Labs Ordered in the Past 30 Days of this Admission     No orders found from 12/31/2022 to 1/31/2023.

## 2023-02-07 ENCOUNTER — LAB REQUISITION (OUTPATIENT)
Dept: LAB | Facility: CLINIC | Age: 78
End: 2023-02-07
Payer: COMMERCIAL

## 2023-02-07 DIAGNOSIS — U07.1 COVID-19: ICD-10-CM

## 2023-02-07 LAB — SARS-COV-2 RNA RESP QL NAA+PROBE: NEGATIVE

## 2023-02-09 ENCOUNTER — LAB REQUISITION (OUTPATIENT)
Dept: LAB | Facility: CLINIC | Age: 78
End: 2023-02-09
Payer: COMMERCIAL

## 2023-02-09 DIAGNOSIS — I50.9 HEART FAILURE, UNSPECIFIED (H): ICD-10-CM

## 2023-02-10 LAB
ANION GAP SERPL CALCULATED.3IONS-SCNC: 14 MMOL/L (ref 7–15)
BUN SERPL-MCNC: 40.5 MG/DL (ref 8–23)
CALCIUM SERPL-MCNC: 9.6 MG/DL (ref 8.8–10.2)
CHLORIDE SERPL-SCNC: 95 MMOL/L (ref 98–107)
CREAT SERPL-MCNC: 1.13 MG/DL (ref 0.51–0.95)
DEPRECATED HCO3 PLAS-SCNC: 32 MMOL/L (ref 22–29)
GFR SERPL CREATININE-BSD FRML MDRD: 50 ML/MIN/1.73M2
GLUCOSE SERPL-MCNC: 109 MG/DL (ref 70–99)
HGB BLD-MCNC: 13.6 G/DL (ref 11.7–15.7)
MAGNESIUM SERPL-MCNC: 1.9 MG/DL (ref 1.7–2.3)
POTASSIUM SERPL-SCNC: 3.3 MMOL/L (ref 3.4–5.3)
SODIUM SERPL-SCNC: 141 MMOL/L (ref 136–145)

## 2023-02-10 PROCEDURE — 83735 ASSAY OF MAGNESIUM: CPT | Mod: ORL | Performed by: FAMILY MEDICINE

## 2023-02-10 PROCEDURE — 80048 BASIC METABOLIC PNL TOTAL CA: CPT | Mod: ORL | Performed by: FAMILY MEDICINE

## 2023-02-10 PROCEDURE — P9604 ONE-WAY ALLOW PRORATED TRIP: HCPCS | Mod: ORL | Performed by: FAMILY MEDICINE

## 2023-02-10 PROCEDURE — 85018 HEMOGLOBIN: CPT | Mod: ORL | Performed by: FAMILY MEDICINE

## 2023-02-10 PROCEDURE — 36415 COLL VENOUS BLD VENIPUNCTURE: CPT | Mod: ORL | Performed by: FAMILY MEDICINE

## 2023-02-13 ENCOUNTER — OFFICE VISIT (OUTPATIENT)
Dept: CARDIOLOGY | Facility: CLINIC | Age: 78
End: 2023-02-13
Payer: COMMERCIAL

## 2023-02-13 ENCOUNTER — APPOINTMENT (OUTPATIENT)
Dept: CARDIOLOGY | Facility: CLINIC | Age: 78
End: 2023-02-13
Payer: COMMERCIAL

## 2023-02-13 VITALS
BODY MASS INDEX: 37.38 KG/M2 | OXYGEN SATURATION: 99 % | HEART RATE: 84 BPM | RESPIRATION RATE: 16 BRPM | DIASTOLIC BLOOD PRESSURE: 60 MMHG | HEIGHT: 63 IN | SYSTOLIC BLOOD PRESSURE: 100 MMHG

## 2023-02-13 DIAGNOSIS — I50.32 CHRONIC HEART FAILURE WITH PRESERVED EJECTION FRACTION (H): Primary | ICD-10-CM

## 2023-02-13 PROCEDURE — 99214 OFFICE O/P EST MOD 30 MIN: CPT | Performed by: NURSE PRACTITIONER

## 2023-02-13 NOTE — PATIENT INSTRUCTIONS
Dulce Pritchard,    It was a pleasure to see you today at [facility].     My recommendations after this visit include:  - Please follow up with Dr Wooten in 8 weeks   - Please follow up with Bev Kamara in 4 weeks  - Continue current medications    Bev Kamara, CNP

## 2023-02-13 NOTE — PROGRESS NOTES
Assessment/Recommendations   Assessment:    1.  Heart failure with preserved ejection fraction, NYHA class II: Compensated.  She states her dyspnea on exertion and lower extremity edema have improved significantly since hospitalization.  She is currently at TCU more than likely be discharged next week.  We discussed monitoring symptoms, following a low-sodium diet and monitoring daily weights.  She lives with her niece and nephew.  She is interested in [employer] which we will fax over her application today.  She met with the nurse clinician during hospitalization.  2.  Hypertension: Controlled.  Blood pressure 100/60  3.  Valvular disease: Mild to moderate mitral regurgitation.  Continue to monitor    Plan:  1.  Continue current medications  2.  Continue monitoring weights and following a low-salt diet  3.  Enroll in [employer]    Dulce Pritchard will follow up with Dr. Wooten in 8 weeks and in 4 weeks.     History of Present Illness/Subjective    Ms. Dulce Pritchard is a 77 year old female seen at [facility] heart failure clinic today for continued follow-up.  Her daughter Em accompanies her today.  Heart failure with preserved ejection fraction.  To February 4 with shortness of breath, cough and weakness.  Her oxygen level was 85%.  She received IV Lasix and symptoms .  She had an echocardiogram which showed ejection fraction 50 to 55% with severe pulmonary hypertension, mild to moderate mitral regurgitation.  She was discharged to TCU.  She is a past medical history significant for pulmonary hypertension, hypertension, obesity.    Today, she states she is feeling well.  Her dyspnea exertion and lower extremity edema have improved significantly. she denies lightheadedness, shortness of breath, orthopnea, PND, palpitations, chest pain, abdominal fullness/bloating and lower extremity edema.      She is monitoring home weights which are stable at TCU.  She is on a low-sodium diet.  She lives with  "her niece and nephew    ECHOCARDIOGRAM: 1/31/2023  Interpretation Summary     The left ventricle is mildly dilated.  Left ventricular function is decreased. The ejection fraction is 50-55%  (borderline).  The left atrium is mildly dilated.  The right atrium is mildly dilated.  Severe (>55mmHg) pulmonary hypertension is present.  IVC diameter >2.1 cm collapsing <50% with sniff suggests a high RA pressure  estimated at 15 mmHg or greater.  There is mild to moderate (1-2+) mitral regurgitation.     Physical Examination Review of Systems   /60   Pulse 84   Resp 16   Ht 1.6 m (5' 3\")   SpO2 99%   BMI 37.38 kg/m    Body mass index is 37.38 kg/m .  Wt Readings from Last 3 Encounters:   02/06/23 95.7 kg (211 lb)       General Appearance:   no acute distress   ENT/Mouth: Wearing mask   EYES:  no scleral icterus, normal conjunctivae   Neck: no thyromegaly   Chest/Lungs:   lungs are clear to auscultation, no rales or wheezing, equal chest wall expansion    Cardiovascular:   Regular. Normal first and second heart sounds with no murmurs, rubs, or gallops, no edema bilaterally    Abdomen:  bowel sounds are present   Extremities: no cyanosis or clubbing   Skin: no xanthelasma, warm.    Neurologic: normal  bilateral, no tremors     Psychiatric: alert and oriented x3    Enc Vitals  BP: 100/60  Pulse: 84  Resp: 16  SpO2: 99 %  Weight:  (pt unable to take weight)  Height: 160 cm (5' 3\")                                         Medical History  Surgical History Family History Social History   Past Medical History:   Diagnosis Date     Atrial fibrillation (H)      Chronic heart failure with preserved ejection fraction (H) 2/13/2023     Congestive heart failure (H)      Depressive disorder      Hyperlipidemia      Hypertension      Obese      Pulmonary hypertension (H)     History reviewed. No pertinent surgical history. No family history on file. Social History     Socioeconomic History     Marital status:      " Spouse name: Not on file     Number of children: Not on file     Years of education: Not on file     Highest education level: Not on file   Occupational History     Not on file   Tobacco Use     Smoking status: Former     Packs/day: 1.00     Types: Cigarettes     Start date:      Quit date:      Years since quittin.1     Smokeless tobacco: Not on file   Substance and Sexual Activity     Alcohol use: Never     Drug use: Never     Sexual activity: Not on file   Other Topics Concern     Not on file   Social History Narrative     Not on file     Social Determinants of Health     Financial Resource Strain: Not on file   Food Insecurity: Not on file   Transportation Needs: Not on file   Physical Activity: Not on file   Stress: Not on file   Social Connections: Not on file   Intimate Partner Violence: Not on file   Housing Stability: Not on file          Medications  Allergies   Current Outpatient Medications   Medication Sig Dispense Refill     aspirin (ASA) 81 MG EC tablet Take 1 tablet (81 mg) by mouth daily 30 tablet 0     atorvastatin (LIPITOR) 20 MG tablet Take 1 tablet (20 mg) by mouth every evening 30 tablet 0     FLUoxetine (PROZAC) 20 MG capsule Take 20 mg by mouth daily       furosemide (LASIX) 40 MG tablet Take 1 tablet (40 mg) by mouth 2 times daily 60 tablet 0     gabapentin (NEURONTIN) 300 MG capsule Take 2 capsules (600 mg) by mouth At Bedtime 30 capsule 0     guaiFENesin (MUCINEX) 600 MG 12 hr tablet Take 1 tablet (600 mg) by mouth 2 times daily 14 tablet 0     ipratropium - albuterol 0.5 mg/2.5 mg/3 mL (DUONEB) 0.5-2.5 (3) MG/3ML neb solution Take 1 vial (3 mLs) by nebulization every 6 hours 90 mL 0     lisinopril (ZESTRIL) 20 MG tablet Take 1 tablet (20 mg) by mouth daily 30 tablet 0     melatonin 3 MG tablet Take 1 tablet (3 mg) by mouth nightly as needed for sleep 14 tablet 0     traMADol (ULTRAM) 50 MG tablet Take 100 mg by mouth every morning       Vitamin D (Cholecalciferol) 25 MCG  (1000 UT) TABS Take 1,000 Units by mouth daily      No Known Allergies      Lab Results    Chemistry/lipid CBC Cardiac Enzymes/BNP/TSH/INR   Lab Results   Component Value Date    CHOL 142 11/22/2022    HDL 47 (L) 11/22/2022    TRIG 78 11/22/2022    BUN 40.5 (H) 02/10/2023     02/10/2023    CO2 32 (H) 02/10/2023    Lab Results   Component Value Date    WBC 6.7 01/31/2023    HGB 13.6 02/10/2023    HCT 35.8 01/31/2023     01/31/2023     (L) 02/06/2023    Lab Results   Component Value Date    TROPONINI 0.11 01/31/2023    BNP 1,597 (H) 01/30/2023             This note has been dictated using voice recognition software. Any grammatical, typographical, or context distortions are unintentional and inherent to the software    30 minutes spent on the date of encounter doing chart review, review of outside records, review of test results, interpretation with above tests, patient visit, documentation and discussion with family.

## 2023-02-13 NOTE — LETTER
2/13/2023    Colin Christie MD  [address]  [place] [address]    RE: Dulce Pritchard       Dear Colleague,     I had the pleasure of seeing Dulce Pritchard in the [facility].        Assessment/Recommendations   Assessment:    1.  Heart failure with preserved ejection fraction, NYHA class II: Compensated.  She states her dyspnea on exertion and lower extremity edema have improved significantly since hospitalization.  She is currently at TCU more than likely be discharged next week.  We discussed monitoring symptoms, following a low-sodium diet and monitoring daily weights.  She lives with her niece and nephew.  She is interested in [facility] which we will fax over her application today.  She met with the nurse clinician during hospitalization.  2.  Hypertension: Controlled.  Blood pressure 100/60  3.  Valvular disease: Mild to moderate mitral regurgitation.  Continue to monitor    Plan:  1.  Continue current medications  2.  Continue monitoring weights and following a low-salt diet  3.  Enroll in [facility]    Dulce Pritchard will follow up with Dr. Wooten in 8 weeks and in 4 weeks.     History of Present Illness/Subjective    Ms. Dulce Pritchard is a 77 year old female seen at [facility] heart failure clinic today for continued follow-up.  Her daughter Em accompanies her today.  Heart failure with preserved ejection fraction.  To February 4 with shortness of breath, cough and weakness.  Her oxygen level was 85%.  She received IV Lasix and symptoms .  She had an echocardiogram which showed ejection fraction 50 to 55% with severe pulmonary hypertension, mild to moderate mitral regurgitation.  She was discharged to TCU.  She is a past medical history significant for pulmonary hypertension, hypertension, obesity.    Today, she states she is feeling well.  Her dyspnea exertion and lower extremity edema have improved significantly. she denies  "lightheadedness, shortness of breath, orthopnea, PND, palpitations, chest pain, abdominal fullness/bloating and lower extremity edema.      She is monitoring home weights which are stable at TCU.  She is on a low-sodium diet.  She lives with her niece and nephew    ECHOCARDIOGRAM: 1/31/2023  Interpretation Summary     The left ventricle is mildly dilated.  Left ventricular function is decreased. The ejection fraction is 50-55%  (borderline).  The left atrium is mildly dilated.  The right atrium is mildly dilated.  Severe (>55mmHg) pulmonary hypertension is present.  IVC diameter >2.1 cm collapsing <50% with sniff suggests a high RA pressure  estimated at 15 mmHg or greater.  There is mild to moderate (1-2+) mitral regurgitation.     Physical Examination Review of Systems   /60   Pulse 84   Resp 16   Ht 1.6 m (5' 3\")   SpO2 99%   BMI 37.38 kg/m    Body mass index is 37.38 kg/m .  Wt Readings from Last 3 Encounters:   02/06/23 95.7 kg (211 lb)       General Appearance:   no acute distress   ENT/Mouth: Wearing mask   EYES:  no scleral icterus, normal conjunctivae   Neck: no thyromegaly   Chest/Lungs:   lungs are clear to auscultation, no rales or wheezing, equal chest wall expansion    Cardiovascular:   Regular. Normal first and second heart sounds with no murmurs, rubs, or gallops, no edema bilaterally    Abdomen:  bowel sounds are present   Extremities: no cyanosis or clubbing   Skin: no xanthelasma, warm.    Neurologic: normal  bilateral, no tremors     Psychiatric: alert and oriented x3    Enc Vitals  BP: 100/60  Pulse: 84  Resp: 16  SpO2: 99 %  Weight:  (pt unable to take weight)  Height: 160 cm (5' 3\")                                         Medical History  Surgical History Family History Social History   Past Medical History:   Diagnosis Date     Atrial fibrillation (H)      Chronic heart failure with preserved ejection fraction (H) 2/13/2023     Congestive heart failure (H)      Depressive " disorder      Hyperlipidemia      Hypertension      Obese      Pulmonary hypertension (H)     History reviewed. No pertinent surgical history. No family history on file. Social History     Socioeconomic History     Marital status:      Spouse name: Not on file     Number of children: Not on file     Years of education: Not on file     Highest education level: Not on file   Occupational History     Not on file   Tobacco Use     Smoking status: Former     Packs/day: 1.00     Types: Cigarettes     Start date:      Quit date:      Years since quittin.1     Smokeless tobacco: Not on file   Substance and Sexual Activity     Alcohol use: Never     Drug use: Never     Sexual activity: Not on file   Other Topics Concern     Not on file   Social History Narrative     Not on file     Social Determinants of Health     Financial Resource Strain: Not on file   Food Insecurity: Not on file   Transportation Needs: Not on file   Physical Activity: Not on file   Stress: Not on file   Social Connections: Not on file   Intimate Partner Violence: Not on file   Housing Stability: Not on file          Medications  Allergies   Current Outpatient Medications   Medication Sig Dispense Refill     aspirin (ASA) 81 MG EC tablet Take 1 tablet (81 mg) by mouth daily 30 tablet 0     atorvastatin (LIPITOR) 20 MG tablet Take 1 tablet (20 mg) by mouth every evening 30 tablet 0     FLUoxetine (PROZAC) 20 MG capsule Take 20 mg by mouth daily       furosemide (LASIX) 40 MG tablet Take 1 tablet (40 mg) by mouth 2 times daily 60 tablet 0     gabapentin (NEURONTIN) 300 MG capsule Take 2 capsules (600 mg) by mouth At Bedtime 30 capsule 0     guaiFENesin (MUCINEX) 600 MG 12 hr tablet Take 1 tablet (600 mg) by mouth 2 times daily 14 tablet 0     ipratropium - albuterol 0.5 mg/2.5 mg/3 mL (DUONEB) 0.5-2.5 (3) MG/3ML neb solution Take 1 vial (3 mLs) by nebulization every 6 hours 90 mL 0     lisinopril (ZESTRIL) 20 MG tablet Take 1 tablet (20  mg) by mouth daily 30 tablet 0     melatonin 3 MG tablet Take 1 tablet (3 mg) by mouth nightly as needed for sleep 14 tablet 0     traMADol (ULTRAM) 50 MG tablet Take 100 mg by mouth every morning       Vitamin D (Cholecalciferol) 25 MCG (1000 UT) TABS Take 1,000 Units by mouth daily      No Known Allergies      Lab Results    Chemistry/lipid CBC Cardiac Enzymes/BNP/TSH/INR   Lab Results   Component Value Date    CHOL 142 11/22/2022    HDL 47 (L) 11/22/2022    TRIG 78 11/22/2022    BUN 40.5 (H) 02/10/2023     02/10/2023    CO2 32 (H) 02/10/2023    Lab Results   Component Value Date    WBC 6.7 01/31/2023    HGB 13.6 02/10/2023    HCT 35.8 01/31/2023     01/31/2023     (L) 02/06/2023    Lab Results   Component Value Date    TROPONINI 0.11 01/31/2023    BNP 1,597 (H) 01/30/2023             This note has been dictated using voice recognition software. Any grammatical, typographical, or context distortions are unintentional and inherent to the software    30 minutes spent on the date of encounter doing chart review, review of outside records, review of test results, interpretation with above tests, patient visit, documentation and discussion with family.                Thank you for allowing me to participate in the care of your patient.      Sincerely,     BRE Ramos [facility]  cc:   Barbara Alejandra MD  [address]  [place] [phone]

## 2023-02-22 ENCOUNTER — LAB REQUISITION (OUTPATIENT)
Dept: LAB | Facility: CLINIC | Age: 78
End: 2023-02-22
Payer: COMMERCIAL

## 2023-02-22 DIAGNOSIS — I50.9 HEART FAILURE, UNSPECIFIED (H): ICD-10-CM

## 2023-02-24 ENCOUNTER — OFFICE VISIT (OUTPATIENT)
Dept: PULMONOLOGY | Facility: CLINIC | Age: 78
End: 2023-02-24

## 2023-02-24 DIAGNOSIS — J44.9 COPD (CHRONIC OBSTRUCTIVE PULMONARY DISEASE) (H): Primary | ICD-10-CM

## 2023-02-24 LAB
ANION GAP SERPL CALCULATED.3IONS-SCNC: 14 MMOL/L (ref 7–15)
BUN SERPL-MCNC: 38.1 MG/DL (ref 8–23)
CALCIUM SERPL-MCNC: 9.3 MG/DL (ref 8.8–10.2)
CHLORIDE SERPL-SCNC: 102 MMOL/L (ref 98–107)
CREAT SERPL-MCNC: 1.32 MG/DL (ref 0.51–0.95)
DEPRECATED HCO3 PLAS-SCNC: 26 MMOL/L (ref 22–29)
GFR SERPL CREATININE-BSD FRML MDRD: 42 ML/MIN/1.73M2
GLUCOSE SERPL-MCNC: 121 MG/DL (ref 70–99)
POTASSIUM SERPL-SCNC: 4.4 MMOL/L (ref 3.4–5.3)
SODIUM SERPL-SCNC: 142 MMOL/L (ref 136–145)

## 2023-02-24 PROCEDURE — P9604 ONE-WAY ALLOW PRORATED TRIP: HCPCS | Mod: ORL | Performed by: FAMILY MEDICINE

## 2023-02-24 PROCEDURE — 99207 PR NO CHARGE NURSE ONLY: CPT

## 2023-02-24 PROCEDURE — 36415 COLL VENOUS BLD VENIPUNCTURE: CPT | Mod: ORL | Performed by: FAMILY MEDICINE

## 2023-02-24 PROCEDURE — 80048 BASIC METABOLIC PNL TOTAL CA: CPT | Mod: ORL | Performed by: FAMILY MEDICINE

## 2023-03-27 ENCOUNTER — OFFICE VISIT (OUTPATIENT)
Dept: CARDIOLOGY | Facility: CLINIC | Age: 78
End: 2023-03-27
Payer: COMMERCIAL

## 2023-03-27 VITALS
BODY MASS INDEX: 37.38 KG/M2 | RESPIRATION RATE: 16 BRPM | DIASTOLIC BLOOD PRESSURE: 62 MMHG | HEART RATE: 79 BPM | SYSTOLIC BLOOD PRESSURE: 110 MMHG | HEIGHT: 63 IN | OXYGEN SATURATION: 91 %

## 2023-03-27 DIAGNOSIS — I10 PRIMARY HYPERTENSION: ICD-10-CM

## 2023-03-27 DIAGNOSIS — I50.32 CHRONIC HEART FAILURE WITH PRESERVED EJECTION FRACTION (H): Primary | ICD-10-CM

## 2023-03-27 LAB
ALBUMIN SERPL-MCNC: 2.3 G/DL (ref 3.5–5)
ALP SERPL-CCNC: 117 U/L (ref 45–120)
ALT SERPL W P-5'-P-CCNC: 13 U/L (ref 0–45)
ANION GAP SERPL CALCULATED.3IONS-SCNC: 9 MMOL/L (ref 5–18)
AST SERPL W P-5'-P-CCNC: 26 U/L (ref 0–40)
BILIRUB SERPL-MCNC: 0.7 MG/DL (ref 0–1)
BUN SERPL-MCNC: 21 MG/DL (ref 8–28)
CALCIUM SERPL-MCNC: 9.6 MG/DL (ref 8.5–10.5)
CHLORIDE BLD-SCNC: 106 MMOL/L (ref 98–107)
CHOLEST SERPL-MCNC: 120 MG/DL
CO2 SERPL-SCNC: 27 MMOL/L (ref 22–31)
CREAT SERPL-MCNC: 0.9 MG/DL (ref 0.6–1.1)
FASTING STATUS PATIENT QL REPORTED: YES
GFR SERPL CREATININE-BSD FRML MDRD: 66 ML/MIN/1.73M2
GLUCOSE BLD-MCNC: 116 MG/DL (ref 70–125)
HDLC SERPL-MCNC: 29 MG/DL
HGB BLD-MCNC: 11.2 G/DL (ref 11.7–15.7)
LDLC SERPL CALC-MCNC: 71 MG/DL
POTASSIUM BLD-SCNC: 3.8 MMOL/L (ref 3.5–5)
PROT SERPL-MCNC: 6.5 G/DL (ref 6–8)
SODIUM SERPL-SCNC: 142 MMOL/L (ref 136–145)
TRIGL SERPL-MCNC: 99 MG/DL

## 2023-03-27 PROCEDURE — 99214 OFFICE O/P EST MOD 30 MIN: CPT | Performed by: NURSE PRACTITIONER

## 2023-03-27 PROCEDURE — 36415 COLL VENOUS BLD VENIPUNCTURE: CPT | Performed by: NURSE PRACTITIONER

## 2023-03-27 PROCEDURE — 85018 HEMOGLOBIN: CPT | Performed by: NURSE PRACTITIONER

## 2023-03-27 PROCEDURE — 80053 COMPREHEN METABOLIC PANEL: CPT | Performed by: NURSE PRACTITIONER

## 2023-03-27 PROCEDURE — 80061 LIPID PANEL: CPT | Performed by: NURSE PRACTITIONER

## 2023-03-27 NOTE — PATIENT INSTRUCTIONS
Dulce Pritchard,    It was a pleasure to see you today at [facility].     My recommendations after this visit include:  - Please follow up with Dr Wooten April 18   - Please follow up with Bev Kamara in 4 months   - I have checked labs and will contact you with results  - Continue current medications    Bev Kamara, CNP

## 2023-03-27 NOTE — LETTER
3/27/2023    Colin Christie MD  [address]  [place] [address]    RE: Dulce RIVAS Macho       Dear Colleague,     I had the pleasure of seeing Dulce Pritchard in the [facility].        Assessment/Recommendations   Assessment:    1.  Heart failure with preserved ejection fraction, NYHA class II: Compensated.  She states she is feeling well.  She has mild dyspnea on exertion.  Her weight is stable.  She receives [facility] to help her with low sodium  2. Hypertension: Controlled.  Blood pressure 110/62    Plan:  1.  CMP, hemoglobin and lipid panel pending per her primary's instructions  2.  Continue current medications  3.  Continue monitoring weights and following a low-salt diet.  Continue [facility]    Dulce Pritchard will follow up with Dr. Wooten April 18 and in the heart failure clinic in 4 months or sooner if needed.     History of Present Illness/Subjective    Ms. Dulce Pritchard is a 77 year old female seen at [facility] heart failure clinic today for continued follow-up.  Her daughter Em accompanies her today.  She follows up for heart failure with preserved ejection fraction.  She was hospitalized in February with acute heart failure. She had an echocardiogram which showed ejection fraction 50 to 55% with severe pulmonary hypertension, mild to moderate mitral regurgitation.  She was discharged to TCU.  She was discharged home about a month ago. She is a past medical history significant for pulmonary hypertension, hypertension, obesity.     Today, she states she is feeling well.  She has mild dyspnea on exertion.  She denies lightheadedness, shortness of breath, orthopnea, PND, palpitations, chest pain, abdominal fullness/bloating and lower extremity edema.      She is monitoring home weights which are stable around 180 pounds.  She is following a low sodium diet.  She receives [facility] meals.       Physical Examination Review of Systems   BP  "110/62 (BP Location: Left arm, Patient Position: Sitting, Cuff Size: Adult Large)   Pulse 79   Resp 16   Ht 1.6 m (5' 3\")   SpO2 91%   BMI 37.38 kg/m    Body mass index is 37.38 kg/m .  Wt Readings from Last 3 Encounters:   23 95.7 kg (211 lb)       General Appearance:   no acute distress   ENT/Mouth: Wearing mask   EYES:  no scleral icterus, normal conjunctivae   Neck: no thyromegaly   Chest/Lungs:   lungs are clear to auscultation, no rales or wheezing, equal chest wall expansion    Cardiovascular:   Regular. Normal first and second heart sounds with no murmurs, rubs, or gallops, no edema bilaterally    Abdomen:  bowel sounds are present   Extremities: no cyanosis or clubbing   Skin: no xanthelasma, warm.    Neurologic: normal  bilateral, no tremors     Psychiatric: alert and oriented x3    Enc Vitals  BP: 110/62  Pulse: 79  Resp: 16  SpO2: 91 %  Weight:  (pt unable to weigh. last weight was 3/23 180 lbs)  Height: 160 cm (5' 3\")                                         Medical History  Surgical History Family History Social History   Past Medical History:   Diagnosis Date     Atrial fibrillation (H)      Chronic heart failure with preserved ejection fraction (H) 2023     Congestive heart failure (H)      Depressive disorder      Hyperlipidemia      Hypertension      Obese      Primary hypertension 3/27/2023     Pulmonary hypertension (H)     History reviewed. No pertinent surgical history. No family history on file. Social History     Socioeconomic History     Marital status:      Spouse name: Not on file     Number of children: Not on file     Years of education: Not on file     Highest education level: Not on file   Occupational History     Not on file   Tobacco Use     Smoking status: Former     Packs/day: 1.00     Types: Cigarettes     Start date:      Quit date:      Years since quittin.2     Smokeless tobacco: Not on file   Substance and Sexual Activity     Alcohol use: " Never     Drug use: Never     Sexual activity: Not on file   Other Topics Concern     Not on file   Social History Narrative     Not on file     Social Determinants of Health     Financial Resource Strain: Not on file   Food Insecurity: Not on file   Transportation Needs: Not on file   Physical Activity: Not on file   Stress: Not on file   Social Connections: Not on file   Intimate Partner Violence: Not on file   Housing Stability: Not on file          Medications  Allergies   Current Outpatient Medications   Medication Sig Dispense Refill     aspirin (ASA) 81 MG EC tablet Take 1 tablet (81 mg) by mouth daily 30 tablet 0     atorvastatin (LIPITOR) 20 MG tablet Take 1 tablet (20 mg) by mouth every evening 30 tablet 0     FLUoxetine (PROZAC) 20 MG capsule Take 20 mg by mouth daily       furosemide (LASIX) 40 MG tablet Take 1 tablet (40 mg) by mouth 2 times daily 60 tablet 0     gabapentin (NEURONTIN) 300 MG capsule Take 2 capsules (600 mg) by mouth At Bedtime 30 capsule 0     guaiFENesin (MUCINEX) 600 MG 12 hr tablet Take 1 tablet (600 mg) by mouth 2 times daily 14 tablet 0     ipratropium - albuterol 0.5 mg/2.5 mg/3 mL (DUONEB) 0.5-2.5 (3) MG/3ML neb solution Take 1 vial (3 mLs) by nebulization every 6 hours 90 mL 0     lisinopril (ZESTRIL) 20 MG tablet Take 1 tablet (20 mg) by mouth daily 30 tablet 0     melatonin 3 MG tablet Take 1 tablet (3 mg) by mouth nightly as needed for sleep 14 tablet 0     traMADol (ULTRAM) 50 MG tablet Take 100 mg by mouth every morning       Vitamin D (Cholecalciferol) 25 MCG (1000 UT) TABS Take 1,000 Units by mouth daily      No Known Allergies      Lab Results    Chemistry/lipid CBC Cardiac Enzymes/BNP/TSH/INR   Lab Results   Component Value Date    CHOL 142 11/22/2022    HDL 47 (L) 11/22/2022    TRIG 78 11/22/2022    BUN 38.1 (H) 02/24/2023     02/24/2023    CO2 26 02/24/2023    Lab Results   Component Value Date    WBC 6.7 01/31/2023    HGB 13.6 02/10/2023    HCT 35.8 01/31/2023      01/31/2023     (L) 02/06/2023    Lab Results   Component Value Date    TROPONINI 0.11 01/31/2023    BNP 1,597 (H) 01/30/2023             This note has been dictated using voice recognition software. Any grammatical, typographical, or context distortions are unintentional and inherent to the software    30 minutes spent on the date of encounter doing chart review, review of outside records, review of test results, interpretation with above tests, patient visit, documentation and discussion with family.            Thank you for allowing me to participate in the care of your patient.      Sincerely,     BRE Ramos [facility]  cc: No referring provider defined for this encounter.

## 2023-03-27 NOTE — PROGRESS NOTES
"      Assessment/Recommendations   Assessment:    1.  Heart failure with preserved ejection fraction, NYHA class II: Compensated.  She states she is feeling well.  She has mild dyspnea on exertion.  Her weight is stable.  She receives [facility] to help her with low sodium  2. Hypertension: Controlled.  Blood pressure 110/62    Plan:  1.  CMP, hemoglobin and lipid panel pending per her primary's instructions  2.  Continue current medications  3.  Continue monitoring weights and following a low-salt diet.  Continue [facility]    Dulce Pritchard will follow up with Dr. Wooten April 18 and in the heart failure clinic in 4 months or sooner if needed.     History of Present Illness/Subjective    Ms. Dulce Pritchard is a 77 year old female seen at [facility] heart failure clinic today for continued follow-up.  Her daughter Em accompanies her today.  She follows up for heart failure with preserved ejection fraction.  She was hospitalized in February with acute heart failure. She had an echocardiogram which showed ejection fraction 50 to 55% with severe pulmonary hypertension, mild to moderate mitral regurgitation.  She was discharged to TCU.  She was discharged home about a month ago. She is a past medical history significant for pulmonary hypertension, hypertension, obesity.     Today, she states she is feeling well.  She has mild dyspnea on exertion.  She denies lightheadedness, shortness of breath, orthopnea, PND, palpitations, chest pain, abdominal fullness/bloating and lower extremity edema.      She is monitoring home weights which are stable around 180 pounds.  She is following a low sodium diet.  She receives [facility] meals.       Physical Examination Review of Systems   /62 (BP Location: Left arm, Patient Position: Sitting, Cuff Size: Adult Large)   Pulse 79   Resp 16   Ht 1.6 m (5' 3\")   SpO2 91%   BMI 37.38 kg/m    Body mass index is 37.38 kg/m .  Wt Readings from Last 3 Encounters: " "  23 95.7 kg (211 lb)       General Appearance:   no acute distress   ENT/Mouth: Wearing mask   EYES:  no scleral icterus, normal conjunctivae   Neck: no thyromegaly   Chest/Lungs:   lungs are clear to auscultation, no rales or wheezing, equal chest wall expansion    Cardiovascular:   Regular. Normal first and second heart sounds with no murmurs, rubs, or gallops, no edema bilaterally    Abdomen:  bowel sounds are present   Extremities: no cyanosis or clubbing   Skin: no xanthelasma, warm.    Neurologic: normal  bilateral, no tremors     Psychiatric: alert and oriented x3    Enc Vitals  BP: 110/62  Pulse: 79  Resp: 16  SpO2: 91 %  Weight:  (pt unable to weigh. last weight was 3/23 180 lbs)  Height: 160 cm (5' 3\")                                         Medical History  Surgical History Family History Social History   Past Medical History:   Diagnosis Date     Atrial fibrillation (H)      Chronic heart failure with preserved ejection fraction (H) 2023     Congestive heart failure (H)      Depressive disorder      Hyperlipidemia      Hypertension      Obese      Primary hypertension 3/27/2023     Pulmonary hypertension (H)     History reviewed. No pertinent surgical history. No family history on file. Social History     Socioeconomic History     Marital status:      Spouse name: Not on file     Number of children: Not on file     Years of education: Not on file     Highest education level: Not on file   Occupational History     Not on file   Tobacco Use     Smoking status: Former     Packs/day: 1.00     Types: Cigarettes     Start date:      Quit date:      Years since quittin.2     Smokeless tobacco: Not on file   Substance and Sexual Activity     Alcohol use: Never     Drug use: Never     Sexual activity: Not on file   Other Topics Concern     Not on file   Social History Narrative     Not on file     Social Determinants of Health     Financial Resource Strain: Not on file   Food " Insecurity: Not on file   Transportation Needs: Not on file   Physical Activity: Not on file   Stress: Not on file   Social Connections: Not on file   Intimate Partner Violence: Not on file   Housing Stability: Not on file          Medications  Allergies   Current Outpatient Medications   Medication Sig Dispense Refill     aspirin (ASA) 81 MG EC tablet Take 1 tablet (81 mg) by mouth daily 30 tablet 0     atorvastatin (LIPITOR) 20 MG tablet Take 1 tablet (20 mg) by mouth every evening 30 tablet 0     FLUoxetine (PROZAC) 20 MG capsule Take 20 mg by mouth daily       furosemide (LASIX) 40 MG tablet Take 1 tablet (40 mg) by mouth 2 times daily 60 tablet 0     gabapentin (NEURONTIN) 300 MG capsule Take 2 capsules (600 mg) by mouth At Bedtime 30 capsule 0     guaiFENesin (MUCINEX) 600 MG 12 hr tablet Take 1 tablet (600 mg) by mouth 2 times daily 14 tablet 0     ipratropium - albuterol 0.5 mg/2.5 mg/3 mL (DUONEB) 0.5-2.5 (3) MG/3ML neb solution Take 1 vial (3 mLs) by nebulization every 6 hours 90 mL 0     lisinopril (ZESTRIL) 20 MG tablet Take 1 tablet (20 mg) by mouth daily 30 tablet 0     melatonin 3 MG tablet Take 1 tablet (3 mg) by mouth nightly as needed for sleep 14 tablet 0     traMADol (ULTRAM) 50 MG tablet Take 100 mg by mouth every morning       Vitamin D (Cholecalciferol) 25 MCG (1000 UT) TABS Take 1,000 Units by mouth daily      No Known Allergies      Lab Results    Chemistry/lipid CBC Cardiac Enzymes/BNP/TSH/INR   Lab Results   Component Value Date    CHOL 142 11/22/2022    HDL 47 (L) 11/22/2022    TRIG 78 11/22/2022    BUN 38.1 (H) 02/24/2023     02/24/2023    CO2 26 02/24/2023    Lab Results   Component Value Date    WBC 6.7 01/31/2023    HGB 13.6 02/10/2023    HCT 35.8 01/31/2023     01/31/2023     (L) 02/06/2023    Lab Results   Component Value Date    TROPONINI 0.11 01/31/2023    BNP 1,597 (H) 01/30/2023             This note has been dictated using voice recognition software. Any  grammatical, typographical, or context distortions are unintentional and inherent to the software    30 minutes spent on the date of encounter doing chart review, review of outside records, review of test results, interpretation with above tests, patient visit, documentation and discussion with family.

## 2023-04-18 ENCOUNTER — OFFICE VISIT (OUTPATIENT)
Dept: CARDIOLOGY | Facility: CLINIC | Age: 78
End: 2023-04-18
Payer: COMMERCIAL

## 2023-04-18 VITALS
DIASTOLIC BLOOD PRESSURE: 46 MMHG | SYSTOLIC BLOOD PRESSURE: 103 MMHG | HEART RATE: 60 BPM | RESPIRATION RATE: 16 BRPM | BODY MASS INDEX: 37.38 KG/M2 | HEIGHT: 63 IN

## 2023-04-18 DIAGNOSIS — I10 HYPERTENSION, UNSPECIFIED TYPE: ICD-10-CM

## 2023-04-18 DIAGNOSIS — R06.02 SOB (SHORTNESS OF BREATH): ICD-10-CM

## 2023-04-18 DIAGNOSIS — I44.1 MOBITZ (TYPE) I (WENCKEBACH'S) ATRIOVENTRICULAR BLOCK: ICD-10-CM

## 2023-04-18 DIAGNOSIS — I25.10 CORONARY ARTERY DISEASE INVOLVING NATIVE CORONARY ARTERY WITHOUT ANGINA PECTORIS, UNSPECIFIED WHETHER NATIVE OR TRANSPLANTED HEART: Primary | ICD-10-CM

## 2023-04-18 DIAGNOSIS — I44.1 MOBITZ TYPE 2 SECOND DEGREE HEART BLOCK: ICD-10-CM

## 2023-04-18 DIAGNOSIS — E78.5 DYSLIPIDEMIA: ICD-10-CM

## 2023-04-18 PROCEDURE — 99214 OFFICE O/P EST MOD 30 MIN: CPT | Performed by: INTERNAL MEDICINE

## 2023-04-18 RX ORDER — HYDROXYCHLOROQUINE SULFATE 200 MG/1
200 TABLET, FILM COATED ORAL 2 TIMES DAILY
COMMUNITY
Start: 2023-04-17

## 2023-04-18 RX ORDER — DOCUSATE SODIUM AND SENNOSIDES 8.6; 5 MG/1; MG/1
1 TABLET, FILM COATED ORAL 2 TIMES DAILY
COMMUNITY
Start: 2023-02-21

## 2023-04-18 RX ORDER — POLYETHYLENE GLYCOL 3350 17 G/17G
17 POWDER, FOR SOLUTION ORAL DAILY PRN
COMMUNITY
Start: 2023-02-10

## 2023-04-18 NOTE — PROGRESS NOTES
[facility] HEART CARE   [address], [place] [address]   [email or web address]   OFFICE: [phone]          Thank you Colin Kang for asking the [facility] Heart Care team to participate in the care of your patient, Dulce Pritchard.     Impression and Plan       1.  Coronary artery disease.  Dulce has known coronary artery disease by virtue of CT scan February 2023 revealing severe coronary calcification.  I do feel it be reasonable and prudent to pursue ischemic evaluation to evaluate for silent ischemia particularly given her hospitalization for fluid overload/pulmonary edema and January/February 2023.    Continue 81 mg aspirin.    Regadenoson nuclear perfusion study.     2.  Mitral insufficiency.  This is felt mild-moderate in degree on echocardiogram 31 January 2023.    3.  Arrhythmia.  ECGs were reviewed during February 2023 hospitalization.  She was noted to have sinus rhythm with sinus arrhythmia/bradycardia and intermittent high junctional escape earlier this year when hospitalized.  She also had some intermittent second-degree heart block type I.  For this reason, beta-blocker therapy was discontinued/deferred.     4.  Hypertension.    Blood pressure is reasonable in the office today at 103/46 mmHg.     5.  Dyslipidemia.  Lipid profile 22 November 2022 revealed LDL 79 mg/dL and HDL 47 mg/dL. Although lipid profile fairly reasonable, given evidence of severe coronary calcification would advocate trying to suppress LDL less than 70 mg/dL if possible.     Continue atorvastatin 20 mg daily (initiated February 2023 Hospital admission).     6.  History of fluid retention felt in part related to impaired diastolic filling.  Echocardiogram 31 January 2023 revealed normal left ventricular systolic performance with ejection fraction of 50 to 55%.  She was recently seen in the Heart Failure Clinic 27 March 2023 at which time she was felt to be volume  neutral and had documented stable weights.  Her weights have continued to be stable.    Follow-up and further recommendations pending nuclear perfusion imaging results.    35 minutes spent reviewing prior records (including documentation, laboratory studies, cardiac testing/imaging), interview with patient along with physical exam, planning, and subsequent documentation/crafting of note).           History of Present Illness    Once again I would like to thank you again for asking me to participate in the care of your patient, Dulce Pritchard.  As you know, but to reiterate for my own records, Dulce Pritchard is a 77 year old female with coronary artery disease by virtue of CT scan of chest 30 January 2023 when hospitalized revealing evidence of severe coronary calcification.    Dulce had been hospitalized late January, early February 2023 at the time did he have evidence of fluid retention felt in part related to impaired diastolic filling.  Since her dismissal from the hospital, she has been doing well.  This is confirmed by her daughter, Em, who joined in the visit today.  Her weights have been stable and within a couple of pounds.  She denies any shortness of breath at night to suggest PND/orthopnea.  No palpitations    Further review of systems is otherwise negative/noncontributory (medical record and 13 point review of systems reviewed as well and pertinent positives noted).         Cardiac Diagnostics         Echocardiogram 31 January 2023:  1. Mild left ventricular enlargement with normal left ventricular systolic performance.  Ejection fraction 50-55%.  2. Abnormal septal motion consistent with conduction abnormality.  3. Mild-moderate mitral insufficiency.  4. Normal right ventricular size and systolic performance.  5. Mild biatrial enlargement.  6. Right ventricular systolic pressure relative to right atrial pressure is mildly increased.  The pulmonary artery pressure is estimated to be 45-50 mmHg  "plus right atrial pressure.    Twelve-lead ECG (personally reviewed) 30 January 2023: Sinus rhythm.  PACs.    CT scan of chest 30 January 2023:  1. No acute pulmonary embolism.  2. Enlarged right and left atria. Severe atheromatous coronary calcifications.  3. Small right trace left pleural effusions.  4. Bronchial wall thickening and peribronchial and interstitial opacities in both lungs, most mixed interstitial and alveolar lung edema. Pattern suggests acute congestive failure.  5. Coronary calcification: Severe.           Physical Examination       /46 (BP Location: Left arm, Patient Position: Sitting, Cuff Size: Adult Regular)   Pulse 60   Resp 16   Ht 1.6 m (5' 3\")   BMI 37.38 kg/m          Wt Readings from Last 3 Encounters:   02/06/23 95.7 kg (211 lb)     The patient is alert and oriented times three. Sclerae are anicteric. Mucosal membranes are moist. Jugular venous pressure is reasonable.  No significant adenopathy/thyromegally appreciated. Lungs are managed, but fairly clear. On cardiovascular exam, the patient has a regular S1 and S2. Abdomen is soft and non-tender. Extremities reveal no clubbing, cyanosis.       Medications  Allergies   Current Outpatient Medications   Medication Sig Dispense Refill     aspirin (ASA) 81 MG EC tablet Take 1 tablet (81 mg) by mouth daily 30 tablet 0     atorvastatin (LIPITOR) 20 MG tablet Take 1 tablet (20 mg) by mouth every evening 30 tablet 0     FLUoxetine (PROZAC) 20 MG capsule Take 20 mg by mouth daily       furosemide (LASIX) 40 MG tablet Take 1 tablet (40 mg) by mouth 2 times daily 60 tablet 0     gabapentin (NEURONTIN) 300 MG capsule Take 2 capsules (600 mg) by mouth At Bedtime 30 capsule 0     guaiFENesin (MUCINEX) 600 MG 12 hr tablet Take 1 tablet (600 mg) by mouth 2 times daily 14 tablet 0     hydroxychloroquine (PLAQUENIL) 200 MG tablet Take 400 mg by mouth daily       lisinopril (ZESTRIL) 20 MG tablet Take 1 tablet (20 mg) by mouth daily 30 tablet 0 "     melatonin 3 MG tablet Take 1 tablet (3 mg) by mouth nightly as needed for sleep 14 tablet 0     polyethylene glycol (MIRALAX) 17 GM/Dose powder DISSOLVE 17 GMS IN WATER AND TAKE BY MOUTH ONCE DAILY AS NEEDED       STOOL SOFTENER/LAXATIVE 50-8.6 MG tablet TAKE 2 TABLETS BY MOUTH ONCE DAILY DX CONSTIPATION       traMADol (ULTRAM) 50 MG tablet Take 100 mg by mouth every morning       Vitamin D (Cholecalciferol) 25 MCG (1000 UT) TABS Take 1,000 Units by mouth daily       ipratropium - albuterol 0.5 mg/2.5 mg/3 mL (DUONEB) 0.5-2.5 (3) MG/3ML neb solution Take 1 vial (3 mLs) by nebulization every 6 hours (Patient not taking: Reported on 4/18/2023) 90 mL 0     No Known Allergies       Lab Results    Chemistry/lipid CBC Cardiac Enzymes/BNP/TSH/INR   Recent Labs   Lab Test 03/27/23  0949   CHOL 120   HDL 29*   LDL 71   TRIG 99     Recent Labs   Lab Test 03/27/23  0949 11/22/22  1354 09/27/21  1000   LDL 71 79 106     Recent Labs   Lab Test 03/27/23  0949      POTASSIUM 3.8   CHLORIDE 106   CO2 27      BUN 21   CR 0.90   GFRESTIMATED 66   EDGAR 9.6     Recent Labs   Lab Test 03/27/23  0949 02/24/23  1033 02/10/23  1056   CR 0.90 1.32* 1.13*     Recent Labs   Lab Test 01/17/19  1041 01/18/18  1821   A1C 5.9 6.0          Recent Labs   Lab Test 03/27/23  0949 02/10/23  1056 02/06/23  0444 02/03/23  0500 01/31/23  0722   WBC  --   --   --   --  6.7   HGB 11.2*   < >  --    < > 11.2*   HCT  --   --   --   --  35.8   MCV  --   --   --   --  100   PLT  --   --  110*   < > 121*    < > = values in this interval not displayed.     Recent Labs   Lab Test 03/27/23  0949 02/10/23  1056 02/05/23  0410   HGB 11.2* 13.6 10.7*    Recent Labs   Lab Test 01/31/23  0722 01/31/23  0110 01/30/23 1926   TROPONINI 0.11 0.12 0.12     Recent Labs   Lab Test 01/30/23 1926   BNP 1,597*     No results for input(s): TSH in the last 41815 hours.  No results for input(s): INR in the last 61516 hours.     Medical History  Surgical History  Family History Social History   Past Medical History:   Diagnosis Date     Atrial fibrillation (H)      Chronic heart failure with preserved ejection fraction (H) 2023     Congestive heart failure (H)      Depressive disorder      Hyperlipidemia      Hypertension      Obese      Primary hypertension 3/27/2023     Pulmonary hypertension (H)      No past surgical history on file.  No family history on file.     Social History     Socioeconomic History     Marital status:      Spouse name: Not on file     Number of children: Not on file     Years of education: Not on file     Highest education level: Not on file   Occupational History     Not on file   Tobacco Use     Smoking status: Former     Packs/day: 1.00     Types: Cigarettes     Start date:      Quit date:      Years since quittin.3     Smokeless tobacco: Not on file   Vaping Use     Vaping status: Never Used   Substance and Sexual Activity     Alcohol use: Never     Drug use: Never     Sexual activity: Not on file   Other Topics Concern     Not on file   Social History Narrative     Not on file     Social Determinants of Health     Financial Resource Strain: Not on file   Food Insecurity: Not on file   Transportation Needs: Not on file   Physical Activity: Not on file   Stress: Not on file   Social Connections: Not on file   Intimate Partner Violence: Not on file   Housing Stability: Not on file

## 2023-04-18 NOTE — LETTER
4/18/2023    Colin Christie MD  [address]  [place] [address]    RE: Dulce RIVAS Macho       Dear Colleague,     I had the pleasure of seeing Dulce Pritchard in the [facility] Heart Clinic.         [facility] HEART CARE   [address], [place] [address]   [email or web address]   OFFICE: [phone]          Thank you Dr. Christie, Colin Fox for asking the [facility] Heart Care team to participate in the care of your patient, Dulce Pritchard.     Impression and Plan       1.  Coronary artery disease.  Dulce has known coronary artery disease by virtue of CT scan February 2023 revealing severe coronary calcification.  I do feel it be reasonable and prudent to pursue ischemic evaluation to evaluate for silent ischemia particularly given her hospitalization for fluid overload/pulmonary edema and January/February 2023.  Continue 81 mg aspirin.  Regadenoson nuclear perfusion study.     2.  Mitral insufficiency.  This is felt mild-moderate in degree on echocardiogram 31 January 2023.    3.  Arrhythmia.  ECGs were reviewed during February 2023 hospitalization.  She was noted to have sinus rhythm with sinus arrhythmia/bradycardia and intermittent high junctional escape earlier this year when hospitalized.  She also had some intermittent second-degree heart block type I.  For this reason, beta-blocker therapy was discontinued/deferred.     4.  Hypertension.    Blood pressure is reasonable in the office today at 103/46 mmHg.     5.  Dyslipidemia.  Lipid profile 22 November 2022 revealed LDL 79 mg/dL and HDL 47 mg/dL. Although lipid profile fairly reasonable, given evidence of severe coronary calcification would advocate trying to suppress LDL less than 70 mg/dL if possible.   Continue atorvastatin 20 mg daily (initiated February 2023 Hospital admission).     6.  History of fluid retention felt in part related to impaired diastolic filling.   Echocardiogram 31 January 2023 revealed normal left ventricular systolic performance with ejection fraction of 50 to 55%.  She was recently seen in the Heart Failure Clinic 27 March 2023 at which time she was felt to be volume neutral and had documented stable weights.  Her weights have continued to be stable.    Follow-up and further recommendations pending nuclear perfusion imaging results.    35 minutes spent reviewing prior records (including documentation, laboratory studies, cardiac testing/imaging), interview with patient along with physical exam, planning, and subsequent documentation/crafting of note).           History of Present Illness    Once again I would like to thank you again for asking me to participate in the care of your patient, Dulce Pritchard.  As you know, but to reiterate for my own records, Dulce Pritchard is a 77 year old female with coronary artery disease by virtue of CT scan of chest 30 January 2023 when hospitalized revealing evidence of severe coronary calcification.    Dulce had been hospitalized late January, early February 2023 at the time did he have evidence of fluid retention felt in part related to impaired diastolic filling.  Since her dismissal from the hospital, she has been doing well.  This is confirmed by her daughter, Em, who joined in the visit today.  Her weights have been stable and within a couple of pounds.  She denies any shortness of breath at night to suggest PND/orthopnea.  No palpitations    Further review of systems is otherwise negative/noncontributory (medical record and 13 point review of systems reviewed as well and pertinent positives noted).         Cardiac Diagnostics         Echocardiogram 31 January 2023:  Mild left ventricular enlargement with normal left ventricular systolic performance.  Ejection fraction 50-55%.  Abnormal septal motion consistent with conduction abnormality.  Mild-moderate mitral insufficiency.  Normal right ventricular  "size and systolic performance.  Mild biatrial enlargement.  Right ventricular systolic pressure relative to right atrial pressure is mildly increased.  The pulmonary artery pressure is estimated to be 45-50 mmHg plus right atrial pressure.    Twelve-lead ECG (personally reviewed) 30 January 2023: Sinus rhythm.  PACs.    CT scan of chest 30 January 2023:  No acute pulmonary embolism.  Enlarged right and left atria. Severe atheromatous coronary calcifications.  Small right trace left pleural effusions.  Bronchial wall thickening and peribronchial and interstitial opacities in both lungs, most mixed interstitial and alveolar lung edema. Pattern suggests acute congestive failure.  Coronary calcification: Severe.           Physical Examination       /46 (BP Location: Left arm, Patient Position: Sitting, Cuff Size: Adult Regular)   Pulse 60   Resp 16   Ht 1.6 m (5' 3\")   BMI 37.38 kg/m          Wt Readings from Last 3 Encounters:   02/06/23 95.7 kg (211 lb)     The patient is alert and oriented times three. Sclerae are anicteric. Mucosal membranes are moist. Jugular venous pressure is reasonable.  No significant adenopathy/thyromegally appreciated. Lungs are managed, but fairly clear. On cardiovascular exam, the patient has a regular S1 and S2. Abdomen is soft and non-tender. Extremities reveal no clubbing, cyanosis.       Medications  Allergies   Current Outpatient Medications   Medication Sig Dispense Refill    aspirin (ASA) 81 MG EC tablet Take 1 tablet (81 mg) by mouth daily 30 tablet 0    atorvastatin (LIPITOR) 20 MG tablet Take 1 tablet (20 mg) by mouth every evening 30 tablet 0    FLUoxetine (PROZAC) 20 MG capsule Take 20 mg by mouth daily      furosemide (LASIX) 40 MG tablet Take 1 tablet (40 mg) by mouth 2 times daily 60 tablet 0    gabapentin (NEURONTIN) 300 MG capsule Take 2 capsules (600 mg) by mouth At Bedtime 30 capsule 0    guaiFENesin (MUCINEX) 600 MG 12 hr tablet Take 1 tablet (600 mg) by mouth " 2 times daily 14 tablet 0    hydroxychloroquine (PLAQUENIL) 200 MG tablet Take 400 mg by mouth daily      lisinopril (ZESTRIL) 20 MG tablet Take 1 tablet (20 mg) by mouth daily 30 tablet 0    melatonin 3 MG tablet Take 1 tablet (3 mg) by mouth nightly as needed for sleep 14 tablet 0    polyethylene glycol (MIRALAX) 17 GM/Dose powder DISSOLVE 17 GMS IN WATER AND TAKE BY MOUTH ONCE DAILY AS NEEDED      STOOL SOFTENER/LAXATIVE 50-8.6 MG tablet TAKE 2 TABLETS BY MOUTH ONCE DAILY DX CONSTIPATION      traMADol (ULTRAM) 50 MG tablet Take 100 mg by mouth every morning      Vitamin D (Cholecalciferol) 25 MCG (1000 UT) TABS Take 1,000 Units by mouth daily      ipratropium - albuterol 0.5 mg/2.5 mg/3 mL (DUONEB) 0.5-2.5 (3) MG/3ML neb solution Take 1 vial (3 mLs) by nebulization every 6 hours (Patient not taking: Reported on 4/18/2023) 90 mL 0     No Known Allergies       Lab Results    Chemistry/lipid CBC Cardiac Enzymes/BNP/TSH/INR   Recent Labs   Lab Test 03/27/23  0949   CHOL 120   HDL 29*   LDL 71   TRIG 99     Recent Labs   Lab Test 03/27/23  0949 11/22/22  1354 09/27/21  1000   LDL 71 79 106     Recent Labs   Lab Test 03/27/23  0949      POTASSIUM 3.8   CHLORIDE 106   CO2 27      BUN 21   CR 0.90   GFRESTIMATED 66   EDGAR 9.6     Recent Labs   Lab Test 03/27/23  0949 02/24/23  1033 02/10/23  1056   CR 0.90 1.32* 1.13*     Recent Labs   Lab Test 01/17/19  1041 01/18/18  1821   A1C 5.9 6.0          Recent Labs   Lab Test 03/27/23  0949 02/10/23  1056 02/06/23  0444 02/03/23  0500 01/31/23  0722   WBC  --   --   --   --  6.7   HGB 11.2*   < >  --    < > 11.2*   HCT  --   --   --   --  35.8   MCV  --   --   --   --  100   PLT  --   --  110*   < > 121*    < > = values in this interval not displayed.     Recent Labs   Lab Test 03/27/23  0949 02/10/23  1056 02/05/23  0410   HGB 11.2* 13.6 10.7*    Recent Labs   Lab Test 01/31/23  0722 01/31/23  0110 01/30/23  1926   TROPONINI 0.11 0.12 0.12     Recent Labs   Lab  Test 23   BNP 1,597*     No results for input(s): TSH in the last 59443 hours.  No results for input(s): INR in the last 91958 hours.     Medical History  Surgical History Family History Social History   Past Medical History:   Diagnosis Date    Atrial fibrillation (H)     Chronic heart failure with preserved ejection fraction (H) 2023    Congestive heart failure (H)     Depressive disorder     Hyperlipidemia     Hypertension     Obese     Primary hypertension 3/27/2023    Pulmonary hypertension (H)      No past surgical history on file.  No family history on file.     Social History     Socioeconomic History    Marital status:      Spouse name: Not on file    Number of children: Not on file    Years of education: Not on file    Highest education level: Not on file   Occupational History    Not on file   Tobacco Use    Smoking status: Former     Packs/day: 1.00     Types: Cigarettes     Start date:      Quit date:      Years since quittin.3    Smokeless tobacco: Not on file   Vaping Use    Vaping status: Never Used   Substance and Sexual Activity    Alcohol use: Never    Drug use: Never    Sexual activity: Not on file   Other Topics Concern    Not on file   Social History Narrative    Not on file     Social Determinants of Health     Financial Resource Strain: Not on file   Food Insecurity: Not on file   Transportation Needs: Not on file   Physical Activity: Not on file   Stress: Not on file   Social Connections: Not on file   Intimate Partner Violence: Not on file   Housing Stability: Not on file                      Thank you for allowing me to participate in the care of your patient.      Sincerely,     Hank Wooten MD     [facility] Care  cc:   No referring provider defined for this encounter.

## 2023-04-21 ENCOUNTER — LAB REQUISITION (OUTPATIENT)
Dept: LAB | Facility: CLINIC | Age: 78
End: 2023-04-21
Payer: COMMERCIAL

## 2023-04-21 DIAGNOSIS — D69.6 THROMBOCYTOPENIA, UNSPECIFIED (H): ICD-10-CM

## 2023-04-21 DIAGNOSIS — M1A.9XX1 CHRONIC GOUT, UNSPECIFIED, WITH TOPHUS (TOPHI): ICD-10-CM

## 2023-04-21 DIAGNOSIS — E78.2 MIXED HYPERLIPIDEMIA: ICD-10-CM

## 2023-04-21 DIAGNOSIS — I10 ESSENTIAL (PRIMARY) HYPERTENSION: ICD-10-CM

## 2023-04-21 LAB
ALBUMIN SERPL BCG-MCNC: 3.6 G/DL (ref 3.5–5.2)
ALP SERPL-CCNC: 109 U/L (ref 35–104)
ALT SERPL W P-5'-P-CCNC: 25 U/L (ref 10–35)
ANION GAP SERPL CALCULATED.3IONS-SCNC: 12 MMOL/L (ref 7–15)
AST SERPL W P-5'-P-CCNC: 37 U/L (ref 10–35)
BASOPHILS # BLD AUTO: 0 10E3/UL (ref 0–0.2)
BASOPHILS NFR BLD AUTO: 1 %
BILIRUB SERPL-MCNC: 0.3 MG/DL
BUN SERPL-MCNC: 43.4 MG/DL (ref 8–23)
CALCIUM SERPL-MCNC: 9.8 MG/DL (ref 8.8–10.2)
CHLORIDE SERPL-SCNC: 109 MMOL/L (ref 98–107)
CHOLEST SERPL-MCNC: 146 MG/DL
CREAT SERPL-MCNC: 1.14 MG/DL (ref 0.51–0.95)
DEPRECATED HCO3 PLAS-SCNC: 19 MMOL/L (ref 22–29)
EOSINOPHIL # BLD AUTO: 0.7 10E3/UL (ref 0–0.7)
EOSINOPHIL NFR BLD AUTO: 15 %
ERYTHROCYTE [DISTWIDTH] IN BLOOD BY AUTOMATED COUNT: 14.5 % (ref 10–15)
GFR SERPL CREATININE-BSD FRML MDRD: 50 ML/MIN/1.73M2
GLUCOSE SERPL-MCNC: 100 MG/DL (ref 70–99)
HCT VFR BLD AUTO: 37.4 % (ref 35–47)
HDLC SERPL-MCNC: 48 MG/DL
HGB BLD-MCNC: 11.4 G/DL (ref 11.7–15.7)
IMM GRANULOCYTES # BLD: 0 10E3/UL
IMM GRANULOCYTES NFR BLD: 0 %
LDLC SERPL CALC-MCNC: 76 MG/DL
LYMPHOCYTES # BLD AUTO: 1.2 10E3/UL (ref 0.8–5.3)
LYMPHOCYTES NFR BLD AUTO: 26 %
MCH RBC QN AUTO: 30.6 PG (ref 26.5–33)
MCHC RBC AUTO-ENTMCNC: 30.5 G/DL (ref 31.5–36.5)
MCV RBC AUTO: 101 FL (ref 78–100)
MONOCYTES # BLD AUTO: 0.3 10E3/UL (ref 0–1.3)
MONOCYTES NFR BLD AUTO: 6 %
NEUTROPHILS # BLD AUTO: 2.4 10E3/UL (ref 1.6–8.3)
NEUTROPHILS NFR BLD AUTO: 52 %
NONHDLC SERPL-MCNC: 98 MG/DL
NRBC # BLD AUTO: 0 10E3/UL
NRBC BLD AUTO-RTO: 0 /100
PLATELET # BLD AUTO: 138 10E3/UL (ref 150–450)
POTASSIUM SERPL-SCNC: 4.2 MMOL/L (ref 3.4–5.3)
PROT SERPL-MCNC: 6.2 G/DL (ref 6.4–8.3)
RBC # BLD AUTO: 3.72 10E6/UL (ref 3.8–5.2)
SODIUM SERPL-SCNC: 140 MMOL/L (ref 136–145)
TRIGL SERPL-MCNC: 110 MG/DL
URATE SERPL-MCNC: 9 MG/DL (ref 2.4–5.7)
WBC # BLD AUTO: 4.7 10E3/UL (ref 4–11)

## 2023-04-21 PROCEDURE — 80053 COMPREHEN METABOLIC PANEL: CPT | Mod: ORL | Performed by: FAMILY MEDICINE

## 2023-04-21 PROCEDURE — 84550 ASSAY OF BLOOD/URIC ACID: CPT | Mod: ORL | Performed by: FAMILY MEDICINE

## 2023-04-21 PROCEDURE — 80061 LIPID PANEL: CPT | Mod: ORL | Performed by: FAMILY MEDICINE

## 2023-04-21 PROCEDURE — 85025 COMPLETE CBC W/AUTO DIFF WBC: CPT | Mod: ORL | Performed by: FAMILY MEDICINE

## 2023-05-22 NOTE — PROGRESS NOTES
[facility] MEDICINE  PROGRESS NOTE     Code Status: Full Code    Identification/Summary:       Dulce Pritchard is a 77 year old female PMH significant for HTN, chronic joint pains, inflammatory arthritis, depression. 1/30 presented with complaints of cough, weakness, shortness of breath x1 month. Oxygen saturation of 85% on room air per EMS, blood pressure 172/73, heart rate 55.  BNP elevated at 1600. CT chest negative for PE, findings consistent with congestive heart failure, also severe coronary calcifications.  EKG possibly intermittent heart block versus A. fib with slow ventricular response.       Initiated on IV Lasix.  Cardiology consultated.  Echo showed EF 55% but evidence of severe pulmonary hypertension and abnormal septal wall motion.  Palliative care consulted.  On 2/1 added DuoNebs, prednisone and doxycycline for possible COPD exacerbation (vs symptom control) though recommend outpatient follow-up with pulmonary medicine to establish a formal diagnosis of COPD. Cardiology transitioned to PO diuresis 2/2.     On 2/3, she remained hemodynamically and vitally stable though still required 2-3 L of supplemental oxygen; her diuresis regimen was transitioned fully to an oral regimen of 40 mg twice daily Lasix; other medication adjustments include stopping hydrochlorothiazide and atenolol and will be discharged with new 20 mg lisinopril daily, baby aspirin, and nighttime statin, as well as 4 more days of empiric doxycycline and prednisone.  Patient is discharging to a transitional care unit for further rehabilitation and strengthening, conditioning, and should have follow-up with her primary care provider and/or facility provider within a week as well as cardiology and heart failure as an outpatient longitudinally.       Cares were actively discussed with the patient's family members.  Of note, on discharge in AM the patient has a magnesium 1.7 which is being replaced on  replacement protocol, normalized potassium and creatinine, but also noted with a slowly downtrending/stable thrombocytopenia at 98-as such, do not recommend Lovenox for DVT prophylaxis while at TCU.  She should have basic BMP and CBC and magnesium labs to be rechecked.     Progress note input now as pt may not be able to have ride/prior auth before end of day.  - stable.      Assessment and plan:  Acute diastolic congestive heart failure  Acute hypoxemic respiratory failure  Severe coronary calcifications  Sinus arrhythmia  Essential hypertension  Questionable intermittent heart block, SSS?  Echocardiogram shows EF of 55% with septal wall motion abnormality consistent with conduction defect and severe pulmonary hypertension.  Receiving intravenous Lasix.    Negative troponins.  Procalcitonin 0.13.  No fevers.  Hold off on antibiotics.  Hold atenolol and HCTZ.    Continue lisinopril and dose increased to 20 mg daily.  Continue intravenous Lasix diuresis.  May need ischemic work-up prior to discharge.  Appreciate cardiology consultation.   -Transition to 40 mg p.o. twice daily  Expiratory wheezing  No prior history of COPD but was a longtime smoker.  Wheezing noted today on exam.  Hospitalist on 2/1 empirically treat with duo nebs 4 times daily, prednisone 40 mg daily and doxycycline per COPD protocols.  Guaifenesin as needed.  Patient new to writer 2/2/2023 and discussed w/ copd RT team; recommend formal diagnosis w/ PFTs  Monitor response.   - planning to complete empiric course, 4 more days as of 2/3    Severe pulmonary hypertension  Changes noted on echocardiogram.  Patient reports she is a heavy snorer.  Has never had a sleep study before.  Recommend outpatient sleep study.    Hyperlipidemia  Given severe coronary calcification atorvastatin 20 mg daily started per cardiology.  Recheck lipid panel in 1 month.    Inflammatory arthritis  Followed by rheumatology as an outpatient.  Takes Plaquenil, tramadol,  "gabapentin  Continue tramadol, decrease gabapentin dose to maximum recommended at 600 mg at bedtime  Hold Plaquenil given possible intermittent heart block.    Hypokalemia  Hypomagnesemia  Replacement via protocols.    Goals of care  Patient notes she was doing fairly well up until about a month ago.  Since then has been progressively getting weaker and weaker.  Requires a power chair to get up and down stairs.  Appreciated meeting with palliative care services.     Anticoagulation   Enoxaparin (Lovenox) SQ     COVID-19 PCR influenza A/B/RSV negative from 1/30/2023  Noted.  Standard precautions.  Fluids: Saline lock  Pain meds: Tylenol as needed  Therapy:  PT OT consulted.  May need TCU.  [place]:Not present  Lines: None       Current Diet  Orders Placed This Encounter      2 Gram Sodium Diet Other - please comment      Diet    Supplements  Active Nourishment Order   Procedures     Snacks/Supplements Adult: Gelatein Plus; With Meals     Snacks/Supplements Adult: Ensure Enlive; With Meals       Clinically Significant Risk Factors         # Hypokalemia: Lowest K = 3.3 mmol/L in last 2 days, will replace as needed     # Hypomagnesemia: Lowest Mg = 1.6 mg/dL in last 2 days, will replace as needed     # Thrombocytopenia: Lowest platelets = 98 in last 2 days, will monitor for bleeding         # Obesity: Estimated body mass index is 36.05 kg/m  as calculated from the following:    Height as of this encounter: 1.6 m (5' 3\").    Weight as of this encounter: 92.3 kg (203 lb 8 oz)., PRESENT ON ADMISSION  # Severe Malnutrition: based on nutrition assessment, PRESENT ON ADMISSION         Alcides Verduzco   [facility] Center[facility] Service  [facility]   Securely message with the Vocera Web Console (learn more here)  Text page via [employer] Paging/Directory       ------------------------  Interval History/Subjective:  No new pain or sob  Stable  To be discharged today  Enjoying breakfast    Physical Exam/Objective:  Temp:  " [97.5  F (36.4  C)-98.4  F (36.9  C)] 97.5  F (36.4  C)  Pulse:  [55-63] 60  Resp:  [16-20] 16  BP: (100-149)/(50-70) 117/56  SpO2:  [93 %-98 %] 97 %  Wt Readings from Last 4 Encounters:   02/03/23 92.3 kg (203 lb 8 oz)     Body mass index is 36.05 kg/m .    Constitutional: fatigued, alert, cooperative, no apparent distress, appears stated age and moderately obese, weak voice.  Not coughing as frequently as previously.  ENT: Normocephalic, without obvious abnormality, atraumatic, external ears without lesions, oral pharynx with moist mucous membranes, tonsils without erythema or exudates, gums normal and good dentition.  Respiratory: Expiratory wheezes throughout.  Rhonchi as well.  Cardiovascular: Normal apical impulse, regular rate and rhythm, normal S1 and S2, no S3 or S4, and no murmur noted  GI: No scars, normal bowel sounds, soft, non-distended, non-tender, no masses palpated, no hepatosplenomegally  Skin: normal skin color, texture, turgor, no redness, warmth, or swelling, and no rashes  Musculoskeletal: There is no redness, warmth, or swelling of the joints.  Motor strength is 4 out of 5 all extremities bilaterally.  Tone is normal.  Trace lower extremity edema bilaterally  Neurologic: Cranial nerves II-XII are grossly intact. Sensory:  Sensory intact  Neuropsychiatric: General: normal, calm and normal eye contact Level of consciousness: alert / normal Affect: normal Orientation: oriented to self, place, time and situation Memory and insight: normal, memory for past and recent events intact and thought process normal      Medications:   Personally Reviewed.  Medications       aspirin  81 mg Oral Daily     atorvastatin  20 mg Oral QPM     doxycycline hyclate  100 mg Oral BID     enoxaparin ANTICOAGULANT  40 mg Subcutaneous Q24H     FLUoxetine  20 mg Oral Daily     furosemide  40 mg Oral Daily     gabapentin  600 mg Oral At Bedtime     guaiFENesin  1,200 mg Oral BID     [Held by provider] hydroxychloroquine   200 mg Oral BID     ipratropium - albuterol 0.5 mg/2.5 mg/3 mL  3 mL Nebulization Q6H     lisinopril  20 mg Oral Daily     predniSONE  40 mg Oral Daily     sodium chloride (PF)  3 mL Intracatheter Q8H     traMADol  100 mg Oral QAM       Data reviewed today: I personally reviewed all new medications, labs, imaging/diagnostics reports over the past 24 hours. Pertinent findings include:    Imaging:   No results found for this or any previous visit (from the past 24 hour(s)).    Labs:  Echocardiogram Complete   Final Result      CT Chest Pulmonary Embolism w Contrast   Final Result   IMPRESSION:      1.  No acute pulmonary embolism.   2.  Enlarged right and left atria. Severe atheromatous coronary calcifications.   3.  Small right trace left pleural effusions.   4.  Bronchial wall thickening and peribronchial and interstitial opacities in both lungs, most mixed interstitial and alveolar lung edema. Pattern suggests acute congestive failure.      XR Chest Port 1 View   Final Result   IMPRESSION: Modest cardiomegaly. Slight interstitial prominence but no focal pneumonia or pleural effusion evident.   DJD both shoulders.        Recent Results (from the past 24 hour(s))   Platelet count    Collection Time: 02/03/23  5:00 AM   Result Value Ref Range    Platelet Count 98 (L) 150 - 450 10e3/uL   Creatinine    Collection Time: 02/03/23  5:00 AM   Result Value Ref Range    Creatinine 0.73 0.60 - 1.10 mg/dL    GFR Estimate 85 >60 mL/min/1.73m2   Magnesium    Collection Time: 02/03/23  5:00 AM   Result Value Ref Range    Magnesium 1.7 (L) 1.8 - 2.6 mg/dL   Potassium    Collection Time: 02/03/23  5:00 AM   Result Value Ref Range    Potassium 3.3 (L) 3.5 - 5.0 mmol/L   Potassium    Collection Time: 02/03/23 10:34 AM   Result Value Ref Range    Potassium 3.6 3.5 - 5.0 mmol/L   Extra Purple Top Tube    Collection Time: 02/03/23 10:34 AM   Result Value Ref Range    Hold Specimen JIC        Pending Labs:  Unresulted Labs Ordered in the  Past 30 Days of this Admission     No orders found from 12/31/2022 to 1/31/2023.            Hemostasis: Electrocautery

## 2023-06-19 ENCOUNTER — TRANSFERRED RECORDS (OUTPATIENT)
Dept: HEALTH INFORMATION MANAGEMENT | Facility: CLINIC | Age: 78
End: 2023-06-19

## 2023-06-22 ENCOUNTER — HOSPITAL ENCOUNTER (EMERGENCY)
Facility: CLINIC | Age: 78
End: 2023-06-22
Payer: COMMERCIAL

## 2023-06-22 ENCOUNTER — HOSPITAL ENCOUNTER (INPATIENT)
Facility: HOSPITAL | Age: 78
LOS: 4 days | Discharge: HOME HEALTH | DRG: 308 | End: 2023-06-26
Attending: EMERGENCY MEDICINE | Admitting: HOSPITALIST
Payer: COMMERCIAL

## 2023-06-22 ENCOUNTER — HOSPITAL ENCOUNTER (OUTPATIENT)
Age: 78
End: 2023-06-22
Attending: HOSPITALIST
Payer: COMMERCIAL

## 2023-06-22 DIAGNOSIS — N28.9 ACUTE RENAL INSUFFICIENCY: ICD-10-CM

## 2023-06-22 DIAGNOSIS — R00.1 SYMPTOMATIC BRADYCARDIA: ICD-10-CM

## 2023-06-22 DIAGNOSIS — E03.9 HYPOTHYROIDISM, UNSPECIFIED TYPE: Primary | ICD-10-CM

## 2023-06-22 DIAGNOSIS — R00.1 ASYMPTOMATIC BRADYCARDIA: ICD-10-CM

## 2023-06-22 DIAGNOSIS — I50.31 ACUTE HEART FAILURE WITH PRESERVED EJECTION FRACTION (HFPEF) (H): ICD-10-CM

## 2023-06-22 LAB
ANION GAP SERPL CALCULATED.3IONS-SCNC: 11 MMOL/L (ref 7–15)
ATRIAL RATE - MUSE: 44 BPM
BASOPHILS # BLD AUTO: 0 10E3/UL (ref 0–0.2)
BASOPHILS NFR BLD AUTO: 0 %
BUN SERPL-MCNC: 64.5 MG/DL (ref 8–23)
CALCIUM SERPL-MCNC: 9.8 MG/DL (ref 8.8–10.2)
CHLORIDE SERPL-SCNC: 110 MMOL/L (ref 98–107)
CREAT SERPL-MCNC: 1.97 MG/DL (ref 0.51–0.95)
DEPRECATED HCO3 PLAS-SCNC: 20 MMOL/L (ref 22–29)
DIASTOLIC BLOOD PRESSURE - MUSE: NORMAL MMHG
EOSINOPHIL # BLD AUTO: 0.2 10E3/UL (ref 0–0.7)
EOSINOPHIL NFR BLD AUTO: 4 %
ERYTHROCYTE [DISTWIDTH] IN BLOOD BY AUTOMATED COUNT: 13.9 % (ref 10–15)
GFR SERPL CREATININE-BSD FRML MDRD: 26 ML/MIN/1.73M2
GLUCOSE SERPL-MCNC: 112 MG/DL (ref 70–99)
HCT VFR BLD AUTO: 31.1 % (ref 35–47)
HGB BLD-MCNC: 9.8 G/DL (ref 11.7–15.7)
HOLD SPECIMEN: NORMAL
HOLD SPECIMEN: NORMAL
IMM GRANULOCYTES # BLD: 0 10E3/UL
IMM GRANULOCYTES NFR BLD: 0 %
INTERPRETATION ECG - MUSE: NORMAL
LYMPHOCYTES # BLD AUTO: 1.3 10E3/UL (ref 0.8–5.3)
LYMPHOCYTES NFR BLD AUTO: 26 %
MAGNESIUM SERPL-MCNC: 2.3 MG/DL (ref 1.7–2.3)
MCH RBC QN AUTO: 30.5 PG (ref 26.5–33)
MCHC RBC AUTO-ENTMCNC: 31.5 G/DL (ref 31.5–36.5)
MCV RBC AUTO: 97 FL (ref 78–100)
MONOCYTES # BLD AUTO: 0.4 10E3/UL (ref 0–1.3)
MONOCYTES NFR BLD AUTO: 8 %
NEUTROPHILS # BLD AUTO: 3.1 10E3/UL (ref 1.6–8.3)
NEUTROPHILS NFR BLD AUTO: 62 %
NRBC # BLD AUTO: 0 10E3/UL
NRBC BLD AUTO-RTO: 0 /100
P AXIS - MUSE: 16 DEGREES
PLATELET # BLD AUTO: 88 10E3/UL (ref 150–450)
POTASSIUM SERPL-SCNC: 4.7 MMOL/L (ref 3.4–5.3)
PR INTERVAL - MUSE: 202 MS
QRS DURATION - MUSE: 98 MS
QT - MUSE: 484 MS
QTC - MUSE: 413 MS
R AXIS - MUSE: 64 DEGREES
RBC # BLD AUTO: 3.21 10E6/UL (ref 3.8–5.2)
SODIUM SERPL-SCNC: 141 MMOL/L (ref 136–145)
SYSTOLIC BLOOD PRESSURE - MUSE: NORMAL MMHG
T AXIS - MUSE: 11 DEGREES
T4 FREE SERPL-MCNC: 0.79 NG/DL (ref 0.9–1.7)
TROPONIN T SERPL HS-MCNC: 43 NG/L
TROPONIN T SERPL HS-MCNC: 44 NG/L
TSH SERPL DL<=0.005 MIU/L-ACNC: 5.37 UIU/ML (ref 0.3–4.2)
VENTRICULAR RATE- MUSE: 44 BPM
WBC # BLD AUTO: 5 10E3/UL (ref 4–11)

## 2023-06-22 PROCEDURE — 250N000011 HC RX IP 250 OP 636: Mod: JZ | Performed by: EMERGENCY MEDICINE

## 2023-06-22 PROCEDURE — 250N000013 HC RX MED GY IP 250 OP 250 PS 637: Performed by: HOSPITALIST

## 2023-06-22 PROCEDURE — 258N000003 HC RX IP 258 OP 636: Performed by: EMERGENCY MEDICINE

## 2023-06-22 PROCEDURE — 80048 BASIC METABOLIC PNL TOTAL CA: CPT | Performed by: EMERGENCY MEDICINE

## 2023-06-22 PROCEDURE — 84439 ASSAY OF FREE THYROXINE: CPT | Performed by: EMERGENCY MEDICINE

## 2023-06-22 PROCEDURE — 99222 1ST HOSP IP/OBS MODERATE 55: CPT | Performed by: INTERNAL MEDICINE

## 2023-06-22 PROCEDURE — 84481 FREE ASSAY (FT-3): CPT | Performed by: HOSPITALIST

## 2023-06-22 PROCEDURE — 36415 COLL VENOUS BLD VENIPUNCTURE: CPT | Performed by: EMERGENCY MEDICINE

## 2023-06-22 PROCEDURE — 84484 ASSAY OF TROPONIN QUANT: CPT | Performed by: EMERGENCY MEDICINE

## 2023-06-22 PROCEDURE — 83735 ASSAY OF MAGNESIUM: CPT | Performed by: EMERGENCY MEDICINE

## 2023-06-22 PROCEDURE — 210N000001 HC R&B IMCU HEART CARE

## 2023-06-22 PROCEDURE — 99291 CRITICAL CARE FIRST HOUR: CPT | Mod: 25

## 2023-06-22 PROCEDURE — 258N000003 HC RX IP 258 OP 636: Performed by: HOSPITALIST

## 2023-06-22 PROCEDURE — 85025 COMPLETE CBC W/AUTO DIFF WBC: CPT | Performed by: EMERGENCY MEDICINE

## 2023-06-22 PROCEDURE — 93005 ELECTROCARDIOGRAM TRACING: CPT | Performed by: EMERGENCY MEDICINE

## 2023-06-22 PROCEDURE — 99223 1ST HOSP IP/OBS HIGH 75: CPT | Performed by: HOSPITALIST

## 2023-06-22 PROCEDURE — 96374 THER/PROPH/DIAG INJ IV PUSH: CPT

## 2023-06-22 PROCEDURE — 84443 ASSAY THYROID STIM HORMONE: CPT | Performed by: EMERGENCY MEDICINE

## 2023-06-22 PROCEDURE — 96361 HYDRATE IV INFUSION ADD-ON: CPT

## 2023-06-22 RX ORDER — ALLOPURINOL 100 MG/1
200 TABLET ORAL EVERY EVENING
COMMUNITY
Start: 2023-04-23

## 2023-06-22 RX ORDER — ACETAMINOPHEN 650 MG/1
650 SUPPOSITORY RECTAL EVERY 6 HOURS PRN
Status: DISCONTINUED | OUTPATIENT
Start: 2023-06-22 | End: 2023-06-26 | Stop reason: HOSPADM

## 2023-06-22 RX ORDER — ALLOPURINOL 100 MG/1
100 TABLET ORAL DAILY
Status: DISCONTINUED | OUTPATIENT
Start: 2023-06-23 | End: 2023-06-26 | Stop reason: HOSPADM

## 2023-06-22 RX ORDER — ONDANSETRON 4 MG/1
4 TABLET, ORALLY DISINTEGRATING ORAL EVERY 6 HOURS PRN
Status: DISCONTINUED | OUTPATIENT
Start: 2023-06-22 | End: 2023-06-26 | Stop reason: HOSPADM

## 2023-06-22 RX ORDER — ACETAMINOPHEN 325 MG/1
650 TABLET ORAL EVERY 6 HOURS PRN
Status: DISCONTINUED | OUTPATIENT
Start: 2023-06-22 | End: 2023-06-26 | Stop reason: HOSPADM

## 2023-06-22 RX ORDER — GABAPENTIN 300 MG/1
600 CAPSULE ORAL AT BEDTIME
Status: DISCONTINUED | OUTPATIENT
Start: 2023-06-22 | End: 2023-06-26 | Stop reason: HOSPADM

## 2023-06-22 RX ORDER — ATROPINE SULFATE 0.1 MG/ML
0.5 INJECTION INTRAVENOUS ONCE
Status: COMPLETED | OUTPATIENT
Start: 2023-06-22 | End: 2023-06-22

## 2023-06-22 RX ORDER — LIDOCAINE 40 MG/G
CREAM TOPICAL
Status: DISCONTINUED | OUTPATIENT
Start: 2023-06-22 | End: 2023-06-26 | Stop reason: HOSPADM

## 2023-06-22 RX ORDER — SODIUM CHLORIDE 9 MG/ML
INJECTION, SOLUTION INTRAVENOUS CONTINUOUS
Status: DISCONTINUED | OUTPATIENT
Start: 2023-06-22 | End: 2023-06-23

## 2023-06-22 RX ORDER — ATORVASTATIN CALCIUM 10 MG/1
20 TABLET, FILM COATED ORAL EVERY EVENING
Status: DISCONTINUED | OUTPATIENT
Start: 2023-06-22 | End: 2023-06-26 | Stop reason: HOSPADM

## 2023-06-22 RX ORDER — ONDANSETRON 2 MG/ML
4 INJECTION INTRAMUSCULAR; INTRAVENOUS EVERY 6 HOURS PRN
Status: DISCONTINUED | OUTPATIENT
Start: 2023-06-22 | End: 2023-06-26 | Stop reason: HOSPADM

## 2023-06-22 RX ORDER — ASPIRIN 81 MG/1
81 TABLET ORAL DAILY
Status: DISCONTINUED | OUTPATIENT
Start: 2023-06-23 | End: 2023-06-26 | Stop reason: HOSPADM

## 2023-06-22 RX ORDER — TRAMADOL HYDROCHLORIDE 50 MG/1
50 TABLET ORAL EVERY 12 HOURS PRN
Status: DISCONTINUED | OUTPATIENT
Start: 2023-06-22 | End: 2023-06-26 | Stop reason: HOSPADM

## 2023-06-22 RX ADMIN — GABAPENTIN 600 MG: 300 CAPSULE ORAL at 22:50

## 2023-06-22 RX ADMIN — SODIUM CHLORIDE: 9 INJECTION, SOLUTION INTRAVENOUS at 22:50

## 2023-06-22 RX ADMIN — ATROPINE SULFATE 0.5 MG: 0.1 INJECTION INTRAVENOUS at 18:37

## 2023-06-22 RX ADMIN — SODIUM CHLORIDE 500 ML: 9 INJECTION, SOLUTION INTRAVENOUS at 18:07

## 2023-06-22 RX ADMIN — ATORVASTATIN CALCIUM 20 MG: 10 TABLET, FILM COATED ORAL at 22:50

## 2023-06-22 ASSESSMENT — ACTIVITIES OF DAILY LIVING (ADL)
ADLS_ACUITY_SCORE: 35

## 2023-06-22 NOTE — CONSULTS
"  HEART CARE CONSULTATON NOTE        Assessment/Recommendations   Assessment:   1.  Sinus node dysfunction, symptomatic bradycardia.  History of intermittent junctional escape.  History of second-degree AV block.  2.  Coronary artery disease  3.  Acute on chronic Congestive heart failure, HFrEF  4.  Mitral regurgitation, mild to moderate  5. Pulmonary hypertenion     Plan:   1. Plan for PPM tomorrow.  Pending no acute findings on labs.  Patient has been off atenolol for over 72 hours  2. NPO     Clinically Significant Risk Factors Present on Admission                # Drug Induced Platelet Defect: home medication list includes an antiplatelet medication  # Acute Kidney Injury, unspecified: based on a >150% or 0.3 mg/dL increase in last creatinine compared to past 90 day average, will monitor renal function  # Hypertension: Noted on problem list      # Obesity: Estimated body mass index is 31.41 kg/m  as calculated from the following:    Height as of this encounter: 1.626 m (5' 4\").    Weight as of this encounter: 83 kg (183 lb).            History of Present Illness/Subjective    HPI: Dulce Pritchard is a 77 year old female history of coronary disease, intermittent junctional rhythm, history of second-degree AV block who is on atenolol found to be bradycardic.  Over the past 2 weeks her primary team has been weaning her off of atenolol due to symptomatic bradycardia.  She presented today to the emergency department with ongoing bradycardia with heart rate in the upper 30s associated with fatigue and low energy state.  She has not had a syncopal episode.  She states for the past few weeks has been feeling very fatigued with any activity such as getting up moving around her home.  She denies any chest pain.  She has no significant lower extremity edema orthopnea or PND symptoms.  She was on atenolol at 50 mg daily which was recently decreased to 25 mg and 3 days ago was discontinued.    Telemetry EKG demonstrates " "sinus bradycardia with a heart rate in the upper 30s.  Review of telemetry demonstrates ongoing bradycardia with a heart rate of between 30 to 40 bpm.  She was given atropine with no improvement in sinus node rate.    Patient echocardiogram was reviewed.  Labs are pending.    Consult requested by Dr. Howard for symptomatic sinus bradycardia     Physical Examination  Review of Systems   VITALS: /60   Pulse (!) 41   Temp 97.6  F (36.4  C)   Resp 12   Ht 1.626 m (5' 4\")   Wt 83 kg (183 lb)   SpO2 98%   BMI 31.41 kg/m    BMI: Body mass index is 31.41 kg/m .  Wt Readings from Last 3 Encounters:   06/22/23 83 kg (183 lb)   02/06/23 95.7 kg (211 lb)     No intake or output data in the 24 hours ending 06/22/23 1637  General Appearance:   no distress, obese body habitus   ENT/Mouth: membranes moist, no oral lesions or bleeding gums.      EYES:  no scleral icterus, normal conjunctivae   Neck: no carotid bruits or thyromegaly   Chest/Lungs:   lungs are clear to auscultation, no rales or wheezing, no sternal scar, equal chest wall expansion    Cardiovascular:   Regular. Normal first and second heart sounds with fiant systolic murmurs, rubs, or gallops; the carotid, radial and posterior tibial pulses are intact, Jugular venous pressure  8 cm, mild edema bilaterally    Abdomen:  no organomegaly, masses, bruits, or tenderness; bowel sounds are present   Extremities: no cyanosis or clubbing   Skin: no xanthelasma, warm.    Neurologic: normal  bilateral, no tremors     Psychiatric: alert and oriented x3, calm     Review Of Systems  Skin: negative  Eyes: negative  Ears/Nose/Throat: negative  Respiratory: Dyspnea on exertion  Cardiovascular: Fatigue, bradycardia, no chest pain  Gastrointestinal: negative  Genitourinary: negative  Musculoskeletal: negative  Neurologic: negative  Psychiatric: negative  Hematologic/Lymphatic/Immunologic: negative  Endocrine: negative          Lab Results    Chemistry/lipid CBC Cardiac " Enzymes/BNP/TSH/INR   Recent Labs   Lab Test 23  0948   CHOL 146   HDL 48*   LDL 76   TRIG 110     Recent Labs   Lab Test 23  0948 23  0949 22  1354   LDL 76 71 79     Recent Labs   Lab Test 23  0948      POTASSIUM 4.2   CHLORIDE 109*   CO2 19*   *   BUN 43.4*   CR 1.14*   GFRESTIMATED 50*   EDGAR 9.8     Recent Labs   Lab Test 23  0948 23  0949 23  1033   CR 1.14* 0.90 1.32*     Recent Labs   Lab Test 19  1041 18  1821   A1C 5.9 6.0          Recent Labs   Lab Test 23  0948   WBC 4.7   HGB 11.4*   HCT 37.4   *   *     Recent Labs   Lab Test 23  0948 23  0949 02/10/23  1056   HGB 11.4* 11.2* 13.6    Recent Labs   Lab Test 23  0722 23  0110 23  1926   TROPONINI 0.11 0.12 0.12     Recent Labs   Lab Test 23  192   BNP 1,597*     No results for input(s): TSH in the last 61222 hours.  No results for input(s): INR in the last 22057 hours.     Medical History  Surgical History Family History Social History   Past Medical History:   Diagnosis Date     Atrial fibrillation (H)      Chronic heart failure with preserved ejection fraction (H) 2023     Congestive heart failure (H)      Depressive disorder      Hyperlipidemia      Hypertension      Obese      Primary hypertension 3/27/2023     Pulmonary hypertension (H)      History reviewed. No pertinent surgical history.  No family history on file.     Social History     Socioeconomic History     Marital status:      Spouse name: Not on file     Number of children: Not on file     Years of education: Not on file     Highest education level: Not on file   Occupational History     Not on file   Tobacco Use     Smoking status: Former     Packs/day: 1.00     Types: Cigarettes     Start date:      Quit date:      Years since quittin.4     Smokeless tobacco: Not on file   Vaping Use     Vaping Use: Never used   Substance and Sexual  Activity     Alcohol use: Never     Drug use: Never     Sexual activity: Not on file   Other Topics Concern     Not on file   Social History Narrative     Not on file     Social Determinants of Health     Financial Resource Strain: Not on file   Food Insecurity: Not on file   Transportation Needs: Not on file   Physical Activity: Not on file   Stress: Not on file   Social Connections: Not on file   Intimate Partner Violence: Not on file   Housing Stability: Not on file         Medications  Allergies   Current Outpatient Medications   Medication Sig Dispense Refill     aspirin (ASA) 81 MG EC tablet Take 1 tablet (81 mg) by mouth daily 30 tablet 0     atorvastatin (LIPITOR) 20 MG tablet Take 1 tablet (20 mg) by mouth every evening 30 tablet 0     FLUoxetine (PROZAC) 20 MG capsule Take 20 mg by mouth daily       furosemide (LASIX) 40 MG tablet Take 1 tablet (40 mg) by mouth 2 times daily 60 tablet 0     gabapentin (NEURONTIN) 300 MG capsule Take 2 capsules (600 mg) by mouth At Bedtime 30 capsule 0     guaiFENesin (MUCINEX) 600 MG 12 hr tablet Take 1 tablet (600 mg) by mouth 2 times daily 14 tablet 0     hydroxychloroquine (PLAQUENIL) 200 MG tablet Take 400 mg by mouth daily       ipratropium - albuterol 0.5 mg/2.5 mg/3 mL (DUONEB) 0.5-2.5 (3) MG/3ML neb solution Take 1 vial (3 mLs) by nebulization every 6 hours (Patient not taking: Reported on 4/18/2023) 90 mL 0     lisinopril (ZESTRIL) 20 MG tablet Take 1 tablet (20 mg) by mouth daily 30 tablet 0     melatonin 3 MG tablet Take 1 tablet (3 mg) by mouth nightly as needed for sleep 14 tablet 0     polyethylene glycol (MIRALAX) 17 GM/Dose powder DISSOLVE 17 GMS IN WATER AND TAKE BY MOUTH ONCE DAILY AS NEEDED       STOOL SOFTENER/LAXATIVE 50-8.6 MG tablet TAKE 2 TABLETS BY MOUTH ONCE DAILY DX CONSTIPATION       traMADol (ULTRAM) 50 MG tablet Take 100 mg by mouth every morning       Vitamin D (Cholecalciferol) 25 MCG (1000 UT) TABS Take 1,000 Units by mouth daily        No  Known Allergies      Dakota Ernandez, DO

## 2023-06-22 NOTE — ED TRIAGE NOTES
Pt arrives with daughter today after contacting cardiologist regarding bradycardia and hypotension. Cardiology wanted patient to come to the ED for further evaluation.      Triage Assessment     Row Name 06/22/23 1526       Triage Assessment (Adult)    Airway WDL WDL       Respiratory WDL    Respiratory WDL WDL       Skin Circulation/Temperature WDL    Skin Circulation/Temperature WDL WDL

## 2023-06-22 NOTE — ED NOTES
Expected Patient Referral to ED  1:44 PM    Referring Clinic/Provider:  [facility]    Reason for referral/Clinical facts:  Continuously having low blood pressures and bradycardia. History of second degree heart block, recently stopped her atenolol. Now, more fatigued and weak. No chest pain.     Recommendations provided:  Send to ED for further evaluation    Caller was informed that this institution does possess the capabilities and/or resources to provide for patient and should be transferred to our facility.    Discussed that if direct admit is sought and any hurdles are encountered, this ED would be happy to see the patient and evaluate.    Informed caller that recommendations provided are recommendations based only on the facts provided and that they responsible to accept or reject the advice, or to seek a formal in person consultation as needed and that this ED will see/treat patient should they arrive.      GUIDO ORDAZ MD  [facility] EMERGENCY ROOM  [address] [id number] [phone]       Guido Ordaz MD  06/22/23 7175

## 2023-06-22 NOTE — ED PROVIDER NOTES
EMERGENCY DEPARTMENT ENCOUNTER      NAME: Dulce Pritchard  AGE: 77 year old female  YOB: 1945  MRN: [id number]  EVALUATION DATE & TIME: 6/22/2023  3:37 PM    PCP: Colin Christie    ED PROVIDER: Carolann Goode MD    Chief Complaint   Patient presents with     Hypotension     Bradycardia         FINAL IMPRESSION:  1. Symptomatic bradycardia    2. Acute renal insufficiency          ED COURSE & MEDICAL DECISION MAKING:    Pertinent Labs & Imaging studies reviewed. (See chart for details)  77 year old female with history of HTN, previous A-fib who presents to the Emergency Department for evaluation of generalized weakness, some postural lightheadedness with hypotension and bradycardia.  1 month ago was decreased from atenolol 50-25 daily, and has been off of all atenolol for 72+ hours but her symptoms persist.  Patient bradycardic here primarily in the 40s but will occasionally dip into the 30s.  Initial blood pressure was low in the 90s but has since been stable.  Symptoms are concerning for symptomatic bradycardia versus another AV blockade or arrhythmia.  At this point her beta-blocker is out of her system and I do have concerns that she will need permanent pacemaker placement.  She denies any associated chest pain or shortness of breath but concern for underlying MI that was occult.    Patient placed on monitor, IV established and blood obtained.  Twelve-lead EKG shows sinus bradycardia.  CBC, BMP notable for anemia with hemoglobin of 9.8 down from 11.42 months ago.  Creatinine of 1.97.  This is up from anywhere between 0.9-1.3 over the last 3 months so she was given small normal saline bolus.  Her troponin was slightly elevated, 44.  On repeat is 43 unchanged.  TSH was slightly elevated and free T4 is low.  T3 remains pending at the time this dictation.  She did have some sustained heart rates in the 30s so we did try 0.5 mg of atropine with really little change.  Patient initially was  excepted for transfer at [facility], because we did not have any hospital beds here.  Ultimately they have a hospital bed but no nurse to staff it.  So patient will board here.  Cardiology consulted, plan for PPM tomorrow.  Admitted to hospitalist medicine service.      ED Course as of 06/22/23 2241   Thu Jun 22, 2023   2054 I met with the patient, obtained history, performed an initial exam, and discussed options and plan for diagnostics and treatment here in the ED.     1617 Spoke w Dr. Ernandez, cards   1647 Hemoglobin(!): 9.8  Down from 11.4, 2mo ago   1705 Troponin T, High Sensitivity(!): 44  Will repeat in 2h   1706 Creatinine(!): 1.97  Vs Cr 0.9-1.3 over last 3mo   1739 Spoke w hospitalist Dr. Evangelista @ [facility]   1900 Troponin T, High Sensitivity(!): 43  unchanged   1947 Told by bedboard that there is a physical bed at [facility] but they do not have staff for it.  No beds in system and will have to board here.   2008 Spoke to hospitalist Dr. Reyes who accepts the patient for admission.    2010 Updated the patient about plans for admission with Dr. Reyes at [facility].        Medical Decision Making    History:    Supplemental history from: Daughter    External Record(s) reviewed: Documented in chart, if applicable. and Other: Chart Review Phone call 6/22/23    Work Up:    Chart documentation includes differential considered and any EKGs or imaging independently interpreted by provider, where specified.    In additional to work up documented, I considered the following work up: See MDM    External consultation:    Discussion of management with another provider: Cardiology Dr. Ernandez and hospitalist    Complicating factors:    Care impacted by chronic illness: Heart Disease, Hyperlipidemia and Hypertension    Care affected by social determinants of health: Access to care, referred to ED    Disposition considerations: Admit.        At the conclusion of the encounter I discussed the results of all  of the tests and the disposition. The questions were answered. The patient or family acknowledged understanding and was agreeable with the care plan.    CRITICAL CARE:  Critical Care  Performed by: Carolann Goode MD  Authorized by: Carolann Goode MD     Total critical care time: 45 minutes  Criticalcare time was exclusive of separately billable procedures and treating other patients.  Critical care was necessary to treat or prevent imminent or life-threatening deterioration of the following conditions: Bradycardia  Critical care was time spent personally by me on the following activities: development of treatment plan with patient or surrogate, discussions with consultants, examination of patient, evaluation of patient's response totreatment, obtaining history from patient or surrogate, ordering and performing treatments and interventions, ordering and review of laboratory studies, ordering and review of radiographic studies and re-evaluation ofpatient's condition, this excludes any separately billable procedures.      MEDICATIONS GIVEN IN THE EMERGENCY:  Medications   lidocaine 1 % 0.1-1 mL (has no administration in time range)   lidocaine (LMX4) cream (has no administration in time range)   sodium chloride (PF) 0.9% PF flush 3 mL (has no administration in time range)   sodium chloride (PF) 0.9% PF flush 3 mL (has no administration in time range)   melatonin tablet 1 mg (has no administration in time range)   ondansetron (ZOFRAN ODT) ODT tab 4 mg (has no administration in time range)     Or   ondansetron (ZOFRAN) injection 4 mg (has no administration in time range)   acetaminophen (TYLENOL) tablet 650 mg (has no administration in time range)     Or   acetaminophen (TYLENOL) Suppository 650 mg (has no administration in time range)   sodium chloride 0.9% infusion (has no administration in time range)   aspirin EC tablet 81 mg (has no administration in time range)   atorvastatin (LIPITOR) tablet 20 mg (has no  "administration in time range)   FLUoxetine (PROzac) capsule 20 mg (has no administration in time range)   traMADol (ULTRAM) tablet 50 mg (has no administration in time range)   allopurinol (ZYLOPRIM) tablet 100 mg (has no administration in time range)   gabapentin (NEURONTIN) capsule 600 mg (has no administration in time range)   atropine injection 0.5 mg (0.5 mg Intravenous $Given 6/22/23 1837)   0.9% sodium chloride BOLUS (0 mLs Intravenous Stopped 6/22/23 2113)       NEW PRESCRIPTIONS STARTED AT TODAY'S ER VISIT  New Prescriptions    No medications on file          =================================================================    HPI    Patient information was obtained from: Patient     Use of Intrepreter: N/A        Dulce Pritchard is a 77 year old female with pertinent medical history of hypertension, Atrial fibrillation, congestive heart failure, depressive disorder, hyperlipidemia, Obese, who presents via wheelchair in accompanied by daughter for evaluation of hypotension and bradycardia.     Per Chart Review: The patient presented to PCP on 6/19 and notes that she was told to stop taking atenolol 50 mg 1/2 tab daily because her blood pressure was 84/40 and heart rate of 36. Today 6/22, the patient daughter called to talk with Dr. Hanley because she had pulse of 39 and blood pressure of 100/47. Daughter reports that the patient is \"totally\" less alert compared to a month ago.     The patient has not been taking atenolol as told since 6/19 as told because she had slow heart rate in the 30's and hypotension of 84/40. She denies any lightheadedness, dizziness, chest pain, or palpitations. She notes bloody stool due to constipation. In the ED, her blood pressure was 134/60 and heart rate was 45 BPM. She denies any history of pacemaker. She notes that her atenolol dose was decrease from 50 mg per day to 25 mg per day. She denies any other medical concerns.       PAST MEDICAL HISTORY:  Past Medical History: "   Diagnosis Date     Atrial fibrillation (H)      Chronic heart failure with preserved ejection fraction (H) 2/13/2023     Congestive heart failure (H)      Depressive disorder      Hyperlipidemia      Hypertension      Obese      Primary hypertension 3/27/2023     Pulmonary hypertension (H)        PAST SURGICAL HISTORY:  History reviewed. No pertinent surgical history.    CURRENT MEDICATIONS:    Prior to Admission Medications   Prescriptions Last Dose Informant Patient Reported? Taking?   FLUoxetine (PROZAC) 20 MG capsule 6/22/2023 at am  Yes Yes   Sig: Take 20 mg by mouth daily   STOOL SOFTENER/LAXATIVE 50-8.6 MG tablet Past Month  Yes Yes   Sig: TAKE 2 TABLETS BY MOUTH ONCE DAILY DX CONSTIPATION   Vitamin D (Cholecalciferol) 25 MCG (1000 UT) TABS 6/22/2023 at am  Yes Yes   Sig: Take 1,000 Units by mouth daily   allopurinol (ZYLOPRIM) 100 MG tablet 6/22/2023 at am  Yes Yes   Sig: Take 100 mg by mouth daily   aspirin (ASA) 81 MG EC tablet 6/22/2023 at am  No Yes   Sig: Take 1 tablet (81 mg) by mouth daily   atorvastatin (LIPITOR) 20 MG tablet 6/21/2023 at pm  No Yes   Sig: Take 1 tablet (20 mg) by mouth every evening   furosemide (LASIX) 40 MG tablet 6/22/2023 at am  No Yes   Sig: Take 1 tablet (40 mg) by mouth 2 times daily   gabapentin (NEURONTIN) 300 MG capsule 6/21/2023 at pm  No Yes   Sig: Take 2 capsules (600 mg) by mouth At Bedtime   guaiFENesin (MUCINEX) 600 MG 12 hr tablet 6/21/2023 at pm  No Yes   Sig: Take 1 tablet (600 mg) by mouth 2 times daily   hydroxychloroquine (PLAQUENIL) 200 MG tablet 6/22/2023  Yes Yes   Sig: Take 200 mg by mouth 2 times daily   lisinopril (ZESTRIL) 20 MG tablet 6/22/2023  No Yes   Sig: Take 1 tablet (20 mg) by mouth daily   melatonin 3 MG tablet More than a month  No Yes   Sig: Take 1 tablet (3 mg) by mouth nightly as needed for sleep   polyethylene glycol (MIRALAX) 17 GM/Dose powder Past Week  Yes Yes   Sig: DISSOLVE 17 GMS IN WATER AND TAKE BY MOUTH ONCE DAILY AS NEEDED    traMADol (ULTRAM) 50 MG tablet 2023  Yes Yes   Sig: Take 50 mg by mouth every 6 hours as needed Per [facility]: Take 1-2 tablets PO Q6H PRN Chronic Pain      Facility-Administered Medications: None       ALLERGIES:  No Known Allergies    FAMILY HISTORY:  No family history on file.    SOCIAL HISTORY:  Social History     Tobacco Use     Smoking status: Former     Packs/day: 1.00     Types: Cigarettes     Start date:      Quit date:      Years since quittin.4   Vaping Use     Vaping Use: Never used   Substance Use Topics     Alcohol use: Never     Drug use: Never        VITALS:  Patient Vitals for the past 24 hrs:   BP Temp Pulse Resp SpO2 Height Weight   23 -- -- (!) 42 27 97 % -- --   23 -- -- (!) 41 20 97 % -- --   23 117/60 -- (!) 40 20 97 % -- --   23 127/60 -- (!) 43 18 96 % -- --   23 123/56 -- (!) 39 13 97 % -- --   23 122/58 -- (!) 41 16 97 % -- --   23 132/63 -- (!) 43 17 96 % -- --   23 131/59 -- (!) 43 15 98 % -- --   23 121/59 -- (!) 45 18 98 % -- --   23 102/52 -- 52 (!) 37 95 % -- --   23 108/53 -- (!) 41 12 96 % -- --   23 124/58 -- (!) 41 (!) 9 98 % -- --   23 101/46 -- (!) 41 19 94 % -- --   23 121/56 -- (!) 41 21 96 % -- --   23 -- -- (!) 40 14 98 % -- --   23 -- -- (!) 39 (!) 36 98 % -- --   23 1759 112/50 -- (!) 40 28 98 % -- --   23 174 -- -- (!) 42 19 97 % -- --   23 174 121/60 -- (!) 42 23 97 % -- --   23 1730 -- -- (!) 41 18 98 % -- --   23 1729 125/61 -- (!) 41 12 98 % -- --   23 -- -- (!) 40 18 99 % -- --   23 171 118/55 -- (!) 39 19 97 % -- --   23 1659 124/59 -- (!) 41 15 99 % -- --   23 1644 112/54 -- (!) 40 16 98 % -- --   23 1629 108/50 -- (!) 43 21 98 % -- --   23 1627 126/58 -- (!) 41 13 98 % -- --   23 1615 -- --  "(!) 41 12 98 % -- --   06/22/23 1600 -- -- (!) 40 19 98 % -- --   06/22/23 1545 134/60 -- (!) 46 14 97 % -- --   06/22/23 1525 97/49 97.6  F (36.4  C) (!) 42 18 -- 1.626 m (5' 4\") 83 kg (183 lb)       PHYSICAL EXAM    General Appearance: Well-appearing, well-nourished, no acute distress   Chest:  No tenderness or deformity, no crepitus  Cardio:  Bradycardia in the low 40's. Initially hypotensive in 90, rechecked in the 120's. no murmur/gallop/rub  Pulm:  No respiratory distress, clear to auscultation bilaterally  Abdomen:  Soft, non-tender, obese  Skin:  Skin warm, dry, no rashes  Neuro:  Alert and oriented ×3     RADIOLOGY/LABS:  Reviewed all pertinent imaging. Please see official radiology report. All pertinent labs reviewed and interpreted.    Results for orders placed or performed during the hospital encounter of 06/22/23   Basic metabolic panel   Result Value Ref Range    Sodium 141 136 - 145 mmol/L    Potassium 4.7 3.4 - 5.3 mmol/L    Chloride 110 (H) 98 - 107 mmol/L    Carbon Dioxide (CO2) 20 (L) 22 - 29 mmol/L    Anion Gap 11 7 - 15 mmol/L    Urea Nitrogen 64.5 (H) 8.0 - 23.0 mg/dL    Creatinine 1.97 (H) 0.51 - 0.95 mg/dL    Calcium 9.8 8.8 - 10.2 mg/dL    Glucose 112 (H) 70 - 99 mg/dL    GFR Estimate 26 (L) >60 mL/min/1.73m2   Result Value Ref Range    Troponin T, High Sensitivity 44 (H) <=14 ng/L   Result Value Ref Range    Magnesium 2.3 1.7 - 2.3 mg/dL   CBC with platelets and differential   Result Value Ref Range    WBC Count 5.0 4.0 - 11.0 10e3/uL    RBC Count 3.21 (L) 3.80 - 5.20 10e6/uL    Hemoglobin 9.8 (L) 11.7 - 15.7 g/dL    Hematocrit 31.1 (L) 35.0 - 47.0 %    MCV 97 78 - 100 fL    MCH 30.5 26.5 - 33.0 pg    MCHC 31.5 31.5 - 36.5 g/dL    RDW 13.9 10.0 - 15.0 %    Platelet Count 88 (L) 150 - 450 10e3/uL    % Neutrophils 62 %    % Lymphocytes 26 %    % Monocytes 8 %    % Eosinophils 4 %    % Basophils 0 %    % Immature Granulocytes 0 %    NRBCs per 100 WBC 0 <1 /100    Absolute Neutrophils 3.1 1.6 - " 8.3 10e3/uL    Absolute Lymphocytes 1.3 0.8 - 5.3 10e3/uL    Absolute Monocytes 0.4 0.0 - 1.3 10e3/uL    Absolute Eosinophils 0.2 0.0 - 0.7 10e3/uL    Absolute Basophils 0.0 0.0 - 0.2 10e3/uL    Absolute Immature Granulocytes 0.0 <=0.4 10e3/uL    Absolute NRBCs 0.0 10e3/uL   Extra Red Top Tube   Result Value Ref Range    Hold Specimen JIC    Extra Purple Top Tube   Result Value Ref Range    Hold Specimen JIC    TSH with free T4 reflex   Result Value Ref Range    TSH 5.37 (H) 0.30 - 4.20 uIU/mL   Troponin T, High Sensitivity (now)   Result Value Ref Range    Troponin T, High Sensitivity 43 (H) <=14 ng/L   Result Value Ref Range    Free T4 0.79 (L) 0.90 - 1.70 ng/dL       EKG:  Performed at: 22-JUN-2023 15:35    Impression: Sinus bradycardia    Rate: 44 BPM  Rhythm: Bradycardia  Axis: 64   AR Interval: 202 ms  QRS Interval: 98 ms  QTc Interval: 413 ms  ST Changes: None  Comparison: When compared with ECG of 30-JAN-2023 19:19, Sinus rhythm has replaced Atrial fibrillation.   I have independently reviewed and interpreted the EKG(s) documented above.      The creation of this record is based on the scribe s observations of the work being performed by Carolann Goode MD and the provider s statements to them. It was created on her behalf by Era Wheeler, a trained medical scribe. This document has been checked and approved by the attending provider.    Carolann Goode MD  Emergency Medicine  [facility] EMERGENCY DEPARTMENT  [address] [place] [id number] [phone]  Dept: [phone]       Carolann Goode MD  06/22/23 0045

## 2023-06-22 NOTE — MEDICATION SCRIBE - ADMISSION MEDICATION HISTORY
Medication Scribe Admission Medication History    Admission medication history is complete. The information provided in this note is only as accurate as the sources available at the time of the update.    Medication reconciliation/reorder completed by provider prior to medication history? No    Information Source(s): Patient via in-person. Also used Niece, Mamie, [phone] - who currently is assisting her Aunt in setting up her medications.    Pertinent Information: Patient relies on family members to set up medications. This is a recent situation and may have been precipitated by patient non adherence and confusion regarding starting and stopping medications.     Changes made to PTA medication list:    Added: Alloperinol    Deleted: Duoneb    Changed: Tramadol dosing regimen     Medication Affordability:  Not including over the counter (OTC) medications, was there a time in the past 3 months when you did not take your medications as prescribed because of cost?: No    Allergies reviewed with patient and updates made in EHR: yes    Medication History Completed By: Marlon Acharya 6/22/2023 6:12 PM    Prior to Admission medications    Medication Sig Last Dose Taking? Auth Provider Long Term End Date   allopurinol (ZYLOPRIM) 100 MG tablet Take 100 mg by mouth daily 6/22/2023 at am Yes Reported, Patient     aspirin (ASA) 81 MG EC tablet Take 1 tablet (81 mg) by mouth daily 6/22/2023 at am Yes Alcides Verduzco MD     atorvastatin (LIPITOR) 20 MG tablet Take 1 tablet (20 mg) by mouth every evening 6/21/2023 at pm Yes Alicdes Verduzco MD Yes    FLUoxetine (PROZAC) 20 MG capsule Take 20 mg by mouth daily 6/22/2023 at am Yes Unknown, Entered By History Yes    furosemide (LASIX) 40 MG tablet Take 1 tablet (40 mg) by mouth 2 times daily 6/22/2023 at am Yes Alcides Verduzco MD Yes    gabapentin (NEURONTIN) 300 MG capsule Take 2 capsules (600 mg) by mouth At Bedtime 6/21/2023 at pm Yes Alcides Verduzco MD Yes    guaiFENesin (MUCINEX) 600  MG 12 hr tablet Take 1 tablet (600 mg) by mouth 2 times daily 6/21/2023 at pm Yes Alcides Verduzco MD     hydroxychloroquine (PLAQUENIL) 200 MG tablet Take 200 mg by mouth 2 times daily 6/22/2023 Yes Reported, Patient No    lisinopril (ZESTRIL) 20 MG tablet Take 1 tablet (20 mg) by mouth daily 6/22/2023 Yes Alcides Verduzco MD Yes    melatonin 3 MG tablet Take 1 tablet (3 mg) by mouth nightly as needed for sleep More than a month Yes Alcides Verduzco MD     polyethylene glycol (MIRALAX) 17 GM/Dose powder DISSOLVE 17 GMS IN WATER AND TAKE BY MOUTH ONCE DAILY AS NEEDED Past Week Yes Reported, Patient     STOOL SOFTENER/LAXATIVE 50-8.6 MG tablet TAKE 2 TABLETS BY MOUTH ONCE DAILY DX CONSTIPATION Past Month Yes Reported, Patient     traMADol (ULTRAM) 50 MG tablet Take 50 mg by mouth every 6 hours as needed Per [facility]: Take 1-2 tablets PO Q6H PRN Chronic Pain 6/22/2023 Yes Unknown, Entered By History     Vitamin D (Cholecalciferol) 25 MCG (1000 UT) TABS Take 1,000 Units by mouth daily 6/22/2023 at am Yes Unknown, Entered By History

## 2023-06-23 LAB
ANION GAP SERPL CALCULATED.3IONS-SCNC: 14 MMOL/L (ref 7–15)
ANION GAP SERPL CALCULATED.3IONS-SCNC: 8 MMOL/L (ref 7–15)
BUN SERPL-MCNC: 57.5 MG/DL (ref 8–23)
CALCIUM SERPL-MCNC: 9 MG/DL (ref 8.8–10.2)
CHLORIDE SERPL-SCNC: 112 MMOL/L (ref 98–107)
CHLORIDE SERPL-SCNC: 113 MMOL/L (ref 98–107)
CREAT SERPL-MCNC: 1.67 MG/DL (ref 0.51–0.95)
DEPRECATED HCO3 PLAS-SCNC: 18 MMOL/L (ref 22–29)
DEPRECATED HCO3 PLAS-SCNC: 19 MMOL/L (ref 22–29)
GFR SERPL CREATININE-BSD FRML MDRD: 32 ML/MIN/1.73M2
GLUCOSE SERPL-MCNC: 87 MG/DL (ref 70–99)
POTASSIUM SERPL-SCNC: 4.1 MMOL/L (ref 3.4–5.3)
POTASSIUM SERPL-SCNC: 4.1 MMOL/L (ref 3.4–5.3)
SODIUM SERPL-SCNC: 139 MMOL/L (ref 136–145)
SODIUM SERPL-SCNC: 145 MMOL/L (ref 136–145)
T3FREE SERPL-MCNC: 2 PG/ML (ref 2–4.4)

## 2023-06-23 PROCEDURE — 210N000001 HC R&B IMCU HEART CARE

## 2023-06-23 PROCEDURE — 99233 SBSQ HOSP IP/OBS HIGH 50: CPT | Performed by: INTERNAL MEDICINE

## 2023-06-23 PROCEDURE — 36415 COLL VENOUS BLD VENIPUNCTURE: CPT | Performed by: HOSPITALIST

## 2023-06-23 PROCEDURE — 82310 ASSAY OF CALCIUM: CPT | Performed by: INTERNAL MEDICINE

## 2023-06-23 PROCEDURE — 250N000013 HC RX MED GY IP 250 OP 250 PS 637: Performed by: HOSPITALIST

## 2023-06-23 PROCEDURE — 258N000003 HC RX IP 258 OP 636: Performed by: HOSPITALIST

## 2023-06-23 PROCEDURE — 258N000003 HC RX IP 258 OP 636: Performed by: INTERNAL MEDICINE

## 2023-06-23 PROCEDURE — 99232 SBSQ HOSP IP/OBS MODERATE 35: CPT | Performed by: INTERNAL MEDICINE

## 2023-06-23 PROCEDURE — 250N000013 HC RX MED GY IP 250 OP 250 PS 637: Performed by: INTERNAL MEDICINE

## 2023-06-23 PROCEDURE — 80051 ELECTROLYTE PANEL: CPT | Performed by: HOSPITALIST

## 2023-06-23 RX ORDER — HYDROXYCHLOROQUINE SULFATE 200 MG/1
200 TABLET, FILM COATED ORAL 2 TIMES DAILY
Status: DISCONTINUED | OUTPATIENT
Start: 2023-06-23 | End: 2023-06-26 | Stop reason: HOSPADM

## 2023-06-23 RX ORDER — NALOXONE HYDROCHLORIDE 0.4 MG/ML
0.4 INJECTION, SOLUTION INTRAMUSCULAR; INTRAVENOUS; SUBCUTANEOUS
Status: DISCONTINUED | OUTPATIENT
Start: 2023-06-23 | End: 2023-06-26 | Stop reason: HOSPADM

## 2023-06-23 RX ORDER — NALOXONE HYDROCHLORIDE 0.4 MG/ML
0.2 INJECTION, SOLUTION INTRAMUSCULAR; INTRAVENOUS; SUBCUTANEOUS
Status: DISCONTINUED | OUTPATIENT
Start: 2023-06-23 | End: 2023-06-26 | Stop reason: HOSPADM

## 2023-06-23 RX ORDER — SODIUM CHLORIDE 9 MG/ML
INJECTION, SOLUTION INTRAVENOUS CONTINUOUS
Status: ACTIVE | OUTPATIENT
Start: 2023-06-23 | End: 2023-06-23

## 2023-06-23 RX ORDER — LEVOTHYROXINE SODIUM 25 UG/1
25 TABLET ORAL
Status: DISCONTINUED | OUTPATIENT
Start: 2023-06-23 | End: 2023-06-26 | Stop reason: HOSPADM

## 2023-06-23 RX ADMIN — FLUOXETINE 20 MG: 20 CAPSULE ORAL at 08:44

## 2023-06-23 RX ADMIN — Medication 81 MG: at 08:44

## 2023-06-23 RX ADMIN — ALLOPURINOL 100 MG: 100 TABLET ORAL at 08:44

## 2023-06-23 RX ADMIN — SODIUM CHLORIDE: 9 INJECTION, SOLUTION INTRAVENOUS at 14:15

## 2023-06-23 RX ADMIN — SODIUM CHLORIDE: 9 INJECTION, SOLUTION INTRAVENOUS at 16:34

## 2023-06-23 RX ADMIN — HYDROXYCHLOROQUINE SULFATE 200 MG: 200 TABLET, FILM COATED ORAL at 19:41

## 2023-06-23 RX ADMIN — ACETAMINOPHEN 650 MG: 325 TABLET ORAL at 22:02

## 2023-06-23 RX ADMIN — LEVOTHYROXINE SODIUM 25 MCG: 0.03 TABLET ORAL at 08:44

## 2023-06-23 RX ADMIN — ATORVASTATIN CALCIUM 20 MG: 10 TABLET, FILM COATED ORAL at 19:41

## 2023-06-23 RX ADMIN — GABAPENTIN 600 MG: 300 CAPSULE ORAL at 22:03

## 2023-06-23 ASSESSMENT — ACTIVITIES OF DAILY LIVING (ADL)
WALKING_OR_CLIMBING_STAIRS: AMBULATION DIFFICULTY, REQUIRES EQUIPMENT
DOING_ERRANDS_INDEPENDENTLY_DIFFICULTY: YES
CHANGE_IN_FUNCTIONAL_STATUS_SINCE_ONSET_OF_CURRENT_ILLNESS/INJURY: NO
NUMBER_OF_TIMES_PATIENT_HAS_FALLEN_WITHIN_LAST_SIX_MONTHS: 1
ADLS_ACUITY_SCORE: 27
CONCENTRATING,_REMEMBERING_OR_MAKING_DECISIONS_DIFFICULTY: NO
FALL_HISTORY_WITHIN_LAST_SIX_MONTHS: YES
TRANSFERRING: 0-->INDEPENDENT
WALKING_OR_CLIMBING_STAIRS_DIFFICULTY: YES
TOILETING_ISSUES: NO
DRESSING/BATHING_DIFFICULTY: NO
VISION_MANAGEMENT: GLASSES
ADLS_ACUITY_SCORE: 33
DEPENDENT_IADLS:: CLEANING;COOKING;LAUNDRY;SHOPPING;TRANSPORTATION
DIFFICULTY_EATING/SWALLOWING: NO
ADLS_ACUITY_SCORE: 35
TRANSFERRING: 1-->ASSISTANCE (EQUIPMENT/PERSON) NEEDED (NOT DEVELOPMENTALLY APPROPRIATE)
ADLS_ACUITY_SCORE: 35
ADLS_ACUITY_SCORE: 27
ADLS_ACUITY_SCORE: 35
ADLS_ACUITY_SCORE: 33
ADLS_ACUITY_SCORE: 27
ADLS_ACUITY_SCORE: 35
WEAR_GLASSES_OR_BLIND: YES
ADLS_ACUITY_SCORE: 35

## 2023-06-23 NOTE — ED NOTES
"[facility] ED Handoff Report    ED Chief Complaint: Hypotension, Bradycardia    ED Diagnosis:  (R00.1) Symptomatic bradycardia  Comment:  Plan:    (N28.9) Acute renal insufficiency  Comment:  Plan:       PMH:    Past Medical History:   Diagnosis Date    Atrial fibrillation (H)     Chronic heart failure with preserved ejection fraction (H) 2/13/2023    Congestive heart failure (H)     Depressive disorder     Hyperlipidemia     Hypertension     Obese     Primary hypertension 3/27/2023    Pulmonary hypertension (H)         Code Status:  Full Code     Falls Risk: Yes Band: Applied    Current Living Situation/Residence: lives with their niece    Elimination Status: Continent: wears briefs and purewick in place     Activity Level: SBA w/ walker    Patients Preferred Language:  English     Needed: No    Vital Signs:  /52   Pulse (!) 42   Temp 97.6  F (36.4  C)   Resp 18   Ht 1.626 m (5' 4\")   Wt 83 kg (183 lb)   SpO2 97%   BMI 31.41 kg/m       Cardiac Rhythm: Bradycardia    Pain Score: 0/10    Is the Patient Confused:  No    Last Food or Drink: 06/23/23 at 1300    Focused Assessment: AOx4; ongoing bradycardia and hypotension; lung sounds diminished but clear bilaterally; heart sounds WDL. Pt denies dizziness, nausea, and pain. Purewick in place. Pt lives in her own home and reports that her niece helps her out around the house. Pt is cooperative and has been updated on the plan of care.    Tests Performed: Done: Labs    Treatments Provided:  Labs, cardiac monitoring, medication administration    Family Dynamics/Concerns: No    Family Updated On Visitor Policy: Yes    Plan of Care Communicated to Family: Yes    Who Was Updated about Plan of Care: Daughter Em Henderson Checklist Done and Signed by Patient: Yes    Covid: asymptomatic    Additional Information: Abnormal labs; see results    RN: Chu Bolton RN 6/23/2023 1:17 PM       "

## 2023-06-23 NOTE — UTILIZATION REVIEW
Admission Status; Secondary Review Determination       Under the authority of the Utilization Management Committee, the utilization review process indicated a secondary review on the above patient. The review outcome is based on review of the medical records, discussions with staff, and applying clinical experience noted on the date of the review.     (x) Inpatient Status Appropriate - This patient's medical care is consistent with medical management for inpatient care and reasonable inpatient medical practice.     RATIONALE FOR DETERMINATION     Ms. Pritchard is a 76 yo female with a PMH of CAD, CHF, HTN who presents to the ED with ongoing fatigue and weakness consistent with symptomatic bradycardia that has persisted despite being weaned of her daily atenolol.  HR in the ED was in the 30's.  Also noted to be in acute renal failure; creat is improving with IVF given but not yet at baseline.  HR remains in the 40's on tele.  Cardiology consulted; recommending continued tele monitoring today and initiation of thyroid supplementation (new hypothyroidism) and treatment of ARF to see if HR improves.  She is not yet medically stable for discharge today.      At the time of admission with the information available to the attending physician more than 2 nights Hospital complex care was anticipated, based on patient risk of adverse outcome if treated as outpatient and complex care required. Inpatient admission is appropriate based on the Medicare guidelines.      The information on this document is developed by the utilization review team in order for the business office to ensure compliance. This only denotes the appropriateness of proper admission status and does not reflect the quality of care rendered.   The definitions of Inpatient Status and Observation Status used in making the determination above are those provided in the CMS Coverage Manual, Chapter 1 and Chapter 6, section 70.4.         Sincerely,     Ekta  Evangelina, DO  Utilization Review  Physician Advisor  [facility].

## 2023-06-23 NOTE — ED NOTES
Primary  for the Pt is her Daughter Em Pritchard and the best number to reach her at with any changes is [phone].

## 2023-06-23 NOTE — CONSULTS
Care Management Initial Consult    General Information  Assessment completed with: Dulce Ruiz  Type of CM/SW Visit: Initial Assessment    Primary Care Provider verified and updated as needed: Yes   Readmission within the last 30 days:        Reason for Consult: discharge planning  Advance Care Planning:       General Information Comments: Goal is home with home care    Communication Assessment  Patient's communication style: spoken language (English or Bilingual)       Cognitive  Cognitive/Neuro/Behavioral: WDL                      Living Environment:   People in home: other (see comments) (Her niece and niece's family)     Current living Arrangements: house      Able to return to prior arrangements: yes  Living Arrangement Comments: Would like [facility] for home PT, RN and HHA    Family/Social Support:  Care provided by: self, other (see comments)  Provides care for: no one  Marital Status:   Other (specify)          Description of Support System: Supportive       Current Resources:   Patient receiving home care services: No     Community Resources: None  Equipment currently used at home:    Supplies currently used at home:  (Walker for in the home, wheelchair for longer distances)    Employment/Financial:  Employment Status: retired        Financial Concerns: No concerns identified   Referral to Financial Worker: No     Does the patient's insurance plan have a 3 day qualifying hospital stay waiver?  No    Lifestyle & Psychosocial Needs:  Social Determinants of Health     Tobacco Use: Medium Risk (6/22/2023)    Patient History      Smoking Tobacco Use: Former      Smokeless Tobacco Use: Unknown      Passive Exposure: Not on file   Alcohol Use: Not on file   Financial Resource Strain: Not on file   Food Insecurity: Not on file   Transportation Needs: Not on file   Physical Activity: Not on file   Stress: Not on file   Social Connections: Not on file   Intimate Partner Violence: Not on file    Depression: Not on file   Housing Stability: Not on file     Functional Status:  Prior to admission patient needed assistance:   Dependent ADLs:: Ambulation-walker  Dependent IADLs:: Cleaning, Cooking, Laundry, Shopping, Transportation     Mental Health Status:  Mental Health Status: No Current Concerns       Chemical Dependency Status:  Chemical Dependency Status: No Current Concerns           Values/Beliefs:  Spiritual, Cultural Beliefs, Uatsdin Practices, Values that affect care: no          Values/Beliefs Comment: Anglican    Additional Information:  Writer met with patient to review role of care management services, discuss goals of care and assess need for any possible services at discharge. Patient alert, answering questions appropriately and engaged in the conversation. Patient lives in her own home and is independent with activities of daily living at baseline. She uses a walker for mobility and has a wheelchair for longer distances. Her niece and her niece's family live with her and provide any necessary support and assist with instrumental activities of daily living (IADLs) including transportation (patient no longer drives).  Patient's goal is to return home at discharge and she would like to have home care through [facility] whom she has used in the past. She would like to have skilled nursing visits, physical therapy and a home health aide. A referral has been made to [facility] who is reviewing. They state that they will accept or decline in Epic.  1:22 PM: [facility] has accepted the referral.    Clary Russell RN       detailed exam warm and dry/color normal

## 2023-06-23 NOTE — PLAN OF CARE
Problem: Heart Failure  Goal: Optimal Coping  6/23/2023 1440 by Ruth Ann Negron RN  Outcome: Progressing  6/23/2023 1438 by Ruth Ann Negron RN  Outcome: Progressing     Problem: Heart Failure  Goal: Stable Heart Rate and Rhythm  6/23/2023 1440 by Ruth Ann Negron RN  Outcome: Progressing  6/23/2023 1438 by Ruth Ann Negron RN  Outcome: Progressing     Problem: Heart Failure  Goal: Optimal Functional Ability  6/23/2023 1440 by Ruth Ann Negron RN  Outcome: Progressing  6/23/2023 1438 by Ruth Ann Negron RN  Outcome: Progressing  Intervention: Optimize Functional Ability  Recent Flowsheet Documentation  Taken 6/23/2023 1351 by Ruth Ann Negron RN  Activity Management: activity adjusted per tolerance     Problem: Dysrhythmia  Goal: Normalized Cardiac Rhythm  Outcome: Progressing      Goal Outcome Evaluation:       Pt arrived to the unit around 1400. She is AOX4 and denies pain. HR has been in the mid to low 40's. Lung sounds are WDL. Purewick in place. Continuous IV fluids 50ml/hr running.

## 2023-06-23 NOTE — H&P
[facility]    History and Physical - Hospitalist Service       Date of Admission:  6/22/2023    Assessment & Plan      Dulce Pritchard is a 77 year old female admitted on 6/22/2023. She has h/o CAD, CHF, MR, pulmonary hypertension, hyperlipidemia, depression, obesity, sinus node dysfunction , history of second-degree AV block on atenolol found to be bradycardic.  Atenolol has been now stopped, EKG revealed second degree heart block. Planned for PPM tomorrow    Symptomatic bradycardia  -Patient was on atenolol which was stopped recently on 6/19 by her PCP due to ongoing bradycardia.  -Noted heart rate to be in 30s in the ED.  -TSH elevated Check free T3, T4.   -Monitor on cardiac telemetry  -Evaluate cardiology, plan for pacemaker placement in a.m.  -Clear liquid diet for now, n.p.o. after midnight    PALMIRA on CKD  -Creat normal in march/23, 1.97 on admission. Appears likely prerenal from hypotension as reported by patient. BP currently normotensive.  -Hold PTA Lasix, lisinopril  -repeat labs in AM    History of CHF-patient appears stable  -Last ECHO 1/23 EF 50-55%  -Hold PTA lasix in light of PALMIRA.    Hypertension -stable  -Hold PTA lisinopril in light of PALMIRA    History of CAD  -Continue PTA aspirin, statin.  -Holding lisinopril in light of PALMIRA.    Mood disorder-continue PTA Prozac.    Inflammatory arthritis   -Continue PTA gabapentin,tramadol  -Hold plaquenil due to bradycardia, seems like this was stopped at previous admission but pt still takes it ?   -Likely needs to be addressed on discharge         Diet: NPO for Medical/Clinical Reasons Except for: Meds  Combination Diet Clear Liquid  NPO per Anesthesia Guidelines for Procedure/Surgery Except for: Meds    DVT Prophylaxis: Pneumatic Compression Devices  Clifton Catheter: Not present  Lines: None     Cardiac Monitoring: ACTIVE order. Indication: Bradycardias (48 hours)  Code Status: Full Code , d/w patient on admission    Clinically  "Significant Risk Factors Present on Admission                # Drug Induced Platelet Defect: home medication list includes an antiplatelet medication  # Acute Kidney Injury, unspecified: based on a >150% or 0.3 mg/dL increase in last creatinine compared to past 90 day average, will monitor renal function  # Hypertension: Noted on problem list      # Obesity: Estimated body mass index is 31.41 kg/m  as calculated from the following:    Height as of this encounter: 1.626 m (5' 4\").    Weight as of this encounter: 83 kg (183 lb).            Disposition Plan      Expected Discharge Date: 06/24/2023                  Gita Reyes MD  Hospitalist Service  [facility]  Securely message with [employer] (more info)  Text page via [employer] Paging/Directory     ______________________________________________________________________    Chief Complaint   Bradycardia    History is obtained from the patient    History of Present Illness   Dulce Pritchard is a 77 year old female with h/o of hypertension, inflammatory arthritis, CAD, CHF, MR, pulmonary hypertension, hyperlipidemia, depression, obesity, sinus node dysfunction , history of second-degree AV block on atenolol found to be bradycardic.  Patient has been weaned off of atenolol due to symptomtic bradycardia for past 2 weeks.  However patient continued of symptoms of fatigue and low energy for which she came into ER and reported her heart rate to be in upper 30s. .Denies any chest pain, shortness of breath, abdominal pain, nausea, vomiting, cough, diarrhea, constipation, dysuria or polyuria.  No dizziness, palpitations.  No tingling or numbness.  No recent travel.  No known exposures to Covid.  No sick contact.    In the ED, patient found to have heart rate in 30s.  Labs markable for elevated creatinine of 1.97.  TSH elevated at 5.37.  CBC unremarkable.  Evaluated by cardiology in the ED, patient will be admitted for pacemaker placement " tomorrow.        Past Medical History    Past Medical History:   Diagnosis Date     Atrial fibrillation (H)      Chronic heart failure with preserved ejection fraction (H) 2/13/2023     Congestive heart failure (H)      Depressive disorder      Hyperlipidemia      Hypertension      Obese      Primary hypertension 3/27/2023     Pulmonary hypertension (H)        Past Surgical History   History reviewed. No pertinent surgical history.    Prior to Admission Medications   Prior to Admission Medications   Prescriptions Last Dose Informant Patient Reported? Taking?   FLUoxetine (PROZAC) 20 MG capsule 6/22/2023 at am  Yes Yes   Sig: Take 20 mg by mouth daily   STOOL SOFTENER/LAXATIVE 50-8.6 MG tablet Past Month  Yes Yes   Sig: TAKE 2 TABLETS BY MOUTH ONCE DAILY DX CONSTIPATION   Vitamin D (Cholecalciferol) 25 MCG (1000 UT) TABS 6/22/2023 at am  Yes Yes   Sig: Take 1,000 Units by mouth daily   allopurinol (ZYLOPRIM) 100 MG tablet 6/22/2023 at am  Yes Yes   Sig: Take 100 mg by mouth daily   aspirin (ASA) 81 MG EC tablet 6/22/2023 at am  No Yes   Sig: Take 1 tablet (81 mg) by mouth daily   atorvastatin (LIPITOR) 20 MG tablet 6/21/2023 at pm  No Yes   Sig: Take 1 tablet (20 mg) by mouth every evening   furosemide (LASIX) 40 MG tablet 6/22/2023 at am  No Yes   Sig: Take 1 tablet (40 mg) by mouth 2 times daily   gabapentin (NEURONTIN) 300 MG capsule 6/21/2023 at pm  No Yes   Sig: Take 2 capsules (600 mg) by mouth At Bedtime   guaiFENesin (MUCINEX) 600 MG 12 hr tablet 6/21/2023 at pm  No Yes   Sig: Take 1 tablet (600 mg) by mouth 2 times daily   hydroxychloroquine (PLAQUENIL) 200 MG tablet 6/22/2023  Yes Yes   Sig: Take 200 mg by mouth 2 times daily   lisinopril (ZESTRIL) 20 MG tablet 6/22/2023  No Yes   Sig: Take 1 tablet (20 mg) by mouth daily   melatonin 3 MG tablet More than a month  No Yes   Sig: Take 1 tablet (3 mg) by mouth nightly as needed for sleep   polyethylene glycol (MIRALAX) 17 GM/Dose powder Past Week  Yes Yes    Sig: DISSOLVE 17 GMS IN WATER AND TAKE BY MOUTH ONCE DAILY AS NEEDED   traMADol (ULTRAM) 50 MG tablet 6/22/2023  Yes Yes   Sig: Take 50 mg by mouth every 6 hours as needed Per Lily: Take 1-2 tablets PO Q6H PRN Chronic Pain      Facility-Administered Medications: None        Review of Systems    The 10 point Review of Systems is negative other than noted in the HPI or here.      Physical Exam   Vital Signs: Temp: 97.6  F (36.4  C)   BP: 122/58 Pulse: (!) 41   Resp: 16 SpO2: 97 % O2 Device: None (Room air)    Weight: 183 lbs 0 oz    General: Pleasant, NAD  HEENT:EOMI, AT,NC  CVS:Bradycardia  RS:CTAB  Abd: Soft, NT,ND  Neurology:Grossly normal  Psy:Approrpiate affect      Medical Decision Making       70 MINUTES SPENT BY ME on the date of service doing chart review, history, exam, documentation & further activities per the note.      Data     I have personally reviewed the following data over the past 24 hrs:    5.0  \   9.8 (L)   / 88 (L)     141 110 (H) 64.5 (H) /  112 (H)   4.7 20 (L) 1.97 (H) \       Trop: 43 (H) BNP: N/A       TSH: 5.37 (H) T4: 0.79 (L) A1C: N/A

## 2023-06-23 NOTE — PROGRESS NOTES
"[facility]    Medicine Progress Note - Hospitalist Service    Date of Admission:  6/22/2023    Assessment & Plan   77-year-old female with history of sinus node dysfunction, second-degree AV block, CAD, CHF, pulmonary hypertension, mood disorder and obesity presents 6/22/2023 with symptomatic bradycardia and PALMIRA.      Symptomatic bradycardia: Likely secondary to atenolol accumulation in the setting of PALMIRA  Also found to be mildly hypothyroid  --Not planning pacemaker placement this hospitalization  -- Hold atenolol indefinitely  -- Start thyroid replacement  -- Possible discharge home tomorrow with Holter monitor      Hypothyroidism: New diagnosis  TSH on admission 5.37 with free T4 of 0.79  --Start Synthroid 25 mcg daily  -- Outpatient follow-up      PALMIRA on CKD: Thought secondary to hypotension, bradycardia in the setting of Lasix and lisinopril  --Start gentle hydration  -- Continue to hold home Lasix and lisinopril      History of CHF: Last TTE showed EF of 50 to 55%  --Hold home Lasix and lisinopril for now as above      History of CAD: Continue aspirin, statin      Mood disorder: Continue Prozac      Inflammatory arthritis: Continue gabapentin, tramadol and Plaquenil.         Diet: Regular Diet Adult    DVT Prophylaxis: Pneumatic Compression Devices  Clifton Catheter: Not present  Lines: None     Cardiac Monitoring: None  Code Status: Full Code      Clinically Significant Risk Factors Present on Admission                # Drug Induced Platelet Defect: home medication list includes an antiplatelet medication   # Hypertension: Noted on problem list      # Obesity: Estimated body mass index is 30.99 kg/m  as calculated from the following:    Height as of this encounter: 1.626 m (5' 4\").    Weight as of this encounter: 81.9 kg (180 lb 8.9 oz).            Disposition Plan     Expected Discharge Date: 06/24/2023                  Josh Zavala, DO  Hospitalist Service  [facility]" [facility]  Securely message with [employer] (more info)  Text page via [employer] Paging/Directory   ______________________________________________________________________    Interval History   NAD. Denies any complaints at this time    Physical Exam   Vital Signs: Temp: 97.7  F (36.5  C) Temp src: Oral BP: 123/61 Pulse: (!) 43   Resp: 22 SpO2: 96 % O2 Device: None (Room air)    Weight: 180 lbs 8.91 oz  General: NAD  RESPIRATORY: Breathing nonlabored  CARDIOVASCULAR: Rates nonpitting le edema bilat.   ABDOMEN: soft and non-tender  NEUROLOGIC: Motor and sensory intact, speech clear        Medical Decision Making       >50 MINUTES SPENT BY ME on the date of service doing chart review, history, exam, documentation & further activities per the note.      Data

## 2023-06-23 NOTE — DISCHARGE INSTRUCTIONS
A referral has been made for you to [facility] for home PT, nursing and a home health aide.  They should call you to arrange the first visit but, if you have questions or concerns, you can call them at [phone]. Thank you.       Hospital follow-up appointment: Friday, June 30, 8:45am with Dr. Colin Christie at the [facility]

## 2023-06-23 NOTE — PROGRESS NOTES
"  HEART CARE CONSULTATON NOTE        Assessment/Recommendations   Assessment:   1.  Sinus node dysfunction, symptomatic bradycardia. Improving.  Likely secondary to atenolol use with poor renal clearance in the setting of acute kidney injury.  Also hypothyroidism likely playing significant role as well.  2.  Coronary artery disease  3.  Acute on chronic Congestive heart failure, HFrEF  4.  Mitral regurgitation, mild to moderate  5. Pulmonary hypertenion   6. PALMIRA in CDK stage III a     Plan:   1.  No indication for permanent pacemaker at this time.  Patient has active hypothyroidism which we have started treatment.  She has acute kidney injury and was on atenolol which will take days to completely clear out of her system.  Her rates are now in the mid 40s.  With rapid hand contraction her heart rates go into the 60s.    2.  Resume diet  3.  Treat acute kidney injury  4..  Starting Synthroid, TSH elevated, free T4 low    Monitor patient today.  Ongoing improvement in bradycardia possible discharge home tomorrow with cardiac monitor.    Clinically Significant Risk Factors Present on Admission                # Drug Induced Platelet Defect: home medication list includes an antiplatelet medication   # Hypertension: Noted on problem list      # Obesity: Estimated body mass index is 31.41 kg/m  as calculated from the following:    Height as of this encounter: 1.626 m (5' 4\").    Weight as of this encounter: 83 kg (183 lb).            History of Present Illness/Subjective    HPI: Dulce Pritchard is a 77 year old female history of coronary disease, intermittent junctional rhythm, history of second-degree AV block who is on atenolol found to be bradycardic.     S: Presented with profound bradycardia yesterday.  On labs noted to have acute kidney injury.  She was on atenolol which was being titrated down over the past few weeks.  Given acute kidney injury likely atenolol will remain in her system for a prolonged period of " "time.  Also she was noted to have hypothyroidism.  Her rates are improving this morning in the mid 40s.  With rapid hand contraction heart rates increased to the 60s.    At this time no indication for permanent pacemaker.    Echo: 2023  The left ventricle is mildly dilated.  Left ventricular function is decreased. The ejection fraction is 50-55%  (borderline).  The left atrium is mildly dilated.  The right atrium is mildly dilated.  Severe (>55mmHg) pulmonary hypertension is present.  IVC diameter >2.1 cm collapsing <50% with sniff suggests a high RA pressure  estimated at 15 mmHg or greater.  There is mild to moderate (1-2+) mitral regurgitation.     Physical Examination  Review of Systems   VITALS: /60   Pulse (!) 45   Temp 97.6  F (36.4  C)   Resp 12   Ht 1.626 m (5' 4\")   Wt 83 kg (183 lb)   SpO2 93%   BMI 31.41 kg/m    BMI: Body mass index is 31.41 kg/m .  Wt Readings from Last 3 Encounters:   06/22/23 83 kg (183 lb)   02/06/23 95.7 kg (211 lb)     No intake or output data in the 24 hours ending 06/22/23 1637  General Appearance:   no distress, obese body habitus   ENT/Mouth: membranes moist, no oral lesions or bleeding gums.      EYES:  no scleral icterus, normal conjunctivae   Neck: no carotid bruits or thyromegaly   Chest/Lungs:   lungs are clear to auscultation, no rales or wheezing, no sternal scar, equal chest wall expansion    Cardiovascular:   Regular. Normal first and second heart sounds with fiant systolic murmurs, rubs, or gallops; the carotid, radial and posterior tibial pulses are intact, Jugular venous pressure  8 cm, mild edema bilaterally    Abdomen:  no organomegaly, masses, bruits, or tenderness; bowel sounds are present   Extremities: no cyanosis or clubbing   Skin: no xanthelasma, warm.    Neurologic: normal  bilateral, no tremors     Psychiatric: alert and oriented x3, calm     Review Of Systems  Skin: negative  Eyes: negative  Ears/Nose/Throat: negative  Respiratory: " Dyspnea on exertion  Cardiovascular: Fatigue, bradycardia, no chest pain  Gastrointestinal: negative  Genitourinary: negative  Musculoskeletal: negative  Neurologic: negative  Psychiatric: negative  Hematologic/Lymphatic/Immunologic: negative  Endocrine: negative          Lab Results    Chemistry/lipid CBC Cardiac Enzymes/BNP/TSH/INR   Recent Labs   Lab Test 04/21/23  0948   CHOL 146   HDL 48*   LDL 76   TRIG 110     Recent Labs   Lab Test 04/21/23  0948 03/27/23  0949 11/22/22  1354   LDL 76 71 79     Recent Labs   Lab Test 04/21/23  0948      POTASSIUM 4.2   CHLORIDE 109*   CO2 19*   *   BUN 43.4*   CR 1.14*   GFRESTIMATED 50*   EDGAR 9.8     Recent Labs   Lab Test 04/21/23  0948 03/27/23  0949 02/24/23  1033   CR 1.14* 0.90 1.32*     Recent Labs   Lab Test 01/17/19  1041 01/18/18  1821   A1C 5.9 6.0          Recent Labs   Lab Test 04/21/23  0948   WBC 4.7   HGB 11.4*   HCT 37.4   *   *     Recent Labs   Lab Test 04/21/23  0948 03/27/23  0949 02/10/23  1056   HGB 11.4* 11.2* 13.6    Recent Labs   Lab Test 01/31/23  0722 01/31/23  0110 01/30/23  1926   TROPONINI 0.11 0.12 0.12     Recent Labs   Lab Test 01/30/23  1926   BNP 1,597*     No results for input(s): TSH in the last 54569 hours.  No results for input(s): INR in the last 15526 hours.     Medical History  Surgical History Family History Social History   Past Medical History:   Diagnosis Date     Atrial fibrillation (H)      Chronic heart failure with preserved ejection fraction (H) 2/13/2023     Congestive heart failure (H)      Depressive disorder      Hyperlipidemia      Hypertension      Obese      Primary hypertension 3/27/2023     Pulmonary hypertension (H)      History reviewed. No pertinent surgical history.  No family history on file.     Social History     Socioeconomic History     Marital status:      Spouse name: Not on file     Number of children: Not on file     Years of education: Not on file     Highest  education level: Not on file   Occupational History     Not on file   Tobacco Use     Smoking status: Former     Packs/day: 1.00     Types: Cigarettes     Start date:      Quit date:      Years since quittin.4     Smokeless tobacco: Not on file   Vaping Use     Vaping Use: Never used   Substance and Sexual Activity     Alcohol use: Never     Drug use: Never     Sexual activity: Not on file   Other Topics Concern     Not on file   Social History Narrative     Not on file     Social Determinants of Health     Financial Resource Strain: Not on file   Food Insecurity: Not on file   Transportation Needs: Not on file   Physical Activity: Not on file   Stress: Not on file   Social Connections: Not on file   Intimate Partner Violence: Not on file   Housing Stability: Not on file         Medications  Allergies   Current Outpatient Medications   Medication Sig Dispense Refill     allopurinol (ZYLOPRIM) 100 MG tablet Take 100 mg by mouth daily       aspirin (ASA) 81 MG EC tablet Take 1 tablet (81 mg) by mouth daily 30 tablet 0     atorvastatin (LIPITOR) 20 MG tablet Take 1 tablet (20 mg) by mouth every evening 30 tablet 0     FLUoxetine (PROZAC) 20 MG capsule Take 20 mg by mouth daily       furosemide (LASIX) 40 MG tablet Take 1 tablet (40 mg) by mouth 2 times daily 60 tablet 0     gabapentin (NEURONTIN) 300 MG capsule Take 2 capsules (600 mg) by mouth At Bedtime 30 capsule 0     guaiFENesin (MUCINEX) 600 MG 12 hr tablet Take 1 tablet (600 mg) by mouth 2 times daily 14 tablet 0     hydroxychloroquine (PLAQUENIL) 200 MG tablet Take 200 mg by mouth 2 times daily       lisinopril (ZESTRIL) 20 MG tablet Take 1 tablet (20 mg) by mouth daily 30 tablet 0     melatonin 3 MG tablet Take 1 tablet (3 mg) by mouth nightly as needed for sleep 14 tablet 0     polyethylene glycol (MIRALAX) 17 GM/Dose powder DISSOLVE 17 GMS IN WATER AND TAKE BY MOUTH ONCE DAILY AS NEEDED       STOOL SOFTENER/LAXATIVE 50-8.6 MG tablet TAKE 2  TABLETS BY MOUTH ONCE DAILY DX CONSTIPATION       traMADol (ULTRAM) 50 MG tablet Take 50 mg by mouth every 6 hours as needed Per Lily: Take 1-2 tablets PO Q6H PRN Chronic Pain       Vitamin D (Cholecalciferol) 25 MCG (1000 UT) TABS Take 1,000 Units by mouth daily        No Known Allergies      Last Comprehensive Metabolic Panel:  Lab Results   Component Value Date     06/23/2023     06/23/2023    POTASSIUM 4.1 06/23/2023    POTASSIUM 4.1 06/23/2023    CHLORIDE 112 (H) 06/23/2023    CHLORIDE 113 (H) 06/23/2023    CO2 19 (L) 06/23/2023    CO2 18 (L) 06/23/2023    ANIONGAP 8 06/23/2023    ANIONGAP 14 06/23/2023    GLC 87 06/23/2023    BUN 57.5 (H) 06/23/2023    CR 1.67 (H) 06/23/2023    GFRESTIMATED 32 (L) 06/23/2023    EDGAR 9.0 06/23/2023           Dakota Ernandez DO

## 2023-06-23 NOTE — ED NOTES
"[facility] ED Handoff Report    ED Chief Complaint: Bradycardia and hypotension     ED Diagnosis:  (R00.1) Symptomatic bradycardia  Comment:   Plan:     (N28.9) Acute renal insufficiency  Comment:   Plan:        PMH:    Past Medical History:   Diagnosis Date     Atrial fibrillation (H)      Chronic heart failure with preserved ejection fraction (H) 2/13/2023     Congestive heart failure (H)      Depressive disorder      Hyperlipidemia      Hypertension      Obese      Primary hypertension 3/27/2023     Pulmonary hypertension (H)         Code Status:  Prior     Falls Risk: No Band: Not applicable    Current Living Situation/Residence: lives in an apartment     Elimination Status: Continent: incontinent but has a pure wick in place     Activity Level: 2 assist    Patients Preferred Language:  English     Needed: No    Vital Signs:  /63   Pulse (!) 43   Temp 97.6  F (36.4  C)   Resp 17   Ht 1.626 m (5' 4\")   Wt 83 kg (183 lb)   SpO2 96%   BMI 31.41 kg/m       Cardiac Rhythm: Sinus Bradycardia     Pain Score: 0/10    Is the Patient Confused:  No    Last Food or Drink: 06/22/23 at 2000    Focused Assessment:  Cardiac     Tests Performed: Done: Labs and Imaging    Treatments Provided:  Meds, labs, imaging, fluids     Family Dynamics/Concerns: No    Family Updated On Visitor Policy: Yes    Plan of Care Communicated to Family: Yes    Who Was Updated about Plan of Care: Daughter Em Henderson Checklist Done and Signed by Patient: Yes    Medications sent with patient: N/A    Covid: asymptomatic , N/A    Additional Information:     RN: Joanne Ascencio RN   6/22/2023 8:18 PM     "

## 2023-06-24 ENCOUNTER — APPOINTMENT (OUTPATIENT)
Dept: OCCUPATIONAL THERAPY | Facility: HOSPITAL | Age: 78
DRG: 308 | End: 2023-06-24
Attending: INTERNAL MEDICINE
Payer: COMMERCIAL

## 2023-06-24 LAB
ANION GAP SERPL CALCULATED.3IONS-SCNC: 9 MMOL/L (ref 7–15)
BUN SERPL-MCNC: 43.6 MG/DL (ref 8–23)
CALCIUM SERPL-MCNC: 9.2 MG/DL (ref 8.8–10.2)
CHLORIDE SERPL-SCNC: 114 MMOL/L (ref 98–107)
CREAT SERPL-MCNC: 1.33 MG/DL (ref 0.51–0.95)
DEPRECATED HCO3 PLAS-SCNC: 18 MMOL/L (ref 22–29)
GFR SERPL CREATININE-BSD FRML MDRD: 42 ML/MIN/1.73M2
GLUCOSE SERPL-MCNC: 92 MG/DL (ref 70–99)
HGB BLD-MCNC: 9 G/DL (ref 11.7–15.7)
POTASSIUM SERPL-SCNC: 4.3 MMOL/L (ref 3.4–5.3)
SODIUM SERPL-SCNC: 141 MMOL/L (ref 136–145)

## 2023-06-24 PROCEDURE — 85018 HEMOGLOBIN: CPT | Performed by: INTERNAL MEDICINE

## 2023-06-24 PROCEDURE — 250N000013 HC RX MED GY IP 250 OP 250 PS 637: Performed by: INTERNAL MEDICINE

## 2023-06-24 PROCEDURE — 99232 SBSQ HOSP IP/OBS MODERATE 35: CPT | Performed by: INTERNAL MEDICINE

## 2023-06-24 PROCEDURE — 250N000013 HC RX MED GY IP 250 OP 250 PS 637: Performed by: HOSPITALIST

## 2023-06-24 PROCEDURE — 210N000001 HC R&B IMCU HEART CARE

## 2023-06-24 PROCEDURE — 82310 ASSAY OF CALCIUM: CPT | Performed by: INTERNAL MEDICINE

## 2023-06-24 PROCEDURE — 97166 OT EVAL MOD COMPLEX 45 MIN: CPT | Mod: GO

## 2023-06-24 PROCEDURE — 97530 THERAPEUTIC ACTIVITIES: CPT | Mod: GO

## 2023-06-24 PROCEDURE — 36415 COLL VENOUS BLD VENIPUNCTURE: CPT | Performed by: INTERNAL MEDICINE

## 2023-06-24 RX ADMIN — ATORVASTATIN CALCIUM 20 MG: 10 TABLET, FILM COATED ORAL at 20:23

## 2023-06-24 RX ADMIN — GABAPENTIN 600 MG: 300 CAPSULE ORAL at 21:09

## 2023-06-24 RX ADMIN — HYDROXYCHLOROQUINE SULFATE 200 MG: 200 TABLET, FILM COATED ORAL at 20:23

## 2023-06-24 RX ADMIN — FLUOXETINE 20 MG: 20 CAPSULE ORAL at 08:47

## 2023-06-24 RX ADMIN — LEVOTHYROXINE SODIUM 25 MCG: 0.03 TABLET ORAL at 08:47

## 2023-06-24 RX ADMIN — ACETAMINOPHEN 650 MG: 325 TABLET ORAL at 21:09

## 2023-06-24 RX ADMIN — Medication 81 MG: at 08:48

## 2023-06-24 RX ADMIN — HYDROXYCHLOROQUINE SULFATE 200 MG: 200 TABLET, FILM COATED ORAL at 08:51

## 2023-06-24 RX ADMIN — ALLOPURINOL 100 MG: 100 TABLET ORAL at 08:48

## 2023-06-24 ASSESSMENT — ACTIVITIES OF DAILY LIVING (ADL)
ADLS_ACUITY_SCORE: 33

## 2023-06-24 NOTE — PROGRESS NOTES
" [facility]    Medicine Progress Note - Hospitalist Service    Date of Admission:  6/22/2023    Assessment & Plan   77-year-old female with history of sinus node dysfunction, second-degree AV block, CAD, CHF, pulmonary hypertension, mood disorder and obesity presents 6/22/2023 with symptomatic bradycardia and PALMIRA.      Symptomatic bradycardia: Likely secondary to atenolol accumulation in the setting of PALMIRA  Also found to be mildly hypothyroid  -- Hold atenolol indefinitely  -- Started thyroid replacement  -- continue to monitor bradycardia for improvement, consider pacemaker placement vs ramona with Holter monitor in 1-2 days      Hypothyroidism: New diagnosis  TSH on admission 5.37 with free T4 of 0.79  --Started Synthroid 25 mcg daily  -- Outpatient follow-up      PALMIRA on CKD: Thought secondary to hypotension, bradycardia in the setting of Lasix and lisinopril  -- continue gentle hydration  -- Continue to hold home Lasix and lisinopril      History of CHF: Last TTE showed EF of 50 to 55%  --Holding home Lasix and lisinopril for now as above      History of CAD: Continue aspirin, statin      Mood disorder: Continue Prozac      Inflammatory arthritis: Continue gabapentin, tramadol and Plaquenil.         Diet: Regular Diet Adult    DVT Prophylaxis: Pneumatic Compression Devices  [place]ifton Catheter: Not present  Lines: None     Cardiac Monitoring: None  Code Status: Full Code      Clinically Significant Risk Factors                # Thrombocytopenia: Lowest platelets = 88 in last 2 days, will monitor for bleeding   # Hypertension: Noted on problem list        # Obesity: Estimated body mass index is 31.64 kg/m  as calculated from the following:    Height as of this encounter: 1.626 m (5' 4\").    Weight as of this encounter: 83.6 kg (184 lb 4.9 oz)., PRESENT ON ADMISSION          Disposition Plan      Expected Discharge Date: 06/25/2023        Discharge Comments: Bradycardic to 38 at rest. Plan holter " monitor at discharge          Josh Zavala,   Hospitalist Service  [facility]  Securely message with [employer] (more info)  Text page via [employer] Paging/Directory   ______________________________________________________________________    Interval History   NAD. Denies any complaints at this time    Physical Exam   Vital Signs: Temp: 98.6  F (37  C) Temp src: Oral BP: 94/53 Pulse: (!) 40   Resp: 18 SpO2: 97 % O2 Device: None (Room air)    Weight: 184 lbs 4.87 oz  General: NAD  RESPIRATORY: Breathing nonlabored  CARDIOVASCULAR: Trace nonpitting le edema bilat.   ABDOMEN: soft and non-tender  NEUROLOGIC: Motor and sensory intact, speech clear        Medical Decision Making       >40 MINUTES SPENT BY ME on the date of service doing chart review, history, exam, documentation & further activities per the note.      Data

## 2023-06-24 NOTE — PLAN OF CARE
Goal Outcome Evaluation:       Pt remains in sinus bradycardia with 1st degree AV block overnight with HR in 40's. Pt asymptomatic. Creatinine improving to 1.33 this AM.

## 2023-06-24 NOTE — PROGRESS NOTES
HEART CARE CONSULTATON NOTE        Assessment/Recommendations   Assessment:   1.  Sinus node dysfunction, symptomatic bradycardia.   2.  Coronary artery disease  3.  Acute on chronic Congestive heart failure, HFrEF  4.  Mitral regurgitation, mild to moderate  5. Pulmonary hypertenion   6. PALMIRA in CDK stage III a     Plan:   1.  No indication for permanent pacemaker at this time.  Patient has active hypothyroidism which we have started treatment.  She has acute kidney injury and was on atenolol which will take days to completely clear out of her system.  Her rates are now in the mid 40s.  Would like to see heart rate response with walking following.  2.  Acute kidney injury improving.  Continue to monitor  4..  Started on Synthroid 6/22, TSH elevated, free T4 low    She remained hospitalized today.  Will make decision if she can discharge home tomorrow or will need permanent pacemaker on Monday.       History of Present Illness/Subjective    HPI: Dulce Pritchard is a 77 year old female history of coronary disease, intermittent junctional rhythm, history of second-degree AV block who is on atenolol found to be bradycardic.     S: No further profound bradycardia.  Continues have heart rates in the upper 30s and mid 40s throughout the day.  Like to see heart rate response with walking.  Continues to have some fatigue symptoms.  No lightheadedness or syncope.  No chest pain.  Discussed patient indications for pacemaker.  Given she has ongoing bradycardia but treating for hypothyroidism would cautious monitor for the next 24 hours.  She likely has underlying sinus node dysfunction exacerbated by beta-blocker and hypothyroidism.    Echo: 2023  The left ventricle is mildly dilated.  Left ventricular function is decreased. The ejection fraction is 50-55%  (borderline).  The left atrium is mildly dilated.  The right atrium is mildly dilated.  Severe (>55mmHg) pulmonary hypertension is present.  IVC diameter >2.1 cm  "collapsing <50% with sniff suggests a high RA pressure  estimated at 15 mmHg or greater.  There is mild to moderate (1-2+) mitral regurgitation.     Physical Examination  Review of Systems   VITALS: BP 94/53 (BP Location: Right arm)   Pulse (!) 40   Temp 98.6  F (37  C) (Oral)   Resp 18   Ht 1.626 m (5' 4\")   Wt 83.6 kg (184 lb 4.9 oz)   SpO2 97%   BMI 31.64 kg/m    BMI: Body mass index is 31.64 kg/m .  Wt Readings from Last 3 Encounters:   06/24/23 83.6 kg (184 lb 4.9 oz)   02/06/23 95.7 kg (211 lb)     No intake or output data in the 24 hours ending 06/22/23 1637  General Appearance:   no distress, obese body habitus   ENT/Mouth: membranes moist, no oral lesions or bleeding gums.      EYES:  no scleral icterus, normal conjunctivae   Neck: no carotid bruits or thyromegaly   Chest/Lungs:   lungs are clear to auscultation,    Cardiovascular:   Regular. Normal first and second heart sounds with fiant systolic murmurs, rubsJugular venous pressure  8 cm, no edema bilaterally    Abdomen:  no  tenderness; bowel sounds are present   Extremities: no cyanosis or clubbing   Skin: no xanthelasma, warm.    Neurologic: normal  bilateral, no tremors     Psychiatric: alert and oriented x3, calm     Review Of Systems  Skin: negative  Eyes: negative  Ears/Nose/Throat: negative  Respiratory: Dyspnea on exertion  Cardiovascular: Fatigue, bradycardia, no chest pain  Gastrointestinal: negative  Genitourinary: negative  Musculoskeletal: negative  Neurologic: negative  Psychiatric: negative  Hematologic/Lymphatic/Immunologic: negative  Endocrine: negative          Lab Results    Chemistry/lipid CBC Cardiac Enzymes/BNP/TSH/INR   Recent Labs   Lab Test 04/21/23  0948   CHOL 146   HDL 48*   LDL 76   TRIG 110     Recent Labs   Lab Test 04/21/23  0948 03/27/23  0949 11/22/22  1354   LDL 76 71 79     Recent Labs   Lab Test 04/21/23  0948      POTASSIUM 4.2   CHLORIDE 109*   CO2 19*   *   BUN 43.4*   CR 1.14* "   GFRESTIMATED 50*   EDGAR 9.8     Recent Labs   Lab Test 23  0948 23  0949 23  1033   CR 1.14* 0.90 1.32*     Recent Labs   Lab Test 19  1041 18  1821   A1C 5.9 6.0          Recent Labs   Lab Test 23  0948   WBC 4.7   HGB 11.4*   HCT 37.4   *   *     Recent Labs   Lab Test 23  0948 23  0949 02/10/23  1056   HGB 11.4* 11.2* 13.6    Recent Labs   Lab Test 23  0722 23  0110 23  192   TROPONINI 0.11 0.12 0.12     Recent Labs   Lab Test 23  192   BNP 1,597*     No results for input(s): TSH in the last 37520 hours.  No results for input(s): INR in the last 50365 hours.     Medical History  Surgical History Family History Social History   Past Medical History:   Diagnosis Date     Atrial fibrillation (H)      Chronic heart failure with preserved ejection fraction (H) 2023     Congestive heart failure (H)      Depressive disorder      Hyperlipidemia      Hypertension      Obese      Primary hypertension 3/27/2023     Pulmonary hypertension (H)      History reviewed. No pertinent surgical history.  No family history on file.     Social History     Socioeconomic History     Marital status:      Spouse name: Not on file     Number of children: Not on file     Years of education: Not on file     Highest education level: Not on file   Occupational History     Not on file   Tobacco Use     Smoking status: Former     Packs/day: 1.00     Types: Cigarettes     Start date:      Quit date:      Years since quittin.4     Smokeless tobacco: Not on file   Vaping Use     Vaping Use: Never used   Substance and Sexual Activity     Alcohol use: Never     Drug use: Never     Sexual activity: Not on file   Other Topics Concern     Not on file   Social History Narrative     Not on file     Social Determinants of Health     Financial Resource Strain: Not on file   Food Insecurity: Not on file   Transportation Needs: Not on file    Physical Activity: Not on file   Stress: Not on file   Social Connections: Not on file   Intimate Partner Violence: Not on file   Housing Stability: Not on file         Medications  Allergies   No current outpatient medications on file.      No Known Allergies      Last Comprehensive Metabolic Panel:  Lab Results   Component Value Date     06/24/2023    POTASSIUM 4.3 06/24/2023    CHLORIDE 114 (H) 06/24/2023    CO2 18 (L) 06/24/2023    ANIONGAP 9 06/24/2023    GLC 92 06/24/2023    BUN 43.6 (H) 06/24/2023    CR 1.33 (H) 06/24/2023    GFRESTIMATED 42 (L) 06/24/2023    EDGAR 9.2 06/24/2023           Dakota Ernandez DO

## 2023-06-24 NOTE — PROGRESS NOTES
"CLINICAL NUTRITION SERVICES - ASSESSMENT NOTE     Nutrition Prescription    RECOMMENDATIONS FOR MDs/PROVIDERS TO ORDER:  None    Malnutrition Status:    Unable to assess due to need for physical exam     Recommendations already ordered by Registered Dietitian (RD):  None    Future/Additional Recommendations:  Weight, po intake.      REASON FOR ASSESSMENT  Dulce Pritchard is a/an 77 year old female assessed by the dietitian for Admission Nutrition Risk Screen for positive- unsure weight loss and decreased appetite.     HPI   Per H&P: Pt with h/o of hypertension, inflammatory arthritis, CAD, CHF, MR, pulmonary hypertension, hyperlipidemia, depression, obesity, sinus node dysfunction , history of second-degree AV block on atenolol found to be bradycardic.  Patient has been weaned off of atenolol due to symptomtic bradycardia for past 2 weeks.  However patient continued of symptoms of fatigue and low energy for which she came into ER and reported her heart rate to be in upper 30s.    NUTRITION HISTORY  Called pt this afternoon. Pt reports no weight loss or decreased appetite- pt states she eats more than enough. Per chart review po intakes adequate and meeting % of nutritional needs.     NKFA    CURRENT NUTRITION ORDERS  Diet: Regular  Intake/Tolerance: Per flowsheets:   6/24: 100% x 2 meals- 1451 kcals, 40 gm protein   6/23: 75% x 2 meal- 1560 kcals, 53 gm protein     LABS  Labs reviewed- BUN (43.6(H), Creat 1.33(H), Glucose  past 24 hr     MEDICATIONS  Medications reviewed- lipitor, prozac, levothyroxine     ANTHROPOMETRICS  Height: 162.6 cm (5' 4\")  Most Recent Weight: 83.6 kg (184 lb 4.9 oz)    IBW: 54.5 kg  BMI: Obesity Grade I BMI 30-34.9  Weight History: Weight hx unclear due to limited documentation. Wt from 2/6 likely stated.   Wt Readings from Last 10 Encounters:   06/24/23 83.6 kg (184 lb 4.9 oz)   02/06/23 95.7 kg (211 lb)      Dosing Weight: 83.6 kg    ASSESSED NUTRITION NEEDS  Estimated " Energy Needs: 2051-5399 kcals/day (20 - 25 kcals/kg)  Justification: Maintenance, Obesity    Estimated Protein Needs:  grams protein/day (1- 1.2 grams of pro/kg)  Justification: Increased needs  Estimated Fluid Needs: 1670 mL/day (1 mL/kcal)   Justification: Maintenance    PHYSICAL FINDINGS  See malnutrition section below.  Per Flowsheets:   GI: WDL  BM: x2 today   Skin: WDL  Edema: +1 trace- BLE  I/Os: 480 ml po, 300 ml UOP today     MALNUTRITION:  % Weight Loss: Unable to assess due to limited weight hx   % Intake:  No decreased intake noted  Subcutaneous Fat Loss:  Unable to assess   Muscle Loss:  Unable to assess  Fluid Retention:  +1 trace noted    Malnutrition Diagnosis: Unable to determine due to need for physical exam    NUTRITION DIAGNOSIS  No nutrition diagnosis at this time     INTERVENTIONS  Implementation  No nutrition intervention at this time     Goals  Pt to meet nutritional needs      Monitoring/Evaluation  Weight, po intake.

## 2023-06-24 NOTE — PLAN OF CARE
Problem: Heart Failure  Goal: Optimal Coping  Outcome: Progressing     Problem: Dysrhythmia  Goal: Normalized Cardiac Rhythm  Outcome: Not Progressing   Goal Outcome Evaluation:       Monitor shows continued Bradycardia, HR 38-42 when up in chair at rest. HR in mid 40's when up to BR. Cardiology here to see pt. Plan to monitor HR over weekend and if not improved,possible pacemaker Monday. PT/OT ordered to increase activity and see if HR improves. Denied any pain, Shortness of breath or dizziness.

## 2023-06-24 NOTE — PROGRESS NOTES
06/24/23 1500   Appointment Info   Signing Clinician's Name / Credentials (OT) Larisa ALLRED OTR/L CLT   Living Environment   People in Home other relative(s)   Current Living Arrangements house   Self-Care   Equipment Currently Used at Home walker, rolling   Activity/Exercise/Self-Care Comment I with ADLs, Family A with driving and IADLs as needed   General Information   Onset of Illness/Injury or Date of Surgery 06/22/23   Referring Physician    Additional Occupational Profile Info/Pertinent History of Current Problem Dulce Pritchard is a 77 year old female admitted on 6/22/2023. She has h/o CAD, CHF, MR, pulmonary hypertension, hyperlipidemia, depression, obesity, sinus node dysfunction , history of second-degree AV block on atenolol found to be bradycardic.  Atenolol has been now stopped, EKG revealed second degree heart block. Planned for PPM tomorrow   Existing Precautions/Restrictions no known precautions/restrictions   Cognitive Status Examination   Affect/Mental Status (Cognitive) WNL   Follows Commands WNL   Bed Mobility   Bed Mobility supine-sit;sit-supine   Supine-Sit [place] (Bed Mobility) moderate assist (50% patient effort)   Sit-Supine [place] (Bed Mobility) moderate assist (50% patient effort)   Transfers   Transfers sit-stand transfer   Sit-Stand Transfer   Sit-Stand [place] (Transfers) minimum assist (75% patient effort)   Clinical Impression   Criteria for Skilled Therapeutic Interventions Met (OT) Yes, treatment indicated   OT Diagnosis decreased ADLs, act tolerance   OT Problem List-Impairments impacting ADL problems related to;activity tolerance impaired;strength   Assessment of Occupational Performance 1-3 Performance Deficits   Identified Performance Deficits   (trsfs, bed mob, act tolerance)   Planned Therapy Interventions (OT) ADL retraining;transfer training   Clinical Decision Making Complexity (OT) moderate complexity   Risk & Benefits of therapy  have been explained care plan/treatment goals reviewed   OT Total Evaluation Time   OT Eval, Moderate Complexity Minutes (72085) 8   OT Goals   Therapy Frequency (OT) Daily   OT Predicted Duration/Target Date for Goal Attainment 07/01/23   OT Goals Hygiene/Grooming;Lower Body Dressing;Transfers   OT: Hygiene/Grooming supervision/stand-by assist   OT: Lower Body Dressing Supervision/stand-by assist   OT: Transfer Supervision/stand-by assist   Interventions   Interventions Quick Adds Therapeutic Activity   Therapeutic Activities   Therapeutic Activity Minutes (89324) 8   Treatment Detail/Skilled Intervention Ambulated in amaya w/ w/c follow- CGA with FWW, flexed forward posture. seated rest 1/2 way. CGA with chair trsf. States she was SOB and LE's tired. HR 64-70 upon return to room   OT Discharge Planning   OT Plan G/h, trsfs, LB drsg   OT Discharge Recommendation (DC Rec) Transitional Care Facility   OT Rationale for DC Rec Patient appears below basline. Requiring A with bed mobility, trsfs.   OT Brief overview of current status Mod bed mob,   Total Session Time   Timed Code Treatment Minutes 8   Total Session Time (sum of timed and untimed services) 16

## 2023-06-24 NOTE — PLAN OF CARE
Problem: Heart Failure  Goal: Optimal Coping  Outcome: Progressing  Goal: Optimal Cardiac Output  Outcome: Progressing  Goal: Stable Heart Rate and Rhythm  Outcome: Progressing  Goal: Optimal Functional Ability  Outcome: Progressing  Goal: Fluid and Electrolyte Balance  Outcome: Progressing  Goal: Improved Oral Intake  Outcome: Progressing  Goal: Effective Oxygenation and Ventilation  Outcome: Progressing  Goal: Effective Breathing Pattern During Sleep  Outcome: Progressing     Problem: Dysrhythmia  Goal: Normalized Cardiac Rhythm  Outcome: Progressing   Goal Outcome Evaluation:       Pt heart rate was sinin the mid to low 40's throughout shift.  Pt denies dizziness, sob, nausea etc.    PT reports some chronic arthritic pain for which prn apap was given at bedtime.

## 2023-06-25 ENCOUNTER — APPOINTMENT (OUTPATIENT)
Dept: PHYSICAL THERAPY | Facility: HOSPITAL | Age: 78
DRG: 308 | End: 2023-06-25
Attending: INTERNAL MEDICINE
Payer: COMMERCIAL

## 2023-06-25 ENCOUNTER — APPOINTMENT (OUTPATIENT)
Dept: OCCUPATIONAL THERAPY | Facility: HOSPITAL | Age: 78
DRG: 308 | End: 2023-06-25
Attending: INTERNAL MEDICINE
Payer: COMMERCIAL

## 2023-06-25 LAB
ANION GAP SERPL CALCULATED.3IONS-SCNC: 8 MMOL/L (ref 7–15)
BUN SERPL-MCNC: 31.6 MG/DL (ref 8–23)
CALCIUM SERPL-MCNC: 9.3 MG/DL (ref 8.8–10.2)
CHLORIDE SERPL-SCNC: 112 MMOL/L (ref 98–107)
CREAT SERPL-MCNC: 1.11 MG/DL (ref 0.51–0.95)
DEPRECATED HCO3 PLAS-SCNC: 21 MMOL/L (ref 22–29)
GFR SERPL CREATININE-BSD FRML MDRD: 52 ML/MIN/1.73M2
GLUCOSE SERPL-MCNC: 104 MG/DL (ref 70–99)
HOLD SPECIMEN: NORMAL
POTASSIUM SERPL-SCNC: 4.3 MMOL/L (ref 3.4–5.3)
SODIUM SERPL-SCNC: 141 MMOL/L (ref 136–145)

## 2023-06-25 PROCEDURE — 36415 COLL VENOUS BLD VENIPUNCTURE: CPT | Performed by: INTERNAL MEDICINE

## 2023-06-25 PROCEDURE — 80048 BASIC METABOLIC PNL TOTAL CA: CPT | Performed by: INTERNAL MEDICINE

## 2023-06-25 PROCEDURE — 97116 GAIT TRAINING THERAPY: CPT | Mod: GP | Performed by: PHYSICAL THERAPIST

## 2023-06-25 PROCEDURE — 97110 THERAPEUTIC EXERCISES: CPT | Mod: GO

## 2023-06-25 PROCEDURE — 99232 SBSQ HOSP IP/OBS MODERATE 35: CPT | Performed by: INTERNAL MEDICINE

## 2023-06-25 PROCEDURE — 97162 PT EVAL MOD COMPLEX 30 MIN: CPT | Mod: GP | Performed by: PHYSICAL THERAPIST

## 2023-06-25 PROCEDURE — 97535 SELF CARE MNGMENT TRAINING: CPT | Mod: GO

## 2023-06-25 PROCEDURE — 250N000013 HC RX MED GY IP 250 OP 250 PS 637: Performed by: INTERNAL MEDICINE

## 2023-06-25 PROCEDURE — 250N000013 HC RX MED GY IP 250 OP 250 PS 637: Performed by: HOSPITALIST

## 2023-06-25 PROCEDURE — 210N000001 HC R&B IMCU HEART CARE

## 2023-06-25 RX ORDER — FUROSEMIDE 20 MG
40 TABLET ORAL 2 TIMES DAILY
Status: DISCONTINUED | OUTPATIENT
Start: 2023-06-25 | End: 2023-06-26 | Stop reason: HOSPADM

## 2023-06-25 RX ADMIN — ACETAMINOPHEN 650 MG: 325 TABLET ORAL at 19:27

## 2023-06-25 RX ADMIN — HYDROXYCHLOROQUINE SULFATE 200 MG: 200 TABLET, FILM COATED ORAL at 08:35

## 2023-06-25 RX ADMIN — ALLOPURINOL 100 MG: 100 TABLET ORAL at 08:35

## 2023-06-25 RX ADMIN — FLUOXETINE 20 MG: 20 CAPSULE ORAL at 08:36

## 2023-06-25 RX ADMIN — Medication 81 MG: at 08:35

## 2023-06-25 RX ADMIN — HYDROXYCHLOROQUINE SULFATE 200 MG: 200 TABLET, FILM COATED ORAL at 20:33

## 2023-06-25 RX ADMIN — LEVOTHYROXINE SODIUM 25 MCG: 0.03 TABLET ORAL at 06:58

## 2023-06-25 RX ADMIN — FUROSEMIDE 40 MG: 20 TABLET ORAL at 20:33

## 2023-06-25 RX ADMIN — ATORVASTATIN CALCIUM 20 MG: 10 TABLET, FILM COATED ORAL at 20:33

## 2023-06-25 RX ADMIN — GABAPENTIN 600 MG: 300 CAPSULE ORAL at 21:43

## 2023-06-25 RX ADMIN — FUROSEMIDE 40 MG: 20 TABLET ORAL at 12:49

## 2023-06-25 ASSESSMENT — ACTIVITIES OF DAILY LIVING (ADL)
ADLS_ACUITY_SCORE: 33
ADLS_ACUITY_SCORE: 34
ADLS_ACUITY_SCORE: 34
ADLS_ACUITY_SCORE: 33
ADLS_ACUITY_SCORE: 34

## 2023-06-25 NOTE — PROGRESS NOTES
HEART CARE CONSULTATON NOTE        Assessment/Recommendations   Assessment:   1.  Sinus node dysfunction, symptomatic bradycardia.  Secondary to atenolol and acute hypothyroidism.  Improving.  Heart rate 87 today with walking.   2.  Coronary artery disease  3.  Acute on chronic Congestive heart failure, HFrEF  4.  Mitral regurgitation, mild to moderate  5.  Pulmonary hypertenion   6.  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  No indication for permanent pacemaker at this time.  Patient has active hypothyroidism which we have started treatment.  She has acute kidney injury and was on atenolol which will take days to completely clear out of her system.  Her rates are now in the upper 40s (increase to mid 80s with walking).    2.  Acute kidney injury improving.  Continue to monitor  4..  Started on Synthroid 6/22, TSH elevated, free T4 low    Continue cardiac monitoring today.  Plan for discharge home tomorrow if remains stable with 2 weeks of ambulatory telemetry.         History of Present Illness/Subjective    HPI: Dulce Pritchard is a 77 year old female history of coronary disease, intermittent junctional rhythm, history of second-degree AV block who is on atenolol found to be bradycardic.     S: No further profound bradycardia.  Heart rates are continue to improve.  Now in the mid 40s.  She is being actively treated for hypothyroidism.  Her energy level and fatigue have dramatically improved.  With walking heart rates were 80s for this afternoon.  Denies any lightheadedness or syncope.  Gradual improvement of heart rates are reassuring.  Likely will not require pacemaker.  She will stay for another day in the hospital.  If she has no events will discharge on home ambulatory cardiac monitor.    Plan with patient.  She is agreeable.    Echo: 2023  The left ventricle is mildly dilated.  Left ventricular function is decreased. The ejection fraction is 50-55%  (borderline).  The left atrium is mildly dilated.  The  "right atrium is mildly dilated.  Severe (>55mmHg) pulmonary hypertension is present.  IVC diameter >2.1 cm collapsing <50% with sniff suggests a high RA pressure  estimated at 15 mmHg or greater.  There is mild to moderate (1-2+) mitral regurgitation.     Physical Examination  Review of Systems   VITALS: /60 (BP Location: Right arm)   Pulse (S) 84   Temp 97.4  F (36.3  C) (Oral)   Resp 16   Ht 1.626 m (5' 4\")   Wt 82.1 kg (181 lb)   SpO2 (!) 88%   BMI 31.07 kg/m    BMI: Body mass index is 31.07 kg/m .  Wt Readings from Last 3 Encounters:   06/25/23 82.1 kg (181 lb)   02/06/23 95.7 kg (211 lb)     No intake or output data in the 24 hours ending 06/22/23 1637  General Appearance:   no distress, obese body habitus   ENT/Mouth: membranes moist, no oral lesions or bleeding gums.      EYES:  no scleral icterus, normal conjunctivae   Neck: no carotid bruits or thyromegaly   Chest/Lungs:   lungs are clear to auscultation,    Cardiovascular:   Regular. Normal first and second heart sounds with fiant systolic murmurs, rubsJugular venous pressure  8 cm   Abdomen:  no  tenderness; bowel sounds are present   Extremities: no cyanosis or clubbing   Skin: no xanthelasma, warm.    Neurologic: normal  bilateral, no tremors     Psychiatric: alert and oriented x3, calm     Review Of Systems  Skin: negative  Eyes: negative  Ears/Nose/Throat: negative  Respiratory: Dyspnea on exertion  Cardiovascular: Fatigue (improved), bradycardia, no chest pain  Gastrointestinal: negative  Genitourinary: negative  Musculoskeletal: negative  Neurologic: negative  Psychiatric: negative  Hematologic/Lymphatic/Immunologic: negative  Endocrine: negative          Lab Results    Chemistry/lipid CBC Cardiac Enzymes/BNP/TSH/INR   Recent Labs   Lab Test 04/21/23  0948   CHOL 146   HDL 48*   LDL 76   TRIG 110     Recent Labs   Lab Test 04/21/23  0948 03/27/23  0949 11/22/22  1354   LDL 76 71 79     Recent Labs   Lab Test 04/21/23  0948    "   POTASSIUM 4.2   CHLORIDE 109*   CO2 19*   *   BUN 43.4*   CR 1.14*   GFRESTIMATED 50*   EDGAR 9.8     Recent Labs   Lab Test 23  0948 23  0949 23  1033   CR 1.14* 0.90 1.32*     Recent Labs   Lab Test 19  1041 18  1821   A1C 5.9 6.0          Recent Labs   Lab Test 23  0948   WBC 4.7   HGB 11.4*   HCT 37.4   *   *     Recent Labs   Lab Test 23  0948 23  0949 02/10/23  1056   HGB 11.4* 11.2* 13.6    Recent Labs   Lab Test 23  0722 23  0110 23  1926   TROPONINI 0.11 0.12 0.12     Recent Labs   Lab Test 23  192   BNP 1,597*     No results for input(s): TSH in the last 67507 hours.  No results for input(s): INR in the last 37369 hours.     Medical History  Surgical History Family History Social History   Past Medical History:   Diagnosis Date     Atrial fibrillation (H)      Chronic heart failure with preserved ejection fraction (H) 2023     Congestive heart failure (H)      Depressive disorder      Hyperlipidemia      Hypertension      Obese      Primary hypertension 3/27/2023     Pulmonary hypertension (H)      History reviewed. No pertinent surgical history.  No family history on file.     Social History     Socioeconomic History     Marital status:      Spouse name: Not on file     Number of children: Not on file     Years of education: Not on file     Highest education level: Not on file   Occupational History     Not on file   Tobacco Use     Smoking status: Former     Packs/day: 1.00     Types: Cigarettes     Start date:      Quit date:      Years since quittin.4     Smokeless tobacco: Not on file   Vaping Use     Vaping Use: Never used   Substance and Sexual Activity     Alcohol use: Never     Drug use: Never     Sexual activity: Not on file   Other Topics Concern     Not on file   Social History Narrative     Not on file     Social Determinants of Health     Financial Resource Strain: Not on  file   Food Insecurity: Not on file   Transportation Needs: Not on file   Physical Activity: Not on file   Stress: Not on file   Social Connections: Not on file   Intimate Partner Violence: Not on file   Housing Stability: Not on file         Medications  Allergies   No current outpatient medications on file.      No Known Allergies      Last Comprehensive Metabolic Panel:  Lab Results   Component Value Date     06/25/2023    POTASSIUM 4.3 06/25/2023    CHLORIDE 112 (H) 06/25/2023    CO2 21 (L) 06/25/2023    ANIONGAP 8 06/25/2023     (H) 06/25/2023    BUN 31.6 (H) 06/25/2023    CR 1.11 (H) 06/25/2023    GFRESTIMATED 52 (L) 06/25/2023    EDGAR 9.3 06/25/2023           Dakota Ernandez DO

## 2023-06-25 NOTE — PROGRESS NOTES
"   06/25/23 0835   Appointment Info   Signing Clinician's Name / Credentials (PT) Shefali Ashraf, PT, DPT   Living Environment   People in Home other relative(s)  (niece and niece's family)   Current Living Arrangements house   Home Accessibility wheelchair accessible;stairs within home  (ramped entrance)   Number of Stairs, Within Home, Primary greater than 10 stairs   Stair Railings, Within Home, Primary other (see comments)  (has chair lift)   Self-Care   Equipment Currently Used at Home walker, rolling  (4WW)   Activity/Exercise/Self-Care Comment Pt independent with ADLs.   General Information   Onset of Illness/Injury or Date of Surgery 06/22/23   Referring Physician Josh Zavala DO   Patient/Family Therapy Goals Statement (PT) None stated.   Pertinent History of Current Problem (include personal factors and/or comorbidities that impact the POC) Per H&P: \"77 year old female admitted on 6/22/2023. She has h/o CAD, CHF, MR, pulmonary hypertension, hyperlipidemia, depression, obesity, sinus node dysfunction , history of second-degree AV block on atenolol found to be bradycardic.  Atenolol has been now stopped, EKG revealed second degree heart block. Planned for PPM tomorrow\"   Existing Precautions/Restrictions fall   Range of Motion (ROM)   Range of Motion ROM is WFL   Strength (Manual Muscle Testing)   Strength (Manual Muscle Testing) Deficits observed during functional mobility   Bed Mobility   Comment, (Bed Mobility) Pt seated in chair upon therapist entry. States she normally sleeps in recliner chair.   Transfers   Transfers sit-stand transfer   Impairments Contributing to Impaired Transfers impaired balance;decreased strength   Sit-Stand Transfer   Sit-Stand [place] (Transfers) minimum assist (75% patient effort);verbal cues   Assistive Device (Sit-Stand Transfers) walker, front-wheeled   Comment, (Sit-Stand Transfer) Pt has posterior LOB with first stand and unable to maintain standing position. " Able to achieve standing with min assist of 1.   Gait/Stairs (Locomotion)   [place] Level (Gait) contact guard;verbal cues   Assistive Device (Gait) walker, front-wheeled   Distance in Feet 30'   Distance in Feet (Gait) additional 40'   Pattern (Gait) step-to   Deviations/Abnormal Patterns (Gait) gait speed decreased;geno decreased  (forward flexed posture)   Clinical Impression   Criteria for Skilled Therapeutic Intervention Yes, treatment indicated   PT Diagnosis (PT) impaired functional mobility   Influenced by the following impairments decreased strength, impaired balance, decreased activity tolerance   Functional limitations due to impairments transfers, ambulation   Clinical Presentation (PT Evaluation Complexity) Evolving/Changing   Clinical Presentation Rationale Pt presents as medically diagnosed.   Clinical Decision Making (Complexity) moderate complexity   Planned Therapy Interventions (PT) balance training;bed mobility training;gait training;home exercise program;neuromuscular re-education;patient/family education;strengthening;transfer training   Risk & Benefits of therapy have been explained evaluation/treatment results reviewed;participants voiced agreement with care plan;participants included;patient   PT Total Evaluation Time   PT Eval, Moderate Complexity Minutes (33978) 15   Physical Therapy Goals   PT Frequency Daily   PT Predicted Duration/Target Date for Goal Attainment 07/02/23   PT Goals Bed Mobility;Transfers;Gait;Stairs   PT: Transfers Minimal assist;Sit to/from stand;Assistive device   PT: Gait Minimal assist;Assistive device;Rolling walker;100 feet   Interventions   Interventions Quick Adds Gait Training   Gait Training   Gait Training Minutes (18882) 8   Symptoms Noted During/After Treatment (Gait Training) fatigue;shortness of breath   Treatment Detail/Skilled Intervention With FWW. Patient demonstrates significant forward flexion. O2 sats 90% on RA following walk, HR 90 bpm. RN  notified.   [place] Level (Gait Training) minimum assist (75% patient effort)   Physical Assistance Level (Gait Training) verbal cues;1 person assist   Assistive Device (Gait Training) rolling walker   Gait Analysis Deviations decreased geno;decreased step length;decreased toe-to-floor clearance   Impairments (Gait Analysis/Training) balance impaired;strength decreased   PT Discharge Planning   PT Plan gait with FWW and chair follow, LE strengthening   PT Discharge Recommendation (DC Rec) Transitional Care Facility   PT Rationale for DC Rec Patient requires hands on assist for ambulation and fatigues quickly. Uncertain of level of assist family can provide. Recommend TCU as safest option at discharge. If patient declines TCU, recommend 24 hour supervision/assist, hands on assist of 1 for ambulation with FWW, and home PT.   PT Brief overview of current status Sit <> stand, min assist of 1. Ambulates 40' with FWW, min assist of 1.   Total Session Time   Timed Code Treatment Minutes 8   Total Session Time (sum of timed and untimed services) 23

## 2023-06-25 NOTE — PLAN OF CARE
Goal Outcome Evaluation:      Plan of Care Reviewed With: patient    Overall Patient Progress: improvingOverall Patient Progress: improving    Outcome Evaluation: HR above 40, up to 100 with activity of ambulating to bathroom.

## 2023-06-25 NOTE — PROGRESS NOTES
"[facility]    Medicine Progress Note - Hospitalist Service    Date of Admission:  6/22/2023    Assessment & Plan   77-year-old female with history of sinus node dysfunction, second-degree AV block, CAD, CHF, pulmonary hypertension, mood disorder and obesity presents 6/22/2023 with symptomatic bradycardia and PALMIRA.      Symptomatic bradycardia: Likely secondary to atenolol accumulation in the setting of PALMIRA  Also found to be mildly hypothyroid  -- Hold atenolol indefinitely  -- Started thyroid replacement  -- continue to monitor bradycardia for improvement, consider pacemaker placement vs ramona with Holter monitor tomorrow      Hypothyroidism: New diagnosis  TSH on admission 5.37 with free T4 of 0.79  --Started Synthroid 25 mcg daily  -- Outpatient follow-up      PALMIRA on CKD: Resolved  Thought secondary to hypotension, bradycardia in the setting of Lasix and lisinopril  -- ok to stop hydration and resume home lasix  -- Continue to hold home lisinopril      History of CHF: Last TTE showed EF of 50 to 55%  --Holding home lisinopril for now as above  -- resumed home lasix 6/25      History of CAD: Continue aspirin, statin      Mood disorder: Continue Prozac      Inflammatory arthritis: Continue gabapentin, tramadol and Plaquenil.      Disposition: PT/OT recommends TCU but patient politely declines since she has home care already established. Will therefore plan home with homecare services per patient request.          Diet: Regular Diet Adult    DVT Prophylaxis: Pneumatic Compression Devices  [place] Catheter: Not present  Lines: None     Cardiac Monitoring: ACTIVE order. Indication: Bradycardias (48 hours)  Code Status: Full Code      Clinically Significant Risk Factors                  # Hypertension: Noted on problem list        # Obesity: Estimated body mass index is 31.07 kg/m  as calculated from the following:    Height as of this encounter: 1.626 m (5' 4\").    Weight as of this encounter: 82.1 " kg (181 lb)., PRESENT ON ADMISSION          Disposition Plan      Expected Discharge Date: 06/26/2023        Discharge Comments: Bradycardic to 38 at rest. Plan holter monitor at discharge          Josh Zavala DO  Hospitalist Service  [facility]  Securely message with About[employer] (more info)  Text page via mokono Paging/Directory   ______________________________________________________________________    Interval History   NAD. Denies any complaints    Physical Exam   Vital Signs: Temp: 97.7  F (36.5  C) Temp src: Oral BP: (!) 154/70 Pulse: 68   Resp: 16 SpO2: 95 % O2 Device: None (Room air)    Weight: 180 lbs 15.96 oz  General: NAD  RESPIRATORY: Breathing nonlabored  CARDIOVASCULAR: Trace nonpitting le edema bilat.   ABDOMEN: soft and non-tender  NEUROLOGIC: Motor and sensory intact, speech clear        Medical Decision Making       >40 MINUTES SPENT BY ME on the date of service doing chart review, history, exam, documentation & further activities per the note.      Data

## 2023-06-26 ENCOUNTER — HOSPITAL ENCOUNTER (INPATIENT)
Dept: CARDIOLOGY | Facility: HOSPITAL | Age: 78
Discharge: HOME | DRG: 308 | End: 2023-06-26
Attending: INTERNAL MEDICINE
Payer: COMMERCIAL

## 2023-06-26 VITALS
WEIGHT: 184.08 LBS | BODY MASS INDEX: 31.43 KG/M2 | DIASTOLIC BLOOD PRESSURE: 60 MMHG | TEMPERATURE: 97.7 F | RESPIRATION RATE: 18 BRPM | HEIGHT: 64 IN | HEART RATE: 53 BPM | SYSTOLIC BLOOD PRESSURE: 120 MMHG | OXYGEN SATURATION: 96 %

## 2023-06-26 LAB
ANION GAP SERPL CALCULATED.3IONS-SCNC: 8 MMOL/L (ref 7–15)
BUN SERPL-MCNC: 25.6 MG/DL (ref 8–23)
CALCIUM SERPL-MCNC: 8.8 MG/DL (ref 8.8–10.2)
CHLORIDE SERPL-SCNC: 110 MMOL/L (ref 98–107)
CREAT SERPL-MCNC: 1.07 MG/DL (ref 0.51–0.95)
DEPRECATED HCO3 PLAS-SCNC: 23 MMOL/L (ref 22–29)
GFR SERPL CREATININE-BSD FRML MDRD: 54 ML/MIN/1.73M2
GLUCOSE SERPL-MCNC: 93 MG/DL (ref 70–99)
POTASSIUM SERPL-SCNC: 4.1 MMOL/L (ref 3.4–5.3)
SODIUM SERPL-SCNC: 141 MMOL/L (ref 136–145)

## 2023-06-26 PROCEDURE — 99239 HOSP IP/OBS DSCHRG MGMT >30: CPT | Performed by: INTERNAL MEDICINE

## 2023-06-26 PROCEDURE — 250N000013 HC RX MED GY IP 250 OP 250 PS 637: Performed by: HOSPITALIST

## 2023-06-26 PROCEDURE — 93270 REMOTE 30 DAY ECG REV/REPORT: CPT

## 2023-06-26 PROCEDURE — 36415 COLL VENOUS BLD VENIPUNCTURE: CPT | Performed by: INTERNAL MEDICINE

## 2023-06-26 PROCEDURE — 250N000013 HC RX MED GY IP 250 OP 250 PS 637: Performed by: INTERNAL MEDICINE

## 2023-06-26 PROCEDURE — 80048 BASIC METABOLIC PNL TOTAL CA: CPT | Performed by: INTERNAL MEDICINE

## 2023-06-26 RX ORDER — LEVOTHYROXINE SODIUM 25 UG/1
25 TABLET ORAL
Qty: 30 TABLET | Refills: 3 | Status: SHIPPED | OUTPATIENT
Start: 2023-06-26

## 2023-06-26 RX ADMIN — FUROSEMIDE 40 MG: 20 TABLET ORAL at 08:24

## 2023-06-26 RX ADMIN — LEVOTHYROXINE SODIUM 25 MCG: 0.03 TABLET ORAL at 06:46

## 2023-06-26 RX ADMIN — FLUOXETINE 20 MG: 20 CAPSULE ORAL at 08:22

## 2023-06-26 RX ADMIN — ALLOPURINOL 100 MG: 100 TABLET ORAL at 08:23

## 2023-06-26 RX ADMIN — Medication 81 MG: at 08:23

## 2023-06-26 RX ADMIN — HYDROXYCHLOROQUINE SULFATE 200 MG: 200 TABLET, FILM COATED ORAL at 08:24

## 2023-06-26 ASSESSMENT — ACTIVITIES OF DAILY LIVING (ADL)
ADLS_ACUITY_SCORE: 34
ADLS_ACUITY_SCORE: 34
ADLS_ACUITY_SCORE: 30
ADLS_ACUITY_SCORE: 29
ADLS_ACUITY_SCORE: 30
ADLS_ACUITY_SCORE: 29
ADLS_ACUITY_SCORE: 30

## 2023-06-26 NOTE — PLAN OF CARE
"Goal Outcome Evaluation:                    Patient is alert and oriented.  Patient denied pain and shortness of breath. Vital signs stable, heart rate is sinus bradycardia in the 50s.  No dizziness noted.  Will continue to monitor.      Problem: Plan of Care - These are the overarching goals to be used throughout the patient stay.    Goal: Plan of Care Review  Description: The Plan of Care Review/Shift note should be completed every shift.  The Outcome Evaluation is a brief statement about your assessment that the patient is improving, declining, or no change.  This information will be displayed automatically on your shift note.  Outcome: Progressing  Goal: Patient-Specific Goal (Individualized)  Description: You can add care plan individualizations to a care plan. Examples of Individualization might be:  \"Parent requests to be called daily at 9am for status\", \"I have a hard time hearing out of my right ear\", or \"Do not touch me to wake me up as it startles me\".  Outcome: Progressing  Goal: Absence of Hospital-Acquired Illness or Injury  Outcome: Progressing  Intervention: Identify and Manage Fall Risk  Recent Flowsheet Documentation  Taken 6/25/2023 2035 by Nuha Stearns, RN  Safety Promotion/Fall Prevention:   activity supervised   nonskid shoes/slippers when out of bed   clutter free environment maintained   safety round/check completed  Intervention: Prevent Skin Injury  Recent Flowsheet Documentation  Taken 6/25/2023 2035 by Nuha Stearns, RN  Body Position: position changed independently  Goal: Optimal Comfort and Wellbeing  Outcome: Progressing  Goal: Readiness for Transition of Care  Outcome: Progressing       "

## 2023-06-26 NOTE — PLAN OF CARE
Physical Therapy Discharge Summary    Reason for therapy discharge:    Discharged to home with home therapy.    Progress towards therapy goal(s). See goals on Care Plan in [facility] electronic health record for goal details.  Goals partially met.  Barriers to achieving goals:   discharge from facility.    Therapy recommendation(s):    Recommend continued therapies to improve overall strength, balance and mobility.       Cristy Pritchard, PT, DPT  6/26/2023

## 2023-06-26 NOTE — PLAN OF CARE
Goal Outcome Evaluation:    Discharge instructions given to pt, questions answered. Cardiac event monitor with pt. Pt taken to lobby by NA to be picked up by family.

## 2023-06-26 NOTE — PLAN OF CARE
"Goal Outcome Evaluation:             Patient is alert and oriented.  Denied pain.  Staff attempted to group cares to promote rest.  Heart rhythm was sinus bradycardia in the upper 40s while sleeping.  Patient declined to have staff change external catheter at this time, requesting it be done during daytime.  Will continue to monitor.      Problem: Plan of Care - These are the overarching goals to be used throughout the patient stay.    Goal: Plan of Care Review  Description: The Plan of Care Review/Shift note should be completed every shift.  The Outcome Evaluation is a brief statement about your assessment that the patient is improving, declining, or no change.  This information will be displayed automatically on your shift note.  6/26/2023 0525 by Nuha Stearns RN  Outcome: Progressing  6/25/2023 2225 by Nuha Stearns RN  Outcome: Progressing  Goal: Patient-Specific Goal (Individualized)  Description: You can add care plan individualizations to a care plan. Examples of Individualization might be:  \"Parent requests to be called daily at 9am for status\", \"I have a hard time hearing out of my right ear\", or \"Do not touch me to wake me up as it startles me\".  6/26/2023 0525 by Nuha Stearns RN  Outcome: Progressing  6/25/2023 2225 by Nuha Stearns RN  Outcome: Progressing  Goal: Absence of Hospital-Acquired Illness or Injury  6/26/2023 0525 by Nuha Stearns RN  Outcome: Progressing  6/25/2023 2225 by Nuha Stearns RN  Outcome: Progressing  Intervention: Identify and Manage Fall Risk  Recent Flowsheet Documentation  Taken 6/26/2023 0000 by Nuha Stearns RN  Safety Promotion/Fall Prevention:   activity supervised   nonskid shoes/slippers when out of bed   lighting adjusted   clutter free environment maintained   safety round/check completed  Taken 6/25/2023 2035 by Nuha Stearns RN  Safety Promotion/Fall Prevention:   activity supervised   nonskid shoes/slippers when out of bed   " clutter free environment maintained   safety round/check completed  Intervention: Prevent Skin Injury  Recent Flowsheet Documentation  Taken 6/26/2023 0000 by Nuha Stearns RN  Body Position: position changed independently  Taken 6/25/2023 2035 by Nuha Stearns RN  Body Position: position changed independently  Goal: Optimal Comfort and Wellbeing  6/26/2023 0525 by Nuha Stearns, RN  Outcome: Progressing  6/25/2023 2225 by Nuha Stearns RN  Outcome: Progressing  Goal: Readiness for Transition of Care  6/26/2023 0525 by Nuha Stearns RN  Outcome: Progressing  6/25/2023 2225 by Nuha Stearns RN  Outcome: Progressing

## 2023-06-26 NOTE — DISCHARGE SUMMARY
[facility]  Hospitalist Discharge Summary      Date of Admission:  6/22/2023  Date of Discharge:  6/26/2023  1:53 PM  Discharging Provider: Josh Zavala,   Discharge Service: Hospitalist Service        Follow-ups Needed After Discharge   Follow-up Appointments     Follow-up and recommended labs and tests       Follow up with primary care provider, Colin Christie, within 7   days for hospital follow- up.  Check BMP and BP  Follow up with cardiology as directed  STOP ATENOLOL INDEFINATELY   I am also stopping your lisinopril given recent kidney injury and soft   blood pressures. Continue to hold this medication until seen by PCP for BP   check            Unresulted Labs Ordered in the Past 30 Days of this Admission     No orders found from 5/23/2023 to 6/23/2023.           Discharge Disposition   Discharged to home  Condition at discharge: Stable      Hospital Course   77-year-old female with history of sinus node dysfunction, second-degree AV block, CAD, CHF, pulmonary hypertension, mood disorder and obesity presents 6/22/2023 with symptomatic bradycardia and PALMIRA.        Symptomatic bradycardia: Likely secondary to atenolol accumulation in the setting of PALMIRA  Also found to be mildly hypothyroid  -- Hold atenolol indefinitely  -- Started thyroid replacement  -- ok to discharge home with Holter monitor for 2 weeks per cardiology         Hypothyroidism: New diagnosis  TSH on admission 5.37 with free T4 of 0.79  --Started Synthroid 25 mcg daily  -- Outpatient follow-up        PALMIRA on CKD: Resolved  Thought secondary to hypotension, bradycardia in the setting of Lasix and lisinopril  -- ok to resume home lasix  -- Continue to hold home lisinopril after discharge        History of CHF: Last TTE showed EF of 50 to 55%  --Holding home lisinopril for now as above  -- resumed home lasix 6/25        History of CAD: Continue aspirin, statin        Mood disorder: Continue  Prozac        Inflammatory arthritis: Continue gabapentin, tramadol and Plaquenil.        Disposition: PT/OT recommends TCU but patient politely declines since she has home care already established. Will therefore plan home with homecare services per patient request.        Consultations This Hospital Stay   CARDIOLOGY IP CONSULT  CARE MANAGEMENT / SOCIAL WORK IP CONSULT  PHYSICAL THERAPY ADULT IP CONSULT  OCCUPATIONAL THERAPY ADULT IP CONSULT    Code Status   Full Code    Time Spent on this Encounter   I, Josh Zavala DO, personally saw the patient today and spent greater than 30 minutes discharging this patient.       Josh Zavala DO  [facility]  [address] [place] [id number]  Phone: [phone]  Fax: [phone]  ______________________________________________________________________    Physical Exam   Vital Signs: Temp: 97.7  F (36.5  C) Temp src: Oral BP: 120/60 Pulse: 53   Resp: 18 SpO2: 96 % O2 Device: None (Room air)    Weight: 184 lbs 1.35 oz    General: NAD  HEENT: Without congestion or inflammation  RESPIRATORY: Respirations nonlabored  CARDIOVASCULAR: Trace le edema bilat.  NEUROLOGIC: No focal arm or leg weakness, speech is clear    Primary Care Physician   Colin Christie    Discharge Orders      Home Care Referral      Reason for your hospital stay    Bradycardia     Follow-up and recommended labs and tests     Follow up with primary care provider, Colin Christie, within 7 days for hospital follow- up.  Check BMP and BP  Follow up with cardiology as directed  STOP ATENOLOL INDEFINATELY   I am also stopping your lisinopril given recent kidney injury and soft blood pressures. Continue to hold this medication until seen by PCP for BP check     Activity    Your activity upon discharge: activity as tolerated     Adult Cardiac Event Monitor     Diet    Follow this diet upon discharge: Orders Placed This Encounter      Regular  Diet Adult     \    Discharge Medications   Current Discharge Medication List      START taking these medications    Details   levothyroxine (SYNTHROID/LEVOTHROID) 25 MCG tablet Take 1 tablet (25 mcg) by mouth every morning (before breakfast)  Qty: 30 tablet, Refills: 3    Associated Diagnoses: Hypothyroidism, unspecified type         CONTINUE these medications which have NOT CHANGED    Details   allopurinol (ZYLOPRIM) 100 MG tablet Take 100 mg by mouth daily      aspirin (ASA) 81 MG EC tablet Take 1 tablet (81 mg) by mouth daily  Qty: 30 tablet, Refills: 0    Associated Diagnoses: Acute heart failure with preserved ejection fraction (HFpEF) (H)      atorvastatin (LIPITOR) 20 MG tablet Take 1 tablet (20 mg) by mouth every evening  Qty: 30 tablet, Refills: 0    Associated Diagnoses: Acute heart failure with preserved ejection fraction (HFpEF) (H)      FLUoxetine (PROZAC) 20 MG capsule Take 20 mg by mouth daily      furosemide (LASIX) 40 MG tablet Take 1 tablet (40 mg) by mouth 2 times daily  Qty: 60 tablet, Refills: 0    Associated Diagnoses: Acute heart failure with preserved ejection fraction (HFpEF) (H)      gabapentin (NEURONTIN) 300 MG capsule Take 2 capsules (600 mg) by mouth At Bedtime  Qty: 30 capsule, Refills: 0    Associated Diagnoses: Neuropathy      guaiFENesin (MUCINEX) 600 MG 12 hr tablet Take 1 tablet (600 mg) by mouth 2 times daily  Qty: 14 tablet, Refills: 0    Associated Diagnoses: Wheezing      hydroxychloroquine (PLAQUENIL) 200 MG tablet Take 200 mg by mouth 2 times daily      melatonin 3 MG tablet Take 1 tablet (3 mg) by mouth nightly as needed for sleep  Qty: 14 tablet, Refills: 0    Associated Diagnoses: Primary insomnia      polyethylene glycol (MIRALAX) 17 GM/Dose powder DISSOLVE 17 GMS IN WATER AND TAKE BY MOUTH ONCE DAILY AS NEEDED      STOOL SOFTENER/LAXATIVE 50-8.6 MG tablet TAKE 2 TABLETS BY MOUTH ONCE DAILY DX CONSTIPATION      traMADol (ULTRAM) 50 MG tablet Take 50 mg by mouth every 6  hours as needed Per Lily: Take 1-2 tablets PO Q6H PRN Chronic Pain      Vitamin D (Cholecalciferol) 25 MCG (1000 UT) TABS Take 1,000 Units by mouth daily         STOP taking these medications       lisinopril (ZESTRIL) 20 MG tablet Comments:   Reason for Stopping:             Allergies   No Known Allergies

## 2023-06-30 ENCOUNTER — TRANSFERRED RECORDS (OUTPATIENT)
Dept: HEALTH INFORMATION MANAGEMENT | Facility: CLINIC | Age: 78
End: 2023-06-30

## 2023-06-30 ENCOUNTER — LAB REQUISITION (OUTPATIENT)
Dept: LAB | Facility: CLINIC | Age: 78
End: 2023-06-30
Payer: COMMERCIAL

## 2023-06-30 DIAGNOSIS — N18.32 CHRONIC KIDNEY DISEASE, STAGE 3B (H): ICD-10-CM

## 2023-06-30 PROCEDURE — 80053 COMPREHEN METABOLIC PANEL: CPT | Mod: ORL | Performed by: FAMILY MEDICINE

## 2023-07-01 LAB
ALBUMIN SERPL BCG-MCNC: 3.9 G/DL (ref 3.5–5.2)
ALP SERPL-CCNC: 135 U/L (ref 35–104)
ALT SERPL W P-5'-P-CCNC: 29 U/L (ref 0–50)
ANION GAP SERPL CALCULATED.3IONS-SCNC: 15 MMOL/L (ref 7–15)
AST SERPL W P-5'-P-CCNC: 43 U/L (ref 0–45)
BILIRUB SERPL-MCNC: 0.4 MG/DL
BUN SERPL-MCNC: 40.1 MG/DL (ref 8–23)
CALCIUM SERPL-MCNC: 9.6 MG/DL (ref 8.8–10.2)
CHLORIDE SERPL-SCNC: 103 MMOL/L (ref 98–107)
CREAT SERPL-MCNC: 1.4 MG/DL (ref 0.51–0.95)
DEPRECATED HCO3 PLAS-SCNC: 21 MMOL/L (ref 22–29)
GFR SERPL CREATININE-BSD FRML MDRD: 39 ML/MIN/1.73M2
GLUCOSE SERPL-MCNC: 93 MG/DL (ref 70–99)
POTASSIUM SERPL-SCNC: 3.4 MMOL/L (ref 3.4–5.3)
PROT SERPL-MCNC: 6.5 G/DL (ref 6.4–8.3)
SODIUM SERPL-SCNC: 139 MMOL/L (ref 136–145)

## 2023-07-11 PROCEDURE — 93272 ECG/REVIEW INTERPRET ONLY: CPT | Performed by: INTERNAL MEDICINE

## 2023-07-31 ENCOUNTER — ALLIED HEALTH/NURSE VISIT (OUTPATIENT)
Dept: PULMONOLOGY | Facility: CLINIC | Age: 78
End: 2023-07-31
Payer: COMMERCIAL

## 2023-07-31 ENCOUNTER — OFFICE VISIT (OUTPATIENT)
Dept: PULMONOLOGY | Facility: CLINIC | Age: 78
End: 2023-07-31
Payer: COMMERCIAL

## 2023-07-31 VITALS
HEIGHT: 64 IN | BODY MASS INDEX: 29.53 KG/M2 | SYSTOLIC BLOOD PRESSURE: 124 MMHG | DIASTOLIC BLOOD PRESSURE: 62 MMHG | OXYGEN SATURATION: 98 % | WEIGHT: 173 LBS | HEART RATE: 63 BPM

## 2023-07-31 DIAGNOSIS — I27.22 PULMONARY HYPERTENSION DUE TO LEFT HEART DISEASE (H): Primary | ICD-10-CM

## 2023-07-31 DIAGNOSIS — J44.9 COPD (CHRONIC OBSTRUCTIVE PULMONARY DISEASE) (H): ICD-10-CM

## 2023-07-31 LAB — HGB BLD-MCNC: 11.1 G/DL

## 2023-07-31 PROCEDURE — 94726 PLETHYSMOGRAPHY LUNG VOLUMES: CPT | Performed by: INTERNAL MEDICINE

## 2023-07-31 PROCEDURE — 94729 DIFFUSING CAPACITY: CPT | Performed by: INTERNAL MEDICINE

## 2023-07-31 PROCEDURE — 99204 OFFICE O/P NEW MOD 45 MIN: CPT | Performed by: INTERNAL MEDICINE

## 2023-07-31 PROCEDURE — 85018 HEMOGLOBIN: CPT | Performed by: INTERNAL MEDICINE

## 2023-07-31 PROCEDURE — 94375 RESPIRATORY FLOW VOLUME LOOP: CPT | Performed by: INTERNAL MEDICINE

## 2023-07-31 RX ORDER — ALLOPURINOL 100 MG/1
TABLET ORAL
COMMUNITY
Start: 2023-04-23 | End: 2023-07-31

## 2023-07-31 NOTE — PROGRESS NOTES
Pulmonary Clinic Outpatient Consultation    Assessment and Plan:   77F with a history of afib, CAD, HFpEF, pulmonary HTN, HTN, HLD, presents for COPD evaluation. PFTs today did NOT show evidence of COPD, with a normal FEV1/FVC ratio. 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 from left heart disease - HFpEF- as well as valvular disease). I reassured her that her lung function is quite good. I did not see much emphysema on the CT chest from January 2023. She does not have any concerning respiratory symptoms.     Recommendations:  - no indication for any inhalers at this time  - strict attention to salt intake, daily weights. Follow up with cardiology as scheduled  - encouraged her to remain active and exercise as able  - UTD with flu vaccination. Should have pneumococcal and covid vaccinations - she'll discuss with her PMD  - not a candidate for LDCT for lung cancer screening, since her quit date was >15 years ago.    Follow up in 6 months.     Moises Gannon MD (Avi)  [facility] ([facility])  Clinic [phone]  Fax [phone]     CCx: COPD evaluation    HPI: 77F with a history of afib, CAD, HFpEF, pulmonary HTN, HTN, HLD, presents for COPD evaluation. She was hospitalized in Jan 2023 for respiratory failure and told that she had COPD. She was diuresed for 50lbs for fluid and improved. She was hospitalized again in June for bradycardia and PALMIRA.  Today, she reports her breathing is fine. No significant dyspnea at rest. She gets around with a wheelchair and stair lift. She gets occasional cough in the morning. No chest pain or chest pressure.   20 pack year smoking history; quit 20 years ago.     ROS:  A 12-system review was obtained and was negative with the exception of the symptoms endorsed in the history of present illness.    PMH:  Past Medical History:   Diagnosis Date    Atrial  fibrillation (H)     Chronic heart failure with preserved ejection fraction (H) 2023    Congestive heart failure (H)     Depressive disorder     Hyperlipidemia     Hypertension     Obese     Primary hypertension 3/27/2023    Pulmonary hypertension (H)         PSH:  No past surgical history on file.    Allergies:  No Known Allergies    Family HX:  No family history on file.    Social Hx:  Social History     Socioeconomic History    Marital status:      Spouse name: Not on file    Number of children: Not on file    Years of education: Not on file    Highest education level: Not on file   Occupational History    Not on file   Tobacco Use    Smoking status: Former     Packs/day: 1.00     Years: 20.00     Pack years: 20.00     Types: Cigarettes     Start date:      Quit date:      Years since quittin.5    Smokeless tobacco: Not on file   Vaping Use    Vaping Use: Never used   Substance and Sexual Activity    Alcohol use: Never    Drug use: Never    Sexual activity: Not on file   Other Topics Concern    Not on file   Social History Narrative    Not on file     Social Determinants of Health     Financial Resource Strain: Not on file   Food Insecurity: Not on file   Transportation Needs: Not on file   Physical Activity: Not on file   Stress: Not on file   Social Connections: Not on file   Intimate Partner Violence: Not on file   Housing Stability: Not on file       Current Meds:  Current Outpatient Medications   Medication Sig Dispense Refill    allopurinol (ZYLOPRIM) 100 MG tablet Take 100 mg by mouth daily      aspirin (ASA) 81 MG EC tablet Take 1 tablet (81 mg) by mouth daily 30 tablet 0    atorvastatin (LIPITOR) 20 MG tablet Take 1 tablet (20 mg) by mouth every evening 30 tablet 0    FLUoxetine (PROZAC) 20 MG capsule Take 20 mg by mouth daily      furosemide (LASIX) 40 MG tablet Take 1 tablet (40 mg) by mouth 2 times daily 60 tablet 0    gabapentin (NEURONTIN) 300 MG capsule Take 2 capsules (600  "mg) by mouth At Bedtime 30 capsule 0    hydroxychloroquine (PLAQUENIL) 200 MG tablet Take 200 mg by mouth 2 times daily      levothyroxine (SYNTHROID/LEVOTHROID) 25 MCG tablet Take 1 tablet (25 mcg) by mouth every morning (before breakfast) 30 tablet 3    melatonin 3 MG tablet Take 1 tablet (3 mg) by mouth nightly as needed for sleep 14 tablet 0    polyethylene glycol (MIRALAX) 17 GM/Dose powder DISSOLVE 17 GMS IN WATER AND TAKE BY MOUTH ONCE DAILY AS NEEDED      STOOL SOFTENER/LAXATIVE 50-8.6 MG tablet TAKE 2 TABLETS BY MOUTH ONCE DAILY DX CONSTIPATION      traMADol (ULTRAM) 50 MG tablet Take 50 mg by mouth every 6 hours as needed Per Lily: Take 1-2 tablets PO Q6H PRN Chronic Pain      Vitamin D (Cholecalciferol) 25 MCG (1000 UT) TABS Take 1,000 Units by mouth daily      guaiFENesin (MUCINEX) 600 MG 12 hr tablet Take 1 tablet (600 mg) by mouth 2 times daily (Patient not taking: Reported on 7/31/2023) 14 tablet 0       Physical Exam:  /62 (BP Location: Left arm, Patient Position: Sitting, Cuff Size: Adult Regular)   Pulse 63   Ht 1.626 m (5' 4\")   Wt 78.5 kg (173 lb)   SpO2 98%   BMI 29.70 kg/m    Gen: awake, alert, oriented, no distress  HEENT: nasal turbinates are unremarkable, no oropharyngeal lesions, no cervical or supraclavicular lymphadenopathy  CV: RRR, no M/G/R  Resp: CTAB, no focal crackles or wheezes  Skin: no apparent rashes  Ext: trace edema in the legs. No pitting edema noted.   Neuro: alert, nonfocal    Labs:  reviewed    Imaging studies:  Personally reviewed    EXAM: CT CHEST PULMONARY EMBOLISM W CONTRAST  LOCATION: [facility]  DATE/TIME: 1/30/2023 8:58 PM     INDICATION: Short of breath, elevated D dimer  COMPARISON: None.  TECHNIQUE: CT chest pulmonary angiogram during arterial phase injection of IV contrast. Multiplanar reformats and MIP reconstructions were performed. Dose reduction techniques were used.   CONTRAST: 100 mL Isovue-370   "   FINDINGS:  ANGIOGRAM CHEST: Pulmonary arteries are normal caliber and negative for pulmonary emboli. Minimal degenerative calcification of the aortic root. Mild patchy atheromatous calcifications of the aortic arch, proximal great vessels and descending thoracic   aorta. No thoracic aortic aneurysm.     LUNGS AND PLEURA: Small right pleural effusion and trace left pleural fluid. Related atelectasis. Anterior bowing of the membranous airways indicates imaging at the expiratory phase of the respiratory cycle. In addition there is symmetric airway wall   thickening. The lung parenchyma is heterogeneous attenuation. Patchy multifocal peribronchial inflammation is present in both lungs.     MEDIASTINUM: Both the right and left atria are enlarged. There are mild mitral annular calcifications. Cardiac ventricles are normal in size. No pericardial effusion. Esophagus is decompressed. There are mildly enlarged lymph nodes at both alexandria and in   the AP window. No enlarged paratracheal or subcarinal lymph nodes.     CORONARY ARTERY CALCIFICATION: Severe.     UPPER ABDOMEN: Reflux of IV contrast into the distended hepatic veins and IVC. Splenomegaly.     MUSCULOSKELETAL: Diffuse thoracic spine degenerative osteophytes and disc space narrowing. No aggressive or destructive bone lesions. Bilateral glenohumeral osteoarthrosis.                                                                      IMPRESSION:     1.  No acute pulmonary embolism.  2.  Enlarged right and left atria. Severe atheromatous coronary calcifications.  3.  Small right trace left pleural effusions.  4.  Bronchial wall thickening and peribronchial and interstitial opacities in both lungs, most mixed interstitial and alveolar lung edema. Pattern suggests acute congestive failure.    TTE 1/31/23  Interpretation Summary     The left ventricle is mildly dilated.  Left ventricular function is decreased. The ejection fraction is 50-55%  (borderline).  The left atrium  is mildly dilated.  The right atrium is mildly dilated.  Severe (>55mmHg) pulmonary hypertension is present.  IVC diameter >2.1 cm collapsing <50% with sniff suggests a high RA pressure  estimated at 15 mmHg or greater.  There is mild to moderate (1-2+) mitral regurgitation.    Pulmonary Function Testing  7/31/2023  FEV1 1.91L, 102%  %  No BD testing done  Ratio 0.8  TLC 4.1L, 86%  Dlco 71% ernesto for hgb  Flow volume curve appears normal.

## 2023-07-31 NOTE — LETTER
7/31/2023         RE: Dulce Pritchard  [address] [place] [address]  [place] [address]        Dear Colleague,    Thank you for referring your patient, Dulce Pritchard, to the [facility]. Please see a copy of my visit note below.    Pulmonary Clinic Outpatient Consultation    Assessment and Plan:   77F with a history of afib, CAD, HFpEF, pulmonary HTN, HTN, HLD, presents for COPD evaluation. PFTs today did NOT show evidence of COPD, with a normal FEV1/FVC ratio. 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 from left heart disease - HFpEF- as well as valvular disease). I reassured her that her lung function is quite good. I did not see much emphysema on the CT chest from January 2023. She does not have any concerning respiratory symptoms.     Recommendations:  - no indication for any inhalers at this time  - strict attention to salt intake, daily weights. Follow up with cardiology as scheduled  - encouraged her to remain active and exercise as able  - UTD with flu vaccination. Should have pneumococcal and covid vaccinations - she'll discuss with her PMD    Follow up in 6 months.     Moises (Brant Gannon MD  [facility] ([facility])  Clinic [phone]  Fax [phone]     CCx: COPD evaluation    HPI: 77F with a history of afib, CAD, HFpEF, pulmonary HTN, HTN, HLD, presents for COPD evaluation. She was hospitalized in Jan 2023 for respiratory failure and told that she had COPD. She was diuresed for 50lbs for fluid and improved. She was hospitalized again in June for bradycardia and PALMIRA.  Today, she reports her breathing is fine. No significant dyspnea at rest. She gets around with a wheelchair and stair lift. She gets occasional cough in the morning. No chest pain or chest pressure.   20 pack year smoking history; quit 20 years ago.     ROS:  A 12-system review  was obtained and was negative with the exception of the symptoms endorsed in the history of present illness.    PMH:  Past Medical History:   Diagnosis Date     Atrial fibrillation (H)      Chronic heart failure with preserved ejection fraction (H) 2023     Congestive heart failure (H)      Depressive disorder      Hyperlipidemia      Hypertension      Obese      Primary hypertension 3/27/2023     Pulmonary hypertension (H)         PSH:  No past surgical history on file.    Allergies:  No Known Allergies    Family HX:  No family history on file.    Social Hx:  Social History     Socioeconomic History     Marital status:      Spouse name: Not on file     Number of children: Not on file     Years of education: Not on file     Highest education level: Not on file   Occupational History     Not on file   Tobacco Use     Smoking status: Former     Packs/day: 1.00     Years: 20.00     Pack years: 20.00     Types: Cigarettes     Start date:      Quit date:      Years since quittin.5     Smokeless tobacco: Not on file   Vaping Use     Vaping Use: Never used   Substance and Sexual Activity     Alcohol use: Never     Drug use: Never     Sexual activity: Not on file   Other Topics Concern     Not on file   Social History Narrative     Not on file     Social Determinants of Health     Financial Resource Strain: Not on file   Food Insecurity: Not on file   Transportation Needs: Not on file   Physical Activity: Not on file   Stress: Not on file   Social Connections: Not on file   Intimate Partner Violence: Not on file   Housing Stability: Not on file       Current Meds:  Current Outpatient Medications   Medication Sig Dispense Refill     allopurinol (ZYLOPRIM) 100 MG tablet Take 100 mg by mouth daily       aspirin (ASA) 81 MG EC tablet Take 1 tablet (81 mg) by mouth daily 30 tablet 0     atorvastatin (LIPITOR) 20 MG tablet Take 1 tablet (20 mg) by mouth every evening 30 tablet 0     FLUoxetine (PROZAC) 20  "MG capsule Take 20 mg by mouth daily       furosemide (LASIX) 40 MG tablet Take 1 tablet (40 mg) by mouth 2 times daily 60 tablet 0     gabapentin (NEURONTIN) 300 MG capsule Take 2 capsules (600 mg) by mouth At Bedtime 30 capsule 0     hydroxychloroquine (PLAQUENIL) 200 MG tablet Take 200 mg by mouth 2 times daily       levothyroxine (SYNTHROID/LEVOTHROID) 25 MCG tablet Take 1 tablet (25 mcg) by mouth every morning (before breakfast) 30 tablet 3     melatonin 3 MG tablet Take 1 tablet (3 mg) by mouth nightly as needed for sleep 14 tablet 0     polyethylene glycol (MIRALAX) 17 GM/Dose powder DISSOLVE 17 GMS IN WATER AND TAKE BY MOUTH ONCE DAILY AS NEEDED       STOOL SOFTENER/LAXATIVE 50-8.6 MG tablet TAKE 2 TABLETS BY MOUTH ONCE DAILY DX CONSTIPATION       traMADol (ULTRAM) 50 MG tablet Take 50 mg by mouth every 6 hours as needed Per [facility]: Take 1-2 tablets PO Q6H PRN Chronic Pain       Vitamin D (Cholecalciferol) 25 MCG (1000 UT) TABS Take 1,000 Units by mouth daily       guaiFENesin (MUCINEX) 600 MG 12 hr tablet Take 1 tablet (600 mg) by mouth 2 times daily (Patient not taking: Reported on 7/31/2023) 14 tablet 0       Physical Exam:  /62 (BP Location: Left arm, Patient Position: Sitting, Cuff Size: Adult Regular)   Pulse 63   Ht 1.626 m (5' 4\")   Wt 78.5 kg (173 lb)   SpO2 98%   BMI 29.70 kg/m    Gen: awake, alert, oriented, no distress  HEENT: nasal turbinates are unremarkable, no oropharyngeal lesions, no cervical or supraclavicular lymphadenopathy  CV: RRR, no M/G/R  Resp: CTAB, no focal crackles or wheezes  Skin: no apparent rashes  Ext: trace edema in the legs. No pitting edema noted.   Neuro: alert, nonfocal    Labs:  reviewed    Imaging studies:  Personally reviewed    EXAM: CT CHEST PULMONARY EMBOLISM W CONTRAST  LOCATION: [facility]  DATE/TIME: 1/30/2023 8:58 PM     INDICATION: Short of breath, elevated D dimer  COMPARISON: None.  TECHNIQUE: CT chest pulmonary " angiogram during arterial phase injection of IV contrast. Multiplanar reformats and MIP reconstructions were performed. Dose reduction techniques were used.   CONTRAST: 100 mL Isovue-370     FINDINGS:  ANGIOGRAM CHEST: Pulmonary arteries are normal caliber and negative for pulmonary emboli. Minimal degenerative calcification of the aortic root. Mild patchy atheromatous calcifications of the aortic arch, proximal great vessels and descending thoracic   aorta. No thoracic aortic aneurysm.     LUNGS AND PLEURA: Small right pleural effusion and trace left pleural fluid. Related atelectasis. Anterior bowing of the membranous airways indicates imaging at the expiratory phase of the respiratory cycle. In addition there is symmetric airway wall   thickening. The lung parenchyma is heterogeneous attenuation. Patchy multifocal peribronchial inflammation is present in both lungs.     MEDIASTINUM: Both the right and left atria are enlarged. There are mild mitral annular calcifications. Cardiac ventricles are normal in size. No pericardial effusion. Esophagus is decompressed. There are mildly enlarged lymph nodes at both alexandria and in   the AP window. No enlarged paratracheal or subcarinal lymph nodes.     CORONARY ARTERY CALCIFICATION: Severe.     UPPER ABDOMEN: Reflux of IV contrast into the distended hepatic veins and IVC. Splenomegaly.     MUSCULOSKELETAL: Diffuse thoracic spine degenerative osteophytes and disc space narrowing. No aggressive or destructive bone lesions. Bilateral glenohumeral osteoarthrosis.                                                                      IMPRESSION:     1.  No acute pulmonary embolism.  2.  Enlarged right and left atria. Severe atheromatous coronary calcifications.  3.  Small right trace left pleural effusions.  4.  Bronchial wall thickening and peribronchial and interstitial opacities in both lungs, most mixed interstitial and alveolar lung edema. Pattern suggests acute congestive  failure.    TTE 1/31/23  Interpretation Summary     The left ventricle is mildly dilated.  Left ventricular function is decreased. The ejection fraction is 50-55%  (borderline).  The left atrium is mildly dilated.  The right atrium is mildly dilated.  Severe (>55mmHg) pulmonary hypertension is present.  IVC diameter >2.1 cm collapsing <50% with sniff suggests a high RA pressure  estimated at 15 mmHg or greater.  There is mild to moderate (1-2+) mitral regurgitation.    Pulmonary Function Testing  7/31/2023  FEV1 1.91L, 102%  %  No BD testing done  Ratio 0.8  TLC 4.1L, 86%  Dlco 71% ernesto for hgb  Flow volume curve appears normal.     Again, thank you for allowing me to participate in the care of your patient.        Sincerely,        Moises Gannon MD

## 2023-08-03 LAB
DLCOCOR-%PRED-PRE: 71 %
DLCOCOR-PRE: 13.06 ML/MIN/MMHG
DLCOUNC-%PRED-PRE: 66 %
DLCOUNC-PRE: 12.03 ML/MIN/MMHG
DLCOUNC-PRED: 18.17 ML/MIN/MMHG
ERV-%PRED-PRE: 93 %
ERV-PRE: 0.61 L
ERV-PRED: 0.65 L
EXPTIME-PRE: 6.95 SEC
FEF2575-%PRED-PRE: 109 %
FEF2575-PRE: 1.73 L/SEC
FEF2575-PRED: 1.58 L/SEC
FEFMAX-%PRED-PRE: 116 %
FEFMAX-PRE: 5.94 L/SEC
FEFMAX-PRED: 5.09 L/SEC
FEV1-%PRED-PRE: 102 %
FEV1-PRE: 1.91 L
FEV1FEV6-PRE: 80 %
FEV1FEV6-PRED: 78 %
FEV1FVC-PRE: 80 %
FEV1FVC-PRED: 79 %
FEV1SVC-PRE: 87 %
FEV1SVC-PRED: 69 %
FIFMAX-PRE: 4.21 L/SEC
FRCPLETH-%PRED-PRE: 92 %
FRCPLETH-PRE: 2.5 L
FRCPLETH-PRED: 2.7 L
FVC-%PRED-PRE: 100 %
FVC-PRE: 2.4 L
FVC-PRED: 2.39 L
IC-%PRED-PRE: 80 %
IC-PRE: 1.6 L
IC-PRED: 2 L
RVPLETH-%PRED-PRE: 95 %
RVPLETH-PRE: 1.89 L
RVPLETH-PRED: 1.99 L
TLCPLETH-%PRED-PRE: 86 %
TLCPLETH-PRE: 4.1 L
TLCPLETH-PRED: 4.75 L
VA-%PRED-PRE: 88 %
VA-PRE: 3.83 L
VC-%PRED-PRE: 81 %
VC-PRE: 2.21 L
VC-PRED: 2.7 L

## 2023-08-21 ENCOUNTER — OFFICE VISIT (OUTPATIENT)
Dept: CARDIOLOGY | Facility: CLINIC | Age: 78
End: 2023-08-21
Attending: INTERNAL MEDICINE
Payer: COMMERCIAL

## 2023-08-21 VITALS
HEIGHT: 64 IN | BODY MASS INDEX: 29.7 KG/M2 | HEART RATE: 64 BPM | RESPIRATION RATE: 16 BRPM | SYSTOLIC BLOOD PRESSURE: 126 MMHG | OXYGEN SATURATION: 99 % | DIASTOLIC BLOOD PRESSURE: 58 MMHG

## 2023-08-21 DIAGNOSIS — R00.1 ASYMPTOMATIC BRADYCARDIA: ICD-10-CM

## 2023-08-21 DIAGNOSIS — I10 PRIMARY HYPERTENSION: ICD-10-CM

## 2023-08-21 DIAGNOSIS — I50.32 CHRONIC HEART FAILURE WITH PRESERVED EJECTION FRACTION (H): Primary | ICD-10-CM

## 2023-08-21 PROCEDURE — 99214 OFFICE O/P EST MOD 30 MIN: CPT | Performed by: NURSE PRACTITIONER

## 2023-08-21 NOTE — LETTER
8/21/2023    Colin Christie MD  [address]  [place] [address]    RE: Dulce Pritchard       Dear Colleague,     I had the pleasure of seeing Dulce Pritchard in the [facility].        Assessment/Recommendations   Assessment:    1. Heart failure with preserved ejection fraction, NYHA class II: Compensated.  Euvolemic on exam.  She states she is feeling well.  She has fatigue.  Her weight is stable.  She follows a low-salt diet and receives open arms meals.  2.  Hypertension: Controlled.  Blood pressure 126/58.  Her lisinopril was discontinued during hospitalization in June due to hypotension.  3.  Bradycardia: Her atenolol was discontinued during hospitalization.  She had any event monitor completed early July which showed normal sinus rhythm with average heart rate of 62 bpm and no noted pauses.    Plan:  1.  Continue current medications  2.  Continue monitoring daily weights and following a low-salt diet  3.  Schedule stress test per Dr. Wooten's recommendations in April    Dulce Pritchard will follow up with Dr. Wooten after stress test and in the heart failure clinic in 6 months or sooner if needed.     History of Present Illness/Subjective    Ms. Dulce Pritchard is a 77 year old female seen at [facility] heart failure clinic today for continued follow-up.  Her daughter accompanies her today.  She follows up for heart failure with preserved ejection fraction.  She was hospitalized in June due to bradycardia and acute kidney injury.  Her atenolol and lisinopril were discontinued.  She had an event monitor which showed normal sinus rhythm with an average heart rate of 62 bpm with no noted pauses.  She is planning on having knee or hip replacement and is meeting with [facility] mid September.  Dr. Wooten had recommended doing a stress test in April which she has not completed.  She will schedule this today.  She has a past medical history  "significant for hypertension, pulmonary hypertension and obesity    Today, she is feeling well.  She does have fatigue. She denies lightheadedness, shortness of breath, dyspnea on exertion, orthopnea, PND, palpitations, chest pain, abdominal fullness/bloating, and lower extremity edema.      She is monitoring home weights which are stable.  She is following a low sodium diet.      Physical Examination Review of Systems   /58 (BP Location: Right arm, Patient Position: Sitting, Cuff Size: Adult Large)   Pulse 64   Resp 16   Ht 1.626 m (5' 4\")   SpO2 99%   BMI 29.70 kg/m    Body mass index is 29.7 kg/m .  Wt Readings from Last 3 Encounters:   07/31/23 78.5 kg (173 lb)   06/26/23 83.5 kg (184 lb 1.4 oz)   02/06/23 95.7 kg (211 lb)       General Appearance:   no acute distress   ENT/Mouth: No abnormalities   EYES:  no scleral icterus, normal conjunctivae   Neck: no thyromegaly   Chest/Lungs:   lungs are clear to auscultation, no rales or wheezing, equal chest wall expansion    Cardiovascular:   Regular. Normal first and second heart sounds with no murmurs, rubs, or gallops, no edema bilaterally    Abdomen:  bowel sounds are present   Extremities: no cyanosis or clubbing   Skin: warm   Neurologic: no tremors     Psychiatric: alert and oriented x3    Enc Vitals  BP: 126/58  Pulse: 64  Resp: 16  SpO2: 99 %  Weight:  (pt reports weight of 171 lbs 8/20/23)  Height: 162.6 cm (5' 4\")                                         Medical History  Surgical History Family History Social History   Past Medical History:   Diagnosis Date    Atrial fibrillation (H)     Chronic heart failure with preserved ejection fraction (H) 2/13/2023    Congestive heart failure (H)     Depressive disorder     Hyperlipidemia     Hypertension     Obese     Primary hypertension 3/27/2023    Pulmonary hypertension (H)     History reviewed. No pertinent surgical history. No family history on file. Social History     Socioeconomic History    Marital " status:      Spouse name: Not on file    Number of children: Not on file    Years of education: Not on file    Highest education level: Not on file   Occupational History    Not on file   Tobacco Use    Smoking status: Former     Packs/day: 1.00     Years: 20.00     Pack years: 20.00     Types: Cigarettes     Start date:      Quit date:      Years since quittin.6    Smokeless tobacco: Not on file   Vaping Use    Vaping Use: Never used   Substance and Sexual Activity    Alcohol use: Never    Drug use: Never    Sexual activity: Not on file   Other Topics Concern    Not on file   Social History Narrative    Not on file     Social Determinants of Health     Financial Resource Strain: Not on file   Food Insecurity: Not on file   Transportation Needs: Not on file   Physical Activity: Not on file   Stress: Not on file   Social Connections: Not on file   Intimate Partner Violence: Not on file   Housing Stability: Not on file          Medications  Allergies   Current Outpatient Medications   Medication Sig Dispense Refill    allopurinol (ZYLOPRIM) 100 MG tablet Take 100 mg by mouth daily      aspirin (ASA) 81 MG EC tablet Take 1 tablet (81 mg) by mouth daily 30 tablet 0    atorvastatin (LIPITOR) 20 MG tablet Take 1 tablet (20 mg) by mouth every evening 30 tablet 0    FLUoxetine (PROZAC) 20 MG capsule Take 20 mg by mouth daily      furosemide (LASIX) 40 MG tablet Take 1 tablet (40 mg) by mouth 2 times daily 60 tablet 0    gabapentin (NEURONTIN) 300 MG capsule Take 2 capsules (600 mg) by mouth At Bedtime 30 capsule 0    hydroxychloroquine (PLAQUENIL) 200 MG tablet Take 200 mg by mouth 2 times daily      levothyroxine (SYNTHROID/LEVOTHROID) 25 MCG tablet Take 1 tablet (25 mcg) by mouth every morning (before breakfast) 30 tablet 3    melatonin 3 MG tablet Take 1 tablet (3 mg) by mouth nightly as needed for sleep 14 tablet 0    polyethylene glycol (MIRALAX) 17 GM/Dose powder DISSOLVE 17 GMS IN WATER AND TAKE  BY MOUTH ONCE DAILY AS NEEDED      STOOL SOFTENER/LAXATIVE 50-8.6 MG tablet TAKE 2 TABLETS BY MOUTH ONCE DAILY DX CONSTIPATION      traMADol (ULTRAM) 50 MG tablet Take 50 mg by mouth every 6 hours as needed Per Lily: Take 1-2 tablets PO Q6H PRN Chronic Pain      Vitamin D (Cholecalciferol) 25 MCG (1000 UT) TABS Take 1,000 Units by mouth daily      guaiFENesin (MUCINEX) 600 MG 12 hr tablet Take 1 tablet (600 mg) by mouth 2 times daily (Patient not taking: Reported on 8/21/2023) 14 tablet 0    No Known Allergies      Lab Results    Chemistry/lipid CBC Cardiac Enzymes/BNP/TSH/INR   Lab Results   Component Value Date    CHOL 146 04/21/2023    HDL 48 (L) 04/21/2023    TRIG 110 04/21/2023    BUN 40.1 (H) 06/30/2023     06/30/2023    CO2 21 (L) 06/30/2023    Lab Results   Component Value Date    WBC 5.0 06/22/2023    HGB 11.1 07/31/2023    HCT 31.1 (L) 06/22/2023    MCV 97 06/22/2023    PLT 88 (L) 06/22/2023    Lab Results   Component Value Date    TROPONINI 0.11 01/31/2023    BNP 1,597 (H) 01/30/2023    TSH 5.37 (H) 06/22/2023             This note has been dictated using voice recognition software. Any grammatical, typographical, or context distortions are unintentional and inherent to the software    30 minutes spent on the date of encounter doing chart review, review of outside records, review of test results, interpretation with above tests, patient visit, documentation, and discussion with family.                Thank you for allowing me to participate in the care of your patient.      Sincerely,     Bev Kamara, BRE [facility]  cc:   Sagar Gamble MD  [address], [address]  [place] [address]

## 2023-08-21 NOTE — PATIENT INSTRUCTIONS
Dulce Pritchard,    It was a pleasure to see you today at [facility].     My recommendations after this visit include:  - Please follow up with Dr Wooten after stress test   - Please follow up with Bev Kamara in 6 months   - Please schedule stress test  - Continue current medications    Bev Kamara, CNP

## 2023-08-21 NOTE — PROGRESS NOTES
Assessment/Recommendations   Assessment:    1. Heart failure with preserved ejection fraction, NYHA class II: Compensated.  Euvolemic on exam.  She states she is feeling well.  She has fatigue.  Her weight is stable.  She follows a low-salt diet and receives open arms meals.  2.  Hypertension: Controlled.  Blood pressure 126/58.  Her lisinopril was discontinued during hospitalization in June due to hypotension.  3.  Bradycardia: Her atenolol was discontinued during hospitalization.  She had any event monitor completed early July which showed normal sinus rhythm with average heart rate of 62 bpm and no noted pauses.    Plan:  1.  Continue current medications  2.  Continue monitoring daily weights and following a low-salt diet  3.  Schedule stress test per Dr. Wooten's recommendations in April    Dulce Pritchard will follow up with Dr. Wooten after stress test and in the heart failure clinic in 6 months or sooner if needed.     History of Present Illness/Subjective    Ms. Dulce Pritchard is a 77 year old female seen at [facility] heart failure clinic today for continued follow-up.  Her daughter accompanies her today.  She follows up for heart failure with preserved ejection fraction.  She was hospitalized in June due to bradycardia and acute kidney injury.  Her atenolol and lisinopril were discontinued.  She had an event monitor which showed normal sinus rhythm with an average heart rate of 62 bpm with no noted pauses.  She is planning on having knee or hip replacement and is meeting with [facility] mid September.  Dr. Wooten had recommended doing a stress test in April which she has not completed.  She will schedule this today.  She has a past medical history significant for hypertension, pulmonary hypertension and obesity    Today, she is feeling well.  She does have fatigue. She denies lightheadedness, shortness of breath, dyspnea on exertion, orthopnea, PND, palpitations, chest pain,  "abdominal fullness/bloating, and lower extremity edema.      She is monitoring home weights which are stable.  She is following a low sodium diet.      Physical Examination Review of Systems   /58 (BP Location: Right arm, Patient Position: Sitting, Cuff Size: Adult Large)   Pulse 64   Resp 16   Ht 1.626 m (5' 4\")   SpO2 99%   BMI 29.70 kg/m    Body mass index is 29.7 kg/m .  Wt Readings from Last 3 Encounters:   07/31/23 78.5 kg (173 lb)   06/26/23 83.5 kg (184 lb 1.4 oz)   02/06/23 95.7 kg (211 lb)       General Appearance:   no acute distress   ENT/Mouth: No abnormalities   EYES:  no scleral icterus, normal conjunctivae   Neck: no thyromegaly   Chest/Lungs:   lungs are clear to auscultation, no rales or wheezing, equal chest wall expansion    Cardiovascular:   Regular. Normal first and second heart sounds with no murmurs, rubs, or gallops, no edema bilaterally    Abdomen:  bowel sounds are present   Extremities: no cyanosis or clubbing   Skin: warm   Neurologic: no tremors     Psychiatric: alert and oriented x3    Enc Vitals  BP: 126/58  Pulse: 64  Resp: 16  SpO2: 99 %  Weight:  (pt reports weight of 171 lbs 8/20/23)  Height: 162.6 cm (5' 4\")                                         Medical History  Surgical History Family History Social History   Past Medical History:   Diagnosis Date    Atrial fibrillation (H)     Chronic heart failure with preserved ejection fraction (H) 2/13/2023    Congestive heart failure (H)     Depressive disorder     Hyperlipidemia     Hypertension     Obese     Primary hypertension 3/27/2023    Pulmonary hypertension (H)     History reviewed. No pertinent surgical history. No family history on file. Social History     Socioeconomic History    Marital status:      Spouse name: Not on file    Number of children: Not on file    Years of education: Not on file    Highest education level: Not on file   Occupational History    Not on file   Tobacco Use    Smoking status: " Former     Packs/day: 1.00     Years: 20.00     Pack years: 20.00     Types: Cigarettes     Start date:      Quit date:      Years since quittin.6    Smokeless tobacco: Not on file   Vaping Use    Vaping Use: Never used   Substance and Sexual Activity    Alcohol use: Never    Drug use: Never    Sexual activity: Not on file   Other Topics Concern    Not on file   Social History Narrative    Not on file     Social Determinants of Health     Financial Resource Strain: Not on file   Food Insecurity: Not on file   Transportation Needs: Not on file   Physical Activity: Not on file   Stress: Not on file   Social Connections: Not on file   Intimate Partner Violence: Not on file   Housing Stability: Not on file          Medications  Allergies   Current Outpatient Medications   Medication Sig Dispense Refill    allopurinol (ZYLOPRIM) 100 MG tablet Take 100 mg by mouth daily      aspirin (ASA) 81 MG EC tablet Take 1 tablet (81 mg) by mouth daily 30 tablet 0    atorvastatin (LIPITOR) 20 MG tablet Take 1 tablet (20 mg) by mouth every evening 30 tablet 0    FLUoxetine (PROZAC) 20 MG capsule Take 20 mg by mouth daily      furosemide (LASIX) 40 MG tablet Take 1 tablet (40 mg) by mouth 2 times daily 60 tablet 0    gabapentin (NEURONTIN) 300 MG capsule Take 2 capsules (600 mg) by mouth At Bedtime 30 capsule 0    hydroxychloroquine (PLAQUENIL) 200 MG tablet Take 200 mg by mouth 2 times daily      levothyroxine (SYNTHROID/LEVOTHROID) 25 MCG tablet Take 1 tablet (25 mcg) by mouth every morning (before breakfast) 30 tablet 3    melatonin 3 MG tablet Take 1 tablet (3 mg) by mouth nightly as needed for sleep 14 tablet 0    polyethylene glycol (MIRALAX) 17 GM/Dose powder DISSOLVE 17 GMS IN WATER AND TAKE BY MOUTH ONCE DAILY AS NEEDED      STOOL SOFTENER/LAXATIVE 50-8.6 MG tablet TAKE 2 TABLETS BY MOUTH ONCE DAILY DX CONSTIPATION      traMADol (ULTRAM) 50 MG tablet Take 50 mg by mouth every 6 hours as needed Per Lily: Take  1-2 tablets PO Q6H PRN Chronic Pain      Vitamin D (Cholecalciferol) 25 MCG (1000 UT) TABS Take 1,000 Units by mouth daily      guaiFENesin (MUCINEX) 600 MG 12 hr tablet Take 1 tablet (600 mg) by mouth 2 times daily (Patient not taking: Reported on 8/21/2023) 14 tablet 0    No Known Allergies      Lab Results    Chemistry/lipid CBC Cardiac Enzymes/BNP/TSH/INR   Lab Results   Component Value Date    CHOL 146 04/21/2023    HDL 48 (L) 04/21/2023    TRIG 110 04/21/2023    BUN 40.1 (H) 06/30/2023     06/30/2023    CO2 21 (L) 06/30/2023    Lab Results   Component Value Date    WBC 5.0 06/22/2023    HGB 11.1 07/31/2023    HCT 31.1 (L) 06/22/2023    MCV 97 06/22/2023    PLT 88 (L) 06/22/2023    Lab Results   Component Value Date    TROPONINI 0.11 01/31/2023    BNP 1,597 (H) 01/30/2023    TSH 5.37 (H) 06/22/2023             This note has been dictated using voice recognition software. Any grammatical, typographical, or context distortions are unintentional and inherent to the software    30 minutes spent on the date of encounter doing chart review, review of outside records, review of test results, interpretation with above tests, patient visit, documentation, and discussion with family.

## 2023-08-24 ENCOUNTER — HOSPITAL ENCOUNTER (OUTPATIENT)
Dept: NUCLEAR MEDICINE | Facility: HOSPITAL | Age: 78
Discharge: HOME | End: 2023-08-24
Attending: INTERNAL MEDICINE
Payer: COMMERCIAL

## 2023-08-24 ENCOUNTER — HOSPITAL ENCOUNTER (OUTPATIENT)
Dept: CARDIOLOGY | Facility: HOSPITAL | Age: 78
Discharge: HOME | End: 2023-08-24
Attending: INTERNAL MEDICINE
Payer: COMMERCIAL

## 2023-08-24 DIAGNOSIS — I10 HYPERTENSION, UNSPECIFIED TYPE: ICD-10-CM

## 2023-08-24 DIAGNOSIS — I25.10 CORONARY ARTERY DISEASE INVOLVING NATIVE CORONARY ARTERY WITHOUT ANGINA PECTORIS, UNSPECIFIED WHETHER NATIVE OR TRANSPLANTED HEART: ICD-10-CM

## 2023-08-24 DIAGNOSIS — R06.02 SOB (SHORTNESS OF BREATH): ICD-10-CM

## 2023-08-24 DIAGNOSIS — E78.5 DYSLIPIDEMIA: ICD-10-CM

## 2023-08-24 LAB
CV STRESS CURRENT BP HE: NORMAL
CV STRESS CURRENT HR HE: 59
CV STRESS CURRENT HR HE: 62
CV STRESS CURRENT HR HE: 65
CV STRESS CURRENT HR HE: 70
CV STRESS CURRENT HR HE: 70
CV STRESS CURRENT HR HE: 71
CV STRESS CURRENT HR HE: 73
CV STRESS CURRENT HR HE: 74
CV STRESS CURRENT HR HE: 75
CV STRESS CURRENT HR HE: 77
CV STRESS CURRENT HR HE: 77
CV STRESS CURRENT HR HE: 81
CV STRESS CURRENT HR HE: 81
CV STRESS CURRENT HR HE: 83
CV STRESS CURRENT HR HE: 84
CV STRESS DEVIATION TIME HE: NORMAL
CV STRESS ECHO PERCENT HR HE: NORMAL
CV STRESS EXERCISE STAGE HE: NORMAL
CV STRESS FINAL RESTING BP HE: NORMAL
CV STRESS FINAL RESTING HR HE: 70
CV STRESS MAX HR HE: 85
CV STRESS MAX TREADMILL GRADE HE: 0
CV STRESS MAX TREADMILL SPEED HE: 0
CV STRESS PEAK DIA BP HE: NORMAL
CV STRESS PEAK SYS BP HE: NORMAL
CV STRESS PHASE HE: NORMAL
CV STRESS PROTOCOL HE: NORMAL
CV STRESS RESTING PT POSITION HE: NORMAL
CV STRESS ST DEVIATION AMOUNT HE: NORMAL
CV STRESS ST DEVIATION ELEVATION HE: NORMAL
CV STRESS ST EVELATION AMOUNT HE: NORMAL
CV STRESS TEST TYPE HE: NORMAL
CV STRESS TOTAL STAGE TIME MIN 1 HE: NORMAL
NUC STRESS EJECTION FRACTION: 61 %
RATE PRESSURE PRODUCT: NORMAL
STRESS ECHO BASELINE DIASTOLIC HE: 76
STRESS ECHO BASELINE HR: 61
STRESS ECHO BASELINE SYSTOLIC BP: 186
STRESS ECHO CALCULATED PERCENT HR: 59 %
STRESS ECHO LAST STRESS DIASTOLIC BP: 74
STRESS ECHO LAST STRESS HR: 77
STRESS ECHO LAST STRESS SYSTOLIC BP: 156
STRESS ECHO TARGET HR: 145

## 2023-08-24 PROCEDURE — A9500 TC99M SESTAMIBI: HCPCS | Performed by: INTERNAL MEDICINE

## 2023-08-24 PROCEDURE — 343N000001 HC RX 343: Performed by: INTERNAL MEDICINE

## 2023-08-24 PROCEDURE — 93018 CV STRESS TEST I&R ONLY: CPT | Performed by: GENERAL ACUTE CARE HOSPITAL

## 2023-08-24 PROCEDURE — 250N000011 HC RX IP 250 OP 636: Performed by: INTERNAL MEDICINE

## 2023-08-24 PROCEDURE — 93017 CV STRESS TEST TRACING ONLY: CPT

## 2023-08-24 PROCEDURE — 78452 HT MUSCLE IMAGE SPECT MULT: CPT

## 2023-08-24 PROCEDURE — 93016 CV STRESS TEST SUPVJ ONLY: CPT | Performed by: INTERNAL MEDICINE

## 2023-08-24 PROCEDURE — 78452 HT MUSCLE IMAGE SPECT MULT: CPT | Mod: 26 | Performed by: GENERAL ACUTE CARE HOSPITAL

## 2023-08-24 RX ORDER — REGADENOSON 0.08 MG/ML
0.4 INJECTION, SOLUTION INTRAVENOUS ONCE
Status: COMPLETED | OUTPATIENT
Start: 2023-08-24 | End: 2023-08-24

## 2023-08-24 RX ADMIN — Medication 31.7 MILLICURIE: at 10:30

## 2023-08-24 RX ADMIN — REGADENOSON 0.4 MG: 0.08 INJECTION, SOLUTION INTRAVENOUS at 10:34

## 2023-08-24 RX ADMIN — Medication 8.4 MILLICURIE: at 08:52

## 2023-10-24 NOTE — PROGRESS NOTES
Discharge plan according to [facility]:     09/12/23 1322   Discharge Planning   Patient/Family Anticipates Transition to home with family   Living Arrangements   People in Home   (niece- lives in her home)   Is your private residence a single family home or apartment? Single family home   Number of Stairs, Within Home, Primary greater than 10 stairs   Stair Railings, Within Home, Primary other (see comments)   Equipment Currently Used at Home walker, rolling;wheelchair, manual  (walk in tub; uses walker in home and wheelchair for car transfers)   Support System   Support Systems Family Members   Do you have someone available to stay with you one or two nights once you are home? Yes

## 2023-10-27 ENCOUNTER — TRANSFERRED RECORDS (OUTPATIENT)
Dept: HEALTH INFORMATION MANAGEMENT | Facility: CLINIC | Age: 78
End: 2023-10-27

## 2023-10-27 ENCOUNTER — LAB REQUISITION (OUTPATIENT)
Dept: LAB | Facility: CLINIC | Age: 78
End: 2023-10-27
Payer: COMMERCIAL

## 2023-10-27 DIAGNOSIS — N18.32 CHRONIC KIDNEY DISEASE, STAGE 3B (H): ICD-10-CM

## 2023-10-27 DIAGNOSIS — E03.9 HYPOTHYROIDISM, UNSPECIFIED: ICD-10-CM

## 2023-10-27 DIAGNOSIS — M1A.9XX1 CHRONIC GOUT, UNSPECIFIED, WITH TOPHUS (TOPHI): ICD-10-CM

## 2023-10-27 LAB
ALBUMIN SERPL BCG-MCNC: 3.7 G/DL (ref 3.5–5.2)
ALP SERPL-CCNC: 169 U/L (ref 35–104)
ALT SERPL W P-5'-P-CCNC: 34 U/L (ref 0–50)
ANION GAP SERPL CALCULATED.3IONS-SCNC: 13 MMOL/L (ref 7–15)
AST SERPL W P-5'-P-CCNC: 42 U/L (ref 0–45)
BASOPHILS # BLD AUTO: ABNORMAL 10*3/UL
BASOPHILS # BLD MANUAL: 0 10E3/UL (ref 0–0.2)
BASOPHILS NFR BLD AUTO: ABNORMAL %
BASOPHILS NFR BLD MANUAL: 0 %
BILIRUB SERPL-MCNC: 0.3 MG/DL
BUN SERPL-MCNC: 27.1 MG/DL (ref 8–23)
CALCIUM SERPL-MCNC: 9.6 MG/DL (ref 8.8–10.2)
CHLORIDE SERPL-SCNC: 100 MMOL/L (ref 98–107)
CREAT SERPL-MCNC: 1.21 MG/DL (ref 0.51–0.95)
DEPRECATED HCO3 PLAS-SCNC: 27 MMOL/L (ref 22–29)
EGFRCR SERPLBLD CKD-EPI 2021: 46 ML/MIN/1.73M2
EOSINOPHIL # BLD AUTO: ABNORMAL 10*3/UL
EOSINOPHIL # BLD MANUAL: 0 10E3/UL (ref 0–0.7)
EOSINOPHIL NFR BLD AUTO: ABNORMAL %
EOSINOPHIL NFR BLD MANUAL: 1 %
ERYTHROCYTE [DISTWIDTH] IN BLOOD BY AUTOMATED COUNT: 13.6 % (ref 10–15)
GLUCOSE SERPL-MCNC: 119 MG/DL (ref 70–99)
HCT VFR BLD AUTO: 36.1 % (ref 35–47)
HGB BLD-MCNC: 11.4 G/DL (ref 11.7–15.7)
IMM GRANULOCYTES # BLD: ABNORMAL 10*3/UL
IMM GRANULOCYTES NFR BLD: ABNORMAL %
LYMPHOCYTES # BLD AUTO: ABNORMAL 10*3/UL
LYMPHOCYTES # BLD MANUAL: 1 10E3/UL (ref 0.8–5.3)
LYMPHOCYTES NFR BLD AUTO: ABNORMAL %
LYMPHOCYTES NFR BLD MANUAL: 20 %
MCH RBC QN AUTO: 31.7 PG (ref 26.5–33)
MCHC RBC AUTO-ENTMCNC: 31.6 G/DL (ref 31.5–36.5)
MCV RBC AUTO: 100 FL (ref 78–100)
MONOCYTES # BLD AUTO: ABNORMAL 10*3/UL
MONOCYTES # BLD MANUAL: 0.2 10E3/UL (ref 0–1.3)
MONOCYTES NFR BLD AUTO: ABNORMAL %
MONOCYTES NFR BLD MANUAL: 5 %
NEUTROPHILS # BLD AUTO: ABNORMAL 10*3/UL
NEUTROPHILS # BLD MANUAL: 3.6 10E3/UL (ref 1.6–8.3)
NEUTROPHILS NFR BLD AUTO: ABNORMAL %
NEUTROPHILS NFR BLD MANUAL: 74 %
NRBC # BLD AUTO: 0 10E3/UL
NRBC BLD AUTO-RTO: 0 /100
PLAT MORPH BLD: NORMAL
PLATELET # BLD AUTO: 118 10E3/UL (ref 150–450)
POTASSIUM SERPL-SCNC: 4.1 MMOL/L (ref 3.4–5.3)
PROT SERPL-MCNC: 6.6 G/DL (ref 6.4–8.3)
RBC # BLD AUTO: 3.6 10E6/UL (ref 3.8–5.2)
RBC MORPH BLD: NORMAL
SODIUM SERPL-SCNC: 140 MMOL/L (ref 135–145)
TSH SERPL DL<=0.005 MIU/L-ACNC: 3.2 UIU/ML (ref 0.3–4.2)
URATE SERPL-MCNC: 7.4 MG/DL (ref 2.4–5.7)
WBC # BLD AUTO: 4.9 10E3/UL (ref 4–11)

## 2023-10-27 PROCEDURE — 84550 ASSAY OF BLOOD/URIC ACID: CPT | Mod: ORL | Performed by: FAMILY MEDICINE

## 2023-10-27 PROCEDURE — 85007 BL SMEAR W/DIFF WBC COUNT: CPT | Mod: ORL | Performed by: FAMILY MEDICINE

## 2023-10-27 PROCEDURE — 80053 COMPREHEN METABOLIC PANEL: CPT | Mod: ORL | Performed by: FAMILY MEDICINE

## 2023-10-27 PROCEDURE — 85027 COMPLETE CBC AUTOMATED: CPT | Mod: ORL | Performed by: FAMILY MEDICINE

## 2023-10-27 PROCEDURE — 84443 ASSAY THYROID STIM HORMONE: CPT | Mod: ORL | Performed by: FAMILY MEDICINE

## 2023-11-10 ENCOUNTER — ANESTHESIA EVENT (OUTPATIENT)
Dept: SURGERY | Facility: HOSPITAL | Age: 78
End: 2023-11-10
Payer: COMMERCIAL

## 2023-11-13 ENCOUNTER — HOSPITAL ENCOUNTER (OUTPATIENT)
Facility: CLINIC | Age: 78
Discharge: HOME | End: 2023-11-13
Attending: ORTHOPAEDIC SURGERY | Admitting: ORTHOPAEDIC SURGERY
Payer: COMMERCIAL

## 2023-11-13 ENCOUNTER — ANESTHESIA (OUTPATIENT)
Dept: SURGERY | Facility: HOSPITAL | Age: 78
End: 2023-11-13
Payer: COMMERCIAL

## 2023-11-13 VITALS
TEMPERATURE: 97.8 F | OXYGEN SATURATION: 98 % | SYSTOLIC BLOOD PRESSURE: 197 MMHG | RESPIRATION RATE: 16 BRPM | WEIGHT: 179 LBS | HEART RATE: 73 BPM | DIASTOLIC BLOOD PRESSURE: 90 MMHG | HEIGHT: 64 IN | BODY MASS INDEX: 30.56 KG/M2

## 2023-11-13 PROCEDURE — 999N000141 HC STATISTIC PRE-PROCEDURE NURSING ASSESSMENT: Performed by: ORTHOPAEDIC SURGERY

## 2023-11-13 PROCEDURE — 250N000013 HC RX MED GY IP 250 OP 250 PS 637: Performed by: PHYSICIAN ASSISTANT

## 2023-11-13 RX ORDER — CEFAZOLIN SODIUM/WATER 2 G/20 ML
2 SYRINGE (ML) INTRAVENOUS SEE ADMIN INSTRUCTIONS
Status: DISCONTINUED | OUTPATIENT
Start: 2023-11-13 | End: 2023-11-13 | Stop reason: HOSPADM

## 2023-11-13 RX ORDER — ACETAMINOPHEN 325 MG/1
975 TABLET ORAL ONCE
Status: COMPLETED | OUTPATIENT
Start: 2023-11-13 | End: 2023-11-13

## 2023-11-13 RX ORDER — SODIUM CHLORIDE, SODIUM LACTATE, POTASSIUM CHLORIDE, CALCIUM CHLORIDE 600; 310; 30; 20 MG/100ML; MG/100ML; MG/100ML; MG/100ML
INJECTION, SOLUTION INTRAVENOUS CONTINUOUS
Status: DISCONTINUED | OUTPATIENT
Start: 2023-11-13 | End: 2023-11-13 | Stop reason: HOSPADM

## 2023-11-13 RX ORDER — CEFAZOLIN SODIUM/WATER 2 G/20 ML
2 SYRINGE (ML) INTRAVENOUS
Status: DISCONTINUED | OUTPATIENT
Start: 2023-11-13 | End: 2023-11-13 | Stop reason: HOSPADM

## 2023-11-13 RX ORDER — LIDOCAINE 40 MG/G
CREAM TOPICAL
Status: DISCONTINUED | OUTPATIENT
Start: 2023-11-13 | End: 2023-11-13 | Stop reason: HOSPADM

## 2023-11-13 RX ORDER — TRANEXAMIC ACID 650 MG/1
1950 TABLET ORAL ONCE
Status: COMPLETED | OUTPATIENT
Start: 2023-11-13 | End: 2023-11-13

## 2023-11-13 RX ADMIN — ACETAMINOPHEN 975 MG: 325 TABLET ORAL at 06:08

## 2023-11-13 RX ADMIN — TRANEXAMIC ACID 1950 MG: 650 TABLET ORAL at 06:08

## 2023-11-13 ASSESSMENT — COPD QUESTIONNAIRES
CAT_SEVERITY: MILD
COPD: 1

## 2023-11-13 ASSESSMENT — ACTIVITIES OF DAILY LIVING (ADL): ADLS_ACUITY_SCORE: 35

## 2023-11-13 NOTE — ANESTHESIA PREPROCEDURE EVALUATION
Anesthesia Pre-Procedure Evaluation    Patient: Dulce Pritchard   MRN: [id number] : 1945        Procedure : Procedure(s):  RIGHT DIRECT ANTERIOR TOTAL HIP ARTHROPLASTY          Past Medical History:   Diagnosis Date    Arthritis     Atrial fibrillation (H)     Chronic heart failure with preserved ejection fraction (H) 2023    Congestive heart failure (H)     Depressive disorder     Hyperlipidemia     Hypertension     Obese     Primary hypertension 2023    Pulmonary hypertension (H)     Thyroid disease       Past Surgical History:   Procedure Laterality Date    HYSTERECTOMY        No Known Allergies   Social History     Tobacco Use    Smoking status: Former     Packs/day: 1.00     Years: 20.00     Additional pack years: 0.00     Total pack years: 20.00     Types: Cigarettes     Start date:      Quit date:      Years since quittin.8    Smokeless tobacco: Not on file   Substance Use Topics    Alcohol use: Never      Wt Readings from Last 1 Encounters:   23 81.2 kg (179 lb)        Anesthesia Evaluation   Pt has had prior anesthetic.         ROS/MED HX  ENT/Pulmonary:     (+)                        mild,  COPD,              Neurologic:  - neg neurologic ROS     Cardiovascular: Comment:      The regadenoson nuclear stress test is probably negative for inducible myocardial ischemia or infarction. Image quality is suboptimal due to the patient arm being down during both rest and stress image acquisition, causing significant overlying soft tissue attenuation.     The left ventricular ejection fraction at stress is 61%.     The patient is at a low risk of future cardiac ischemic events.     There is no prior study for comparison.       The regadenoson nuclear stress test is probably negative for inducible myocardial ischemia or infarction. Image quality is suboptimal due to the patient arm being down during both rest and stress image acquisition, causing significant overlying soft  tissue attenuation.     The left ventricular ejection fraction at stress is 61%.     The patient is at a low risk of future cardiac ischemic events.     There is no prior study for comparison.      (+) Dyslipidemia hypertension- -   -  - -      CHF                     valvular problems/murmurs (mild MR)     pulmonary hypertension (Pulmonary HTN alluded to in chart, but no tests or further workup identify this issue),      METS/Exercise Tolerance: >4 METS    Hematologic:  - neg hematologic  ROS     Musculoskeletal:  - neg musculoskeletal ROS     GI/Hepatic:  - neg GI/hepatic ROS     Renal/Genitourinary:     (+) renal disease (stage 3),             Endo:  - neg endo ROS   (+)          thyroid problem,     Obesity,       Psychiatric/Substance Use:  - neg psychiatric ROS     Infectious Disease:  - neg infectious disease ROS     Malignancy:  - neg malignancy ROS     Other:  - neg other ROS          Physical Exam    Airway  airway exam normal      Mallampati: II       Respiratory Devices and Support         Dental  no notable dental history   Comment: Upper lower dentures        Cardiovascular   cardiovascular exam normal       Rhythm and rate: regular and normal     Pulmonary   pulmonary exam normal        breath sounds clear to auscultation           OUTSIDE LABS:  CBC:   Lab Results   Component Value Date    WBC 4.9 10/27/2023    WBC 5.0 06/22/2023    HGB 11.4 (L) 10/27/2023    HGB 11.1 07/31/2023    HCT 36.1 10/27/2023    HCT 31.1 (L) 06/22/2023     (L) 10/27/2023    PLT 88 (L) 06/22/2023     BMP:   Lab Results   Component Value Date     10/27/2023     06/30/2023    POTASSIUM 4.1 10/27/2023    POTASSIUM 3.4 06/30/2023    CHLORIDE 100 10/27/2023    CHLORIDE 103 06/30/2023    CO2 27 10/27/2023    CO2 21 (L) 06/30/2023    BUN 27.1 (H) 10/27/2023    BUN 40.1 (H) 06/30/2023    CR 1.21 (H) 10/27/2023    CR 1.40 (H) 06/30/2023     (H) 10/27/2023    GLC 93 06/30/2023     COAGS: No results found for:  "\"PTT\", \"INR\", \"FIBR\"  POC: No results found for: \"BGM\", \"HCG\", \"HCGS\"  HEPATIC:   Lab Results   Component Value Date    ALBUMIN 3.7 10/27/2023    PROTTOTAL 6.6 10/27/2023    ALT 34 10/27/2023    AST 42 10/27/2023    ALKPHOS 169 (H) 10/27/2023    BILITOTAL 0.3 10/27/2023     OTHER:   Lab Results   Component Value Date    A1C 5.9 01/17/2019    EDGAR 9.6 10/27/2023    MAG 2.3 06/22/2023    TSH 3.20 10/27/2023    T4 0.79 (L) 06/22/2023       Anesthesia Plan    ASA Status:  3       Anesthesia Type: Spinal.   Induction: Intravenous, Propofol.   Maintenance: TIVA.        Consents    Anesthesia Plan(s) and associated risks, benefits, and realistic alternatives discussed. Questions answered and patient/representative(s) expressed understanding.     - Discussed:     - Discussed with:  Patient      - Extended Intubation/Ventilatory Support Discussed: No.      - Patient is DNR/DNI Status: No     Use of blood products discussed: No .     Postoperative Care    Pain management: IV analgesics.   PONV prophylaxis: Ondansetron (or other 5HT-3), Dexamethasone or Solumedrol     Comments:                Genaro Lopez MD  "

## 2023-11-13 NOTE — INTERVAL H&P NOTE
"I have reviewed the surgical (or preoperative) H&P that is linked to this encounter, and examined the patient. Noted changes include: Fungal infection on operative hip noted.  Discussed findings with the patient and her daughter and all agreed to cancel today's surgery, treat the tinea cruris, and have her see me in clinic next week for re-evaluation.    Clinical Conditions Present on Arrival:  Clinically Significant Risk Factors Present on Admission                 # Drug Induced Platelet Defect: home medication list includes an antiplatelet medication  # Obesity: Estimated body mass index is 30.73 kg/m  as calculated from the following:    Height as of this encounter: 1.626 m (5' 4\").    Weight as of this encounter: 81.2 kg (179 lb).       "

## 2023-11-13 NOTE — PROVIDER NOTIFICATION
Patient noted to have redness  and open areas in her panus along surgical side and none surgical side.  Notified Dr. San, plan to postpone surgery until area healed.

## 2024-01-04 NOTE — PROGRESS NOTES
Discharge plan according to [facility]:    12/27/23 8930   Discharge Planning   Patient/Family Anticipates Transition to home;home with family   Living Arrangements   People in Home other (see comments);child(lobo), adult  (niece- lives with her and daughter josie)   Type of Residence Private Residence   Is your private residence a single family home or apartment? Single family home   Number of Stairs, Within Home, Primary greater than 10 stairs   Stair Railings, Within Home, Primary railings safe and in good condition   Bathroom Shower/Tub Tub/Shower unit   Equipment Currently Used at Home walker, rolling;wheelchair, manual   Support System   Support Systems Family Members;Children   Do you have someone available to stay with you one or two nights once you are home? Yes

## 2024-02-01 ENCOUNTER — LAB REQUISITION (OUTPATIENT)
Dept: LAB | Facility: CLINIC | Age: 79
End: 2024-02-01
Payer: COMMERCIAL

## 2024-02-01 ENCOUNTER — TRANSFERRED RECORDS (OUTPATIENT)
Dept: HEALTH INFORMATION MANAGEMENT | Facility: CLINIC | Age: 79
End: 2024-02-01

## 2024-02-01 DIAGNOSIS — N18.32 CHRONIC KIDNEY DISEASE, STAGE 3B (H): ICD-10-CM

## 2024-02-01 PROCEDURE — 85025 COMPLETE CBC W/AUTO DIFF WBC: CPT | Mod: ORL | Performed by: FAMILY MEDICINE

## 2024-02-01 PROCEDURE — 80053 COMPREHEN METABOLIC PANEL: CPT | Mod: ORL | Performed by: FAMILY MEDICINE

## 2024-02-02 LAB
ALBUMIN SERPL BCG-MCNC: 3.9 G/DL (ref 3.5–5.2)
ALP SERPL-CCNC: 149 U/L (ref 40–150)
ALT SERPL W P-5'-P-CCNC: 21 U/L (ref 0–50)
ANION GAP SERPL CALCULATED.3IONS-SCNC: 15 MMOL/L (ref 7–15)
AST SERPL W P-5'-P-CCNC: 37 U/L (ref 0–45)
BASOPHILS # BLD AUTO: 0 10E3/UL (ref 0–0.2)
BASOPHILS NFR BLD AUTO: 0 %
BILIRUB SERPL-MCNC: 0.4 MG/DL
BUN SERPL-MCNC: 16.9 MG/DL (ref 8–23)
CALCIUM SERPL-MCNC: 9.5 MG/DL (ref 8.8–10.2)
CHLORIDE SERPL-SCNC: 101 MMOL/L (ref 98–107)
CREAT SERPL-MCNC: 1.08 MG/DL (ref 0.51–0.95)
DEPRECATED HCO3 PLAS-SCNC: 24 MMOL/L (ref 22–29)
EGFRCR SERPLBLD CKD-EPI 2021: 53 ML/MIN/1.73M2
EOSINOPHIL # BLD AUTO: 0.3 10E3/UL (ref 0–0.7)
EOSINOPHIL NFR BLD AUTO: 5 %
ERYTHROCYTE [DISTWIDTH] IN BLOOD BY AUTOMATED COUNT: 13.8 % (ref 10–15)
GLUCOSE SERPL-MCNC: 117 MG/DL (ref 70–99)
HCT VFR BLD AUTO: 37.6 % (ref 35–47)
HGB BLD-MCNC: 12.1 G/DL (ref 11.7–15.7)
IMM GRANULOCYTES # BLD: 0 10E3/UL
IMM GRANULOCYTES NFR BLD: 0 %
LYMPHOCYTES # BLD AUTO: 0.8 10E3/UL (ref 0.8–5.3)
LYMPHOCYTES NFR BLD AUTO: 15 %
MCH RBC QN AUTO: 32.1 PG (ref 26.5–33)
MCHC RBC AUTO-ENTMCNC: 32.2 G/DL (ref 31.5–36.5)
MCV RBC AUTO: 100 FL (ref 78–100)
MONOCYTES # BLD AUTO: 0.4 10E3/UL (ref 0–1.3)
MONOCYTES NFR BLD AUTO: 7 %
NEUTROPHILS # BLD AUTO: 3.9 10E3/UL (ref 1.6–8.3)
NEUTROPHILS NFR BLD AUTO: 73 %
NRBC # BLD AUTO: 0 10E3/UL
NRBC BLD AUTO-RTO: 0 /100
PLATELET # BLD AUTO: 113 10E3/UL (ref 150–450)
POTASSIUM SERPL-SCNC: 4.7 MMOL/L (ref 3.4–5.3)
PROT SERPL-MCNC: 6.7 G/DL (ref 6.4–8.3)
RBC # BLD AUTO: 3.77 10E6/UL (ref 3.8–5.2)
SODIUM SERPL-SCNC: 140 MMOL/L (ref 135–145)
WBC # BLD AUTO: 5.4 10E3/UL (ref 4–11)

## 2024-02-05 NOTE — PROVIDER NOTIFICATION
I am evaluating this patient for upcoming Right Posterior Total Hip Arthroplasty with Dr. San at [facility] on 2/7/24:    - Reviewed preop H&P and labs. Patient was originally scheduled for right total hip surgery in Nov. 2023 but it was cancelled on day of surgery after patient was found to have a tinea rash with some open skin in her abdominal folds on her right side. Patient has been applying clotrimazole cream to the area and rash is much improved per notes from PCP.  Patient also has mild, chronic thrombocytopenia. Most recent platelet count 113,000 at preop exam on 2/1/24. I notified Dr. San of the above information and he is ok proceeding. Please call me below if any questions. Full Treatment Plan note to follow.       BRE Adames, CNP   Advanced Practice Nurse Navigator- Orthopedics  [facility]   Office Phone: [phone]  Direct Fax: [phone]

## 2024-02-05 NOTE — TREATMENT PLAN
Orthopedic Surgery Pre-Op Plan: Dulce Pritchard  pre-op review. This is NOT an H&P   Surgeon: Dr. San   [facility]: [facility]  Name of Surgery: Right Posterior Total Hip Arthroplasty  Date of Surgery: 2/7/24  H&P: Completed on 2/1/24 by Dr. Colin Christie at [facility].   History of ASA, NSAIDS, vitamin and/or herbal supplements, GLP-1 Agonist medication taken within 10 days?: Yes- on ASA 81 mg daily-patient instructed to hold this for at least 7 days before surgery.  History of blood thinners?: No    Plan:   1) Discharge Plan: Home morning of POD 1 with assist of Family (lives with her daughter and niece, who will be helping her after surgery). Please see Discharge Planning section near bottom of this note for further details.     2) Tinea Cruris Rash in Abdominal Folds: Improved: originally scheduled for right total hip surgery in Nov. 2023 but it was cancelled on day of surgery after patient was found to have a tinea rash with some open skin in her abdominal folds on her right side. Patient has been applying clotrimazole cream to the area and rash is much improved per notes from PCP. Dr. San notified and is ok proceeding.     3) Thrombocytopenia: Chronic: Mild: History of mild, chronic thrombocytopenia.  Most recent platelet count 113,000 at preop exam on 2/1/2024.  On review of labs it appears patient's platelet count has been typically in 80,000-130,000 range over the past couple years.  Dr. San notified and is okay proceeding.  I will recheck platelet count on day of surgery to make sure it remains stable.     4) Coronary Artery Disease: CT Chest 1/30/23 showed severe coronary artery calcifications however NM MPI stress test 8/24/23: Probably negative for inducible myocardial ischemia or infarction.  LVEF at stress 61%.  Patient at low risk of future cardiac ischemic events based on this stress test.     NM MPI Stress Test 8/24/23:    The regadenoson nuclear stress test is probably negative for inducible  myocardial ischemia or infarction. Image quality is suboptimal due to the patient arm being down during both rest and stress image acquisition, causing significant overlying soft tissue attenuation.    The left ventricular ejection fraction at stress is 61%.    The patient is at a low risk of future cardiac ischemic events.    There is no prior study for comparison.    5) History of Bradycardia: experienced symptomatic bradycardia during hospitalization for acute kidney injury 6/2023. Atenolol was discontinued at that time and patient has remained off it. Cardiac event monitor below showed normal sinus rhythm, no significant pauses, no sustained tachyarrhythmias or atrial fibrillation.     Cardiac event monitoring from 6/26/2023 to 7/9/2023 (monitored duration 12d 11h 56m).  Baseline rhythm was sinus rhythm.    Reported heart rate range 50 to 120bpm, average 62bpm.  One symptom triggers (other symptoms) correlated to sinus rhythm.  One automated recordings included sinus rhythm with PAC.  No sustained tachyarrhythmias.  No atrial fibrillation.  There were no pauses noted.  Supraventricular and ventricular ectopic beat frequency are not reported on this monitoring modality.      6) Heart Failure with Preserved Ejection Fraction (HFpEF): NYHA Class II: Well-compensated and euvolemic at last Cardiology visit on 8/21/2023. Cleared by PCP for surgery. Reviewed most recent Cardiology visit note with BRE Agarwal, TAMMY, [facility], from 8/21/23: patient has some chronic fatigue but feels well. Her weights have been stable and she has been following a low salt diet. She receives open arms meals. On furosemide. Will continue current medications, continue low salt diet and follow up again with Cardiology in about 6 months. I recommend close monitoring of perioperative fluid status.  Will order daily weights.  Nursing to please notify Hospitalist for weight gain >3 lbs in 24 hours or for  signs/symptoms of worsening heart failure.     7) Pulmonary Hypertension: Severe: per last Echocardiogram below on 1/31/2023. She has had consultations with both Cardiology and Pulmonology. [facility] per Dr. Gannon, her lung function is quite good. PFT's 7/31/23 did NOT show evidence of COPD. She does not have concerning respiratory symptoms. No indication for inhalers at this time. She should continue strict attention to salt intake and daily weights.     Echocardiogram 1/31/23:  Interpretation Summary   The left ventricle is mildly dilated.  Left ventricular function is decreased. The ejection fraction is 50-55%  (borderline).  The left atrium is mildly dilated.  The right atrium is mildly dilated.  Severe (>55mmHg) pulmonary hypertension is present.  IVC diameter >2.1 cm collapsing <50% with sniff suggests a high RA pressure  estimated at 15 mmHg or greater.  There is mild to moderate (1-2+) mitral regurgitation.    8) Hyperlipidemia: On atorvastatin.    9) Hypertension: Well-controlled on furosemide.     10) Hypothyroidism: On levothyroxine.    11) Chronic Kidney Disease: Stage 3b: Stable.  Creatinine 1.08, GFR 53, BUN 16.9 on 2/1/2024. I recommend avoiding nephrotoxins like NSAIDS, promoting good post-op hydration and monitoring post-op kidney function closely.      12) Rheumatoid Arthritis: On hydroxychloroquine.    13) Polyarthralgia: On tramadol.    14) Generalized Anxiety Disorder: On fluoxetine.    15) Gout: On allopurinol.    Patient appears medically optimized for upcoming surgery. I would recommend Hospitalist Consult to assist with medical management. Please call me below with any questions on this patient.       Review of Systems Notable for: Tinea cruris rash in abdominal folds-improved, thrombocytopenia-chronic-mild, coronary artery disease, history of bradycardia, heart failure with preserved ejection  fraction, pulmonary hypertension-severe, hyperlipidemia, hypertension, hypothyroidism, chronic kidney disease- stage 3b, rheumatoid arthritis, polyarthralgia, generalized anxiety disorder, gout.    Past Medical History:   Past Medical History:   Diagnosis Date    Acute renal insufficiency 06/22/2023    Arthritis     Atrial fibrillation (H)     Chronic heart failure with preserved ejection fraction (H) 02/13/2023    Congestive heart failure (H)     Depressive disorder     Hyperlipidemia     Hypertension     Obese     Primary hypertension 03/27/2023    Pulmonary hypertension (H)     Thyroid disease      Past Surgical History:   Procedure Laterality Date    HYSTERECTOMY         Current Medications:  Patient's Medications   New Prescriptions    No medications on file   Previous Medications    ALLOPURINOL (ZYLOPRIM) 100 MG TABLET    Take 100 mg by mouth daily    ASPIRIN (ASA) 81 MG EC TABLET    Take 1 tablet (81 mg) by mouth daily    ATORVASTATIN (LIPITOR) 20 MG TABLET    Take 1 tablet (20 mg) by mouth every evening    FLUOXETINE (PROZAC) 20 MG CAPSULE    Take 20 mg by mouth daily    FUROSEMIDE (LASIX) 40 MG TABLET    Take 1 tablet (40 mg) by mouth 2 times daily    GABAPENTIN (NEURONTIN) 300 MG CAPSULE    Take 2 capsules (600 mg) by mouth At Bedtime    GUAIFENESIN (MUCINEX) 600 MG 12 HR TABLET    Take 1 tablet (600 mg) by mouth 2 times daily    HYDROXYCHLOROQUINE (PLAQUENIL) 200 MG TABLET    Take 200 mg by mouth 2 times daily    LEVOTHYROXINE (SYNTHROID/LEVOTHROID) 25 MCG TABLET    Take 1 tablet (25 mcg) by mouth every morning (before breakfast)    POLYETHYLENE GLYCOL (MIRALAX) 17 GM/DOSE POWDER    DISSOLVE 17 GMS IN WATER AND TAKE BY MOUTH ONCE DAILY AS NEEDED    STOOL SOFTENER/LAXATIVE 50-8.6 MG TABLET    1 tablet daily as needed    TRAMADOL (ULTRAM) 50 MG TABLET    Take 50 mg by mouth every 6 hours as needed Per Lily: Take 1-2 tablets PO Q6H PRN Chronic Pain    VITAMIN D (CHOLECALCIFEROL) 25 MCG (1000 UT) TABS     Take 1,000 Units by mouth daily   Modified Medications    No medications on file   Discontinued Medications    No medications on file       ALLERGIES:  No Known Allergies    Social History  Social History     Tobacco Use    Smoking status: Former     Packs/day: 1.00     Years: 20.00     Additional pack years: 0.00     Total pack years: 20.00     Types: Cigarettes     Start date:      Quit date:      Years since quittin.1   Vaping Use    Vaping Use: Never used   Substance Use Topics    Alcohol use: Never    Drug use: Yes     Comment: Tramadol 50mg BID       Any Abnormal Recent Diagnostics? Yes  Platelet count 113,000 at preop exam on 2024: Patient has history of mild, chronic thrombocytopenia.  Dr. San notified and is okay proceeding.  We will recheck platelet count on day of surgery to make sure it remains stable.  Creatinine 1.08, GFR 53 on 2024: Shows stable chronic kidney disease-stage 3b.     Discharge Planning:   Discharge plan according to [facility]:     Home morning of POD 1 with assist of Family (lives with her daughter and niece, who will  be helping her after surgery).       23 1246   Discharge Planning   Patient/Family Anticipates Transition to home;home with family   Living Arrangements   People in Home other (see comments);child(lobo), adult  (niece- lives with her and daughter josie)   Type of Residence Private Residence   Is your private residence a single family home or apartment? Single family home   Number of Stairs, Within Home, Primary greater than 10 stairs   Stair Railings, Within Home, Primary railings safe and in good condition   Bathroom Shower/Tub Tub/Shower unit   Equipment Currently Used at Home walker, rolling;wheelchair, manual   Support System   Support Systems Family Members;Children   Do you have someone available to stay with you one or two nights once you are home? Yes       BRE Adames, CNP   Advanced Practice Nurse Navigator-  Orthopedics  [facility]   Phone: [phone]

## 2024-02-06 ENCOUNTER — ANESTHESIA EVENT (OUTPATIENT)
Dept: SURGERY | Facility: CLINIC | Age: 79
End: 2024-02-06
Payer: COMMERCIAL

## 2024-02-07 ENCOUNTER — APPOINTMENT (OUTPATIENT)
Dept: RADIOLOGY | Facility: CLINIC | Age: 79
End: 2024-02-07
Attending: PHYSICIAN ASSISTANT
Payer: COMMERCIAL

## 2024-02-07 ENCOUNTER — HOSPITAL ENCOUNTER (OUTPATIENT)
Facility: CLINIC | Age: 79
Discharge: NURSING, REHAB OR LONG-TERM CARE | End: 2024-02-12
Attending: ORTHOPAEDIC SURGERY | Admitting: ORTHOPAEDIC SURGERY
Payer: COMMERCIAL

## 2024-02-07 ENCOUNTER — APPOINTMENT (OUTPATIENT)
Dept: PHYSICAL THERAPY | Facility: CLINIC | Age: 79
End: 2024-02-07
Attending: ORTHOPAEDIC SURGERY
Payer: COMMERCIAL

## 2024-02-07 ENCOUNTER — ANESTHESIA (OUTPATIENT)
Dept: SURGERY | Facility: CLINIC | Age: 79
End: 2024-02-07
Payer: COMMERCIAL

## 2024-02-07 DIAGNOSIS — M16.11 PRIMARY OSTEOARTHRITIS OF RIGHT HIP: Primary | ICD-10-CM

## 2024-02-07 PROBLEM — Z47.1 ORTHOPEDIC AFTERCARE FOR JOINT REPLACEMENT: Status: ACTIVE | Noted: 2024-02-07

## 2024-02-07 LAB
GLUCOSE BLDC GLUCOMTR-MCNC: 117 MG/DL (ref 70–99)
PLATELET # BLD AUTO: 92 10E3/UL (ref 150–450)

## 2024-02-07 PROCEDURE — C1776 JOINT DEVICE (IMPLANTABLE): HCPCS | Performed by: ORTHOPAEDIC SURGERY

## 2024-02-07 PROCEDURE — 710N000010 HC RECOVERY PHASE 1, LEVEL 2, PER MIN: Performed by: ORTHOPAEDIC SURGERY

## 2024-02-07 PROCEDURE — 250N000009 HC RX 250: Performed by: ORTHOPAEDIC SURGERY

## 2024-02-07 PROCEDURE — 272N000001 HC OR GENERAL SUPPLY STERILE: Performed by: ORTHOPAEDIC SURGERY

## 2024-02-07 PROCEDURE — 250N000009 HC RX 250: Performed by: PHYSICIAN ASSISTANT

## 2024-02-07 PROCEDURE — 258N000003 HC RX IP 258 OP 636: Performed by: PHYSICIAN ASSISTANT

## 2024-02-07 PROCEDURE — 99232 SBSQ HOSP IP/OBS MODERATE 35: CPT | Performed by: STUDENT IN AN ORGANIZED HEALTH CARE EDUCATION/TRAINING PROGRAM

## 2024-02-07 PROCEDURE — 250N000011 HC RX IP 250 OP 636: Performed by: PHYSICIAN ASSISTANT

## 2024-02-07 PROCEDURE — 250N000009 HC RX 250: Performed by: NURSE ANESTHETIST, CERTIFIED REGISTERED

## 2024-02-07 PROCEDURE — 250N000011 HC RX IP 250 OP 636: Performed by: NURSE ANESTHETIST, CERTIFIED REGISTERED

## 2024-02-07 PROCEDURE — 999N000141 HC STATISTIC PRE-PROCEDURE NURSING ASSESSMENT: Performed by: ORTHOPAEDIC SURGERY

## 2024-02-07 PROCEDURE — 97110 THERAPEUTIC EXERCISES: CPT | Mod: GP

## 2024-02-07 PROCEDURE — 97161 PT EVAL LOW COMPLEX 20 MIN: CPT | Mod: GP

## 2024-02-07 PROCEDURE — 82962 GLUCOSE BLOOD TEST: CPT

## 2024-02-07 PROCEDURE — 85049 AUTOMATED PLATELET COUNT: CPT | Performed by: NURSE PRACTITIONER

## 2024-02-07 PROCEDURE — 250N000013 HC RX MED GY IP 250 OP 250 PS 637: Performed by: STUDENT IN AN ORGANIZED HEALTH CARE EDUCATION/TRAINING PROGRAM

## 2024-02-07 PROCEDURE — 999N000065 XR PELVIS AND HIP PORTABLE RIGHT 1 VIEW

## 2024-02-07 PROCEDURE — 250N000013 HC RX MED GY IP 250 OP 250 PS 637: Performed by: PHYSICIAN ASSISTANT

## 2024-02-07 PROCEDURE — 370N000017 HC ANESTHESIA TECHNICAL FEE, PER MIN: Performed by: ORTHOPAEDIC SURGERY

## 2024-02-07 PROCEDURE — 360N000077 HC SURGERY LEVEL 4, PER MIN: Performed by: ORTHOPAEDIC SURGERY

## 2024-02-07 PROCEDURE — 250N000011 HC RX IP 250 OP 636: Performed by: ANESTHESIOLOGY

## 2024-02-07 PROCEDURE — 250N000011 HC RX IP 250 OP 636: Mod: JZ | Performed by: ANESTHESIOLOGY

## 2024-02-07 PROCEDURE — 272N000002 HC OR SUPPLY OTHER OPNP: Performed by: ORTHOPAEDIC SURGERY

## 2024-02-07 PROCEDURE — 36415 COLL VENOUS BLD VENIPUNCTURE: CPT | Performed by: NURSE PRACTITIONER

## 2024-02-07 PROCEDURE — 258N000003 HC RX IP 258 OP 636: Performed by: ANESTHESIOLOGY

## 2024-02-07 DEVICE — 0 DEGREE POLYETHYLENE INSERT
Type: IMPLANTABLE DEVICE | Site: HIP | Status: FUNCTIONAL
Brand: TRIDENT

## 2024-02-07 DEVICE — UNIVERSAL V40 TAPER ADAPTER SLEEVE
Type: IMPLANTABLE DEVICE | Site: HIP | Status: FUNCTIONAL
Brand: ADAPTER SLEEVE

## 2024-02-07 DEVICE — ANATOMIC UNIVERSAL TAPER FEMORAL HEAD
Type: IMPLANTABLE DEVICE | Site: HIP | Status: FUNCTIONAL
Brand: BIOLOX

## 2024-02-07 DEVICE — HIP STEM - HIGH OFFSET
Type: IMPLANTABLE DEVICE | Site: HIP | Status: FUNCTIONAL
Brand: INSIGNIA

## 2024-02-07 DEVICE — TRIDENT II TRITANIUM SOLIDBACK ACETABULAR SHELL 54E
Type: IMPLANTABLE DEVICE | Site: HIP | Status: FUNCTIONAL
Brand: TRIDENT II

## 2024-02-07 RX ORDER — FENTANYL CITRATE 50 UG/ML
25 INJECTION, SOLUTION INTRAMUSCULAR; INTRAVENOUS EVERY 5 MIN PRN
Status: DISCONTINUED | OUTPATIENT
Start: 2024-02-07 | End: 2024-02-07 | Stop reason: HOSPADM

## 2024-02-07 RX ORDER — CEFADROXIL 500 MG/1
500 CAPSULE ORAL 2 TIMES DAILY
Qty: 14 CAPSULE | Refills: 0 | Status: SHIPPED | OUTPATIENT
Start: 2024-02-07 | End: 2024-02-12

## 2024-02-07 RX ORDER — OXYCODONE HYDROCHLORIDE 5 MG/1
5-10 TABLET ORAL EVERY 4 HOURS PRN
Qty: 20 TABLET | Refills: 0 | Status: SHIPPED | OUTPATIENT
Start: 2024-02-07 | End: 2024-02-12

## 2024-02-07 RX ORDER — HYDROMORPHONE HCL IN WATER/PF 6 MG/30 ML
0.2 PATIENT CONTROLLED ANALGESIA SYRINGE INTRAVENOUS EVERY 5 MIN PRN
Status: DISCONTINUED | OUTPATIENT
Start: 2024-02-07 | End: 2024-02-07 | Stop reason: HOSPADM

## 2024-02-07 RX ORDER — POLYETHYLENE GLYCOL 3350 17 G/17G
17 POWDER, FOR SOLUTION ORAL DAILY
Status: DISCONTINUED | OUTPATIENT
Start: 2024-02-08 | End: 2024-02-12 | Stop reason: HOSPADM

## 2024-02-07 RX ORDER — HYDROMORPHONE HCL IN WATER/PF 6 MG/30 ML
0.1 PATIENT CONTROLLED ANALGESIA SYRINGE INTRAVENOUS
Status: DISCONTINUED | OUTPATIENT
Start: 2024-02-07 | End: 2024-02-12 | Stop reason: HOSPADM

## 2024-02-07 RX ORDER — LEVOTHYROXINE SODIUM 25 UG/1
25 TABLET ORAL
Status: DISCONTINUED | OUTPATIENT
Start: 2024-02-08 | End: 2024-02-12 | Stop reason: HOSPADM

## 2024-02-07 RX ORDER — ATORVASTATIN CALCIUM 10 MG/1
20 TABLET, FILM COATED ORAL EVERY EVENING
Status: DISCONTINUED | OUTPATIENT
Start: 2024-02-07 | End: 2024-02-12 | Stop reason: HOSPADM

## 2024-02-07 RX ORDER — CEFAZOLIN SODIUM/WATER 2 G/20 ML
2 SYRINGE (ML) INTRAVENOUS
Status: COMPLETED | OUTPATIENT
Start: 2024-02-07 | End: 2024-02-07

## 2024-02-07 RX ORDER — ACETAMINOPHEN 325 MG/1
650 TABLET ORAL EVERY 4 HOURS PRN
Qty: 100 TABLET | Refills: 0 | Status: SHIPPED | OUTPATIENT
Start: 2024-02-07 | End: 2024-02-22

## 2024-02-07 RX ORDER — LIDOCAINE 40 MG/G
CREAM TOPICAL
Status: DISCONTINUED | OUTPATIENT
Start: 2024-02-07 | End: 2024-02-07 | Stop reason: HOSPADM

## 2024-02-07 RX ORDER — CEFAZOLIN SODIUM 1 G/3ML
1 INJECTION, POWDER, FOR SOLUTION INTRAMUSCULAR; INTRAVENOUS EVERY 8 HOURS
Qty: 10 ML | Refills: 0 | Status: COMPLETED | OUTPATIENT
Start: 2024-02-07 | End: 2024-02-07

## 2024-02-07 RX ORDER — NALOXONE HYDROCHLORIDE 0.4 MG/ML
0.4 INJECTION, SOLUTION INTRAMUSCULAR; INTRAVENOUS; SUBCUTANEOUS
Status: DISCONTINUED | OUTPATIENT
Start: 2024-02-07 | End: 2024-02-12 | Stop reason: HOSPADM

## 2024-02-07 RX ORDER — KETAMINE HYDROCHLORIDE 10 MG/ML
INJECTION INTRAMUSCULAR; INTRAVENOUS PRN
Status: DISCONTINUED | OUTPATIENT
Start: 2024-02-07 | End: 2024-02-07

## 2024-02-07 RX ORDER — PROPOFOL 10 MG/ML
INJECTION, EMULSION INTRAVENOUS CONTINUOUS PRN
Status: DISCONTINUED | OUTPATIENT
Start: 2024-02-07 | End: 2024-02-07

## 2024-02-07 RX ORDER — POLYETHYLENE GLYCOL 3350 17 G/17G
17 POWDER, FOR SOLUTION ORAL DAILY PRN
Status: DISCONTINUED | OUTPATIENT
Start: 2024-02-07 | End: 2024-02-12

## 2024-02-07 RX ORDER — CEFAZOLIN SODIUM/WATER 2 G/20 ML
2 SYRINGE (ML) INTRAVENOUS SEE ADMIN INSTRUCTIONS
Status: DISCONTINUED | OUTPATIENT
Start: 2024-02-07 | End: 2024-02-07 | Stop reason: HOSPADM

## 2024-02-07 RX ORDER — SODIUM CHLORIDE, SODIUM LACTATE, POTASSIUM CHLORIDE, CALCIUM CHLORIDE 600; 310; 30; 20 MG/100ML; MG/100ML; MG/100ML; MG/100ML
INJECTION, SOLUTION INTRAVENOUS CONTINUOUS
Status: DISCONTINUED | OUTPATIENT
Start: 2024-02-07 | End: 2024-02-07 | Stop reason: HOSPADM

## 2024-02-07 RX ORDER — NALOXONE HYDROCHLORIDE 0.4 MG/ML
0.2 INJECTION, SOLUTION INTRAMUSCULAR; INTRAVENOUS; SUBCUTANEOUS
Status: DISCONTINUED | OUTPATIENT
Start: 2024-02-07 | End: 2024-02-12 | Stop reason: HOSPADM

## 2024-02-07 RX ORDER — ACETAMINOPHEN 325 MG/1
650 TABLET ORAL EVERY 4 HOURS PRN
Status: DISCONTINUED | OUTPATIENT
Start: 2024-02-10 | End: 2024-02-12 | Stop reason: HOSPADM

## 2024-02-07 RX ORDER — LIDOCAINE 40 MG/G
CREAM TOPICAL
Status: DISCONTINUED | OUTPATIENT
Start: 2024-02-07 | End: 2024-02-12 | Stop reason: HOSPADM

## 2024-02-07 RX ORDER — OXYCODONE HYDROCHLORIDE 5 MG/1
5 TABLET ORAL EVERY 4 HOURS PRN
Status: DISCONTINUED | OUTPATIENT
Start: 2024-02-07 | End: 2024-02-12 | Stop reason: HOSPADM

## 2024-02-07 RX ORDER — ACETAMINOPHEN 325 MG/1
975 TABLET ORAL EVERY 8 HOURS
Qty: 27 TABLET | Refills: 0 | Status: COMPLETED | OUTPATIENT
Start: 2024-02-07 | End: 2024-02-10

## 2024-02-07 RX ORDER — HYDROMORPHONE HCL IN WATER/PF 6 MG/30 ML
0.2 PATIENT CONTROLLED ANALGESIA SYRINGE INTRAVENOUS
Status: DISCONTINUED | OUTPATIENT
Start: 2024-02-07 | End: 2024-02-12 | Stop reason: HOSPADM

## 2024-02-07 RX ORDER — ONDANSETRON 2 MG/ML
4 INJECTION INTRAMUSCULAR; INTRAVENOUS EVERY 6 HOURS PRN
Status: DISCONTINUED | OUTPATIENT
Start: 2024-02-07 | End: 2024-02-12 | Stop reason: HOSPADM

## 2024-02-07 RX ORDER — PROCHLORPERAZINE MALEATE 5 MG
5 TABLET ORAL EVERY 6 HOURS PRN
Status: DISCONTINUED | OUTPATIENT
Start: 2024-02-07 | End: 2024-02-12 | Stop reason: HOSPADM

## 2024-02-07 RX ORDER — ONDANSETRON 2 MG/ML
INJECTION INTRAMUSCULAR; INTRAVENOUS PRN
Status: DISCONTINUED | OUTPATIENT
Start: 2024-02-07 | End: 2024-02-07

## 2024-02-07 RX ORDER — TRANEXAMIC ACID 650 MG/1
1950 TABLET ORAL ONCE
Status: COMPLETED | OUTPATIENT
Start: 2024-02-07 | End: 2024-02-07

## 2024-02-07 RX ORDER — LIDOCAINE HYDROCHLORIDE 10 MG/ML
INJECTION, SOLUTION INFILTRATION; PERINEURAL PRN
Status: DISCONTINUED | OUTPATIENT
Start: 2024-02-07 | End: 2024-02-07

## 2024-02-07 RX ORDER — FENTANYL CITRATE 50 UG/ML
INJECTION, SOLUTION INTRAMUSCULAR; INTRAVENOUS PRN
Status: DISCONTINUED | OUTPATIENT
Start: 2024-02-07 | End: 2024-02-07

## 2024-02-07 RX ORDER — GABAPENTIN 300 MG/1
600 CAPSULE ORAL AT BEDTIME
Status: DISCONTINUED | OUTPATIENT
Start: 2024-02-07 | End: 2024-02-12 | Stop reason: HOSPADM

## 2024-02-07 RX ORDER — FENTANYL CITRATE 50 UG/ML
50 INJECTION, SOLUTION INTRAMUSCULAR; INTRAVENOUS EVERY 5 MIN PRN
Status: DISCONTINUED | OUTPATIENT
Start: 2024-02-07 | End: 2024-02-07 | Stop reason: HOSPADM

## 2024-02-07 RX ORDER — BISACODYL 10 MG
10 SUPPOSITORY, RECTAL RECTAL DAILY PRN
Status: DISCONTINUED | OUTPATIENT
Start: 2024-02-07 | End: 2024-02-12 | Stop reason: HOSPADM

## 2024-02-07 RX ORDER — ONDANSETRON 2 MG/ML
4 INJECTION INTRAMUSCULAR; INTRAVENOUS EVERY 30 MIN PRN
Status: DISCONTINUED | OUTPATIENT
Start: 2024-02-07 | End: 2024-02-07 | Stop reason: HOSPADM

## 2024-02-07 RX ORDER — AMOXICILLIN 250 MG
1 CAPSULE ORAL 2 TIMES DAILY
Status: DISCONTINUED | OUTPATIENT
Start: 2024-02-07 | End: 2024-02-12 | Stop reason: HOSPADM

## 2024-02-07 RX ORDER — ONDANSETRON 4 MG/1
4 TABLET, ORALLY DISINTEGRATING ORAL EVERY 6 HOURS PRN
Status: DISCONTINUED | OUTPATIENT
Start: 2024-02-07 | End: 2024-02-12 | Stop reason: HOSPADM

## 2024-02-07 RX ORDER — HYDROMORPHONE HCL IN WATER/PF 6 MG/30 ML
0.4 PATIENT CONTROLLED ANALGESIA SYRINGE INTRAVENOUS EVERY 5 MIN PRN
Status: DISCONTINUED | OUTPATIENT
Start: 2024-02-07 | End: 2024-02-07 | Stop reason: HOSPADM

## 2024-02-07 RX ORDER — ASPIRIN 81 MG/1
81 TABLET ORAL 2 TIMES DAILY
Qty: 60 TABLET | Refills: 0 | Status: SHIPPED | OUTPATIENT
Start: 2024-02-07 | End: 2024-02-12

## 2024-02-07 RX ORDER — ACETAMINOPHEN 325 MG/1
975 TABLET ORAL ONCE
Status: COMPLETED | OUTPATIENT
Start: 2024-02-07 | End: 2024-02-07

## 2024-02-07 RX ORDER — HYDROXYZINE HYDROCHLORIDE 10 MG/1
10 TABLET, FILM COATED ORAL EVERY 6 HOURS PRN
Qty: 30 TABLET | Refills: 0 | Status: SHIPPED | OUTPATIENT
Start: 2024-02-07 | End: 2024-02-12

## 2024-02-07 RX ORDER — ASPIRIN 81 MG/1
81 TABLET ORAL 2 TIMES DAILY
Status: DISCONTINUED | OUTPATIENT
Start: 2024-02-07 | End: 2024-02-12 | Stop reason: HOSPADM

## 2024-02-07 RX ORDER — ONDANSETRON 4 MG/1
4 TABLET, ORALLY DISINTEGRATING ORAL EVERY 30 MIN PRN
Status: DISCONTINUED | OUTPATIENT
Start: 2024-02-07 | End: 2024-02-07 | Stop reason: HOSPADM

## 2024-02-07 RX ORDER — SODIUM CHLORIDE, SODIUM LACTATE, POTASSIUM CHLORIDE, CALCIUM CHLORIDE 600; 310; 30; 20 MG/100ML; MG/100ML; MG/100ML; MG/100ML
INJECTION, SOLUTION INTRAVENOUS CONTINUOUS
Status: DISCONTINUED | OUTPATIENT
Start: 2024-02-07 | End: 2024-02-08

## 2024-02-07 RX ADMIN — SODIUM CHLORIDE, POTASSIUM CHLORIDE, SODIUM LACTATE AND CALCIUM CHLORIDE: 600; 310; 30; 20 INJECTION, SOLUTION INTRAVENOUS at 14:58

## 2024-02-07 RX ADMIN — OXYCODONE HYDROCHLORIDE 2.5 MG: 5 TABLET ORAL at 23:35

## 2024-02-07 RX ADMIN — KETAMINE HYDROCHLORIDE 5 MG: 10 INJECTION INTRAMUSCULAR; INTRAVENOUS at 08:04

## 2024-02-07 RX ADMIN — KETAMINE HYDROCHLORIDE 5 MG: 10 INJECTION INTRAMUSCULAR; INTRAVENOUS at 07:07

## 2024-02-07 RX ADMIN — MEPIVACAINE HYDROCHLORIDE 3 ML: 15 INJECTION, SOLUTION EPIDURAL; INFILTRATION at 07:16

## 2024-02-07 RX ADMIN — FENTANYL CITRATE 25 MCG: 50 INJECTION INTRAMUSCULAR; INTRAVENOUS at 07:07

## 2024-02-07 RX ADMIN — Medication 2 G: at 07:23

## 2024-02-07 RX ADMIN — KETAMINE HYDROCHLORIDE 5 MG: 10 INJECTION INTRAMUSCULAR; INTRAVENOUS at 07:32

## 2024-02-07 RX ADMIN — CEFAZOLIN 1 G: 1 INJECTION, POWDER, FOR SOLUTION INTRAMUSCULAR; INTRAVENOUS at 22:25

## 2024-02-07 RX ADMIN — FENTANYL CITRATE 50 MCG: 50 INJECTION INTRAMUSCULAR; INTRAVENOUS at 09:44

## 2024-02-07 RX ADMIN — ACETAMINOPHEN 975 MG: 325 TABLET ORAL at 14:49

## 2024-02-07 RX ADMIN — ACETAMINOPHEN 975 MG: 325 TABLET ORAL at 22:16

## 2024-02-07 RX ADMIN — ONDANSETRON 4 MG: 2 INJECTION INTRAMUSCULAR; INTRAVENOUS at 07:07

## 2024-02-07 RX ADMIN — PROPOFOL 75 MCG/KG/MIN: 10 INJECTION, EMULSION INTRAVENOUS at 07:14

## 2024-02-07 RX ADMIN — KETAMINE HYDROCHLORIDE 5 MG: 10 INJECTION INTRAMUSCULAR; INTRAVENOUS at 08:07

## 2024-02-07 RX ADMIN — CEFAZOLIN 1 G: 1 INJECTION, POWDER, FOR SOLUTION INTRAMUSCULAR; INTRAVENOUS at 14:50

## 2024-02-07 RX ADMIN — ACETAMINOPHEN 975 MG: 325 TABLET ORAL at 06:25

## 2024-02-07 RX ADMIN — ATORVASTATIN CALCIUM 20 MG: 10 TABLET, FILM COATED ORAL at 21:05

## 2024-02-07 RX ADMIN — SODIUM CHLORIDE, POTASSIUM CHLORIDE, SODIUM LACTATE AND CALCIUM CHLORIDE: 600; 310; 30; 20 INJECTION, SOLUTION INTRAVENOUS at 06:50

## 2024-02-07 RX ADMIN — LIDOCAINE HYDROCHLORIDE 5 ML: 10 INJECTION, SOLUTION INFILTRATION; PERINEURAL at 07:14

## 2024-02-07 RX ADMIN — GABAPENTIN 600 MG: 300 CAPSULE ORAL at 21:05

## 2024-02-07 RX ADMIN — ASPIRIN 81 MG: 81 TABLET, COATED ORAL at 21:05

## 2024-02-07 RX ADMIN — HYDROMORPHONE HYDROCHLORIDE 0.2 MG: 0.2 INJECTION, SOLUTION INTRAMUSCULAR; INTRAVENOUS; SUBCUTANEOUS at 10:20

## 2024-02-07 RX ADMIN — KETAMINE HYDROCHLORIDE 5 MG: 10 INJECTION INTRAMUSCULAR; INTRAVENOUS at 07:16

## 2024-02-07 RX ADMIN — OXYCODONE HYDROCHLORIDE 5 MG: 5 TABLET ORAL at 11:39

## 2024-02-07 RX ADMIN — FENTANYL CITRATE 50 MCG: 50 INJECTION INTRAMUSCULAR; INTRAVENOUS at 09:52

## 2024-02-07 RX ADMIN — TRANEXAMIC ACID 1950 MG: 650 TABLET ORAL at 06:26

## 2024-02-07 RX ADMIN — KETAMINE HYDROCHLORIDE 5 MG: 10 INJECTION INTRAMUSCULAR; INTRAVENOUS at 07:43

## 2024-02-07 RX ADMIN — OXYCODONE HYDROCHLORIDE 2.5 MG: 5 TABLET ORAL at 16:09

## 2024-02-07 RX ADMIN — SENNOSIDES AND DOCUSATE SODIUM 1 TABLET: 8.6; 5 TABLET ORAL at 21:05

## 2024-02-07 ASSESSMENT — ACTIVITIES OF DAILY LIVING (ADL)
ADLS_ACUITY_SCORE: 38
ADLS_ACUITY_SCORE: 40

## 2024-02-07 NOTE — INTERVAL H&P NOTE
"I have reviewed the surgical (or preoperative) H&P that is linked to this encounter, and examined the patient. There are no significant changes    Clinical Conditions Present on Arrival:  Clinically Significant Risk Factors Present on Admission                 # Drug Induced Platelet Defect: home medication list includes an antiplatelet medication  # Overweight: Estimated body mass index is 29.99 kg/m  as calculated from the following:    Height as of this encounter: 1.626 m (5' 4\").    Weight as of this encounter: 79.2 kg (174 lb 11.2 oz).       "

## 2024-02-07 NOTE — CONSULTS
"[facility]  Consult Note - Hospitalist Service  Date of Admission:  2/7/2024  Consult Requested by: Orthopedic surgery Dr. San  Reason for Consult: Management of chronic medical conditions    Assessment & Plan   Dulce Pritchard is a 78 year old female admitted on 2/7/2024.  She has a past medical history significant for gout, hypothyroidism, sick sinus syndrome, coronary artery disease, pulmonary hypertension, CHF who presents to the hospital for elective right total hip arthroplasty.  Hospitalist medicine consulted for management for chronic medical conditions      Mood disorder    Plan  -Continue home fluoxetine 20 mg daily on 2/8    Hypothyroidism    Plan  -Continue home levothyroxine    Chronic pain    Plan  -Continue home gabapentin 600 mg nightly    CHF  -Appears close to euvolemia on physical examination    Plan  -Continue home Lasix on discharge    Coronary artery disease  -No chest pain or shortness of breath    Plan  -Continue home atorvastatin 20 mg nightly  -Defer timing of resuming aspirin to primary team.    Status post right hip arthroplasty  -Pain well-controlled    Plan  -Pain management and DVT prophylaxis per primary team    Chronic thrombocytopenia  -Platelet around baseline             Clinically Significant Risk Factors Present on Admission                # Drug Induced Platelet Defect: home medication list includes an antiplatelet medication   # Hypertension: Noted on problem list      # Overweight: Estimated body mass index is 29.99 kg/m  as calculated from the following:    Height as of this encounter: 1.626 m (5' 4\").    Weight as of this encounter: 79.2 kg (174 lb 11.2 oz).              JIMENA LANCASTER MD  Hospitalist Service  Securely message with [employer] (more info)  Text page via [facility] Paging/Directory   ______________________________________________________________________        History of Present Illness   Evaluated around 4:30 PM.  Patient is feeling " well.  She denies any chest pain or shortness of breath.  She denies any nausea or vomiting.  She denies any lightheadedness.      Past Medical History    Past Medical History:   Diagnosis Date    Acute renal insufficiency 06/22/2023    Arthritis     Atrial fibrillation (H)     Chronic heart failure with preserved ejection fraction (H) 02/13/2023    Congestive heart failure (H)     Depressive disorder     Hyperlipidemia     Hypertension     Obese     Primary hypertension 03/27/2023    Pulmonary hypertension (H)     Thyroid disease        Past Surgical History   Past Surgical History:   Procedure Laterality Date    C/SECTION, CLASSICAL      CHOLECYSTECTOMY      HYSTERECTOMY         Medications   I have reviewed this patient's current medications  Current Facility-Administered Medications   Medication    [START ON 2/10/2024] acetaminophen (TYLENOL) tablet 650 mg    acetaminophen (TYLENOL) tablet 975 mg    aspirin EC tablet 81 mg    atorvastatin (LIPITOR) tablet 20 mg    benzocaine-menthol (CEPACOL) 15-3.6 MG lozenge 1 lozenge    bisacodyl (DULCOLAX) suppository 10 mg    ceFAZolin (ANCEF) 1 g vial to attach to  ml bag for ADULT or 50 ml bag for PEDS    [START ON 2/8/2024] FLUoxetine (PROzac) capsule 20 mg    gabapentin (NEURONTIN) capsule 600 mg    HYDROmorphone (DILAUDID) injection 0.1 mg    Or    HYDROmorphone (DILAUDID) injection 0.2 mg    lactated ringers infusion    [START ON 2/8/2024] levothyroxine (SYNTHROID/LEVOTHROID) tablet 25 mcg    lidocaine (LMX4) cream    lidocaine 1 % 0.1-1 mL    magnesium hydroxide (MILK OF MAGNESIA) suspension 30 mL    naloxone (NARCAN) injection 0.2 mg    Or    naloxone (NARCAN) injection 0.4 mg    Or    naloxone (NARCAN) injection 0.2 mg    Or    naloxone (NARCAN) injection 0.4 mg    ondansetron (ZOFRAN ODT) ODT tab 4 mg    Or    ondansetron (ZOFRAN) injection 4 mg    oxyCODONE IR (ROXICODONE) half-tab 2.5 mg    Or    oxyCODONE (ROXICODONE) tablet 5 mg    [START ON 2/8/2024]  polyethylene glycol (MIRALAX) Packet 17 g    polyethylene glycol (MIRALAX) powder 17 g    povidone-iodine (Betadine) 0.28% in NaCl 0.9% 500 mL irrigation    prochlorperazine (COMPAZINE) injection 5 mg    Or    prochlorperazine (COMPAZINE) tablet 5 mg    senna-docusate (SENOKOT-S/PERICOLACE) 8.6-50 MG per tablet 1 tablet    sodium chloride (PF) 0.9% PF flush 3 mL    sodium chloride (PF) 0.9% PF flush 3 mL            Physical Exam   Vital Signs: Temp: 98.1  F (36.7  C) Temp src: Axillary BP: 114/61 Pulse: 64   Resp: 15 SpO2: 97 % O2 Device: None (Room air)    Weight: 174 lbs 11.2 oz  Physical Exam  Constitutional:       General: She is not in acute distress.     Appearance: She is not ill-appearing or toxic-appearing.   Cardiovascular:      Rate and Rhythm: Normal rate.   Pulmonary:      Effort: Pulmonary effort is normal. No respiratory distress.      Breath sounds: No wheezing.   Skin:     General: Skin is warm and dry.   Neurological:      Mental Status: She is alert.   Psychiatric:         Mood and Affect: Mood normal.          Medical Decision Making       40 MINUTES SPENT BY ME on the date of service doing chart review, history, exam, documentation & further activities per the note.      Data     I have personally reviewed the following data over the past 24 hrs:    N/A  \   N/A   / 92 (L)     N/A N/A N/A /  117 (H)   N/A N/A N/A \       Imaging results reviewed over the past 24 hrs:   Recent Results (from the past 24 hour(s))   XR Pelvis w Hip Port Right 1 View    Narrative    EXAM: XR PELVIS AND HIP PORTABLE RIGHT 1 VIEW  LOCATION: [facility]  DATE: 2/7/2024    INDICATION: Status post hip surgery.  COMPARISON: None.      Impression    IMPRESSION: Postop recent placement of a right total hip arthroplasty. The components project in the expected position. No acute displaced periprosthetic fracture. Expected postop soft tissue gas and swelling about the right hip. Moderate to severe    contralateral left hip osteoarthritis. No displaced pelvic fracture identified. Degenerative change lower lumbar spine.

## 2024-02-07 NOTE — PROGRESS NOTES
02/07/24 3085   Appointment Info   Signing Clinician's Name / Credentials (PT) Nay Skaggs, PT, DPT   Quick Adds   Quick Adds Certification   Living Environment   People in Home alone   Current Living Arrangements house   Home Accessibility   (Ramp to enter and stairlift to upper level where patient lives)   Living Environment Comments patient reports daughter will be able to assist for first few days, then niece able to assist in AM for 1 hour only. Niece lives in basement level of home   Self-Care   Equipment Currently Used at Home walker, rolling;wheelchair, manual  (4WW)   Fall history within last six months no   Activity/Exercise/Self-Care Comment Pt reports using 4WW in home and wheelchair in community. Daughter assists with shopping   General Information   Onset of Illness/Injury or Date of Surgery 02/07/24   Referring Physician Dr. Marco A San   Pertinent History of Current Problem (include personal factors and/or comorbidities that impact the POC) s/p R posterior ANGE   Existing Precautions/Restrictions no hip IR;no hip ADD past midline;90 degree hip flexion   Weight-Bearing Status - RLE weight-bearing as tolerated   Range of Motion (ROM)   ROM Comment decreased ROM s/p R ANGE   Transfers   Transfers sit-stand transfer   Maintains Weight-bearing Status (Transfers) able to maintain   Sit-Stand Transfer   Sit-Stand [place] (Transfers) minimum assist (75% patient effort);verbal cues   Assistive Device (Sit-Stand Transfers) walker, front-wheeled   Gait/Stairs (Locomotion)   [place] Level (Gait) moderate assist (50% patient effort);verbal cues   Assistive Device (Gait) walker, front-wheeled   Distance in Feet (Gait) 3ft   Pattern (Gait) step-to   Deviations/Abnormal Patterns (Gait) scissoring;gait speed decreased;base of support, narrow  (very flexed posture)   Maintains Weight-bearing Status (Gait) able to maintain   Clinical Impression   Criteria for Skilled Therapeutic Intervention Yes,  treatment indicated   PT Diagnosis (PT) impaired functional mobility   Influenced by the following impairments pain, decreased strength, impaired balance   Functional limitations due to impairments gait, transfers   Clinical Presentation (PT Evaluation Complexity) stable   Clinical Presentation Rationale pt presents as medically diagnosed   Clinical Decision Making (Complexity) low complexity   Planned Therapy Interventions (PT) gait training;home exercise program;patient/family education;strengthening;transfer training;balance training   Risk & Benefits of therapy have been explained care plan/treatment goals reviewed;patient   PT Total Evaluation Time   PT Eval, Low Complexity Minutes (91653) 10   Therapy Certification   Start of care date 02/07/24   Certification date from 02/07/24   Certification date to 02/28/24   Medical Diagnosis S/P L ANGE   Physical Therapy Goals   PT Frequency Daily   PT Predicted Duration/Target Date for Goal Attainment 02/12/24   PT Goals PT Goal 1;Gait;Transfers   PT: Transfers Supervision/stand-by assist;Sit to/from stand;Assistive device   PT: Gait Rolling walker;Supervision/stand-by assist;50 feet   PT: Goal 1 SBA with written HEP for LE strengthening and ROM   Interventions   Interventions Quick Adds Therapeutic Procedure;Therapeutic Activity;Gait Training   Therapeutic Procedure/Exercise   Ther. Procedure: strength, endurance, ROM, flexibillity Minutes (83749) 15   Symptoms Noted During/After Treatment none   Treatment Detail/Skilled Intervention ANGE protocol therex x10 reps with R LE, SBA, verbal cueing for exercise technique, reviewed hip precautions   Therapeutic Activity   Symptoms Noted During/After Treatment Increased pain   Treatment Detail/Skilled Intervention sit to/from stand with FWW, min assist of 1, verbal cueing for posture, balance and safety, cueing for safe hand placement and LE positioning. Patient demonstrates very flexed posture and unsteady on feet.   Gait  "Training   Symptoms Noted During/After Treatment (Gait Training) increased pain;fatigue   Treatment Detail/Skilled Intervention verbal cueing for safety, posture, LE advancement. Patient scissoring feet and unable to gain balance after a few feet with assist of 2 so chair brought up for patient to sit. Patient then reports using a shoe lift on right due to having a \"shorter leg.\" Pt declined to ambulate again with shoes on.   Distance in Feet 3ft   [place] Level (Gait Training) minimum assist (75% patient effort)   Physical Assistance Level (Gait Training) 2 person assist;verbal cues;nonverbal cues (demo/gestures)   Weight Bearing (Gait Training) weight-bearing as tolerated   Assistive Device (Gait Training) rolling walker   Pattern Analysis (Gait Training) swing-through gait   Gait Analysis Deviations decreased geno;decreased step length  (NBOS, scissoring, flexed posture)   Impairments (Gait Analysis/Training) pain;balance impaired;strength decreased   PT Discharge Planning   PT Plan Gait with assist (with shoes on), TH exercises   PT Discharge Recommendation (DC Rec) other (see comments)   PT Rationale for DC Rec Patient needing assist of 1 for transfers and assist of 2 with ambulation due to impaired balance and decreased strength. Patient at increased fall risk and does not maintain precautions during ambulation due to scissoring gait pattern. Pt will primarily be alone at home after first few days   PT Brief overview of current status Amb 3ft with FWW, min assist of 2   PT Equipment Needed at Discharge cane, straight;walker, rolling   Total Session Time   Timed Code Treatment Minutes 15   Total Session Time (sum of timed and untimed services) 25   [facility]  OUTPATIENT PHYSICAL THERAPY EVALUATION  PLAN OF TREATMENT FOR OUTPATIENT REHABILITATION  (COMPLETE FOR INITIAL CLAIMS ONLY)  Patient's Last Name, First Name, M.I.  YOB: 1945  Dulce Pritchard         "                Provider's Name  [facility] Medical Record No.  [id number]                             Onset Date:  02/07/24   Start of Care Date:  02/07/24   Type:     _X_PT   ___OT   ___SLP Medical Diagnosis:  S/P L ANGE              PT Diagnosis:  impaired functional mobility Visits from SOC:  1     See note for plan of treatment, functional goals and certification details    I CERTIFY THE NEED FOR THESE SERVICES FURNISHED UNDER        THIS PLAN OF TREATMENT AND WHILE UNDER MY CARE     (Physician co-signature of this document indicates review and certification of the therapy plan).

## 2024-02-07 NOTE — ANESTHESIA PROCEDURE NOTES
"Intrathecal Procedure Note    Pre-Procedure   Staff -        Anesthesiologist:  Samy Pritchard MD       Performed By: anesthesiologist       Location: OR       Procedure Start/Stop Times: 2/7/2024 7:12 AM and 2/7/2024 7:16 AM       Pre-Anesthestic Checklist: patient identified, IV checked, risks and benefits discussed, informed consent, monitors and equipment checked, pre-op evaluation and at physician/surgeon's request  Timeout:       Correct Patient: Yes        Correct Procedure: Yes        Correct Site: Yes        Correct Position: Yes   Procedure Documentation  Procedure: intrathecal       Patient Position: sitting       Patient Prep/Sterile Barriers: sterile gloves, mask, patient draped       Skin prep: Chloraprep       Insertion Site: L2-3 (1st attempt L3-4, 2nd attempt L2-3). (midline approach).       Needle Gauge: 22.        Needle Length (Inches): 3.5        Spinal Needle Type: Quincke       Introducer used       Introducer: 20 G       # of attempts: 2 and  # of redirects:     Assessment/Narrative         Paresthesias: No.       CSF fluid: clear.    Medication(s) Administered   1.5% Mepivacaine PF (Intrathecal) - Intrathecal   3 mL - 2/7/2024 7:16:00 AM  Medication Administration Time: 2/7/2024 7:12 AM      FOR [facility]) ONLY:   Pain Team Contact information: please page the Pain Team Via Catarizm. Search \"Pain\". During daytime hours, please page the attending first. At night please page the resident first.      "

## 2024-02-07 NOTE — OP NOTE
Operative Note    Name:  Dulce Pritchard  Location: [facility] Main OR  Procedure Date:  2/7/2024  PCP:  Colin Christie      Procedure(s):  RIGHT POSTERIOR TOTAL HIP ARTHROPLASTY     Implant Name Type Inv. Item Serial No.  Lot No. LRB No. Used Action   SHELL ACETABULAR 54E SOLIDBACK TRITANIUM - [id number] Total Joint Component/Insert SHELL ACETABULAR 54E SOLIDBACK TRITANIUM  [employer] [id number] Right 1 Implanted   IMP INSERT STRK TRIEDNT X3 POLY 40MM 0DEG SZ E 623-00-40E - [id number] Total Joint Component/Insert IMP INSERT STRK TRIEDNT X3 POLY 40MM 0DEG SZ E 623-00-40E  DM Sponto [id number] Right 1 Implanted   IMPLANT HIP STM 105MM NK 38.5MM INSG 5 HI STM HI OFST STRL - [id number] Total Joint Component/Insert IMPLANT HIP STM 105MM NK 38.5MM INSG 5 HI STM HI OFST STRL  DM Sponto [id number] Right 1 Implanted   SLEEVE ADAPTER UNIVERSAL +0MM - [id number] Total Joint Component/Insert SLEEVE ADAPTER UNIVERSAL +0MM  [employer] [id number] Right 1 Implanted   IMP HEAD FEM STRK BIOLOX DELTA TAPER SZ 40 +0MM 6519-1-040 - [id number] Total Joint Component/Insert IMP HEAD FEM STRK BIOLOX DELTA TAPER SZ 40 +0MM 6519-1-040  DM Sponto [id number] Right 1 Implanted       Pre-Procedure Diagnosis:  Osteoarthritis of right hip [M16.11]     Post-Procedure Diagnosis:    same    Surgeon(s):  Nayely Sims PA-C Wickum, Daren J, MD    Assistants:  Required for patient positioning, intraoperative retraction, patient safety, and wound closure.    Anesthesia Type:  Spinal     Findings:  Arthritis  Leg length inequality    Operative Report:      After satisfactory infiltration of spinal block anesthetic, patient was placed lateral right side up on the Decker frame.  Patient's right lower extremity prepped and draped sterile.  Timeout was then conducted to ensure proper procedural site, after which standard posterior approach taken.  Iliotibial band and gluteus fascia split along  its fibers, retractors placed.  Posterior capsule and piriformis taken down for later repair.  Hip was then dislocated and osteotomy of the femoral neck was made.  Femur was retracted anteriorly exposing the acetabulum.  Osteophytes and soft tissue was removed circumferentially.  As 10 was then reamed line to line to accommodate a 54 shell which had good scratch fit.  Dome hole cover placed.  40 mm internal diameter liner then impacted.  Attention then drawn to the femur.  Femur was sequentially broached to accommodate the stem.  Trial showed that a +0 head got her leg lengths a bit closer but not perfect.  We could not get anything longer in.  She was stable to 90 degrees flexion 80 degrees internal rotation.  Trials were then removed, the wound thoroughly irrigated.  Stem opened and then impacted with excellent scratch fit followed by the 40 mm +0 head.  Hip was then reduced.  The wound irrigated.  Bleeders cauterized.  Capsule was repaired to itself in the posterior trochanter with #5 Ethibond.  Iliotibial band gluteus fascia with #1 Vicryl oversewn with #1 strata fix, subcu with 2 oh, and skin with 3-0 Monocryl.  Dermabond applied, sterile dressings were applied.  Patient was awakened and taken to recovery stable.  Postop patient full weightbearing standard posterior precautions for 6 weeks, DVT prophylaxis protocols, pain protocols, therapy will be followed    Estimated Blood Loss:   200cc         Drains:        Complications:    None    Marco A San MD     Date: 2/7/2024  Time: 8:42 AM

## 2024-02-07 NOTE — ANESTHESIA POSTPROCEDURE EVALUATION
Patient: Dulce Pritchard    Procedure: Procedure(s):  RIGHT POSTERIOR TOTAL HIP ARTHROPLASTY       Anesthesia Type:  Spinal    Note:     Postop Pain Control: Uneventful            Sign Out: Well controlled pain   PONV: No   Neuro/Psych: Uneventful            Sign Out: Acceptable/Baseline neuro status   Airway/Respiratory: Uneventful            Sign Out: Acceptable/Baseline resp. status   CV/Hemodynamics: Uneventful            Sign Out: Acceptable CV status; No obvious hypovolemia; No obvious fluid overload   Other NRE: NONE   DID A NON-ROUTINE EVENT OCCUR? No           Last vitals:  Vitals Value Taken Time   /73 02/07/24 1200   Temp 37.1  C (98.7  F) 02/07/24 1030   Pulse 76 02/07/24 1201   Resp 15 02/07/24 1200   SpO2 96 % 02/07/24 1201   Vitals shown include unfiled device data.    Electronically Signed By: Samy Pritchard MD  February 7, 2024  1:11 PM

## 2024-02-07 NOTE — ANESTHESIA PREPROCEDURE EVALUATION
Anesthesia Pre-Procedure Evaluation    Patient: Dulce Pritchard   MRN: [id number] : 1945        Procedure : Procedure(s):  RIGHT POSTERIOR TOTAL HIP ARTHROPLASTY          Past Medical History:   Diagnosis Date    Acute renal insufficiency 2023    Arthritis     Atrial fibrillation (H)     Chronic heart failure with preserved ejection fraction (H) 2023    Congestive heart failure (H)     Depressive disorder     Hyperlipidemia     Hypertension     Obese     Primary hypertension 2023    Pulmonary hypertension (H)     Thyroid disease       Past Surgical History:   Procedure Laterality Date    C/SECTION, CLASSICAL      CHOLECYSTECTOMY      HYSTERECTOMY        No Known Allergies   Social History     Tobacco Use    Smoking status: Former     Packs/day: 1.00     Years: 20.00     Additional pack years: 0.00     Total pack years: 20.00     Types: Cigarettes     Start date:      Quit date:      Years since quittin.1    Smokeless tobacco: Not on file   Substance Use Topics    Alcohol use: Never      Wt Readings from Last 1 Encounters:   24 79.2 kg (174 lb 11.2 oz)        Anesthesia Evaluation   Pt has had prior anesthetic.     No history of anesthetic complications       ROS/MED HX  ENT/Pulmonary: Comment: Per last pulm note:  PFTs today did NOT show evidence of COPD, with a normal FEV1/FVC ratio. 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 from left heart disease - HFpEF- as well as valvular disease). I reassured her that her lung function is quite good. I did not see much emphysema on the CT chest from 2023. She does not have any concerning respiratory symptoms.       Neurologic:  - neg neurologic ROS     Cardiovascular: Comment: Echo 2023 (around time of hospitalization for fluid overload):  The left ventricle is mildly dilated.  Left ventricular function is decreased. The  "ejection fraction is 50-55%  (borderline).  The left atrium is mildly dilated.  The right atrium is mildly dilated.  Severe (>55mmHg) pulmonary hypertension is present.  IVC diameter >2.1 cm collapsing <50% with sniff suggests a high RA pressure  estimated at 15 mmHg or greater.  There is mild to moderate (1-2+) mitral regurgitation.      (+)  hypertension- -   -  - -      CHF                     valvular problems/murmurs (mild-moderate) type: MR    pulmonary hypertension,      METS/Exercise Tolerance:     Hematologic: Comments: Known mild thrombocytopenia - 110s      Musculoskeletal:       GI/Hepatic:  - neg GI/hepatic ROS     Renal/Genitourinary:     (+) renal disease,             Endo:     (+)          thyroid problem,     Obesity,       Psychiatric/Substance Use:       Infectious Disease:       Malignancy:       Other:            Physical Exam    Airway        Mallampati: II   TM distance: > 3 FB   Neck ROM: full   Mouth opening: > 3 cm    Respiratory Devices and Support         Dental       (+) Multiple visibly decayed, broken teeth and Removable bridges or other hardware      Cardiovascular          Rhythm and rate: regular and normal     Pulmonary           breath sounds clear to auscultation           OUTSIDE LABS:  CBC:   Lab Results   Component Value Date    WBC 5.4 02/01/2024    WBC 4.9 10/27/2023    HGB 12.1 02/01/2024    HGB 11.4 (L) 10/27/2023    HCT 37.6 02/01/2024    HCT 36.1 10/27/2023    PLT 92 (L) 02/07/2024     (L) 02/01/2024     BMP:   Lab Results   Component Value Date     02/01/2024     10/27/2023    POTASSIUM 4.7 02/01/2024    POTASSIUM 4.1 10/27/2023    CHLORIDE 101 02/01/2024    CHLORIDE 100 10/27/2023    CO2 24 02/01/2024    CO2 27 10/27/2023    BUN 16.9 02/01/2024    BUN 27.1 (H) 10/27/2023    CR 1.08 (H) 02/01/2024    CR 1.21 (H) 10/27/2023     (H) 02/07/2024     (H) 02/01/2024     COAGS: No results found for: \"PTT\", \"INR\", \"FIBR\"  POC: No results found " "for: \"BGM\", \"HCG\", \"HCGS\"  HEPATIC:   Lab Results   Component Value Date    ALBUMIN 3.9 02/01/2024    PROTTOTAL 6.7 02/01/2024    ALT 21 02/01/2024    AST 37 02/01/2024    ALKPHOS 149 02/01/2024    BILITOTAL 0.4 02/01/2024     OTHER:   Lab Results   Component Value Date    A1C 5.9 01/17/2019    EDGAR 9.5 02/01/2024    MAG 2.3 06/22/2023    TSH 3.20 10/27/2023    T4 0.79 (L) 06/22/2023       Anesthesia Plan    ASA Status:  3       Anesthesia Type: Spinal.              Consents    Anesthesia Plan(s) and associated risks, benefits, and realistic alternatives discussed. Questions answered and patient/representative(s) expressed understanding.     - Discussed: Risks, Benefits and Alternatives for the PROCEDURE were discussed     - Discussed with:  Patient            Postoperative Care    Pain management: IV analgesics, Oral pain medications.   PONV prophylaxis: Ondansetron (or other 5HT-3), Dexamethasone or Solumedrol     Comments:               Samy Pritchard MD    I have reviewed the pertinent notes and labs in the chart from the past 30 days and (re)examined the patient.  Any updates or changes from those notes are reflected in this note.             # Drug Induced Platelet Defect: home medication list includes an antiplatelet medication  # Overweight: Estimated body mass index is 29.99 kg/m  as calculated from the following:    Height as of this encounter: 1.626 m (5' 4\").    Weight as of this encounter: 79.2 kg (174 lb 11.2 oz).      "

## 2024-02-07 NOTE — PHARMACY-ADMISSION MEDICATION HISTORY
Pharmacist RAMON Medication History    Admission medication history is complete. The information provided in this note is only as accurate as the sources available at the time of the update.    Medication reconciliation/reorder completed by provider prior to medication history? No    Information Source(s): Patient and Clinic records and Care Everywhere via in-person    Pertinent Information: N/A    Allergies reviewed with patient and updates made in EHR: yes    Medications available for use during hospital stay: None.      Medication History Completed By: Robert Darby [facility] 2/7/2024 6:42 AM    PTA Med List   Medication Sig Last Dose    allopurinol (ZYLOPRIM) 100 MG tablet Take 100 mg by mouth every evening 2/6/2024 at PM    aspirin (ASA) 81 MG EC tablet Take 1 tablet (81 mg) by mouth daily 1/29/2024 at PM    atorvastatin (LIPITOR) 20 MG tablet Take 1 tablet (20 mg) by mouth every evening 2/6/2024 at PM    FLUoxetine (PROZAC) 20 MG capsule Take 20 mg by mouth daily 2/7/2024 at AM    furosemide (LASIX) 40 MG tablet Take 1 tablet (40 mg) by mouth 2 times daily 2/6/2024    gabapentin (NEURONTIN) 300 MG capsule Take 2 capsules (600 mg) by mouth At Bedtime 2/6/2024 at PM    hydroxychloroquine (PLAQUENIL) 200 MG tablet Take 200 mg by mouth 2 times daily 2/7/2024 at AM    levothyroxine (SYNTHROID/LEVOTHROID) 25 MCG tablet Take 1 tablet (25 mcg) by mouth every morning (before breakfast) 2/7/2024 at AM    polyethylene glycol (MIRALAX) 17 GM/Dose powder Take 17 g by mouth daily as needed for constipation Unknown at prn    STOOL SOFTENER/LAXATIVE 50-8.6 MG tablet Take 1 tablet by mouth daily as needed for constipation Unknown at prn    traMADol (ULTRAM) 50 MG tablet Take  mg by mouth every 6 hours as needed for pain Unknown at prn    Vitamin D (Cholecalciferol) 25 MCG (1000 UT) TABS Take 1,000 Units by mouth daily 2/6/2024 at AM

## 2024-02-07 NOTE — ANESTHESIA CARE TRANSFER NOTE
Patient: Dulce Pritchard    Procedure: Procedure(s):  RIGHT POSTERIOR TOTAL HIP ARTHROPLASTY       Diagnosis: Osteoarthritis of right hip [M16.11]  Diagnosis Additional Information: No value filed.    Anesthesia Type:   Spinal     Note:    Oropharynx: oropharynx clear of all foreign objects and spontaneously breathing  Level of Consciousness: awake  Oxygen Supplementation: room air    Independent Airway: airway patency satisfactory and stable  Dentition: dentition unchanged  Vital Signs Stable: post-procedure vital signs reviewed and stable  Report to RN Given: handoff report given  Patient transferred to: PACU    Handoff Report: Identifed the Patient, Identified the Reponsible Provider, Reviewed the pertinent medical history, Discussed the surgical course, Reviewed Intra-OP anesthesia mangement and issues during anesthesia, Set expectations for post-procedure period and Allowed opportunity for questions and acknowledgement of understanding      Vitals:  Vitals Value Taken Time   /67 02/07/24 0923   Temp     Pulse 67 02/07/24 0924   Resp 16 02/07/24 0924   SpO2 97 % 02/07/24 0924   Vitals shown include unfiled device data.    Electronically Signed By: BRE Powers CRNA  February 7, 2024  9:25 AM

## 2024-02-08 ENCOUNTER — APPOINTMENT (OUTPATIENT)
Dept: OCCUPATIONAL THERAPY | Facility: CLINIC | Age: 79
End: 2024-02-08
Attending: ORTHOPAEDIC SURGERY
Payer: COMMERCIAL

## 2024-02-08 ENCOUNTER — APPOINTMENT (OUTPATIENT)
Dept: PHYSICAL THERAPY | Facility: CLINIC | Age: 79
End: 2024-02-08
Attending: ORTHOPAEDIC SURGERY
Payer: COMMERCIAL

## 2024-02-08 ENCOUNTER — APPOINTMENT (OUTPATIENT)
Dept: PHYSICAL THERAPY | Facility: CLINIC | Age: 79
End: 2024-02-08
Attending: PHYSICIAN ASSISTANT
Payer: COMMERCIAL

## 2024-02-08 LAB
ANION GAP SERPL CALCULATED.3IONS-SCNC: 9 MMOL/L (ref 7–15)
BUN SERPL-MCNC: 22.3 MG/DL (ref 8–23)
CALCIUM SERPL-MCNC: 8.9 MG/DL (ref 8.8–10.2)
CHLORIDE SERPL-SCNC: 100 MMOL/L (ref 98–107)
CREAT SERPL-MCNC: 0.95 MG/DL (ref 0.51–0.95)
DEPRECATED HCO3 PLAS-SCNC: 27 MMOL/L (ref 22–29)
EGFRCR SERPLBLD CKD-EPI 2021: 62 ML/MIN/1.73M2
GLUCOSE SERPL-MCNC: 157 MG/DL (ref 70–99)
HGB BLD-MCNC: 9.5 G/DL (ref 11.7–15.7)
POTASSIUM SERPL-SCNC: 4.3 MMOL/L (ref 3.4–5.3)
SODIUM SERPL-SCNC: 136 MMOL/L (ref 135–145)

## 2024-02-08 PROCEDURE — 80048 BASIC METABOLIC PNL TOTAL CA: CPT | Performed by: STUDENT IN AN ORGANIZED HEALTH CARE EDUCATION/TRAINING PROGRAM

## 2024-02-08 PROCEDURE — 97535 SELF CARE MNGMENT TRAINING: CPT | Mod: GO

## 2024-02-08 PROCEDURE — 36415 COLL VENOUS BLD VENIPUNCTURE: CPT | Performed by: STUDENT IN AN ORGANIZED HEALTH CARE EDUCATION/TRAINING PROGRAM

## 2024-02-08 PROCEDURE — 99232 SBSQ HOSP IP/OBS MODERATE 35: CPT | Performed by: STUDENT IN AN ORGANIZED HEALTH CARE EDUCATION/TRAINING PROGRAM

## 2024-02-08 PROCEDURE — 97166 OT EVAL MOD COMPLEX 45 MIN: CPT | Mod: GO

## 2024-02-08 PROCEDURE — 36415 COLL VENOUS BLD VENIPUNCTURE: CPT | Performed by: PHYSICIAN ASSISTANT

## 2024-02-08 PROCEDURE — 250N000013 HC RX MED GY IP 250 OP 250 PS 637: Performed by: PHYSICIAN ASSISTANT

## 2024-02-08 PROCEDURE — 250N000011 HC RX IP 250 OP 636: Performed by: PHYSICIAN ASSISTANT

## 2024-02-08 PROCEDURE — 250N000013 HC RX MED GY IP 250 OP 250 PS 637: Performed by: STUDENT IN AN ORGANIZED HEALTH CARE EDUCATION/TRAINING PROGRAM

## 2024-02-08 PROCEDURE — 97110 THERAPEUTIC EXERCISES: CPT | Mod: GP

## 2024-02-08 PROCEDURE — 97530 THERAPEUTIC ACTIVITIES: CPT | Mod: GP

## 2024-02-08 PROCEDURE — 85018 HEMOGLOBIN: CPT | Performed by: PHYSICIAN ASSISTANT

## 2024-02-08 RX ORDER — HYDROXYCHLOROQUINE SULFATE 200 MG/1
200 TABLET, FILM COATED ORAL 2 TIMES DAILY
Status: DISCONTINUED | OUTPATIENT
Start: 2024-02-08 | End: 2024-02-12 | Stop reason: HOSPADM

## 2024-02-08 RX ORDER — ALLOPURINOL 100 MG/1
100 TABLET ORAL EVERY EVENING
Status: DISCONTINUED | OUTPATIENT
Start: 2024-02-08 | End: 2024-02-12 | Stop reason: HOSPADM

## 2024-02-08 RX ORDER — FUROSEMIDE 40 MG
40 TABLET ORAL 2 TIMES DAILY
Status: DISCONTINUED | OUTPATIENT
Start: 2024-02-08 | End: 2024-02-12 | Stop reason: HOSPADM

## 2024-02-08 RX ADMIN — HYDROXYCHLOROQUINE SULFATE 200 MG: 200 TABLET ORAL at 20:43

## 2024-02-08 RX ADMIN — FLUOXETINE 20 MG: 20 CAPSULE ORAL at 09:10

## 2024-02-08 RX ADMIN — SENNOSIDES AND DOCUSATE SODIUM 1 TABLET: 8.6; 5 TABLET ORAL at 09:10

## 2024-02-08 RX ADMIN — ASPIRIN 81 MG: 81 TABLET, COATED ORAL at 09:10

## 2024-02-08 RX ADMIN — OXYCODONE HYDROCHLORIDE 5 MG: 5 TABLET ORAL at 13:51

## 2024-02-08 RX ADMIN — ASPIRIN 81 MG: 81 TABLET, COATED ORAL at 20:43

## 2024-02-08 RX ADMIN — ACETAMINOPHEN 975 MG: 325 TABLET ORAL at 15:01

## 2024-02-08 RX ADMIN — ACETAMINOPHEN 975 MG: 325 TABLET ORAL at 06:19

## 2024-02-08 RX ADMIN — LEVOTHYROXINE SODIUM 25 MCG: 0.03 TABLET ORAL at 06:19

## 2024-02-08 RX ADMIN — GABAPENTIN 600 MG: 300 CAPSULE ORAL at 20:43

## 2024-02-08 RX ADMIN — OXYCODONE HYDROCHLORIDE 5 MG: 5 TABLET ORAL at 09:14

## 2024-02-08 RX ADMIN — OXYCODONE HYDROCHLORIDE 5 MG: 5 TABLET ORAL at 04:03

## 2024-02-08 RX ADMIN — OXYCODONE HYDROCHLORIDE 5 MG: 5 TABLET ORAL at 20:43

## 2024-02-08 RX ADMIN — ALLOPURINOL 100 MG: 100 TABLET ORAL at 20:43

## 2024-02-08 RX ADMIN — ATORVASTATIN CALCIUM 20 MG: 10 TABLET, FILM COATED ORAL at 20:43

## 2024-02-08 RX ADMIN — HYDROMORPHONE HYDROCHLORIDE 0.2 MG: 0.2 INJECTION, SOLUTION INTRAMUSCULAR; INTRAVENOUS; SUBCUTANEOUS at 00:30

## 2024-02-08 RX ADMIN — ACETAMINOPHEN 975 MG: 325 TABLET ORAL at 23:32

## 2024-02-08 ASSESSMENT — ACTIVITIES OF DAILY LIVING (ADL)
ADLS_ACUITY_SCORE: 42
ADLS_ACUITY_SCORE: 38
ADLS_ACUITY_SCORE: 41
ADLS_ACUITY_SCORE: 42
ADLS_ACUITY_SCORE: 38
ADLS_ACUITY_SCORE: 38
ADLS_ACUITY_SCORE: 41
ADLS_ACUITY_SCORE: 38
ADLS_ACUITY_SCORE: 41
ADLS_ACUITY_SCORE: 42

## 2024-02-08 NOTE — PROGRESS NOTES
[facility]    Medicine Progress Note - Hospitalist Service    Date of Admission:  2/7/2024    Assessment & Plan   Dulce Pritchard is a 78 year old female admitted on 2/7/2024.  She has a past medical history significant for gout, hypothyroidism, sick sinus syndrome, coronary artery disease, pulmonary hypertension, CHF who presents to the hospital for elective right total hip arthroplasty. Hospitalist medicine consulted for management for chronic medical conditions      Mood disorder    Plan  -Continue home fluoxetine 20 mg daily on 2/8    Hypothyroidism    Plan  -Continue home levothyroxine    Chronic pain    Plan  -Continue home gabapentin 600 mg nightly    CHF  -Appears close to euvolemia on physical examination    Plan  -Check BMP  -Restart Lasix  -Stop IV fluid    Coronary artery disease  -No chest pain or shortness of breath    Plan  -Continue home atorvastatin 20 mg nightly    Status post right hip arthroplasty  -Pain well-controlled  -Worked with PT, appears to have some difficulty with ambulation--> patient will stay in the hospital until 2/9--> encouraged to further discuss her discharge plans with her family (TCU versus home with home care)    Plan  -Pain management and DVT prophylaxis per primary team    Rheumatoid arthritis    Plan  -Restart home hydroxychloroquine    Chronic thrombocytopenia  Anemia.   -Platelet around baseline  -Hemoglobin baseline around 12, postoperative hemoglobin around 9.5.    Plan  -Repeat CBC on 2/9                Diet: Advance Diet as Tolerated: Regular Diet Adult  Discharge Instruction - Regular Diet Adult    DVT Prophylaxis: Defer to primary service  Clifton Catheter: Not present  Lines: None     Cardiac Monitoring: None  Code Status: Full Code      Clinically Significant Risk Factors Present on Admission                # Drug Induced Platelet Defect: home medication list includes an antiplatelet medication   # Hypertension: Noted on problem list     "  # Overweight: Estimated body mass index is 29.99 kg/m  as calculated from the following:    Height as of this encounter: 1.626 m (5' 4\").    Weight as of this encounter: 79.2 kg (174 lb 11.2 oz).              Disposition Plan      Expected Discharge Date: 02/09/2024      Destination: home with family  Discharge Comments: TCU placement            JIMENA LANCASTER MD  Hospitalist Service  [facility]  Securely message with [employer] (more info)  Text page via [employer] Paging/Directory   ______________________________________________________________________    Interval History   No significant events.  Denies any chest pain or shortness of breath.  Reported some difficulty with ambulation.  Denies any severe pain.  Denies any nausea or vomiting.    Physical Exam   Vital Signs: Temp: 98.8  F (37.1  C) Temp src: Oral BP: (!) 159/71 Pulse: 82   Resp: 18 SpO2: 93 % O2 Device: None (Room air) Oxygen Delivery: 2 LPM  Weight: 174 lbs 11.2 oz    Physical Exam  Constitutional:       General: She is not in acute distress.     Appearance: She is not ill-appearing or toxic-appearing.   Cardiovascular:      Rate and Rhythm: Normal rate.   Musculoskeletal:      Right lower leg: No edema.      Left lower leg: No edema.   Neurological:      Mental Status: She is alert.   Psychiatric:         Mood and Affect: Mood normal.          Medical Decision Making       45 MINUTES SPENT BY ME on the date of service doing chart review, history, exam, documentation & further activities per the note.      Data     I have personally reviewed the following data over the past 24 hrs:    N/A  \   9.5 (L)   / N/A     N/A N/A N/A /  N/A   N/A N/A N/A \       Imaging results reviewed over the past 24 hrs:   No results found for this or any previous visit (from the past 24 hour(s)).  "

## 2024-02-08 NOTE — PROGRESS NOTES
"   02/08/24 0732   Appointment Info   Signing Clinician's Name / Credentials (OT) Tyesha Ricardo, MOTR/L, CLT   Rehab Comments (OT) OT fazal   Quick Adds   Quick Adds Certification   Living Environment   People in Home alone;other (see comments)  (niece lives with at times but \"has little ones\" and \"is busy\".)   Current Living Arrangements house   Living Environment Comments dtr will stay for few days with pt; chair lift for up/down stairs   Self-Care   Usual Activity Tolerance good   Current Activity Tolerance fair   Equipment Currently Used at Home raised toilet seat;grab bar, toilet;grab bar, tub/shower  (walk-in tub and counter nearby)   Fall history within last six months no   Activity/Exercise/Self-Care Comment Pt IND w/ ADL, but needed assist with cleaning, but \"I can heat up stuff (to eat) and I get meals 1x/week from [facility]\".   General Information   Onset of Illness/Injury or Date of Surgery 02/07/24   Referring Physician Dr. San   Patient/Family Therapy Goal Statement (OT) To go home   Additional Occupational Profile Info/Pertinent History of Current Problem s/p ANGE   Existing Precautions/Restrictions no hip IR;no hip ADD past midline;90 degree hip flexion;weight bearing   Cognitive Status Examination   Orientation Status orientation to person, place and time   Visual Perception   Visual Impairment/Limitations WFL   Sensory   Sensory Quick Adds sensation intact   Pain Assessment   Patient Currently in Pain No   Posture   Posture not impaired   Range of Motion Comprehensive   General Range of Motion no range of motion deficits identified   Strength Comprehensive (MMT)   Comment, General Manual Muscle Testing (MMT) Assessment generalized weakness   Muscle Tone Assessment   Muscle Tone Quick Adds No deficits were identified   Coordination   Upper Extremity Coordination No deficits were identified   Bed Mobility   Bed Mobility supine-sit;sit-supine   Comment (Bed Mobility) mod A per clinical judgement "   Transfers   Transfers bed-chair transfer;sit-stand transfer;toilet transfer;shower transfer   Transfer Comments min-mod A   Transfer Skill: Bed to Chair/Chair to Bed   Transfer Comments min A   Sit-Stand Transfer   Sit/Stand Transfer Comments min A   Shower Transfer   Shower Transfer Comments mod A   Toilet Transfer   Toilet Transfer Comments min A   Balance   Balance Comments decreased   Activities of Daily Living   BADL Assessment/Intervention lower body dressing;toileting;bathing   Bathing Assessment/Intervention   [place] Level (Bathing) lower body;minimum assist (75% patient effort)   Lower Body Dressing Assessment/Training   [place] Level (Lower Body Dressing) lower body dressing skills;maximum assist (25% patient effort)   Toileting   [place] Level (Toileting) adjust/manage clothing;supervision   Clinical Impression   Criteria for Skilled Therapeutic Interventions Met (OT) Yes, treatment indicated   OT Diagnosis decreased ADLs   Influenced by the following impairments ANGE   OT Problem List-Impairments impacting ADL activity tolerance impaired;mobility;pain;post-surgical precautions;flexibility;balance   Assessment of Occupational Performance 3-5 Performance Deficits   Identified Performance Deficits LE dressing/bathing, bed mobility, all transfers, toileting   Planned Therapy Interventions (OT) ADL retraining;bed mobility training;transfer training   Clinical Decision Making Complexity (OT) detailed assessment/moderate complexity   Risk & Benefits of therapy have been explained evaluation/treatment results reviewed;patient   OT Total Evaluation Time   OT Eval, Moderate Complexity Minutes (95424) 10   Therapy Certification   Medical Diagnosis ANGE   Start of Care Date 02/08/24   Certification date from 02/08/24   Certification date to 03/09/24   OT Goals   Therapy Frequency (OT) One time eval and treatment   OT Predicted Duration/Target Date for Goal Attainment 02/14/24   OT Goals Lower Body  Dressing;Bed Mobility;Toilet Transfer/Toileting   OT: Lower Body Dressing Modified independent;within precautions  (except shoes)   OT: Bed Mobility Modified independent;supine to/from sitting;rolling   OT: Toilet Transfer/Toileting Modified independent;toilet transfer;cleaning and garment management   Interventions   Interventions Quick Adds Self-Care/Home Management   Self-Care/Home Management   Self-Care/Home Mgmt/ADL, Compensatory, Meal Prep Minutes (29107) 30   Symptoms Noted During/After Treatment (Meal Preparation/Planning Training) none   Treatment Detail/Skilled Intervention Pt edu on hip px - demonstrated ability to complete ADLs w/in px with min-mod A for shoes. Pt edu on compensatory strategies for LE dressing using long-handled shoe horn, reacher and sock aid - completed min-Mod A w/ AE. Educ in safe tech with LB dress and standing. pt verbalized understanding, but will need reinforcement.   OT Discharge Planning   OT Plan any transfers with cues for tech/safety and lifts in shoes!   OT Discharge Recommendation (DC Rec) other (see comments)  (defer to ortho)   OT Rationale for DC Rec needs increased assist-Ax1-2 for functional mob--difficulty recalling precautions and increased pain/anxiety are current barriers   OT Brief overview of current status min-mod A with AE for LB dressing   OT Equipment Needed at Discharge   (states no AE for dressing needed nor other equipment now)   Total Session Time   Timed Code Treatment Minutes 30   Total Session Time (sum of timed and untimed services) 40    [facility]  OUTPATIENT OCCUPATIONAL THERAPY  EVALUATION  PLAN OF TREATMENT FOR OUTPATIENT REHABILITATION  (COMPLETE FOR INITIAL CLAIMS ONLY)  Patient's Last Name, First Name, M.I.  YOB: 1945  Dulce Pritchard                          Provider's Name  [facility] Medical Record No.  [id number]                             Onset Date:   02/07/24   Start of Care Date:  02/08/24   Type:     ___PT   _X_OT   ___SLP Medical Diagnosis:  ANGE                    OT Diagnosis:  decreased ADLs Visits from SOC:  1     See note for plan of treatment, functional goals and certification details    I CERTIFY THE NEED FOR THESE SERVICES FURNISHED UNDER        THIS PLAN OF TREATMENT AND WHILE UNDER MY CARE     (Physician co-signature of this document indicates review and certification of the therapy plan).

## 2024-02-08 NOTE — PROGRESS NOTES
Patient vital signs are at baseline: Yes  Patient able to ambulate as they were prior to admission or with assist devices provided by therapies during their stay:  Yes; difficulty with mobility requiring 2 assist for transfers.  Patient MUST void prior to discharge:  Yes  Patient able to tolerate oral intake:  Yes  Pain has adequate pain control using Oral analgesics:  Yes  Does patient have an identified :  Yes  Has goal D/C date and time been discussed with patient:  Yes    Needing one more day with PT/OT to determine TCU versus home with home care.

## 2024-02-08 NOTE — PROGRESS NOTES
Patient vital signs are at baseline: Yes  Patient able to ambulate as they were prior to admission or with assist devices provided by therapies during their stay:  Yes  Patient MUST void prior to discharge:  Yes  Patient able to tolerate oral intake:  Yes  Pain has adequate pain control using Oral analgesics:  Yes  Does patient have an identified :  Yes  Has goal D/C date and time been discussed with patient:  Yes       PT A&Ox4. VSS. On RA. PRN 2.5mg oxy for pain. Back to bed with assist of 1 with walker and gait belt. Call light within reach, bed alarm on for pt safety.

## 2024-02-08 NOTE — PROGRESS NOTES
Patient vital signs are at baseline: Yes  Patient able to ambulate as they were prior to admission or with assist devices provided by therapies during their stay:  Yes  Patient MUST void prior to discharge:  Yes  Patient able to tolerate oral intake:  Yes  Pain has adequate pain control using Oral analgesics:  Yes  Does patient have an identified :  Yes  Has goal D/C date and time been discussed with patient:  Yes     PT A&Ox4. VSS. On RA. Pain controlled well by Schedule tylenol and PRN Oxycodone 2.5mg. Up in chair with assist of 1 with walker and gait belt, but unsteady gait and weakness on both legs.

## 2024-02-08 NOTE — PLAN OF CARE
Problem: Adult Inpatient Plan of Care  Goal: Absence of Hospital-Acquired Illness or Injury  Intervention: Identify and Manage Fall Risk  Recent Flowsheet Documentation  Taken 2/8/2024 0100 by Miguelito Nelson RN  Safety Promotion/Fall Prevention:   activity supervised   assistive device/personal items within reach   clutter free environment maintained   increased rounding and observation   lighting adjusted   mobility aid in reach   nonskid shoes/slippers when out of bed   patient and family education   room door open   room organization consistent   safety round/check completed   Goal Outcome Evaluation:      Plan of Care Reviewed With: patient    Overall Patient Progress: improvingOverall Patient Progress: improving  Patient vital signs are at baseline: Yes  Patient able to ambulate as they were prior to admission or with assist devices provided by therapies during their stay:  No,  Reason:  pt has got poor balance, can barely walk  Patient MUST void prior to discharge:  Yes  Patient able to tolerate oral intake:  Yes  Pain has adequate pain control using Oral analgesics:  Yes  Does patient have an identified :  Yes  Has goal D/C date and time been discussed with patient:  Yes   Pt alert and oriented, vss, can voice needs, pt voided but can not ambulate very unsteady on feet, very poor balance  Pain meds given per request.  Pt has got a pressure sore on her coccyx.

## 2024-02-08 NOTE — PLAN OF CARE
Problem: Hip Arthroplasty  Goal: Optimal Coping  Outcome: Progressing   Goal Outcome Evaluation:         Patient vital signs are at baseline: Yes  Patient able to ambulate as they were prior to admission or with assist devices provided by therapies during their stay:  No,  Reason: up with assist of 2   Patient MUST void prior to discharge:  Yes  Patient able to tolerate oral intake:  Yes  Pain has adequate pain control using Oral analgesics:  Yes  Does patient have an identified :  Yes  Has goal D/C date and time been discussed with patient:  Yes       Pt refused evening lasix scheduled med.

## 2024-02-08 NOTE — PROGRESS NOTES
"Orthopedic Progress Note      Assessment: 1 Day Post-Op  S/P Procedure(s):  RIGHT POSTERIOR TOTAL HIP ARTHROPLASTY       Plan:   - Continue PT/OT - encourage participation and monitor progress   - Weightbearing status: WBAT RLE  - Anticoagulation: ASA 81mg po bid x 30 days in addition to SCDs, catherine stockings and early ambulation.  - Antibiotics: Duricef 500mg po bid x 7 days upon discharge  - Discharge planning: disposition to home vs TCU pending progress with therapy       Subjective:  Pain: Well controlled on Tylenol, Oxycodone, Dilaudid, Gabapentin  Nausea, Vomiting:  No  Chest pain: No  Lightheadedness, Dizziness:  Yes dizzy and headache   Neuro:  Patient denies new onset numbness or paresthesias     Patient doing well on POD #1. States that she feels tired and complains of a headache and dizziness. Worked with therapies this morning but has not yet met her goals. Tolerating oral intake without nausea or vomiting. Pain is controlled with oral medications. Voiding and passing gas.     Hgb 9.5 (12.1 pre-op)        Objective:  BP (!) 159/71 (BP Location: Right arm)   Pulse 82   Temp 98.8  F (37.1  C) (Oral)   Resp 18   Ht 1.626 m (5' 4\")   Wt 79.2 kg (174 lb 11.2 oz)   SpO2 93%   BMI 29.99 kg/m    The patient is A&Ox3. Appears comfortable, sitting up at bedside.  Calves are soft and non-tender bilaterally  Brisk capillary refill in the toes.   Palpable Right dorsalis pedis pulse. Foot warm & well-perfused.  Sensation is intact to light touch & equal bilaterally in the femoral, DP, SP & tibial nerve distributions.  ROM: Flexes at Right hip. Appropriately flexes & extends all toes bilaterally.   Motor: +5/5 dorsiflexion, plantar flexion & EHL bilaterally. Fires quad.   Leg lengths equal.  Right hip dressing C/D/I without strikethrough, no surrounding erythema.        Pertinent Labs   Lab Results: personally reviewed.   No results found for: \"INR\", \"PROTIME\"  Lab Results   Component Value Date    WBC 5.4 " 02/01/2024    HGB 9.5 (L) 02/08/2024    HCT 37.6 02/01/2024     02/01/2024    PLT 92 (L) 02/07/2024     Lab Results   Component Value Date     02/01/2024    CO2 24 02/01/2024         Report completed by:  Reanna Tracy PA-C/[facility]    Date: 2/8/2024  Time: 10:00 AM

## 2024-02-08 NOTE — PROGRESS NOTES
"PRIMARY DIAGNOSIS: \"GENERIC\" NURSING  OUTPATIENT/OBSERVATION GOALS TO BE MET BEFORE DISCHARGE:  ADLs back to baseline: No    Activity and level of assistance: Up with maximum assistance. Consider SW and/or PT evaluation.     Pain status: Improved-controlled with oral pain medications.    Return to near baseline physical activity: No     Discharge Planner Nurse   Safe discharge environment identified: No  Barriers to discharge: Yes       Entered by: Jordana Suazo RN     Please review provider order for any additional goals.   Nurse to notify provider when observation goals have been met and patient is ready for discharge.  "

## 2024-02-08 NOTE — PROVIDER NOTIFICATION
"   02/08/24 1200   Wound Buttocks Fissure NA   Date First Assessed: (c) 02/08/24   Orientation: Midline  Location: Buttocks  Wound Type: Fissure  Pre-existing: Yes  Staging (complete if a pressure injury): NA   Wound Bed Bleeding   Stephanie-wound Assessment Warm   Wound Length (cm) 1 cm   Wound Width (cm) 0.2 cm   Drainage Amount Scant   Drainage Color/Characteristics Sanguinous   Wound Care/Cleansing Barrier applied    Dressing Open to air     Wound to sacrum originally documented as stage 2 hospital acquired pressure sore on 2/08/24. Per patient, she has been \"treating this at home\" prior to admission. Reassessed with Charlie 2/9/24. Updated LDA to reflect our assessment. Chair positioning system applied. Patient states this \"feels much better\".  "

## 2024-02-09 ENCOUNTER — APPOINTMENT (OUTPATIENT)
Dept: ULTRASOUND IMAGING | Facility: CLINIC | Age: 79
End: 2024-02-09
Payer: COMMERCIAL

## 2024-02-09 ENCOUNTER — APPOINTMENT (OUTPATIENT)
Dept: PHYSICAL THERAPY | Facility: CLINIC | Age: 79
End: 2024-02-09
Attending: ORTHOPAEDIC SURGERY
Payer: COMMERCIAL

## 2024-02-09 ENCOUNTER — APPOINTMENT (OUTPATIENT)
Dept: OCCUPATIONAL THERAPY | Facility: CLINIC | Age: 79
End: 2024-02-09
Attending: ORTHOPAEDIC SURGERY
Payer: COMMERCIAL

## 2024-02-09 LAB
BASOPHILS # BLD AUTO: 0 10E3/UL (ref 0–0.2)
BASOPHILS NFR BLD AUTO: 0 %
EOSINOPHIL # BLD AUTO: 0.1 10E3/UL (ref 0–0.7)
EOSINOPHIL NFR BLD AUTO: 1 %
ERYTHROCYTE [DISTWIDTH] IN BLOOD BY AUTOMATED COUNT: 13.1 % (ref 10–15)
FASTING STATUS PATIENT QL REPORTED: ABNORMAL
GLUCOSE SERPL-MCNC: 118 MG/DL (ref 70–99)
HCT VFR BLD AUTO: 26.1 % (ref 35–47)
HGB BLD-MCNC: 8.5 G/DL (ref 11.7–15.7)
IMM GRANULOCYTES # BLD: 0 10E3/UL
IMM GRANULOCYTES NFR BLD: 0 %
LYMPHOCYTES # BLD AUTO: 1.1 10E3/UL (ref 0.8–5.3)
LYMPHOCYTES NFR BLD AUTO: 17 %
MCH RBC QN AUTO: 32.1 PG (ref 26.5–33)
MCHC RBC AUTO-ENTMCNC: 32.6 G/DL (ref 31.5–36.5)
MCV RBC AUTO: 99 FL (ref 78–100)
MONOCYTES # BLD AUTO: 0.6 10E3/UL (ref 0–1.3)
MONOCYTES NFR BLD AUTO: 9 %
NEUTROPHILS # BLD AUTO: 4.7 10E3/UL (ref 1.6–8.3)
NEUTROPHILS NFR BLD AUTO: 73 %
NRBC # BLD AUTO: 0 10E3/UL
NRBC BLD AUTO-RTO: 0 /100
PLATELET # BLD AUTO: 76 10E3/UL (ref 150–450)
RBC # BLD AUTO: 2.65 10E6/UL (ref 3.8–5.2)
WBC # BLD AUTO: 6.5 10E3/UL (ref 4–11)

## 2024-02-09 PROCEDURE — 97535 SELF CARE MNGMENT TRAINING: CPT | Mod: GO

## 2024-02-09 PROCEDURE — 250N000013 HC RX MED GY IP 250 OP 250 PS 637: Performed by: PHYSICIAN ASSISTANT

## 2024-02-09 PROCEDURE — 250N000013 HC RX MED GY IP 250 OP 250 PS 637

## 2024-02-09 PROCEDURE — 99233 SBSQ HOSP IP/OBS HIGH 50: CPT | Performed by: STUDENT IN AN ORGANIZED HEALTH CARE EDUCATION/TRAINING PROGRAM

## 2024-02-09 PROCEDURE — 82947 ASSAY GLUCOSE BLOOD QUANT: CPT | Performed by: ORTHOPAEDIC SURGERY

## 2024-02-09 PROCEDURE — 85025 COMPLETE CBC W/AUTO DIFF WBC: CPT | Performed by: STUDENT IN AN ORGANIZED HEALTH CARE EDUCATION/TRAINING PROGRAM

## 2024-02-09 PROCEDURE — 93971 EXTREMITY STUDY: CPT | Mod: RT

## 2024-02-09 PROCEDURE — 97110 THERAPEUTIC EXERCISES: CPT | Mod: GP

## 2024-02-09 PROCEDURE — 250N000013 HC RX MED GY IP 250 OP 250 PS 637: Performed by: STUDENT IN AN ORGANIZED HEALTH CARE EDUCATION/TRAINING PROGRAM

## 2024-02-09 PROCEDURE — 36415 COLL VENOUS BLD VENIPUNCTURE: CPT | Performed by: ORTHOPAEDIC SURGERY

## 2024-02-09 RX ORDER — METHOCARBAMOL 500 MG/1
500 TABLET, FILM COATED ORAL 4 TIMES DAILY
Status: DISCONTINUED | OUTPATIENT
Start: 2024-02-09 | End: 2024-02-12 | Stop reason: HOSPADM

## 2024-02-09 RX ADMIN — SENNOSIDES AND DOCUSATE SODIUM 1 TABLET: 8.6; 5 TABLET ORAL at 09:24

## 2024-02-09 RX ADMIN — OXYCODONE HYDROCHLORIDE 5 MG: 5 TABLET ORAL at 10:50

## 2024-02-09 RX ADMIN — METHOCARBAMOL 500 MG: 500 TABLET ORAL at 13:44

## 2024-02-09 RX ADMIN — HYDROXYCHLOROQUINE SULFATE 200 MG: 200 TABLET ORAL at 20:25

## 2024-02-09 RX ADMIN — ACETAMINOPHEN 975 MG: 325 TABLET ORAL at 23:46

## 2024-02-09 RX ADMIN — LEVOTHYROXINE SODIUM 25 MCG: 0.03 TABLET ORAL at 06:48

## 2024-02-09 RX ADMIN — HYDROXYCHLOROQUINE SULFATE 200 MG: 200 TABLET ORAL at 09:24

## 2024-02-09 RX ADMIN — METHOCARBAMOL 500 MG: 500 TABLET ORAL at 16:20

## 2024-02-09 RX ADMIN — FUROSEMIDE 40 MG: 40 TABLET ORAL at 13:44

## 2024-02-09 RX ADMIN — ACETAMINOPHEN 975 MG: 325 TABLET ORAL at 14:56

## 2024-02-09 RX ADMIN — FLUOXETINE 20 MG: 20 CAPSULE ORAL at 09:24

## 2024-02-09 RX ADMIN — ASPIRIN 81 MG: 81 TABLET, COATED ORAL at 20:26

## 2024-02-09 RX ADMIN — FUROSEMIDE 40 MG: 40 TABLET ORAL at 09:24

## 2024-02-09 RX ADMIN — SENNOSIDES AND DOCUSATE SODIUM 1 TABLET: 8.6; 5 TABLET ORAL at 20:26

## 2024-02-09 RX ADMIN — METHOCARBAMOL 500 MG: 500 TABLET ORAL at 20:26

## 2024-02-09 RX ADMIN — ASPIRIN 81 MG: 81 TABLET, COATED ORAL at 09:24

## 2024-02-09 RX ADMIN — ACETAMINOPHEN 975 MG: 325 TABLET ORAL at 06:48

## 2024-02-09 RX ADMIN — GABAPENTIN 600 MG: 300 CAPSULE ORAL at 20:25

## 2024-02-09 RX ADMIN — METHOCARBAMOL 500 MG: 500 TABLET ORAL at 10:14

## 2024-02-09 RX ADMIN — ATORVASTATIN CALCIUM 20 MG: 10 TABLET, FILM COATED ORAL at 20:26

## 2024-02-09 RX ADMIN — ALLOPURINOL 100 MG: 100 TABLET ORAL at 20:26

## 2024-02-09 RX ADMIN — OXYCODONE HYDROCHLORIDE 5 MG: 5 TABLET ORAL at 06:48

## 2024-02-09 ASSESSMENT — ACTIVITIES OF DAILY LIVING (ADL)
ADLS_ACUITY_SCORE: 40
ADLS_ACUITY_SCORE: 40
ADLS_ACUITY_SCORE: 44
ADLS_ACUITY_SCORE: 42
ADLS_ACUITY_SCORE: 40
ADLS_ACUITY_SCORE: 44
ADLS_ACUITY_SCORE: 42
ADLS_ACUITY_SCORE: 40
ADLS_ACUITY_SCORE: 42
ADLS_ACUITY_SCORE: 44
ADLS_ACUITY_SCORE: 40
ADLS_ACUITY_SCORE: 39

## 2024-02-09 NOTE — PROGRESS NOTES
Patient vital signs are at baseline: No, elevated temp of 100, improved this AM 98.6  Patient able to ambulate as they were prior to admission or with assist devices provided by therapies during their stay:  No,  Reason:  Up with assist of 2, using bedside commode. Unsteady on feet  Patient MUST void prior to discharge:  Yes  Patient able to tolerate oral intake:  Yes  Pain has adequate pain control using Oral analgesics:  Yes, utilizing scheduled pain meds, PRN oxycodone  Does patient have an identified :  Yes  Has goal D/C date and time been discussed with patient:  Yes

## 2024-02-09 NOTE — UTILIZATION REVIEW
Concurrent stay review; Secondary Review Determination - [facility]        Under the authority of the Utilization Management Committee, the utilization review process indicated a secondary review on the above patient.  The review outcome is based on review of the medical records, discussions with staff, and applying clinical experience noted on the date of the review.        (x) Observation/outpatient Status Appropriate - Concurrent stay review       RATIONALE FOR DETERMINATION:   Dulce Pritchard is a 78 yr old female who presented for right total hip arthroplasty.  Medically stable.  Needs TCU for safe disposition.     Patient delayed discharge is related to disposition, there is no medical necessity for inpatient admission at the time of this review. If there is a change in patient status, please resend for review.    The information on this document is developed by the utilization review team in order for the business office to ensure compliance.  This only denotes the appropriateness of proper admission status and does not reflect the quality of care rendered.       The definitions of Inpatient Status and Observation Status used in making the determination above are those provided in the CMS Coverage Manual, Chapter 1 and Chapter 6, section 70.4.       Sincerely,    Larisa Figueroa MD

## 2024-02-09 NOTE — PROGRESS NOTES
"Orthopedic Progress Note      Assessment: 2 Days Post-Op  S/P Procedure(s):  RIGHT POSTERIOR TOTAL HIP ARTHROPLASTY @    Plan:   - Continue PT/OT.   - Weightbearing status: WBAT  - Anticoagulation: ASA in addition to SCDs, catherine stockings and early ambulation.  - Antibiotics: Duricef 500mg po bid x 7 days upon discharge  - US right lower extremity   - Discharge planning: needs TCU     Subjective:  Pain: Well controlled on oral meds  Nausea, Vomiting:  No  Chest pain: No  Lightheadedness, Dizziness:  No  Neuro:  Patient denies new onset numbness or paresthesias     Patient doing well on POD #2. Ambulating, tolerating oral intake, voiding & pain is controlled with oral medications.      Objective:  /60 (BP Location: Right arm)   Pulse 82   Temp 98.8  F (37.1  C) (Oral)   Resp 16   Ht 1.626 m (5' 4\")   Wt 74.5 kg (164 lb 3.9 oz)   SpO2 95%   BMI 28.19 kg/m    The patient is A&Ox3. Appears comfortable, sitting up at bedside.  Calves are soft and non-tender bilaterally  Brisk capillary refill in the toes.   Palpable right dorsalis pedis pulse. Foot warm & well-perfused.  Sensation is intact to light touch & equal bilaterally in the femoral, DP, SP & tibial nerve distributions.  ROM: Flexes at right hip. Appropriately flexes & extends all toes bilaterally.   Motor: +5/5 dorsiflexion, plantar flexion & EHL bilaterally. Fires quad.   Leg lengths equal.  right hip dressing C/D/I without strikethrough, no surrounding erythema.       5/5 TA/GSC/EHL.         Pertinent Labs   Lab Results: personally reviewed.   No results found for: \"INR\", \"PROTIME\"  Lab Results   Component Value Date    WBC 6.5 02/09/2024    HGB 8.5 (L) 02/09/2024    HCT 26.1 (L) 02/09/2024    MCV 99 02/09/2024    PLT 76 (L) 02/09/2024     Lab Results   Component Value Date     02/08/2024    CO2 27 02/08/2024         Report completed by:  oCrine Casarez PA-C/[facility]    Date: 2/9/2024  Time: 9:42 AM    "

## 2024-02-09 NOTE — PROGRESS NOTES
[facility]    Medicine Progress Note - Hospitalist Service    Date of Admission:  2/7/2024    Assessment & Plan   Dulce Pritchard is a 78 year old female admitted on 2/7/2024.  She has a past medical history significant for gout, hypothyroidism, sick sinus syndrome, coronary artery disease, pulmonary hypertension, CHF who presents to the hospital for elective right total hip arthroplasty. Hospitalist medicine consulted for management for chronic medical conditions.  Hospital course complicated with worsening anemia.  Pending discharge to TCU.    Mood disorder    Plan  -Continue home fluoxetine 20 mg daily     Hypothyroidism    Plan  -Continue home levothyroxine    Chronic pain    Plan  -Continue home gabapentin 600 mg nightly    CHF  -Appears close to euvolemia on physical examination    Plan  -Continue home Lasix.    Coronary artery disease  -No chest pain or shortness of breath    Plan  -Continue home atorvastatin 20 mg nightly    Status post right hip arthroplasty  -Pain well-controlled  -Patient decided to go to TCU.    Plan  -Pain management and DVT prophylaxis per primary team    Rheumatoid arthritis    Plan  -Continue home hydroxychloroquine    Chronic thrombocytopenia  Anemia.   -Platelet around baseline  -Hemoglobin baseline around 12, postoperative hemoglobin around 9.5--> 8.5    Plan  -Recheck CBC on 2/10.    Right sided chest wall pain  -Unable to provide a clear history, reporting right lateral lower chest wall pain started on 2/10/2024.  Most likely musculoskeletal in nature.  However, reported pain with deep inspiration.  Patient is not tachycardic neither hypoxic.  -Wells score is 1.5 suggestive of low probability.    Plan  -Bilateral lower extremity ultrasound without DVT  -Continue to monitor, if you have persistent pain or signs of pulmonary embolism we will proceed with CT chest with contrast.              Diet: Advance Diet as Tolerated: Regular Diet Adult  Discharge  "Instruction - Regular Diet Adult    DVT Prophylaxis: Defer to primary service  [place] Catheter: Not present  Lines: None     Cardiac Monitoring: None  Code Status: Full Code      Clinically Significant Risk Factors Present on Admission                # Drug Induced Platelet Defect: home medication list includes an antiplatelet medication   # Hypertension: Noted on problem list      # Overweight: Estimated body mass index is 28.19 kg/m  as calculated from the following:    Height as of this encounter: 1.626 m (5' 4\").    Weight as of this encounter: 74.5 kg (164 lb 3.9 oz).              Disposition Plan  TCU     Expected Discharge Date: 02/10/2024      Destination: home with family  Discharge Comments: TCU placement            JIMENA LANCASTER MD  Hospitalist Service  [facility]  Securely message with [employer] (more info)  Text page via [employer] Paging/Directory   ______________________________________________________________________    Interval History   Reported generalized weakness, continues to experience some pain around the surgical site.  Reported nonspecific right lateral chest wall pain with palpation and deep inspiration.  Denies any chest pressure.  Denies any shortness of breath.  Denies any lightheadedness.  Denies any nausea or vomiting.    Physical Exam   Vital Signs: Temp: 98.8  F (37.1  C) Temp src: Oral BP: 127/60 Pulse: 82   Resp: 16 SpO2: 95 % O2 Device: None (Room air)    Weight: 164 lbs 3.88 oz    Physical Exam  Constitutional:       General: She is not in acute distress.     Appearance: She is not ill-appearing or toxic-appearing.   Cardiovascular:      Rate and Rhythm: Normal rate.   Pulmonary:      Effort: Pulmonary effort is normal. No respiratory distress.   Musculoskeletal:      Right lower leg: No edema.      Left lower leg: No edema.   Neurological:      Mental Status: She is alert.   Psychiatric:         Mood and Affect: Mood normal.          Medical Decision Making "       52 MINUTES SPENT BY ME on the date of service doing chart review, history, exam, documentation & further activities per the note.      Data     I have personally reviewed the following data over the past 24 hrs:    6.5  \   8.5 (L)   / 76 (L)     136 100 22.3 /  118 (H)   4.3 27 0.95 \       Imaging results reviewed over the past 24 hrs:   Recent Results (from the past 24 hour(s))   US Lower Extremity Venous Duplex Right    Narrative    EXAM: US LOWER EXTREMITY VENOUS DUPLEX RIGHT  LOCATION: [facility]  DATE: 2/9/2024    INDICATION: s p ANGE r o DVT, pain  COMPARISON: None.  TECHNIQUE: Venous Duplex ultrasound of the right lower extremity with and without compression, augmentation and duplex. Color flow and spectral Doppler with waveform analysis performed.    FINDINGS: Exam includes the common femoral, femoral, popliteal, and contralateral common femoral veins as well as segmentally visualized deep calf veins and greater saphenous vein.     RIGHT: No deep vein thrombosis. No superficial thrombophlebitis. No popliteal cyst.      Impression    IMPRESSION:  1.  No deep venous thrombosis in the right lower extremity.

## 2024-02-09 NOTE — PROGRESS NOTES
Patient vital signs are at baseline: Yes  Patient able to ambulate as they were prior to admission or with assist devices provided by therapies during their stay:  Yes  Patient MUST void prior to discharge:  Yes  Patient able to tolerate oral intake:  Yes  Pain has adequate pain control using Oral analgesics:  Yes  Does patient have an identified :  No, Reason: Awaiting TCU placement.  Has goal D/C date and time been discussed with patient:  Yes

## 2024-02-10 ENCOUNTER — APPOINTMENT (OUTPATIENT)
Dept: PHYSICAL THERAPY | Facility: CLINIC | Age: 79
End: 2024-02-10
Attending: ORTHOPAEDIC SURGERY
Payer: COMMERCIAL

## 2024-02-10 LAB
BASOPHILS # BLD AUTO: 0 10E3/UL (ref 0–0.2)
BASOPHILS NFR BLD AUTO: 0 %
EOSINOPHIL # BLD AUTO: 0.1 10E3/UL (ref 0–0.7)
EOSINOPHIL NFR BLD AUTO: 1 %
ERYTHROCYTE [DISTWIDTH] IN BLOOD BY AUTOMATED COUNT: 13.1 % (ref 10–15)
HCT VFR BLD AUTO: 24.8 % (ref 35–47)
HGB BLD-MCNC: 8.2 G/DL (ref 11.7–15.7)
IMM GRANULOCYTES # BLD: 0 10E3/UL
IMM GRANULOCYTES NFR BLD: 0 %
LYMPHOCYTES # BLD AUTO: 0.8 10E3/UL (ref 0.8–5.3)
LYMPHOCYTES NFR BLD AUTO: 12 %
MCH RBC QN AUTO: 32.2 PG (ref 26.5–33)
MCHC RBC AUTO-ENTMCNC: 33.1 G/DL (ref 31.5–36.5)
MCV RBC AUTO: 97 FL (ref 78–100)
MONOCYTES # BLD AUTO: 0.5 10E3/UL (ref 0–1.3)
MONOCYTES NFR BLD AUTO: 7 %
NEUTROPHILS # BLD AUTO: 5.3 10E3/UL (ref 1.6–8.3)
NEUTROPHILS NFR BLD AUTO: 80 %
NRBC # BLD AUTO: 0 10E3/UL
NRBC BLD AUTO-RTO: 0 /100
PLATELET # BLD AUTO: 89 10E3/UL (ref 150–450)
RBC # BLD AUTO: 2.55 10E6/UL (ref 3.8–5.2)
WBC # BLD AUTO: 6.8 10E3/UL (ref 4–11)

## 2024-02-10 PROCEDURE — 85025 COMPLETE CBC W/AUTO DIFF WBC: CPT | Performed by: STUDENT IN AN ORGANIZED HEALTH CARE EDUCATION/TRAINING PROGRAM

## 2024-02-10 PROCEDURE — 250N000013 HC RX MED GY IP 250 OP 250 PS 637

## 2024-02-10 PROCEDURE — 97110 THERAPEUTIC EXERCISES: CPT | Mod: GP

## 2024-02-10 PROCEDURE — 250N000013 HC RX MED GY IP 250 OP 250 PS 637: Performed by: PHYSICIAN ASSISTANT

## 2024-02-10 PROCEDURE — 99233 SBSQ HOSP IP/OBS HIGH 50: CPT | Performed by: STUDENT IN AN ORGANIZED HEALTH CARE EDUCATION/TRAINING PROGRAM

## 2024-02-10 PROCEDURE — 36415 COLL VENOUS BLD VENIPUNCTURE: CPT | Performed by: STUDENT IN AN ORGANIZED HEALTH CARE EDUCATION/TRAINING PROGRAM

## 2024-02-10 PROCEDURE — 97116 GAIT TRAINING THERAPY: CPT | Mod: GP

## 2024-02-10 PROCEDURE — 250N000013 HC RX MED GY IP 250 OP 250 PS 637: Performed by: STUDENT IN AN ORGANIZED HEALTH CARE EDUCATION/TRAINING PROGRAM

## 2024-02-10 RX ADMIN — ALLOPURINOL 100 MG: 100 TABLET ORAL at 21:54

## 2024-02-10 RX ADMIN — METHOCARBAMOL 500 MG: 500 TABLET ORAL at 21:54

## 2024-02-10 RX ADMIN — METHOCARBAMOL 500 MG: 500 TABLET ORAL at 16:02

## 2024-02-10 RX ADMIN — LEVOTHYROXINE SODIUM 25 MCG: 0.03 TABLET ORAL at 06:33

## 2024-02-10 RX ADMIN — GABAPENTIN 600 MG: 300 CAPSULE ORAL at 21:54

## 2024-02-10 RX ADMIN — FUROSEMIDE 40 MG: 40 TABLET ORAL at 08:26

## 2024-02-10 RX ADMIN — HYDROXYCHLOROQUINE SULFATE 200 MG: 200 TABLET ORAL at 21:54

## 2024-02-10 RX ADMIN — ASPIRIN 81 MG: 81 TABLET, COATED ORAL at 08:26

## 2024-02-10 RX ADMIN — SENNOSIDES AND DOCUSATE SODIUM 1 TABLET: 8.6; 5 TABLET ORAL at 08:26

## 2024-02-10 RX ADMIN — FLUOXETINE 20 MG: 20 CAPSULE ORAL at 08:26

## 2024-02-10 RX ADMIN — ASPIRIN 81 MG: 81 TABLET, COATED ORAL at 21:54

## 2024-02-10 RX ADMIN — METHOCARBAMOL 500 MG: 500 TABLET ORAL at 13:06

## 2024-02-10 RX ADMIN — HYDROXYCHLOROQUINE SULFATE 200 MG: 200 TABLET ORAL at 08:26

## 2024-02-10 RX ADMIN — ACETAMINOPHEN 975 MG: 325 TABLET ORAL at 06:33

## 2024-02-10 RX ADMIN — METHOCARBAMOL 500 MG: 500 TABLET ORAL at 08:26

## 2024-02-10 RX ADMIN — FUROSEMIDE 40 MG: 40 TABLET ORAL at 13:06

## 2024-02-10 RX ADMIN — ATORVASTATIN CALCIUM 20 MG: 10 TABLET, FILM COATED ORAL at 21:54

## 2024-02-10 ASSESSMENT — ACTIVITIES OF DAILY LIVING (ADL)
ADLS_ACUITY_SCORE: 40
ADLS_ACUITY_SCORE: 43
ADLS_ACUITY_SCORE: 44
ADLS_ACUITY_SCORE: 40
ADLS_ACUITY_SCORE: 40
ADLS_ACUITY_SCORE: 44
ADLS_ACUITY_SCORE: 40
ADLS_ACUITY_SCORE: 41
ADLS_ACUITY_SCORE: 44
ADLS_ACUITY_SCORE: 40
ADLS_ACUITY_SCORE: 44
ADLS_ACUITY_SCORE: 41

## 2024-02-10 NOTE — PLAN OF CARE
Goal Outcome Evaluation:  Patient vital signs are at baseline: Yes  Patient able to ambulate as they were prior to admission or with assist devices provided by therapies during their stay:  Yes  Patient MUST void prior to discharge:  Yes  Patient able to tolerate oral intake:  Yes  Pain has adequate pain control using Oral analgesics:  Yes  Does patient have an identified :  Yes  Has goal D/C date and time been discussed with patient:  Yes        Assist of one with walker to commode today. Patient states pain is in good control.

## 2024-02-10 NOTE — PROGRESS NOTES
"Orthopedic Progress Note      Assessment: 3 Days Post-Op  S/P Procedure(s):  RIGHT POSTERIOR TOTAL HIP ARTHROPLASTY @    Plan:   - Continue PT/OT.   - Weightbearing status: WBAT  - Anticoagulation: ASA in addition to SCDs, catherine stockings and early ambulation.  - Antibiotics: Duricef 500mg po bid x 7 days upon discharge  -  right lower extremity   - Discharge planning: needs TCU     Subjective:  No acute events overnight.  Tolerating oral intake.  Voiding spontaneously.  Denies nausea, vomiting.  Denies fevers, chills, sweats.  Mobilizing with therapy.  Pain relatively well-controlled.     Objective:  /60 (BP Location: Right arm)   Pulse 68   Temp 98.4  F (36.9  C) (Oral)   Resp 18   Ht 1.626 m (5' 4\")   Wt 74.5 kg (164 lb 3.9 oz)   SpO2 97%   BMI 28.19 kg/m    The patient is A&Ox3. Appears comfortable, sitting up at bedside.  Calves are soft and non-tender bilaterally  Brisk capillary refill in the toes.   Palpable right dorsalis pedis pulse. Foot warm & well-perfused.  Sensation is intact to light touch & equal bilaterally in the femoral, DP, SP & tibial nerve distributions.  ROM: Flexes at right hip. Appropriately flexes & extends all toes bilaterally.   Motor: +5/5 dorsiflexion, plantar flexion & EHL bilaterally. Fires quad.   Leg lengths equal.  right hip dressing C/D/I without strikethrough, no surrounding erythema.       5/5 TA/GSC/EHL.         Pertinent Labs   Lab Results: personally reviewed.   No results found for: \"INR\", \"PROTIME\"  Lab Results   Component Value Date    WBC 6.8 02/10/2024    HGB 8.2 (L) 02/10/2024    HCT 24.8 (L) 02/10/2024    MCV 97 02/10/2024    PLT 89 (L) 02/10/2024     Lab Results   Component Value Date     02/08/2024    CO2 27 02/08/2024         Ottoniel Lopez MD  [facility]    "

## 2024-02-10 NOTE — PLAN OF CARE
Patient vital signs are at baseline: Yes  Patient able to ambulate as they were prior to admission or with assist devices provided by therapies during their stay:  No,  Reason:  A2  Patient MUST void prior to discharge:  Yes  Patient able to tolerate oral intake:  Yes  Pain has adequate pain control using Oral analgesics:  Yes  Does patient have an identified :  Yes  Has goal D/C date and time been discussed with patient:  Yes    Pt is A&Ox4, VSS on RA. A2 with walker and gait belt when ambulating. Voiding adequately. Dressing is CDI. CMS intact. Pt is reporting moderate pain during shift. Utilized scheduled medications along with non-pharm therapies for pain relief.

## 2024-02-10 NOTE — PLAN OF CARE
[id number]    Patient vital signs are at baseline: Yes  Patient able to ambulate as they were prior to admission or with assist devices provided by therapies during their stay:  No,  Reason:  pt assist of 1-2.   Patient MUST void prior to discharge:  Yes  Patient able to tolerate oral intake:  Yes  Pain has adequate pain control using Oral analgesics:  Yes  Does patient have an identified :  Yes  Has goal D/C date and time been discussed with patient:  Yes, awaiting TCU Placement       Problem: Adult Inpatient Plan of Care  Goal: Optimal Comfort and Wellbeing  Outcome: Progressing  Intervention: Monitor Pain and Promote Comfort  Recent Flowsheet Documentation  Taken 2/9/2024 1620 by Chuckie Harper, RN  Pain Management Interventions:   medication (see MAR)   breathing exercises   pain management plan reviewed with patient/caregiver   pillow support provided   repositioned   rest

## 2024-02-10 NOTE — PROGRESS NOTES
Care Management Follow Up    Length of Stay (days): 0    Expected Discharge Date: 02/10/2024     Concerns to be Addressed: discharge planning       Patient plan of care discussed at interdisciplinary rounds: Yes    Anticipated Discharge Disposition: Transitional Care     Anticipated Discharge Services:  TBD    Anticipated Discharge DME: None    Patient/family educated on Medicare website which has current facility and service quality ratings: yes    Education Provided on the Discharge Plan: Yes (AVS per bedside RN)    Patient/Family in Agreement with the Plan: yes    Referrals Placed by CM/SW:  TCU        Additional Information:  CM reviewed chart. [facility] ([facility]) is marked in EPIC as accepted. CM called to confirm and coordinate discharge. [facility] Interim Director of Nursing stated patient is not on their list. [facility] requests CM resend a new referral. Referral sent to [facility]. TCU will need to get prior authorization prior to discharge. Transportation TBD. CM will follow.     CM consult for Discharge Planning/Disposition     Denisha Lee RN

## 2024-02-10 NOTE — PROGRESS NOTES
[facility]    Medicine Progress Note - Hospitalist Service    Date of Admission:  2/7/2024    Assessment & Plan   Dulce Pritchard is a 78 year old female admitted on 2/7/2024.  She has a past medical history significant for gout, hypothyroidism, sick sinus syndrome, coronary artery disease, pulmonary hypertension, CHF who presents to the hospital for elective right total hip arthroplasty. Hospitalist medicine consulted for management for chronic medical conditions.  Hospital course complicated with worsening anemia.  Pending discharge to TCU.    Mood disorder    Plan  -Continue home fluoxetine 20 mg daily     Hypothyroidism    Plan  -Continue home levothyroxine    Chronic pain    Plan  -Continue home gabapentin 600 mg nightly    CHF  -Appears close to euvolemia on physical examination    Plan  -Continue home Lasix.    Coronary artery disease  -No chest pain or shortness of breath    Plan  -Continue home atorvastatin 20 mg nightly    Status post right hip arthroplasty  -Pain well-controlled  -Patient decided to go to TCU.    Plan  -Pain management and DVT prophylaxis per primary team    Rheumatoid arthritis    Plan  -Continue home hydroxychloroquine    Chronic thrombocytopenia  Anemia.   -Platelet around baseline  -Hemoglobin baseline around 12, postoperative hemoglobin around 9.5--> 8.5->8.2  -No signs of active bleeding.    Plan  -Continue to monitor hemoglobin daily while inpatient..  No concerns of brisk active bleeding and therefore she does not need to stay in the hospital.  If discharged, can repeat CBC as an outpatient in 3 to 5 days.      Right sided chest wall pain  -Unable to provide a clear history, reporting right lateral lower chest wall pain started on 2/10/2024.  Most likely musculoskeletal in nature.  Significantly improved over the last 24 hours.  -Wells score suggestive of low probability of PE.   -Right lower extremity duplex ultrasound ordered by primary team did not  "show DVT.    Plan  -Continue to monitor.              Diet: Advance Diet as Tolerated: Regular Diet Adult  Discharge Instruction - Regular Diet Adult    DVT Prophylaxis: Defer to primary service  Clifton Catheter: Not present  Lines: None     Cardiac Monitoring: None  Code Status: Full Code      Clinically Significant Risk Factors Present on Admission                # Drug Induced Platelet Defect: home medication list includes an antiplatelet medication   # Hypertension: Noted on problem list      # Overweight: Estimated body mass index is 28.19 kg/m  as calculated from the following:    Height as of this encounter: 1.626 m (5' 4\").    Weight as of this encounter: 74.5 kg (164 lb 3.9 oz).              Disposition Plan  TCU    Expected Discharge Date: 02/10/2024      Destination: home with family  Discharge Comments: TCU placement            JIMENA LANCASTER MD  Hospitalist Service  [facility]  Securely message with [employer] (more info)  Text page via [employer] Paging/Directory   ______________________________________________________________________    Interval History   Feeling better.  Pain is fairly well-controlled.  Denies any shortness of breath or significant chest pain.  Reported that her right lateral inferior chest wall pain has significantly improved.  Denies any nausea or vomiting.  Denies any lightheadedness.    Physical Exam   Vital Signs: Temp: 98.4  F (36.9  C) Temp src: Oral BP: 124/60 Pulse: 68   Resp: 18 SpO2: 97 % O2 Device: None (Room air)    Weight: 164 lbs 3.88 oz    Physical Exam  Constitutional:       General: She is not in acute distress.     Appearance: She is not ill-appearing or toxic-appearing.   Cardiovascular:      Rate and Rhythm: Normal rate.   Pulmonary:      Effort: Pulmonary effort is normal. No respiratory distress.   Musculoskeletal:      Right lower leg: No edema.      Left lower leg: No edema.   Neurological:      Mental Status: She is alert. Mental status is at " baseline.   Psychiatric:         Mood and Affect: Mood normal.          Medical Decision Making       56 MINUTES SPENT BY ME on the date of service doing chart review, history, exam, documentation & further activities per the note.      Data     I have personally reviewed the following data over the past 24 hrs:    6.8  \   8.2 (L)   / 89 (L)     N/A N/A N/A /  N/A   N/A N/A N/A \       Imaging results reviewed over the past 24 hrs:   No results found for this or any previous visit (from the past 24 hour(s)).

## 2024-02-10 NOTE — PLAN OF CARE
Problem: Adult Inpatient Plan of Care  Goal: Absence of Hospital-Acquired Illness or Injury  Intervention: Identify and Manage Fall Risk  Recent Flowsheet Documentation  Taken 2/9/2024 2030 by Miguelito Nelson RN  Safety Promotion/Fall Prevention:   activity supervised   assistive device/personal items within reach   clutter free environment maintained   increased rounding and observation   lighting adjusted   mobility aid in reach   nonskid shoes/slippers when out of bed   room door open   room organization consistent   safety round/check completed   patient and family education   Goal Outcome Evaluation:      Plan of Care Reviewed With: patient    Overall Patient Progress: improvingOverall Patient Progress: improving  Patient vital signs are at baseline: Yes  Patient able to ambulate as they were prior to admission or with assist devices provided by therapies during their stay:  Yes  Patient MUST void prior to discharge:  Yes  Patient able to tolerate oral intake:  Yes  Pain has adequate pain control using Oral analgesics:  Yes  Does patient have an identified :  Yes  Has goal D/C date and time been discussed with patient:  Yes     Pt remains alert and oriented, vss on room air, can voice needs,  she is assist of 2 to the  bathroom, she is awaiting TCU. Pt has a crack on the sacrum.

## 2024-02-11 ENCOUNTER — APPOINTMENT (OUTPATIENT)
Dept: PHYSICAL THERAPY | Facility: CLINIC | Age: 79
End: 2024-02-11
Attending: ORTHOPAEDIC SURGERY
Payer: COMMERCIAL

## 2024-02-11 LAB
BASOPHILS # BLD AUTO: 0 10E3/UL (ref 0–0.2)
BASOPHILS NFR BLD AUTO: 0 %
EOSINOPHIL # BLD AUTO: 0.3 10E3/UL (ref 0–0.7)
EOSINOPHIL NFR BLD AUTO: 3 %
ERYTHROCYTE [DISTWIDTH] IN BLOOD BY AUTOMATED COUNT: 13.2 % (ref 10–15)
HCT VFR BLD AUTO: 28.3 % (ref 35–47)
HGB BLD-MCNC: 9.3 G/DL (ref 11.7–15.7)
HOLD SPECIMEN: NORMAL
IMM GRANULOCYTES # BLD: 0 10E3/UL
IMM GRANULOCYTES NFR BLD: 0 %
LYMPHOCYTES # BLD AUTO: 1.7 10E3/UL (ref 0.8–5.3)
LYMPHOCYTES NFR BLD AUTO: 20 %
MCH RBC QN AUTO: 32.1 PG (ref 26.5–33)
MCHC RBC AUTO-ENTMCNC: 32.9 G/DL (ref 31.5–36.5)
MCV RBC AUTO: 98 FL (ref 78–100)
MONOCYTES # BLD AUTO: 0.6 10E3/UL (ref 0–1.3)
MONOCYTES NFR BLD AUTO: 7 %
NEUTROPHILS # BLD AUTO: 5.7 10E3/UL (ref 1.6–8.3)
NEUTROPHILS NFR BLD AUTO: 70 %
NRBC # BLD AUTO: 0 10E3/UL
NRBC BLD AUTO-RTO: 0 /100
PLATELET # BLD AUTO: 125 10E3/UL (ref 150–450)
RBC # BLD AUTO: 2.9 10E6/UL (ref 3.8–5.2)
WBC # BLD AUTO: 8.3 10E3/UL (ref 4–11)

## 2024-02-11 PROCEDURE — 97116 GAIT TRAINING THERAPY: CPT | Mod: GP

## 2024-02-11 PROCEDURE — 99232 SBSQ HOSP IP/OBS MODERATE 35: CPT | Performed by: STUDENT IN AN ORGANIZED HEALTH CARE EDUCATION/TRAINING PROGRAM

## 2024-02-11 PROCEDURE — 250N000013 HC RX MED GY IP 250 OP 250 PS 637: Performed by: STUDENT IN AN ORGANIZED HEALTH CARE EDUCATION/TRAINING PROGRAM

## 2024-02-11 PROCEDURE — 250N000013 HC RX MED GY IP 250 OP 250 PS 637: Performed by: PHYSICIAN ASSISTANT

## 2024-02-11 PROCEDURE — 85025 COMPLETE CBC W/AUTO DIFF WBC: CPT | Performed by: STUDENT IN AN ORGANIZED HEALTH CARE EDUCATION/TRAINING PROGRAM

## 2024-02-11 PROCEDURE — 250N000013 HC RX MED GY IP 250 OP 250 PS 637

## 2024-02-11 PROCEDURE — 97110 THERAPEUTIC EXERCISES: CPT | Mod: GP

## 2024-02-11 PROCEDURE — 36415 COLL VENOUS BLD VENIPUNCTURE: CPT | Performed by: STUDENT IN AN ORGANIZED HEALTH CARE EDUCATION/TRAINING PROGRAM

## 2024-02-11 RX ADMIN — OXYCODONE HYDROCHLORIDE 5 MG: 5 TABLET ORAL at 00:06

## 2024-02-11 RX ADMIN — METHOCARBAMOL 500 MG: 500 TABLET ORAL at 12:57

## 2024-02-11 RX ADMIN — ATORVASTATIN CALCIUM 20 MG: 10 TABLET, FILM COATED ORAL at 20:10

## 2024-02-11 RX ADMIN — HYDROXYCHLOROQUINE SULFATE 200 MG: 200 TABLET ORAL at 20:10

## 2024-02-11 RX ADMIN — ASPIRIN 81 MG: 81 TABLET, COATED ORAL at 20:10

## 2024-02-11 RX ADMIN — GABAPENTIN 600 MG: 300 CAPSULE ORAL at 20:10

## 2024-02-11 RX ADMIN — LEVOTHYROXINE SODIUM 25 MCG: 0.03 TABLET ORAL at 07:53

## 2024-02-11 RX ADMIN — METHOCARBAMOL 500 MG: 500 TABLET ORAL at 16:29

## 2024-02-11 RX ADMIN — METHOCARBAMOL 500 MG: 500 TABLET ORAL at 20:09

## 2024-02-11 RX ADMIN — OXYCODONE HYDROCHLORIDE 5 MG: 5 TABLET ORAL at 11:52

## 2024-02-11 RX ADMIN — OXYCODONE HYDROCHLORIDE 5 MG: 5 TABLET ORAL at 18:36

## 2024-02-11 RX ADMIN — FUROSEMIDE 40 MG: 40 TABLET ORAL at 07:54

## 2024-02-11 RX ADMIN — SENNOSIDES AND DOCUSATE SODIUM 1 TABLET: 8.6; 5 TABLET ORAL at 07:56

## 2024-02-11 RX ADMIN — ASPIRIN 81 MG: 81 TABLET, COATED ORAL at 07:56

## 2024-02-11 RX ADMIN — HYDROXYCHLOROQUINE SULFATE 200 MG: 200 TABLET ORAL at 07:56

## 2024-02-11 RX ADMIN — ALLOPURINOL 100 MG: 100 TABLET ORAL at 20:10

## 2024-02-11 RX ADMIN — METHOCARBAMOL 500 MG: 500 TABLET ORAL at 07:55

## 2024-02-11 RX ADMIN — FUROSEMIDE 40 MG: 40 TABLET ORAL at 12:57

## 2024-02-11 RX ADMIN — FLUOXETINE 20 MG: 20 CAPSULE ORAL at 07:56

## 2024-02-11 ASSESSMENT — ACTIVITIES OF DAILY LIVING (ADL)
ADLS_ACUITY_SCORE: 44

## 2024-02-11 NOTE — PLAN OF CARE
Problem: Hip Arthroplasty  Goal: Optimal Coping  Intervention: Support Psychosocial Response to Surgery and Mobility Changes  Recent Flowsheet Documentation  Taken 2/11/2024 0800 by Felicia Phipps RN  Supportive Measures: active listening utilized   Goal Outcome Evaluation:  Patient up in chair. Calling appropriately. Complaint of pain in hip. Oxycodone given with good relief.     Patient vital signs are at baseline: Yes  Patient able to ambulate as they were prior to admission or with assist devices provided by therapies during their stay:  Yes  Patient MUST void prior to discharge:  Yes  Patient able to tolerate oral intake:  Yes  Pain has adequate pain control using Oral analgesics:  Yes  Does patient have an identified :  Yes  Has goal D/C date and time been discussed with patient:  Yes

## 2024-02-11 NOTE — PLAN OF CARE
Patient vital signs are at baseline: Yes  Patient able to ambulate as they were prior to admission or with assist devices provided by therapies during their stay:  Yes  Patient MUST void prior to discharge:  Yes  Patient able to tolerate oral intake:  Yes  Pain has adequate pain control using Oral analgesics:  Yes  Does patient have an identified :  Yes  Has goal D/C date and time been discussed with patient:  Yes    Pt doing well with new pain medications. Up with 1 assist.      Problem: Adult Inpatient Plan of Care  Goal: Optimal Comfort and Wellbeing  Outcome: Progressing  Intervention: Monitor Pain and Promote Comfort  Recent Flowsheet Documentation  Taken 2/10/2024 2130 by Michi Wooten RN  Pain Management Interventions: medication (see MAR)  Goal: Readiness for Transition of Care  Outcome: Progressing     Problem: Hip Arthroplasty  Goal: Optimal Coping  Outcome: Progressing  Goal: Absence of Bleeding  Outcome: Progressing  Intervention: Monitor and Manage Bleeding  Recent Flowsheet Documentation  Taken 2/10/2024 2130 by Michi Wooten RN  Bleeding Management: dressing monitored  Goal: Intact Neurovascular Status  Outcome: Progressing  Goal: Acceptable Pain Control  Outcome: Progressing  Intervention: Prevent or Manage Pain  Recent Flowsheet Documentation  Taken 2/10/2024 2130 by Michi Wooten RN  Pain Management Interventions: medication (see MAR)  Goal: Effective Urinary Elimination  Outcome: Progressing

## 2024-02-11 NOTE — PROGRESS NOTES
"Orthopedic Progress Note      Assessment: 4 Days Post-Op  S/P Procedure(s):  RIGHT POSTERIOR TOTAL HIP ARTHROPLASTY     Plan:   - Continue PT/OT  - Weightbearing status: WBAT  - Anticoagulation:  ASA  in addition to SCDs, catherine stockings and early ambulation.  - Antibiotics: Duricef 500mg po bid x 7 days upon discharge   - Discharge planning: awaiting TCU placement      Subjective:  Pain: mild  Nausea, Vomiting:  No  Lightheadedness, Dizziness:  No  Neuro:  Patient denies new onset numbness or paresthesias  Fever, chills: No  Chest pain: No  SOB: No    Patient reports feeling well today. Patient reports pain is tolerable with current pain regimen. Patient eating and drinking well. Patient voiding and passing gas however no BM. All questions/concerns answered.      Objective:  BP (!) 147/65 (BP Location: Right arm)   Pulse 63   Temp 98.5  F (36.9  C) (Oral)   Resp 16   Ht 1.626 m (5' 4\")   Wt 75.1 kg (165 lb 9.1 oz)   SpO2 97%   BMI 28.42 kg/m    The patient is Alert and awake. Patient is sitting up on the recliner and appears comfortable.   Sensation is intact.  Dorsiflexion and plantar flexion is intact.  Dorsalis pedis pulse intact. Skin warm and well perfused.   Compartments are soft and non-tender.   The incision is covered. Dressing C/D/I.      Pertinent Labs   Lab Results: personally reviewed.   No results found for: \"INR\", \"PROTIME\"  Lab Results   Component Value Date    WBC 8.3 02/11/2024    HGB 9.3 (L) 02/11/2024    HCT 28.3 (L) 02/11/2024    MCV 98 02/11/2024     (L) 02/11/2024     Lab Results   Component Value Date     02/08/2024    CO2 27 02/08/2024         Report completed by:  Marleny Thompson PA-C, ANTHONY  Date: 2/11/2024  Time: 8:14 AM  [facility]         "

## 2024-02-11 NOTE — PROGRESS NOTES
[facility]    Medicine Progress Note - Hospitalist Service    Date of Admission:  2/7/2024    Assessment & Plan   Dulce Pritchard is a 78 year old female admitted on 2/7/2024.  She has a past medical history significant for gout, hypothyroidism, sick sinus syndrome, coronary artery disease, pulmonary hypertension, CHF who presents to the hospital for elective right total hip arthroplasty. Hospitalist medicine consulted for management for chronic medical conditions.  Pending discharge to TCU.    Mood disorder    Plan  -Continue home fluoxetine 20 mg daily     Hypothyroidism    Plan  -Continue home levothyroxine    Chronic pain    Plan  -Continue home gabapentin 600 mg nightly    CHF  -Appears close to euvolemia on physical examination    Plan  -Continue home Lasix.    Coronary artery disease  -No chest pain or shortness of breath    Plan  -Continue home atorvastatin 20 mg nightly    Status post right hip arthroplasty  -Pain well-controlled  -Patient decided to go to TCU.    Plan  -Pain management and DVT prophylaxis per primary team    Rheumatoid arthritis    Plan  -Continue home hydroxychloroquine    Chronic thrombocytopenia  Anemia.   -Platelet around baseline  -Hemoglobin baseline around 12, postoperative hemoglobin around 9.  Stabilized over the last 24 hours  -No signs of active bleeding.    Plan  -Follow-up with primary care physician 1 week and repeat CBC after discharge.    Right sided chest wall pain (resolved)    Insomnia    Plan  -Melatonin 5 mg as needed          Diet: Advance Diet as Tolerated: Regular Diet Adult  Discharge Instruction - Regular Diet Adult    DVT Prophylaxis: Defer to primary service  Clifton Catheter: Not present  Lines: None     Cardiac Monitoring: None  Code Status: Full Code      Clinically Significant Risk Factors Present on Admission                # Drug Induced Platelet Defect: home medication list includes an antiplatelet medication   # Hypertension:  "Noted on problem list      # Overweight: Estimated body mass index is 28.42 kg/m  as calculated from the following:    Height as of this encounter: 1.626 m (5' 4\").    Weight as of this encounter: 75.1 kg (165 lb 9.1 oz).              Disposition Plan  TCU     Expected Discharge Date: 02/12/2024    Discharge Delays: Placement - TCU  Destination: home with family  Discharge Comments: TCU placement - [facility]?            JIMENA LANCASTER MD  Hospitalist Service  [facility]  Securely message with [employer] (more info)  Text page via [employer] Paging/Directory   ______________________________________________________________________    Interval History   Feeling well.  Denies any severe pain.  Denies any chest pain or shortness of breath.  Denies any lightheadedness.      Physical Exam   Vital Signs: Temp: 98.5  F (36.9  C) Temp src: Oral BP: (!) 147/65 Pulse: 63   Resp: 16 SpO2: 97 % O2 Device: None (Room air)    Weight: 165 lbs 9.05 oz    Physical Exam  Constitutional:       General: She is not in acute distress.     Appearance: She is not ill-appearing or toxic-appearing.   Cardiovascular:      Rate and Rhythm: Normal rate.   Pulmonary:      Effort: Pulmonary effort is normal. No respiratory distress.   Musculoskeletal:      Right lower leg: No edema.      Left lower leg: No edema.   Neurological:      Mental Status: She is alert. Mental status is at baseline.   Psychiatric:         Mood and Affect: Mood normal.          Medical Decision Making       45 MINUTES SPENT BY ME on the date of service doing chart review, history, exam, documentation & further activities per the note.      Data     I have personally reviewed the following data over the past 24 hrs:    8.3  \   9.3 (L)   / 125 (L)     N/A N/A N/A /  N/A   N/A N/A N/A \       Imaging results reviewed over the past 24 hrs:   No results found for this or any previous visit (from the past 24 hour(s)).    "

## 2024-02-12 VITALS
SYSTOLIC BLOOD PRESSURE: 132 MMHG | TEMPERATURE: 97.9 F | RESPIRATION RATE: 16 BRPM | WEIGHT: 160.94 LBS | HEART RATE: 64 BPM | DIASTOLIC BLOOD PRESSURE: 62 MMHG | OXYGEN SATURATION: 95 % | BODY MASS INDEX: 27.48 KG/M2 | HEIGHT: 64 IN

## 2024-02-12 PROCEDURE — 99232 SBSQ HOSP IP/OBS MODERATE 35: CPT | Performed by: STUDENT IN AN ORGANIZED HEALTH CARE EDUCATION/TRAINING PROGRAM

## 2024-02-12 PROCEDURE — 250N000013 HC RX MED GY IP 250 OP 250 PS 637: Performed by: PHYSICIAN ASSISTANT

## 2024-02-12 PROCEDURE — 250N000013 HC RX MED GY IP 250 OP 250 PS 637

## 2024-02-12 PROCEDURE — 250N000013 HC RX MED GY IP 250 OP 250 PS 637: Performed by: STUDENT IN AN ORGANIZED HEALTH CARE EDUCATION/TRAINING PROGRAM

## 2024-02-12 RX ORDER — METHOCARBAMOL 500 MG/1
500 TABLET, FILM COATED ORAL 4 TIMES DAILY
Qty: 28 TABLET | Refills: 0 | Status: SHIPPED | OUTPATIENT
Start: 2024-02-12

## 2024-02-12 RX ORDER — ASPIRIN 81 MG/1
81 TABLET ORAL 2 TIMES DAILY
Qty: 60 TABLET | Refills: 0 | Status: SHIPPED | OUTPATIENT
Start: 2024-02-12

## 2024-02-12 RX ORDER — OXYCODONE HYDROCHLORIDE 5 MG/1
5 TABLET ORAL EVERY 4 HOURS PRN
Qty: 20 TABLET | Refills: 0 | Status: SHIPPED | OUTPATIENT
Start: 2024-02-12

## 2024-02-12 RX ORDER — HYDROXYZINE HYDROCHLORIDE 10 MG/1
10 TABLET, FILM COATED ORAL EVERY 6 HOURS PRN
Qty: 30 TABLET | Refills: 0 | Status: SHIPPED | OUTPATIENT
Start: 2024-02-12

## 2024-02-12 RX ORDER — CEFADROXIL 500 MG/1
500 CAPSULE ORAL 2 TIMES DAILY
Qty: 14 CAPSULE | Refills: 0 | Status: SHIPPED | OUTPATIENT
Start: 2024-02-12 | End: 2024-02-22

## 2024-02-12 RX ADMIN — LEVOTHYROXINE SODIUM 25 MCG: 0.03 TABLET ORAL at 06:49

## 2024-02-12 RX ADMIN — FLUOXETINE 20 MG: 20 CAPSULE ORAL at 09:26

## 2024-02-12 RX ADMIN — ASPIRIN 81 MG: 81 TABLET, COATED ORAL at 09:26

## 2024-02-12 RX ADMIN — METHOCARBAMOL 500 MG: 500 TABLET ORAL at 09:26

## 2024-02-12 RX ADMIN — FUROSEMIDE 40 MG: 40 TABLET ORAL at 09:26

## 2024-02-12 RX ADMIN — OXYCODONE HYDROCHLORIDE 5 MG: 5 TABLET ORAL at 15:26

## 2024-02-12 RX ADMIN — OXYCODONE HYDROCHLORIDE 5 MG: 5 TABLET ORAL at 09:35

## 2024-02-12 RX ADMIN — HYDROXYCHLOROQUINE SULFATE 200 MG: 200 TABLET ORAL at 09:26

## 2024-02-12 RX ADMIN — METHOCARBAMOL 500 MG: 500 TABLET ORAL at 13:18

## 2024-02-12 RX ADMIN — OXYCODONE HYDROCHLORIDE 5 MG: 5 TABLET ORAL at 00:54

## 2024-02-12 ASSESSMENT — ACTIVITIES OF DAILY LIVING (ADL)
ADLS_ACUITY_SCORE: 44
ADLS_ACUITY_SCORE: 45
ADLS_ACUITY_SCORE: 44
ADLS_ACUITY_SCORE: 44
ADLS_ACUITY_SCORE: 45

## 2024-02-12 NOTE — PLAN OF CARE
Patient cleared for discharge. Assisted in gathering patient's belongings and gave AVS and discharge packet to patient. Daughter transporting to TCU. Staff escorted patient to main entrance. Patient left via private vehicle.

## 2024-02-12 NOTE — PLAN OF CARE
PRN oxycodone given at approx 0100 before HS for pain. Pt slept well. Able to make needs known via call light. Possible discharge to TCU today.      Problem: Adult Inpatient Plan of Care  Goal: Optimal Comfort and Wellbeing  Outcome: Progressing  Goal: Readiness for Transition of Care  Outcome: Progressing

## 2024-02-12 NOTE — PLAN OF CARE
Patient vital signs are at baseline: Yes  Patient able to ambulate as they were prior to admission or with assist devices provided by therapies during their stay:  Yes  Patient MUST void prior to discharge:  Yes  Patient able to tolerate oral intake:  Yes  Pain has adequate pain control using Oral analgesics:  Yes  Does patient have an identified :  Yes  Has goal D/C date and time been discussed with patient:  Yes     Patient alert and oriented. VSS. RA. No IV access. Up in chair most of the shift. A1 with transfers and toileting. Tolerating Regular diet. Calls appropriately. Call light within reach. Alarms on. Anticipating Discharge tomorrow, awaiting for transport to TCU.

## 2024-02-12 NOTE — PROGRESS NOTES
Patient vital signs are at baseline: Yes  Patient able to ambulate as they were prior to admission or with assist devices provided by therapies during their stay:  Yes  Patient MUST void prior to discharge:  Yes  Patient able to tolerate oral intake:  Yes  Pain has adequate pain control using Oral analgesics:  Yes  Does patient have an identified :  Yes  Has goal D/C date and time been discussed with patient:  Yes      PT A&O. VSS. RA. Oxycodone given for pain, effective as per pt. Need assist of 1 up in chair/up to BSC. Discharge to TCU [facility] for rehab. Waiting for daughter to send her to TCU.

## 2024-02-12 NOTE — PROGRESS NOTES
Physical Therapy Discharge Summary    Reason for therapy discharge:    Discharged to transitional care facility.    Progress towards therapy goal(s). See goals on Care Plan in [facility] electronic health record for goal details.  Goals not met.  Barriers to achieving goals:   discharge from facility.    Therapy recommendation(s):    Continued therapy is recommended.  Rationale/Recommendations:  PT for increased strength, activity tolerance and functional mobility.

## 2024-02-12 NOTE — PROGRESS NOTES
[facility]    Medicine Progress Note - Hospitalist Service    Date of Admission:  2/7/2024    Assessment & Plan   Dulce Pritchard is a 78 year old female admitted on 2/7/2024.  She has a past medical history significant for gout, hypothyroidism, sick sinus syndrome, coronary artery disease, pulmonary hypertension, CHF who presents to the hospital for elective right total hip arthroplasty. Hospitalist medicine consulted for management for chronic medical conditions.  No concerns about plans for discharge to TCU on 2/12    Mood disorder    Plan  -Continue home fluoxetine 20 mg daily     Hypothyroidism    Plan  -Continue home levothyroxine    Chronic pain    Plan  -Continue home gabapentin 600 mg nightly    CHF  -Appears close to euvolemia on physical examination    Plan  -Continue home Lasix.    Coronary artery disease  -No chest pain or shortness of breath    Plan  -Continue home atorvastatin 20 mg nightly    Status post right hip arthroplasty  -Pain well-controlled  -Patient decided to go to TCU.    Plan  -Pain management and DVT prophylaxis per primary team    Rheumatoid arthritis    Plan  -Continue home hydroxychloroquine    Chronic thrombocytopenia  Anemia.   -Platelet around baseline  -Hemoglobin baseline around 12, postoperative hemoglobin stabilized around 9.  -No signs of active bleeding.    Plan  -Follow-up with primary care physician 1 week and repeat CBC after discharge.      Right sided chest wall pain (resolved)              Diet: Advance Diet as Tolerated: Regular Diet Adult  Discharge Instruction - Regular Diet Adult  Diet    DVT Prophylaxis: Defer to primary service  Clifton Catheter: Not present  Lines: None     Cardiac Monitoring: None  Code Status: Full Code      Clinically Significant Risk Factors Present on Admission                # Drug Induced Platelet Defect: home medication list includes an antiplatelet medication   # Hypertension: Noted on problem list      #  "Overweight: Estimated body mass index is 27.62 kg/m  as calculated from the following:    Height as of this encounter: 1.626 m (5' 4\").    Weight as of this encounter: 73 kg (160 lb 15 oz).              Disposition Plan  TCU     Expected Discharge Date: 02/12/2024,  3:00 PM  Discharge Delays: Placement - TCU  Destination: home with family  Discharge Comments: TCU placement - . [facility] today, needs prior auth            JIMENA LANCASTER MD  Hospitalist Service  [facility]  Securely message with [employer] (more info)  Text page via [facility] Paging/Directory   ______________________________________________________________________    Interval History   Denies any chest pain or shortness of breath.  Denies any lightheadedness.  Denies any nausea or vomiting.    Physical Exam   Vital Signs: Temp: 97.9  F (36.6  C) Temp src: Oral BP: (!) 185/82 Pulse: 64   Resp: 16 SpO2: 95 % O2 Device: None (Room air)    Weight: 160 lbs 14.97 oz    Physical Exam  Constitutional:       General: She is not in acute distress.     Appearance: She is not ill-appearing or toxic-appearing.   Cardiovascular:      Rate and Rhythm: Normal rate.   Pulmonary:      Effort: Pulmonary effort is normal. No respiratory distress.   Musculoskeletal:      Right lower leg: No edema.      Left lower leg: No edema.   Neurological:      Mental Status: She is alert. Mental status is at baseline.   Psychiatric:         Mood and Affect: Mood normal.          Medical Decision Making       42 MINUTES SPENT BY ME on the date of service doing chart review, history, exam, documentation & further activities per the note.      Data         Imaging results reviewed over the past 24 hrs:   No results found for this or any previous visit (from the past 24 hour(s)).    "

## 2024-02-12 NOTE — PROGRESS NOTES
Care Management Discharge Note    Discharge Date: 02/12/2024       Discharge Disposition: Transitional Care    Discharge Services:  (TBD)    Discharge DME: None    Discharge Transportation:  (TBD)    Private pay costs discussed: Not applicable    Does the patient's insurance plan have a 3 day qualifying hospital stay waiver?  No    PAS Confirmation Code: [id number]  Patient/family educated on Medicare website which has current facility and service quality ratings: yes    Education Provided on the Discharge Plan: Yes (AVS per bedside RN)  Persons Notified of Discharge Plans: Pt, and Dtr, and TCU   Patient/Family in Agreement with the Plan: yes    Handoff Referral Completed: Yes    Additional Information:    10:42 AM    SW met with Pt to discuss discharge plan. Pt has been accepted at [facility] and waiting for Insurance Auth.  Pt is in agreement with TCU.  Pt states that daughter will transport Pt.  SW left  for Dtr Em.     2:59 PM  Jennyfer with [facility] called and Pt received insurance authorization.  [facility] reached DtrEm and will transport Pt to [facility] at 4:00 PM.  PAS completed by peer.  Orders faxed to TCU.        JUSTIN Brock

## 2024-02-12 NOTE — DISCHARGE SUMMARY
"ORTHOPEDIC DISCHARGE SUMMARY       Dulce Pritchard,  1945, MRN [id number]    Admission Date: 2024      Admission Diagnoses: Osteoarthritis of right hip [M16.11]  Orthopedic aftercare for joint replacement [Z47.1]     Discharge Date:  2024    Post-operative Day:  5 Days Post-Op    Reason for Admission: The patient was admitted for the following: Procedure(s):  RIGHT POSTERIOR TOTAL HIP ARTHROPLASTY    BRIEF HOSPITAL COURSE   Dulce Pritchard is a pleasant 78 year old female who underwent the aforementioned procedure with Dr. San on 2024. There were no intraoperative complications and the patient was transferred to the recovery room and later the orthopedic unit in stable condition. Once the patient reached the orthopedic floor our orthopedic pain protocol was implemented along with the following:    Anticoagulation Medications: ASA  Therapy: PT and OT  Activity: WBAT  Bracing: None    Consultations during Admission: Hospitalist service for medical management     COMPLICATIONS/SIGNIFICANT FINDINGS    None    DISCHARGE INFORMATION   Condition at discharge: Good  Discharge destination: Skilled nursing facility  Patient was seen by myself on the date of discharge.    FOLLOW UP CARE   Follow up with orthopedics in 2 weeks post-op or sooner should the need arise. Ortho will continue to manage pain control, post op anticoagulation and incision care.     Follow up with your PCP for management of chronic medical problems and to evaluate for post op medical complications including constipation, nausea/vomiting, DVT/PE, anemia, changes in blood pressure, fevers/chills, urinary retention and atelectasis/pneumonia.     Subjective   Patient is doing well on POD #1. Pain is well controlled with oral medications. Ambulating. Tolerating oral intake. Voiding. No acute concerns.    Physical Exam   /62 (BP Location: Right arm)   Pulse 64   Temp 97.9  F (36.6  C) (Oral)   Resp 16   Ht 1.626 m (5' 4\")  "  Wt 73 kg (160 lb 15 oz)   SpO2 95%   BMI 27.62 kg/m    The patient is alert, awake and oriented x3. Patient is sitting up on the recliner and appears comfortable.   Sensation is intact.  Dorsiflexion and plantar flexion is intact bilaterally.  Dorsalis pedis pulse intact. Skin warm and well perfused.   Compartments are soft and non-tender.   The incision is covered. Dressing C/D/I.    Pertinent Results at Discharge     Hemoglobin   Date/Time Value Ref Range Status   02/11/2024 06:59 AM 9.3 (L) 11.7 - 15.7 g/dL Final   02/10/2024 06:51 AM 8.2 (L) 11.7 - 15.7 g/dL Final   02/09/2024 06:21 AM 8.5 (L) 11.7 - 15.7 g/dL Final     Platelet Count   Date/Time Value Ref Range Status   02/11/2024 06:59  (L) 150 - 450 10e3/uL Final   02/10/2024 06:51 AM 89 (L) 150 - 450 10e3/uL Final   02/09/2024 06:21 AM 78 (L) 150 - 450 10e3/uL Final       Problem List   Principal Problem:    Orthopedic aftercare for joint replacement      Laura Ansari PA-C/Dr. San  [facility]  [phone]  Date: 2/12/2024  Time: 1:15 PM

## 2024-02-12 NOTE — PROGRESS NOTES
"Orthopedic Progress Note      Assessment: 5 Days Post-Op  S/P Procedure(s):  RIGHT POSTERIOR TOTAL HIP ARTHROPLASTY     Plan:   - Continue PT/OT  - Weightbearing status: WBAT  - Anticoagulation:  ASA  in addition to SCDs, catherine stockings and early ambulation.  - Antibiotics: Duricef 500mg po bid x 7 days upon discharge   - Discharge planning: awaiting TCU authorization, clear from orthopedic standpoint for discharge.       Subjective:  Pain: mild  Nausea, Vomiting:  No  Lightheadedness, Dizziness:  No  Neuro:  Patient denies new onset numbness or paresthesias  Fever, chills: No  Chest pain: No  SOB: No    Patient reports feeling well today. Patient reports pain is well maganged with current pain regimen. Patient eating and drinking well. Patient voiding and passing gas however no BM. All questions/concerns answered.      Objective:  BP (!) 185/82 (BP Location: Right arm)   Pulse 64   Temp 97.9  F (36.6  C) (Oral)   Resp 16   Ht 1.626 m (5' 4\")   Wt 73 kg (160 lb 15 oz)   SpO2 95%   BMI 27.62 kg/m    The patient is Alert and awake. Patient is sitting up on the recliner and appears comfortable.   Sensation is intact.  Dorsiflexion and plantar flexion is intact.  Dorsalis pedis pulse intact. Skin warm and well perfused.   Compartments are soft and non-tender.   The incision is covered. Dressing C/D/I.      Pertinent Labs   Lab Results: personally reviewed.   No results found for: \"INR\", \"PROTIME\"  Lab Results   Component Value Date    WBC 8.3 02/11/2024    HGB 9.3 (L) 02/11/2024    HCT 28.3 (L) 02/11/2024    MCV 98 02/11/2024     (L) 02/11/2024     Lab Results   Component Value Date     02/08/2024    CO2 27 02/08/2024         Report completed by:  Laura Ansari PA-C  Date: 2/12/2024  Time: 9:34 AM  [facility]         "

## 2024-02-13 ENCOUNTER — DOCUMENTATION ONLY (OUTPATIENT)
Dept: GERIATRICS | Facility: CLINIC | Age: 79
End: 2024-02-13
Payer: COMMERCIAL

## 2024-02-13 NOTE — PLAN OF CARE
Occupational Therapy Discharge Summary    Reason for therapy discharge:    Discharged to transitional care facility.    Progress towards therapy goal(s). See goals on Care Plan in [facility] electronic health record for goal details.  Goals not met.  Barriers to achieving goals:   discharge from facility.    Therapy recommendation(s):    Continued therapy is recommended.  Rationale/Recommendations:  to increase safety, ADL ind and act tolerance.

## 2024-02-13 NOTE — PROGRESS NOTES
History of atrial fibrillation monitor heart rates [facility]       Patient Dulce Pritchard  MRN: [id number]        Reason for Visit     Chief Complaint   Patient presents with    Hospital F/U       Code Status     CPR/Full code     Assessment     Status post right total hip arthroplasty on 2/7/2024  Continue with incisional cares  Follow-up with orthopedics as scheduled  On aspirin for DVT prophylaxis duration per Ortho    ABLA with a discharge hemoglobin of 9.3 she will need a recheck    Pain management  Currently discharged on Tylenol as needed as well as oxycodone as needed  Also on scheduled Robaxin  Advised aggressive icing  Will schedule Tylenol 1 g 3 times daily to minimize need for oxycodone    DVT prophylaxis-discharged on aspirin 81 mg twice daily.  Total duration per orthopedics.    Congestive heart failure /ischemic cardiomyopathy   on Lasix   felt to be euvolemic  Monitor weights while in the TCU    Hypertension blood pressures will be monitored she was taken off lisinopril in the past due to low blood pressures    History of rheumatoid arthritis with rheumatoid polyneuropathy  Currently on Plaquenil  Continue with the same  Also on gabapentin for pain relief    Hyperlipidemia continue with her atorvastatin    Hypothyroidism on replacement Synthroid    Gouty arthropathy continue with allopurinol    Mood disorder continue with duloxetine    CKD 3A recheck labs to monitor kidney functions    Chronic thrombocytopenia recheck CBC-    Generalized weakness-staff reports she is not walking well with very limited range of movement  At baseline patient walks with a walker use and has a significant profound disability of her gait  Continue to monitor progress  Currently wheelchair-bound with limited leg mobility reporting high pain levels and difficulty in walking        History     Patient is a very pleasant 78 year old female who is admitted to TCU  Patient was electively admitted and  underwent hip replacement due to severe osteoarthritis of the right hip.  Currently discharged postoperatively to the TCU.  She did have some blood loss anemia but hemoglobin is stable and she is now discharged    Past Medical & Surgical History     PAST MEDICAL HISTORY:   Past Medical History:   Diagnosis Date    Acute renal insufficiency 06/22/2023    Arthritis     Atrial fibrillation (H)     Chronic heart failure with preserved ejection fraction (H) 02/13/2023    Congestive heart failure (H)     Depressive disorder     Hyperlipidemia     Hypertension     Obese     Primary hypertension 03/27/2023    Pulmonary hypertension (H)     Thyroid disease       PAST SURGICAL HISTORY:   has a past surgical history that includes Hysterectomy; c/section, classical; Cholecystectomy; and Arthroplasty hip (Right, 2/7/2024).      Past Social History     Reviewed,  reports that she quit smoking about 21 years ago. Her smoking use included cigarettes. She started smoking about 34 years ago. She has a 20 pack-year smoking history. She does not have any smokeless tobacco history on file. She reports current drug use. She reports that she does not drink alcohol.    Family History     Reviewed, and family history is not on file.    Medication List     Current Outpatient Medications   Medication    acetaminophen (TYLENOL) 325 MG tablet    allopurinol (ZYLOPRIM) 100 MG tablet    aspirin 81 MG EC tablet    atorvastatin (LIPITOR) 20 MG tablet    cefadroxil (DURICEF) 500 MG capsule    FLUoxetine (PROZAC) 20 MG capsule    furosemide (LASIX) 40 MG tablet    gabapentin (NEURONTIN) 300 MG capsule    hydroxychloroquine (PLAQUENIL) 200 MG tablet    hydrOXYzine HCl (ATARAX) 10 MG tablet    levothyroxine (SYNTHROID/LEVOTHROID) 25 MCG tablet    methocarbamol (ROBAXIN) 500 MG tablet    oxyCODONE (ROXICODONE) 5 MG tablet    polyethylene glycol (MIRALAX) 17 GM/Dose powder    STOOL SOFTENER/LAXATIVE 50-8.6 MG tablet    Vitamin D (Cholecalciferol) 25 MCG  "(1000 UT) TABS     No current facility-administered medications for this visit.      MED REC REQUIRED  Post Medication Reconciliation Status: discharge medications reconciled, continue medications without change       Allergies     No Known Allergies    Review of Systems   A comprehensive review of 14 systems was done. Pertinent findings noted here and in history of present illness. All the rest negative.  Constitutional: Negative.  Negative for fever, chills, she has  activity change, appetite change and fatigue.   HENT: Negative for congestion and facial swelling.    Eyes: Negative for photophobia, redness and visual disturbance.   Respiratory: Negative for cough and chest tightness.    Cardiovascular: Negative for chest pain, palpitations and leg swelling.   Gastrointestinal: Negative for nausea, diarrhea, constipation, blood in stool and abdominal distention.   Genitourinary: Negative.    Musculoskeletal: She is reporting high pain levels with difficulty walking  Skin: Negative.    Neurological: Negative for dizziness, tremors, syncope, weakness, light-headedness and headaches.   Hematological: Does not bruise/bleed easily.   Psychiatric/Behavioral: Negative.        Physical Exam   BP (!) 142/59   Pulse 76   Temp 97.5  F (36.4  C)   Resp 18   Ht 1.626 m (5' 4\")   Wt 82.1 kg (181 lb)   SpO2 96%   BMI 31.07 kg/m       Constitutional: Oriented to person, place, and time and appears well-developed.   HEENT:  Normocephalic and atraumatic.  Eyes: Conjunctivae and EOM are normal. Pupils are equal, round, and reactive to light. No discharge.  No scleral icterus. Nose normal. Mouth/Throat: Oropharynx is clear and moist. No oropharyngeal exudate.    NECK: Normal range of motion. Neck supple. No JVD present. No tracheal deviation present. No thyromegaly present.   CARDIOVASCULAR: Normal rate, regular rhythm and intact distal pulses.  Exam reveals no gallop and no friction rub.  Systolic murmur present.  PULMONARY: " Effort normal and breath sounds normal. No respiratory distress.No Wheezing or rales.  ABDOMEN: Soft. Bowel sounds are normal. No distension and no mass.  There is no tenderness. There is no rebound and no guarding. No HSM.  MUSCULOSKELETAL: Normal range of motion. Mild kyphosis, no tenderness.  Right hip surgical incision is intact  LYMPH NODES: Has no cervical, supraclavicular, axillary and groin adenopathy.   NEUROLOGICAL: Alert and oriented to person, place, and time. No cranial nerve deficit.  Normal muscle tone. Coordination normal.   GENITOURINARY: Deferred exam.  SKIN: Skin is warm and dry. No rash noted. No erythema. No pallor.   EXTREMITIES: No cyanosis, no clubbing, no edema. No Deformity.  PSYCHIATRIC: Normal mood, affect and behavior.      Lab Results     Recent Results (from the past 240 hour(s))   Platelet count    Collection Time: 02/07/24  6:44 AM   Result Value Ref Range    Platelet Count 92 (L) 150 - 450 10e3/uL   Glucose by meter    Collection Time: 02/07/24  6:46 AM   Result Value Ref Range    GLUCOSE BY METER POCT 117 (H) 70 - 99 mg/dL   Hemoglobin    Collection Time: 02/08/24  5:53 AM   Result Value Ref Range    Hemoglobin 9.5 (L) 11.7 - 15.7 g/dL   Basic metabolic panel    Collection Time: 02/08/24  2:33 PM   Result Value Ref Range    Sodium 136 135 - 145 mmol/L    Potassium 4.3 3.4 - 5.3 mmol/L    Chloride 100 98 - 107 mmol/L    Carbon Dioxide (CO2) 27 22 - 29 mmol/L    Anion Gap 9 7 - 15 mmol/L    Urea Nitrogen 22.3 8.0 - 23.0 mg/dL    Creatinine 0.95 0.51 - 0.95 mg/dL    GFR Estimate 62 >60 mL/min/1.73m2    Calcium 8.9 8.8 - 10.2 mg/dL    Glucose 157 (H) 70 - 99 mg/dL   Glucose    Collection Time: 02/09/24  6:21 AM   Result Value Ref Range    Glucose 118 (H) 70 - 99 mg/dL    Patient Fasting > 8hrs? Unknown    CBC with platelets and differential    Collection Time: 02/09/24  6:21 AM   Result Value Ref Range    WBC Count 6.5 4.0 - 11.0 10e3/uL    RBC Count 2.65 (L) 3.80 - 5.20 10e6/uL     Hemoglobin 8.5 (L) 11.7 - 15.7 g/dL    Hematocrit 26.1 (L) 35.0 - 47.0 %    MCV 99 78 - 100 fL    MCH 32.1 26.5 - 33.0 pg    MCHC 32.6 31.5 - 36.5 g/dL    RDW 13.1 10.0 - 15.0 %    Platelet Count 76 (L) 150 - 450 10e3/uL    % Neutrophils 73 %    % Lymphocytes 17 %    % Monocytes 9 %    % Eosinophils 1 %    % Basophils 0 %    % Immature Granulocytes 0 %    NRBCs per 100 WBC 0 <1 /100    Absolute Neutrophils 4.7 1.6 - 8.3 10e3/uL    Absolute Lymphocytes 1.1 0.8 - 5.3 10e3/uL    Absolute Monocytes 0.6 0.0 - 1.3 10e3/uL    Absolute Eosinophils 0.1 0.0 - 0.7 10e3/uL    Absolute Basophils 0.0 0.0 - 0.2 10e3/uL    Absolute Immature Granulocytes 0.0 <=0.4 10e3/uL    Absolute NRBCs 0.0 10e3/uL   CBC with platelets and differential    Collection Time: 02/10/24  6:51 AM   Result Value Ref Range    WBC Count 6.8 4.0 - 11.0 10e3/uL    RBC Count 2.55 (L) 3.80 - 5.20 10e6/uL    Hemoglobin 8.2 (L) 11.7 - 15.7 g/dL    Hematocrit 24.8 (L) 35.0 - 47.0 %    MCV 97 78 - 100 fL    MCH 32.2 26.5 - 33.0 pg    MCHC 33.1 31.5 - 36.5 g/dL    RDW 13.1 10.0 - 15.0 %    Platelet Count 89 (L) 150 - 450 10e3/uL    % Neutrophils 80 %    % Lymphocytes 12 %    % Monocytes 7 %    % Eosinophils 1 %    % Basophils 0 %    % Immature Granulocytes 0 %    NRBCs per 100 WBC 0 <1 /100    Absolute Neutrophils 5.3 1.6 - 8.3 10e3/uL    Absolute Lymphocytes 0.8 0.8 - 5.3 10e3/uL    Absolute Monocytes 0.5 0.0 - 1.3 10e3/uL    Absolute Eosinophils 0.1 0.0 - 0.7 10e3/uL    Absolute Basophils 0.0 0.0 - 0.2 10e3/uL    Absolute Immature Granulocytes 0.0 <=0.4 10e3/uL    Absolute NRBCs 0.0 10e3/uL   CBC with platelets and differential    Collection Time: 02/11/24  6:59 AM   Result Value Ref Range    WBC Count 8.3 4.0 - 11.0 10e3/uL    RBC Count 2.90 (L) 3.80 - 5.20 10e6/uL    Hemoglobin 9.3 (L) 11.7 - 15.7 g/dL    Hematocrit 28.3 (L) 35.0 - 47.0 %    MCV 98 78 - 100 fL    MCH 32.1 26.5 - 33.0 pg    MCHC 32.9 31.5 - 36.5 g/dL    RDW 13.2 10.0 - 15.0 %    Platelet Count  125 (L) 150 - 450 10e3/uL    % Neutrophils 70 %    % Lymphocytes 20 %    % Monocytes 7 %    % Eosinophils 3 %    % Basophils 0 %    % Immature Granulocytes 0 %    NRBCs per 100 WBC 0 <1 /100    Absolute Neutrophils 5.7 1.6 - 8.3 10e3/uL    Absolute Lymphocytes 1.7 0.8 - 5.3 10e3/uL    Absolute Monocytes 0.6 0.0 - 1.3 10e3/uL    Absolute Eosinophils 0.3 0.0 - 0.7 10e3/uL    Absolute Basophils 0.0 0.0 - 0.2 10e3/uL    Absolute Immature Granulocytes 0.0 <=0.4 10e3/uL    Absolute NRBCs 0.0 10e3/uL   Extra Green Top (Lithium Heparin) Tube    Collection Time: 02/11/24  6:59 AM   Result Value Ref Range    Hold Specimen JIC              Electronically signed by    Renata Alcaraz MD

## 2024-02-14 ENCOUNTER — TRANSITIONAL CARE UNIT VISIT (OUTPATIENT)
Dept: GERIATRICS | Facility: CLINIC | Age: 79
End: 2024-02-14
Payer: COMMERCIAL

## 2024-02-14 ENCOUNTER — LAB REQUISITION (OUTPATIENT)
Dept: LAB | Facility: CLINIC | Age: 79
End: 2024-02-14
Payer: COMMERCIAL

## 2024-02-14 VITALS
OXYGEN SATURATION: 96 % | BODY MASS INDEX: 30.9 KG/M2 | HEART RATE: 76 BPM | HEIGHT: 64 IN | DIASTOLIC BLOOD PRESSURE: 59 MMHG | SYSTOLIC BLOOD PRESSURE: 142 MMHG | WEIGHT: 181 LBS | TEMPERATURE: 97.5 F | RESPIRATION RATE: 18 BRPM

## 2024-02-14 DIAGNOSIS — D62 ABLA (ACUTE BLOOD LOSS ANEMIA): ICD-10-CM

## 2024-02-14 DIAGNOSIS — I10 ESSENTIAL (PRIMARY) HYPERTENSION: ICD-10-CM

## 2024-02-14 DIAGNOSIS — Z51.81 ENCOUNTER FOR THERAPEUTIC DRUG LEVEL MONITORING: ICD-10-CM

## 2024-02-14 DIAGNOSIS — Z96.641 HISTORY OF RIGHT HIP REPLACEMENT: ICD-10-CM

## 2024-02-14 DIAGNOSIS — M06.9 RHEUMATOID ARTHRITIS INVOLVING MULTIPLE SITES, UNSPECIFIED WHETHER RHEUMATOID FACTOR PRESENT (H): ICD-10-CM

## 2024-02-14 DIAGNOSIS — E66.01 MORBID OBESITY (H): ICD-10-CM

## 2024-02-14 DIAGNOSIS — D69.6 THROMBOCYTOPENIA (H): ICD-10-CM

## 2024-02-14 DIAGNOSIS — G60.9 HEREDITARY AND IDIOPATHIC NEUROPATHY, UNSPECIFIED: ICD-10-CM

## 2024-02-14 DIAGNOSIS — I10 PRIMARY HYPERTENSION: ICD-10-CM

## 2024-02-14 DIAGNOSIS — N18.31 STAGE 3A CHRONIC KIDNEY DISEASE (CKD) (H): ICD-10-CM

## 2024-02-14 DIAGNOSIS — I50.32 CHRONIC HEART FAILURE WITH PRESERVED EJECTION FRACTION (H): ICD-10-CM

## 2024-02-14 DIAGNOSIS — R26.9 GAIT DISTURBANCE: Primary | ICD-10-CM

## 2024-02-14 PROBLEM — H91.90 HEARING LOSS: Status: ACTIVE | Noted: 2024-02-14

## 2024-02-14 PROBLEM — F41.1 GENERALIZED ANXIETY DISORDER: Status: ACTIVE | Noted: 2024-02-14

## 2024-02-14 PROBLEM — F32.A DEPRESSION, UNSPECIFIED: Status: ACTIVE | Noted: 2023-02-08

## 2024-02-14 PROBLEM — R09.02 HYPOXEMIA: Status: ACTIVE | Noted: 2023-01-30

## 2024-02-14 PROBLEM — J44.1 CHRONIC OBSTRUCTIVE PULMONARY DISEASE WITH (ACUTE) EXACERBATION (H): Status: ACTIVE | Noted: 2024-02-14

## 2024-02-14 PROBLEM — E55.9 VITAMIN D DEFICIENCY: Status: ACTIVE | Noted: 2024-02-14

## 2024-02-14 PROBLEM — I25.10 CORONARY ARTERY DISEASE INVOLVING NATIVE CORONARY ARTERY OF NATIVE HEART WITHOUT ANGINA PECTORIS: Status: ACTIVE | Noted: 2023-02-08

## 2024-02-14 PROBLEM — I13.0 HYPERTENSIVE HEART AND CHRONIC KIDNEY DISEASE WITH HEART FAILURE AND STAGE 1 THROUGH STAGE 4 CHRONIC KIDNEY DISEASE, OR UNSPECIFIED CHRONIC KIDNEY DISEASE (H): Status: ACTIVE | Noted: 2024-02-14

## 2024-02-14 PROBLEM — I50.33 ACUTE ON CHRONIC DIASTOLIC HEART FAILURE (H): Status: ACTIVE | Noted: 2023-02-08

## 2024-02-14 PROBLEM — R73.9 HYPERGLYCEMIA: Status: ACTIVE | Noted: 2024-02-14

## 2024-02-14 PROBLEM — M13.80 OTHER SPECIFIED ARTHRITIS, UNSPECIFIED SITE: Status: ACTIVE | Noted: 2023-02-08

## 2024-02-14 PROBLEM — M1A.9XX1 TOPHI: Status: ACTIVE | Noted: 2024-02-14

## 2024-02-14 PROBLEM — I50.33 ACUTE ON CHRONIC DIASTOLIC HEART FAILURE (H): Status: RESOLVED | Noted: 2023-02-08 | Resolved: 2024-02-14

## 2024-02-14 PROBLEM — J44.1 CHRONIC OBSTRUCTIVE PULMONARY DISEASE WITH (ACUTE) EXACERBATION (H): Status: RESOLVED | Noted: 2024-02-14 | Resolved: 2024-02-14

## 2024-02-14 PROBLEM — E78.5 HYPERLIPIDEMIA, UNSPECIFIED: Status: ACTIVE | Noted: 2023-02-08

## 2024-02-14 PROCEDURE — 99305 1ST NF CARE MODERATE MDM 35: CPT | Performed by: FAMILY MEDICINE

## 2024-02-14 NOTE — LETTER
2/14/2024        RE: Dulce Pritchard  [address]  [facility] [address]        History of atrial fibrillation monitor heart rates [facility] GERIATRIC SERVICES       Patient Dulce Pritchard  MRN: [id number]        Reason for Visit     Chief Complaint   Patient presents with     Hospital F/U       Code Status     CPR/Full code     Assessment     Status post right total hip arthroplasty on 2/7/2024  Continue with incisional cares  Follow-up with orthopedics as scheduled  On aspirin for DVT prophylaxis duration per Ortho    ABLA with a discharge hemoglobin of 9.3 she will need a recheck    Pain management  Currently discharged on Tylenol as needed as well as oxycodone as needed  Also on scheduled Robaxin  Advised aggressive icing  Will schedule Tylenol 1 g 3 times daily to minimize need for oxycodone    DVT prophylaxis-discharged on aspirin 81 mg twice daily.  Total duration per orthopedics.    Congestive heart failure /ischemic cardiomyopathy   on Lasix   felt to be euvolemic  Monitor weights while in the TCU    Hypertension blood pressures will be monitored she was taken off lisinopril in the past due to low blood pressures    History of rheumatoid arthritis with rheumatoid polyneuropathy  Currently on Plaquenil  Continue with the same  Also on gabapentin for pain relief    Hyperlipidemia continue with her atorvastatin    Hypothyroidism on replacement Synthroid    Gouty arthropathy continue with allopurinol    Mood disorder continue with duloxetine    CKD 3A recheck labs to monitor kidney functions    Chronic thrombocytopenia recheck CBC-    Generalized weakness-staff reports she is not walking well with very limited range of movement  At baseline patient walks with a walker use and has a significant profound disability of her gait  Continue to monitor progress  Currently wheelchair-bound with limited leg mobility reporting high pain levels and difficulty in walking        History     Patient is a very  pleasant 78 year old female who is admitted to TCU  Patient was electively admitted and underwent hip replacement due to severe osteoarthritis of the right hip.  Currently discharged postoperatively to the TCU.  She did have some blood loss anemia but hemoglobin is stable and she is now discharged    Past Medical & Surgical History     PAST MEDICAL HISTORY:   Past Medical History:   Diagnosis Date     Acute renal insufficiency 06/22/2023     Arthritis      Atrial fibrillation (H)      Chronic heart failure with preserved ejection fraction (H) 02/13/2023     Congestive heart failure (H)      Depressive disorder      Hyperlipidemia      Hypertension      Obese      Primary hypertension 03/27/2023     Pulmonary hypertension (H)      Thyroid disease       PAST SURGICAL HISTORY:   has a past surgical history that includes Hysterectomy; c/section, classical; Cholecystectomy; and Arthroplasty hip (Right, 2/7/2024).      Past Social History     Reviewed,  reports that she quit smoking about 21 years ago. Her smoking use included cigarettes. She started smoking about 34 years ago. She has a 20 pack-year smoking history. She does not have any smokeless tobacco history on file. She reports current drug use. She reports that she does not drink alcohol.    Family History     Reviewed, and family history is not on file.    Medication List     Current Outpatient Medications   Medication     acetaminophen (TYLENOL) 325 MG tablet     allopurinol (ZYLOPRIM) 100 MG tablet     aspirin 81 MG EC tablet     atorvastatin (LIPITOR) 20 MG tablet     cefadroxil (DURICEF) 500 MG capsule     FLUoxetine (PROZAC) 20 MG capsule     furosemide (LASIX) 40 MG tablet     gabapentin (NEURONTIN) 300 MG capsule     hydroxychloroquine (PLAQUENIL) 200 MG tablet     hydrOXYzine HCl (ATARAX) 10 MG tablet     levothyroxine (SYNTHROID/LEVOTHROID) 25 MCG tablet     methocarbamol (ROBAXIN) 500 MG tablet     oxyCODONE (ROXICODONE) 5 MG tablet     polyethylene  "glycol (MIRALAX) 17 GM/Dose powder     STOOL SOFTENER/LAXATIVE 50-8.6 MG tablet     Vitamin D (Cholecalciferol) 25 MCG (1000 UT) TABS     No current facility-administered medications for this visit.      MED REC REQUIRED  Post Medication Reconciliation Status: discharge medications reconciled, continue medications without change       Allergies     No Known Allergies    Review of Systems   A comprehensive review of 14 systems was done. Pertinent findings noted here and in history of present illness. All the rest negative.  Constitutional: Negative.  Negative for fever, chills, she has  activity change, appetite change and fatigue.   HENT: Negative for congestion and facial swelling.    Eyes: Negative for photophobia, redness and visual disturbance.   Respiratory: Negative for cough and chest tightness.    Cardiovascular: Negative for chest pain, palpitations and leg swelling.   Gastrointestinal: Negative for nausea, diarrhea, constipation, blood in stool and abdominal distention.   Genitourinary: Negative.    Musculoskeletal: She is reporting high pain levels with difficulty walking  Skin: Negative.    Neurological: Negative for dizziness, tremors, syncope, weakness, light-headedness and headaches.   Hematological: Does not bruise/bleed easily.   Psychiatric/Behavioral: Negative.        Physical Exam   BP (!) 142/59   Pulse 76   Temp 97.5  F (36.4  C)   Resp 18   Ht 1.626 m (5' 4\")   Wt 82.1 kg (181 lb)   SpO2 96%   BMI 31.07 kg/m       Constitutional: Oriented to person, place, and time and appears well-developed.   HEENT:  Normocephalic and atraumatic.  Eyes: Conjunctivae and EOM are normal. Pupils are equal, round, and reactive to light. No discharge.  No scleral icterus. Nose normal. Mouth/Throat: Oropharynx is clear and moist. No oropharyngeal exudate.    NECK: Normal range of motion. Neck supple. No JVD present. No tracheal deviation present. No thyromegaly present.   CARDIOVASCULAR: Normal rate, " regular rhythm and intact distal pulses.  Exam reveals no gallop and no friction rub.  Systolic murmur present.  PULMONARY: Effort normal and breath sounds normal. No respiratory distress.No Wheezing or rales.  ABDOMEN: Soft. Bowel sounds are normal. No distension and no mass.  There is no tenderness. There is no rebound and no guarding. No HSM.  MUSCULOSKELETAL: Normal range of motion. Mild kyphosis, no tenderness.  Right hip surgical incision is intact  LYMPH NODES: Has no cervical, supraclavicular, axillary and groin adenopathy.   NEUROLOGICAL: Alert and oriented to person, place, and time. No cranial nerve deficit.  Normal muscle tone. Coordination normal.   GENITOURINARY: Deferred exam.  SKIN: Skin is warm and dry. No rash noted. No erythema. No pallor.   EXTREMITIES: No cyanosis, no clubbing, no edema. No Deformity.  PSYCHIATRIC: Normal mood, affect and behavior.      Lab Results     Recent Results (from the past 240 hour(s))   Platelet count    Collection Time: 02/07/24  6:44 AM   Result Value Ref Range    Platelet Count 92 (L) 150 - 450 10e3/uL   Glucose by meter    Collection Time: 02/07/24  6:46 AM   Result Value Ref Range    GLUCOSE BY METER POCT 117 (H) 70 - 99 mg/dL   Hemoglobin    Collection Time: 02/08/24  5:53 AM   Result Value Ref Range    Hemoglobin 9.5 (L) 11.7 - 15.7 g/dL   Basic metabolic panel    Collection Time: 02/08/24  2:33 PM   Result Value Ref Range    Sodium 136 135 - 145 mmol/L    Potassium 4.3 3.4 - 5.3 mmol/L    Chloride 100 98 - 107 mmol/L    Carbon Dioxide (CO2) 27 22 - 29 mmol/L    Anion Gap 9 7 - 15 mmol/L    Urea Nitrogen 22.3 8.0 - 23.0 mg/dL    Creatinine 0.95 0.51 - 0.95 mg/dL    GFR Estimate 62 >60 mL/min/1.73m2    Calcium 8.9 8.8 - 10.2 mg/dL    Glucose 157 (H) 70 - 99 mg/dL   Glucose    Collection Time: 02/09/24  6:21 AM   Result Value Ref Range    Glucose 118 (H) 70 - 99 mg/dL    Patient Fasting > 8hrs? Unknown    CBC with platelets and differential    Collection Time:  02/09/24  6:21 AM   Result Value Ref Range    WBC Count 6.5 4.0 - 11.0 10e3/uL    RBC Count 2.65 (L) 3.80 - 5.20 10e6/uL    Hemoglobin 8.5 (L) 11.7 - 15.7 g/dL    Hematocrit 26.1 (L) 35.0 - 47.0 %    MCV 99 78 - 100 fL    MCH 32.1 26.5 - 33.0 pg    MCHC 32.6 31.5 - 36.5 g/dL    RDW 13.1 10.0 - 15.0 %    Platelet Count 76 (L) 150 - 450 10e3/uL    % Neutrophils 73 %    % Lymphocytes 17 %    % Monocytes 9 %    % Eosinophils 1 %    % Basophils 0 %    % Immature Granulocytes 0 %    NRBCs per 100 WBC 0 <1 /100    Absolute Neutrophils 4.7 1.6 - 8.3 10e3/uL    Absolute Lymphocytes 1.1 0.8 - 5.3 10e3/uL    Absolute Monocytes 0.6 0.0 - 1.3 10e3/uL    Absolute Eosinophils 0.1 0.0 - 0.7 10e3/uL    Absolute Basophils 0.0 0.0 - 0.2 10e3/uL    Absolute Immature Granulocytes 0.0 <=0.4 10e3/uL    Absolute NRBCs 0.0 10e3/uL   CBC with platelets and differential    Collection Time: 02/10/24  6:51 AM   Result Value Ref Range    WBC Count 6.8 4.0 - 11.0 10e3/uL    RBC Count 2.55 (L) 3.80 - 5.20 10e6/uL    Hemoglobin 8.2 (L) 11.7 - 15.7 g/dL    Hematocrit 24.8 (L) 35.0 - 47.0 %    MCV 97 78 - 100 fL    MCH 32.2 26.5 - 33.0 pg    MCHC 33.1 31.5 - 36.5 g/dL    RDW 13.1 10.0 - 15.0 %    Platelet Count 89 (L) 150 - 450 10e3/uL    % Neutrophils 80 %    % Lymphocytes 12 %    % Monocytes 7 %    % Eosinophils 1 %    % Basophils 0 %    % Immature Granulocytes 0 %    NRBCs per 100 WBC 0 <1 /100    Absolute Neutrophils 5.3 1.6 - 8.3 10e3/uL    Absolute Lymphocytes 0.8 0.8 - 5.3 10e3/uL    Absolute Monocytes 0.5 0.0 - 1.3 10e3/uL    Absolute Eosinophils 0.1 0.0 - 0.7 10e3/uL    Absolute Basophils 0.0 0.0 - 0.2 10e3/uL    Absolute Immature Granulocytes 0.0 <=0.4 10e3/uL    Absolute NRBCs 0.0 10e3/uL   CBC with platelets and differential    Collection Time: 02/11/24  6:59 AM   Result Value Ref Range    WBC Count 8.3 4.0 - 11.0 10e3/uL    RBC Count 2.90 (L) 3.80 - 5.20 10e6/uL    Hemoglobin 9.3 (L) 11.7 - 15.7 g/dL    Hematocrit 28.3 (L) 35.0 - 47.0 %     MCV 98 78 - 100 fL    MCH 32.1 26.5 - 33.0 pg    MCHC 32.9 31.5 - 36.5 g/dL    RDW 13.2 10.0 - 15.0 %    Platelet Count 125 (L) 150 - 450 10e3/uL    % Neutrophils 70 %    % Lymphocytes 20 %    % Monocytes 7 %    % Eosinophils 3 %    % Basophils 0 %    % Immature Granulocytes 0 %    NRBCs per 100 WBC 0 <1 /100    Absolute Neutrophils 5.7 1.6 - 8.3 10e3/uL    Absolute Lymphocytes 1.7 0.8 - 5.3 10e3/uL    Absolute Monocytes 0.6 0.0 - 1.3 10e3/uL    Absolute Eosinophils 0.3 0.0 - 0.7 10e3/uL    Absolute Basophils 0.0 0.0 - 0.2 10e3/uL    Absolute Immature Granulocytes 0.0 <=0.4 10e3/uL    Absolute NRBCs 0.0 10e3/uL   Extra Green Top (Lithium Heparin) Tube    Collection Time: 02/11/24  6:59 AM   Result Value Ref Range    Hold Specimen [facility]              Electronically signed by    Renata Alcaraz MD                            Sincerely,        SEB Casillas

## 2024-02-15 ENCOUNTER — TELEPHONE (OUTPATIENT)
Dept: GERIATRICS | Facility: CLINIC | Age: 79
End: 2024-02-15

## 2024-02-15 LAB
ANION GAP SERPL CALCULATED.3IONS-SCNC: 12 MMOL/L (ref 7–15)
BUN SERPL-MCNC: 24.4 MG/DL (ref 8–23)
CALCIUM SERPL-MCNC: 9 MG/DL (ref 8.8–10.2)
CHLORIDE SERPL-SCNC: 102 MMOL/L (ref 98–107)
CREAT SERPL-MCNC: 0.96 MG/DL (ref 0.51–0.95)
DEPRECATED HCO3 PLAS-SCNC: 25 MMOL/L (ref 22–29)
EGFRCR SERPLBLD CKD-EPI 2021: 61 ML/MIN/1.73M2
ERYTHROCYTE [DISTWIDTH] IN BLOOD BY AUTOMATED COUNT: 13.7 % (ref 10–15)
GLUCOSE SERPL-MCNC: 108 MG/DL (ref 70–99)
HCT VFR BLD AUTO: 25.6 % (ref 35–47)
HGB BLD-MCNC: 8.1 G/DL (ref 11.7–15.7)
MAGNESIUM SERPL-MCNC: 2 MG/DL (ref 1.7–2.3)
MCH RBC QN AUTO: 31.6 PG (ref 26.5–33)
MCHC RBC AUTO-ENTMCNC: 31.6 G/DL (ref 31.5–36.5)
MCV RBC AUTO: 100 FL (ref 78–100)
PLATELET # BLD AUTO: 195 10E3/UL (ref 150–450)
POTASSIUM SERPL-SCNC: 3.7 MMOL/L (ref 3.4–5.3)
RBC # BLD AUTO: 2.56 10E6/UL (ref 3.8–5.2)
SODIUM SERPL-SCNC: 139 MMOL/L (ref 135–145)
WBC # BLD AUTO: 6.4 10E3/UL (ref 4–11)

## 2024-02-15 PROCEDURE — 36415 COLL VENOUS BLD VENIPUNCTURE: CPT | Mod: ORL | Performed by: FAMILY MEDICINE

## 2024-02-15 PROCEDURE — 83735 ASSAY OF MAGNESIUM: CPT | Mod: ORL | Performed by: FAMILY MEDICINE

## 2024-02-15 PROCEDURE — 80048 BASIC METABOLIC PNL TOTAL CA: CPT | Mod: ORL | Performed by: FAMILY MEDICINE

## 2024-02-15 PROCEDURE — P9604 ONE-WAY ALLOW PRORATED TRIP: HCPCS | Mod: ORL | Performed by: FAMILY MEDICINE

## 2024-02-15 PROCEDURE — 85027 COMPLETE CBC AUTOMATED: CPT | Mod: ORL | Performed by: FAMILY MEDICINE

## 2024-02-15 NOTE — TELEPHONE ENCOUNTER
[facility] Geriatrics Lab Note     Provider: CARYL Benz  Facility: [facility]  Facility Type:  TCU    No Known Allergies    Labs Reviewed by provider: Heme 2, BMP, Mg     Verbal Order/Direction given by Provider: Check Heme 2 on 2/19/24.      Provider giving Order:  CARYL Benz    Verbal Order given to: Ina([phone])    Elio Salazar RN

## 2024-02-19 ENCOUNTER — TRANSITIONAL CARE UNIT VISIT (OUTPATIENT)
Dept: GERIATRICS | Facility: CLINIC | Age: 79
End: 2024-02-19
Payer: COMMERCIAL

## 2024-02-19 ENCOUNTER — LAB REQUISITION (OUTPATIENT)
Dept: LAB | Facility: CLINIC | Age: 79
End: 2024-02-19
Payer: COMMERCIAL

## 2024-02-19 VITALS
BODY MASS INDEX: 30.49 KG/M2 | TEMPERATURE: 97.2 F | HEART RATE: 72 BPM | HEIGHT: 64 IN | WEIGHT: 178.6 LBS | RESPIRATION RATE: 22 BRPM | DIASTOLIC BLOOD PRESSURE: 50 MMHG | SYSTOLIC BLOOD PRESSURE: 139 MMHG | OXYGEN SATURATION: 96 %

## 2024-02-19 DIAGNOSIS — Z51.81 ENCOUNTER FOR THERAPEUTIC DRUG LEVEL MONITORING: ICD-10-CM

## 2024-02-19 DIAGNOSIS — R52 PAIN MANAGEMENT: ICD-10-CM

## 2024-02-19 DIAGNOSIS — R53.81 PHYSICAL DECONDITIONING: ICD-10-CM

## 2024-02-19 DIAGNOSIS — I50.31 ACUTE HEART FAILURE WITH PRESERVED EJECTION FRACTION (HFPEF) (H): Primary | ICD-10-CM

## 2024-02-19 PROCEDURE — 99310 SBSQ NF CARE HIGH MDM 45: CPT | Performed by: NURSE PRACTITIONER

## 2024-02-19 NOTE — PROGRESS NOTES
[facility] GERIATRIC SERVICES  Chief Complaint   Patient presents with    [facility] F/U     [place] Medical Record Number:  [id number]  Place of Service where encounter took place:  [facility] ([facility]) [[id number]]  Code Status:  unknown     HISTORY:      HPI:  Dulce Pritchard  is 78 year old (1945) undergoing physical and occupational therapy.   She is with past medical history Hypertension, CHF, HDL, Hypothyroidism who  underwent a right total hip arthroplasty with Dr. San on 2/7/2024. There were no intraoperative complications and the patient was transferred to the recovery room and later the orthopedic unit in stable condition.      Today she is seen to review vital signs, labs, routine visit and to establish care.  She denied chest pain shortness of breath cough congestion constipation or diarrhea.  She is with Aquacel dressing right hip clean dry and intact.  She will follow-up with orthopedics on Friday, 2/23/2024.  Her weights were reviewed she is down 2.4 pounds over the last 5 days however throughout the last 5 days her weights are inconsistent.  Labs reviewed 2/15/2024 magnesium and BMP within normal limits hemoglobin 8.1 CBC pending for today.    ALLERGIES:Patient has no known allergies.    PAST MEDICAL HISTORY:   Past Medical History:   Diagnosis Date    Acute renal insufficiency 06/22/2023    Arthritis     Atrial fibrillation (H)     Chronic heart failure with preserved ejection fraction (H) 02/13/2023    Congestive heart failure (H)     Depressive disorder     Hyperlipidemia     Hypertension     Obese     Primary hypertension 03/27/2023    Pulmonary hypertension (H)     Thyroid disease        PAST SURGICAL HISTORY:   has a past surgical history that includes Hysterectomy; c/section, classical; Cholecystectomy; and Arthroplasty hip (Right, 2/7/2024).    FAMILY HISTORY: family history is not on file.    SOCIAL HISTORY:  reports that she quit smoking about 21 years ago. Her smoking use  "included cigarettes. She started smoking about 34 years ago. She has a 20 pack-year smoking history. She does not have any smokeless tobacco history on file. She reports current drug use. She reports that she does not drink alcohol.    ROS:  Constitutional: Negative for activity change, appetite change, fatigue and fever.   HENT: Negative for congestion.    Respiratory: Negative for cough, shortness of breath and wheezing.    Cardiovascular: Negative for chest pain and leg swelling.   Gastrointestinal: Negative for abdominal distention, abdominal pain, constipation, diarrhea and nausea.   Genitourinary: Negative for dysuria.   Musculoskeletal: Negative for arthralgia. Negative for back pain.   Skin: Negative for color change and wound. Surgical incision right hip with dermabond dressing   Neurological: Negative for dizziness.   Psychiatric/Behavioral: Negative for agitation, behavioral problems and confusion.     Physical Exam:  Constitutional:       Appearance: Patient is well-developed.   HENT:      Head: Normocephalic.   Eyes:      Conjunctiva/sclera: Conjunctivae normal.   Neck:      Musculoskeletal: Normal range of motion.   Cardiovascular:      Rate and Rhythm: Normal rate and regular rhythm.      Heart sounds: Normal heart sounds. No murmur.   Pulmonary:      Effort: No respiratory distress.      Breath sounds: Normal breath sounds. No wheezing or rales.   Abdominal:      General: Bowel sounds are normal. There is no distension.      Palpations: Abdomen is soft.      Tenderness: There is no abdominal tenderness.   Musculoskeletal:       Normal range of motion.   Dermabond dressing right hip   Skin:General:        Skin is warm.   Neurological:         Mental Status: Patient is alert and oriented to person, place, and time.   Psychiatric:         Behavior: Behavior normal.     Vitals:/50   Pulse 72   Temp 97.2  F (36.2  C)   Resp 22   Ht 1.626 m (5' 4\")   Wt 81 kg (178 lb 9.6 oz)   SpO2 96%   BMI " 30.66 kg/m   and Body mass index is 30.66 kg/m .    Lab/Diagnostic data:   Recent Results (from the past 240 hour(s))   CBC with platelets and differential    Collection Time: 02/10/24  6:51 AM   Result Value Ref Range    WBC Count 6.8 4.0 - 11.0 10e3/uL    RBC Count 2.55 (L) 3.80 - 5.20 10e6/uL    Hemoglobin 8.2 (L) 11.7 - 15.7 g/dL    Hematocrit 24.8 (L) 35.0 - 47.0 %    MCV 97 78 - 100 fL    MCH 32.2 26.5 - 33.0 pg    MCHC 33.1 31.5 - 36.5 g/dL    RDW 13.1 10.0 - 15.0 %    Platelet Count 89 (L) 150 - 450 10e3/uL    % Neutrophils 80 %    % Lymphocytes 12 %    % Monocytes 7 %    % Eosinophils 1 %    % Basophils 0 %    % Immature Granulocytes 0 %    NRBCs per 100 WBC 0 <1 /100    Absolute Neutrophils 5.3 1.6 - 8.3 10e3/uL    Absolute Lymphocytes 0.8 0.8 - 5.3 10e3/uL    Absolute Monocytes 0.5 0.0 - 1.3 10e3/uL    Absolute Eosinophils 0.1 0.0 - 0.7 10e3/uL    Absolute Basophils 0.0 0.0 - 0.2 10e3/uL    Absolute Immature Granulocytes 0.0 <=0.4 10e3/uL    Absolute NRBCs 0.0 10e3/uL   CBC with platelets and differential    Collection Time: 02/11/24  6:59 AM   Result Value Ref Range    WBC Count 8.3 4.0 - 11.0 10e3/uL    RBC Count 2.90 (L) 3.80 - 5.20 10e6/uL    Hemoglobin 9.3 (L) 11.7 - 15.7 g/dL    Hematocrit 28.3 (L) 35.0 - 47.0 %    MCV 98 78 - 100 fL    MCH 32.1 26.5 - 33.0 pg    MCHC 32.9 31.5 - 36.5 g/dL    RDW 13.2 10.0 - 15.0 %    Platelet Count 125 (L) 150 - 450 10e3/uL    % Neutrophils 70 %    % Lymphocytes 20 %    % Monocytes 7 %    % Eosinophils 3 %    % Basophils 0 %    % Immature Granulocytes 0 %    NRBCs per 100 WBC 0 <1 /100    Absolute Neutrophils 5.7 1.6 - 8.3 10e3/uL    Absolute Lymphocytes 1.7 0.8 - 5.3 10e3/uL    Absolute Monocytes 0.6 0.0 - 1.3 10e3/uL    Absolute Eosinophils 0.3 0.0 - 0.7 10e3/uL    Absolute Basophils 0.0 0.0 - 0.2 10e3/uL    Absolute Immature Granulocytes 0.0 <=0.4 10e3/uL    Absolute NRBCs 0.0 10e3/uL   Extra Green Top (Lithium Heparin) Tube    Collection Time: 02/11/24  6:59 AM    Result Value Ref Range    Hold Specimen [facility]    Basic metabolic panel    Collection Time: 02/15/24  5:16 AM   Result Value Ref Range    Sodium 139 135 - 145 mmol/L    Potassium 3.7 3.4 - 5.3 mmol/L    Chloride 102 98 - 107 mmol/L    Carbon Dioxide (CO2) 25 22 - 29 mmol/L    Anion Gap 12 7 - 15 mmol/L    Urea Nitrogen 24.4 (H) 8.0 - 23.0 mg/dL    Creatinine 0.96 (H) 0.51 - 0.95 mg/dL    GFR Estimate 61 >60 mL/min/1.73m2    Calcium 9.0 8.8 - 10.2 mg/dL    Glucose 108 (H) 70 - 99 mg/dL   CBC with platelets    Collection Time: 02/15/24  5:16 AM   Result Value Ref Range    WBC Count 6.4 4.0 - 11.0 10e3/uL    RBC Count 2.56 (L) 3.80 - 5.20 10e6/uL    Hemoglobin 8.1 (L) 11.7 - 15.7 g/dL    Hematocrit 25.6 (L) 35.0 - 47.0 %     78 - 100 fL    MCH 31.6 26.5 - 33.0 pg    MCHC 31.6 31.5 - 36.5 g/dL    RDW 13.7 10.0 - 15.0 %    Platelet Count 195 150 - 450 10e3/uL   Magnesium    Collection Time: 02/15/24  5:16 AM   Result Value Ref Range    Magnesium 2.0 1.7 - 2.3 mg/dL          Review of your medicines            Accurate as of February 19, 2024  1:17 PM. If you have any questions, ask your nurse or doctor.                CONTINUE these medicines which have NOT CHANGED        Dose / Directions   acetaminophen 325 MG tablet  Commonly known as: TYLENOL  Used for: Primary osteoarthritis of right hip      Dose: 650 mg  Take 2 tablets (650 mg) by mouth every 4 hours as needed for other (mild pain)  Quantity: 100 tablet  Refills: 0     allopurinol 100 MG tablet  Commonly known as: ZYLOPRIM      Dose: 200 mg  Take 200 mg by mouth every evening  Refills: 0     aspirin 81 MG EC tablet  Used for: Primary osteoarthritis of right hip      Dose: 81 mg  Take 1 tablet (81 mg) by mouth 2 times daily  Quantity: 60 tablet  Refills: 0     atorvastatin 20 MG tablet  Commonly known as: LIPITOR  Used for: Acute heart failure with preserved ejection fraction (HFpEF) (H)      Dose: 20 mg  Take 1 tablet (20 mg) by mouth every  evening  Quantity: 30 tablet  Refills: 0     cefadroxil 500 MG capsule  Commonly known as: DURICEF  Used for: Primary osteoarthritis of right hip      Dose: 500 mg  Take 1 capsule (500 mg) by mouth 2 times daily  Quantity: 14 capsule  Refills: 0     FLUoxetine 20 MG capsule  Commonly known as: PROzac      Dose: 20 mg  Take 20 mg by mouth daily  Refills: 0     furosemide 40 MG tablet  Commonly known as: LASIX  Used for: Acute heart failure with preserved ejection fraction (HFpEF) (H)      Dose: 40 mg  Take 1 tablet (40 mg) by mouth 2 times daily  Quantity: 60 tablet  Refills: 0     gabapentin 300 MG capsule  Commonly known as: NEURONTIN  Used for: Neuropathy      Dose: 600 mg  Take 2 capsules (600 mg) by mouth At Bedtime  Quantity: 30 capsule  Refills: 0     hydroxychloroquine 200 MG tablet  Commonly known as: PLAQUENIL      Dose: 200 mg  Take 200 mg by mouth 2 times daily  Refills: 0     hydrOXYzine HCl 10 MG tablet  Commonly known as: ATARAX  Used for: Primary osteoarthritis of right hip      Dose: 10 mg  Take 1 tablet (10 mg) by mouth every 6 hours as needed for itching or anxiety (with pain, moderate pain)  Quantity: 30 tablet  Refills: 0     levothyroxine 25 MCG tablet  Commonly known as: SYNTHROID/LEVOTHROID  Used for: Hypothyroidism, unspecified type      Dose: 25 mcg  Take 1 tablet (25 mcg) by mouth every morning (before breakfast)  Quantity: 30 tablet  Refills: 3     methocarbamol 500 MG tablet  Commonly known as: ROBAXIN  Used for: Primary osteoarthritis of right hip      Dose: 500 mg  Take 1 tablet (500 mg) by mouth 4 times daily  Quantity: 28 tablet  Refills: 0     oxyCODONE 5 MG tablet  Commonly known as: ROXICODONE  Used for: Primary osteoarthritis of right hip      Dose: 5 mg  Take 1 tablet (5 mg) by mouth every 4 hours as needed for moderate to severe pain Max 6 tabs/day  Quantity: 20 tablet  Refills: 0     polyethylene glycol 17 GM/Dose powder  Commonly known as: MIRALAX      Dose: 17 g  Take 17 g by  mouth daily as needed for constipation  Refills: 0     Stool Softener/Laxative 50-8.6 MG tablet  Generic drug: senna-docusate      Dose: 1 tablet  Take 1 tablet by mouth daily as needed for constipation  Refills: 0     Vitamin D3 25 mcg (1000 units) tablet  Commonly known as: CHOLECALCIFEROL      Dose: 1,000 Units  Take 1,000 Units by mouth daily  Refills: 0              ASSESSMENT/PLAN  Encounter Diagnoses   Name Primary?    Acute heart failure with preserved ejection fraction (HFpEF) (H) Yes    Physical deconditioning     Pain management      Congestive heart failure Daily weights, Lasix 40 mg twice daily    Physical deconditioning PT OT     Pain management scheduled extra strength Tylenol 3 times daily methocarbamol 500 mg 4 times daily, PRN Oxycodone     Gout on allopurinol     HDL on atorvastatin     Mood disorder continue Prozac    Rheumatoid arthritis continue hydroxychloroquine    Hypothyroidism on levothyroxine,    Anticoagulation management aspirin 81 mg twice daily      Electronically signed by: Neha Roe CNP

## 2024-02-19 NOTE — LETTER
2/19/2024        RE: Dulce Pritchard  [address]  [place] [address]        [facility] GERIATRIC SERVICES  Chief Complaint   Patient presents with     [facility] F/U     [facility] Medical Record Number:  [id number]  Place of Service where encounter took place:  [facility] ([facility]) [[id number]]  Code Status:  unknown     HISTORY:      HPI:  Dulce Pritchard  is 78 year old (1945) undergoing physical and occupational therapy.   She is with past medical history Hypertension, CHF, HDL, Hypothyroidism who  underwent a right total hip arthroplasty with Dr. San on 2/7/2024. There were no intraoperative complications and the patient was transferred to the recovery room and later the orthopedic unit in stable condition.      Today she is seen to review vital signs, labs, routine visit and to establish care.  She denied chest pain shortness of breath cough congestion constipation or diarrhea.  She is with Aquacel dressing right hip clean dry and intact.  She will follow-up with orthopedics on Friday, 2/23/2024.  Her weights were reviewed she is down 2.4 pounds over the last 5 days however throughout the last 5 days her weights are inconsistent.  Labs reviewed 2/15/2024 magnesium and BMP within normal limits hemoglobin 8.1 CBC pending for today.    ALLERGIES:Patient has no known allergies.    PAST MEDICAL HISTORY:   Past Medical History:   Diagnosis Date     Acute renal insufficiency 06/22/2023     Arthritis      Atrial fibrillation (H)      Chronic heart failure with preserved ejection fraction (H) 02/13/2023     Congestive heart failure (H)      Depressive disorder      Hyperlipidemia      Hypertension      Obese      Primary hypertension 03/27/2023     Pulmonary hypertension (H)      Thyroid disease        PAST SURGICAL HISTORY:   has a past surgical history that includes Hysterectomy; c/section, classical; Cholecystectomy; and Arthroplasty hip (Right, 2/7/2024).    FAMILY HISTORY: family history is  not on file.    SOCIAL HISTORY:  reports that she quit smoking about 21 years ago. Her smoking use included cigarettes. She started smoking about 34 years ago. She has a 20 pack-year smoking history. She does not have any smokeless tobacco history on file. She reports current drug use. She reports that she does not drink alcohol.    ROS:  Constitutional: Negative for activity change, appetite change, fatigue and fever.   HENT: Negative for congestion.    Respiratory: Negative for cough, shortness of breath and wheezing.    Cardiovascular: Negative for chest pain and leg swelling.   Gastrointestinal: Negative for abdominal distention, abdominal pain, constipation, diarrhea and nausea.   Genitourinary: Negative for dysuria.   Musculoskeletal: Negative for arthralgia. Negative for back pain.   Skin: Negative for color change and wound. Surgical incision right hip with dermabond dressing   Neurological: Negative for dizziness.   Psychiatric/Behavioral: Negative for agitation, behavioral problems and confusion.     Physical Exam:  Constitutional:       Appearance: Patient is well-developed.   HENT:      Head: Normocephalic.   Eyes:      Conjunctiva/sclera: Conjunctivae normal.   Neck:      Musculoskeletal: Normal range of motion.   Cardiovascular:      Rate and Rhythm: Normal rate and regular rhythm.      Heart sounds: Normal heart sounds. No murmur.   Pulmonary:      Effort: No respiratory distress.      Breath sounds: Normal breath sounds. No wheezing or rales.   Abdominal:      General: Bowel sounds are normal. There is no distension.      Palpations: Abdomen is soft.      Tenderness: There is no abdominal tenderness.   Musculoskeletal:       Normal range of motion.   Dermabond dressing right hip   Skin:General:        Skin is warm.   Neurological:         Mental Status: Patient is alert and oriented to person, place, and time.   Psychiatric:         Behavior: Behavior normal.     Vitals:/50   Pulse 72   Temp  "97.2  F (36.2  C)   Resp 22   Ht 1.626 m (5' 4\")   Wt 81 kg (178 lb 9.6 oz)   SpO2 96%   BMI 30.66 kg/m   and Body mass index is 30.66 kg/m .    Lab/Diagnostic data:   Recent Results (from the past 240 hour(s))   CBC with platelets and differential    Collection Time: 02/10/24  6:51 AM   Result Value Ref Range    WBC Count 6.8 4.0 - 11.0 10e3/uL    RBC Count 2.55 (L) 3.80 - 5.20 10e6/uL    Hemoglobin 8.2 (L) 11.7 - 15.7 g/dL    Hematocrit 24.8 (L) 35.0 - 47.0 %    MCV 97 78 - 100 fL    MCH 32.2 26.5 - 33.0 pg    MCHC 33.1 31.5 - 36.5 g/dL    RDW 13.1 10.0 - 15.0 %    Platelet Count 89 (L) 150 - 450 10e3/uL    % Neutrophils 80 %    % Lymphocytes 12 %    % Monocytes 7 %    % Eosinophils 1 %    % Basophils 0 %    % Immature Granulocytes 0 %    NRBCs per 100 WBC 0 <1 /100    Absolute Neutrophils 5.3 1.6 - 8.3 10e3/uL    Absolute Lymphocytes 0.8 0.8 - 5.3 10e3/uL    Absolute Monocytes 0.5 0.0 - 1.3 10e3/uL    Absolute Eosinophils 0.1 0.0 - 0.7 10e3/uL    Absolute Basophils 0.0 0.0 - 0.2 10e3/uL    Absolute Immature Granulocytes 0.0 <=0.4 10e3/uL    Absolute NRBCs 0.0 10e3/uL   CBC with platelets and differential    Collection Time: 02/11/24  6:59 AM   Result Value Ref Range    WBC Count 8.3 4.0 - 11.0 10e3/uL    RBC Count 2.90 (L) 3.80 - 5.20 10e6/uL    Hemoglobin 9.3 (L) 11.7 - 15.7 g/dL    Hematocrit 28.3 (L) 35.0 - 47.0 %    MCV 98 78 - 100 fL    MCH 32.1 26.5 - 33.0 pg    MCHC 32.9 31.5 - 36.5 g/dL    RDW 13.2 10.0 - 15.0 %    Platelet Count 125 (L) 150 - 450 10e3/uL    % Neutrophils 70 %    % Lymphocytes 20 %    % Monocytes 7 %    % Eosinophils 3 %    % Basophils 0 %    % Immature Granulocytes 0 %    NRBCs per 100 WBC 0 <1 /100    Absolute Neutrophils 5.7 1.6 - 8.3 10e3/uL    Absolute Lymphocytes 1.7 0.8 - 5.3 10e3/uL    Absolute Monocytes 0.6 0.0 - 1.3 10e3/uL    Absolute Eosinophils 0.3 0.0 - 0.7 10e3/uL    Absolute Basophils 0.0 0.0 - 0.2 10e3/uL    Absolute Immature Granulocytes 0.0 <=0.4 10e3/uL    " Absolute NRBCs 0.0 10e3/uL   Extra Green Top (Lithium Heparin) Tube    Collection Time: 02/11/24  6:59 AM   Result Value Ref Range    Hold Specimen JI    Basic metabolic panel    Collection Time: 02/15/24  5:16 AM   Result Value Ref Range    Sodium 139 135 - 145 mmol/L    Potassium 3.7 3.4 - 5.3 mmol/L    Chloride 102 98 - 107 mmol/L    Carbon Dioxide (CO2) 25 22 - 29 mmol/L    Anion Gap 12 7 - 15 mmol/L    Urea Nitrogen 24.4 (H) 8.0 - 23.0 mg/dL    Creatinine 0.96 (H) 0.51 - 0.95 mg/dL    GFR Estimate 61 >60 mL/min/1.73m2    Calcium 9.0 8.8 - 10.2 mg/dL    Glucose 108 (H) 70 - 99 mg/dL   CBC with platelets    Collection Time: 02/15/24  5:16 AM   Result Value Ref Range    WBC Count 6.4 4.0 - 11.0 10e3/uL    RBC Count 2.56 (L) 3.80 - 5.20 10e6/uL    Hemoglobin 8.1 (L) 11.7 - 15.7 g/dL    Hematocrit 25.6 (L) 35.0 - 47.0 %     78 - 100 fL    MCH 31.6 26.5 - 33.0 pg    MCHC 31.6 31.5 - 36.5 g/dL    RDW 13.7 10.0 - 15.0 %    Platelet Count 195 150 - 450 10e3/uL   Magnesium    Collection Time: 02/15/24  5:16 AM   Result Value Ref Range    Magnesium 2.0 1.7 - 2.3 mg/dL        MEDICATIONS:  MED REC REQUIRED{TIP  Click the link below to document or use med rec list, use list to pull in response :[id number]}  Post Medication Reconciliation Status: {MED REC LIST:480716}          Review of your medicines            Accurate as of February 19, 2024  1:17 PM. If you have any questions, ask your nurse or doctor.                CONTINUE these medicines which have NOT CHANGED        Dose / Directions   acetaminophen 325 MG tablet  Commonly known as: TYLENOL  Used for: Primary osteoarthritis of right hip      Dose: 650 mg  Take 2 tablets (650 mg) by mouth every 4 hours as needed for other (mild pain)  Quantity: 100 tablet  Refills: 0     allopurinol 100 MG tablet  Commonly known as: ZYLOPRIM      Dose: 200 mg  Take 200 mg by mouth every evening  Refills: 0     aspirin 81 MG EC tablet  Used for: Primary osteoarthritis of right  hip      Dose: 81 mg  Take 1 tablet (81 mg) by mouth 2 times daily  Quantity: 60 tablet  Refills: 0     atorvastatin 20 MG tablet  Commonly known as: LIPITOR  Used for: Acute heart failure with preserved ejection fraction (HFpEF) (H)      Dose: 20 mg  Take 1 tablet (20 mg) by mouth every evening  Quantity: 30 tablet  Refills: 0     cefadroxil 500 MG capsule  Commonly known as: DURICEF  Used for: Primary osteoarthritis of right hip      Dose: 500 mg  Take 1 capsule (500 mg) by mouth 2 times daily  Quantity: 14 capsule  Refills: 0     FLUoxetine 20 MG capsule  Commonly known as: PROzac      Dose: 20 mg  Take 20 mg by mouth daily  Refills: 0     furosemide 40 MG tablet  Commonly known as: LASIX  Used for: Acute heart failure with preserved ejection fraction (HFpEF) (H)      Dose: 40 mg  Take 1 tablet (40 mg) by mouth 2 times daily  Quantity: 60 tablet  Refills: 0     gabapentin 300 MG capsule  Commonly known as: NEURONTIN  Used for: Neuropathy      Dose: 600 mg  Take 2 capsules (600 mg) by mouth At Bedtime  Quantity: 30 capsule  Refills: 0     hydroxychloroquine 200 MG tablet  Commonly known as: PLAQUENIL      Dose: 200 mg  Take 200 mg by mouth 2 times daily  Refills: 0     hydrOXYzine HCl 10 MG tablet  Commonly known as: ATARAX  Used for: Primary osteoarthritis of right hip      Dose: 10 mg  Take 1 tablet (10 mg) by mouth every 6 hours as needed for itching or anxiety (with pain, moderate pain)  Quantity: 30 tablet  Refills: 0     levothyroxine 25 MCG tablet  Commonly known as: SYNTHROID/LEVOTHROID  Used for: Hypothyroidism, unspecified type      Dose: 25 mcg  Take 1 tablet (25 mcg) by mouth every morning (before breakfast)  Quantity: 30 tablet  Refills: 3     methocarbamol 500 MG tablet  Commonly known as: ROBAXIN  Used for: Primary osteoarthritis of right hip      Dose: 500 mg  Take 1 tablet (500 mg) by mouth 4 times daily  Quantity: 28 tablet  Refills: 0     oxyCODONE 5 MG tablet  Commonly known as:  ROXICODONE  Used for: Primary osteoarthritis of right hip      Dose: 5 mg  Take 1 tablet (5 mg) by mouth every 4 hours as needed for moderate to severe pain Max 6 tabs/day  Quantity: 20 tablet  Refills: 0     polyethylene glycol 17 GM/Dose powder  Commonly known as: MIRALAX      Dose: 17 g  Take 17 g by mouth daily as needed for constipation  Refills: 0     Stool Softener/Laxative 50-8.6 MG tablet  Generic drug: senna-docusate      Dose: 1 tablet  Take 1 tablet by mouth daily as needed for constipation  Refills: 0     Vitamin D3 25 mcg (1000 units) tablet  Commonly known as: CHOLECALCIFEROL      Dose: 1,000 Units  Take 1,000 Units by mouth daily  Refills: 0              ASSESSMENT/PLAN  Encounter Diagnoses   Name Primary?     Acute heart failure with preserved ejection fraction (HFpEF) (H) Yes     Physical deconditioning      Pain management      Congestive heart failure Daily weights, Lasix 40 mg twice daily    Physical deconditioning PT OT     Pain management scheduled extra strength Tylenol 3 times daily methocarbamol 500 mg 4 times daily, PRN Oxycodone     Gout on allopurinol     HDL on atorvastatin     Mood disorder continue Prozac    Rheumatoid arthritis continue hydroxychloroquine    Hypothyroidism on levothyroxine,    Anticoagulation management aspirin 81 mg twice daily      Electronically signed by: Neha Roe CNP       Sincerely,        Neha Roe CNP

## 2024-02-20 ENCOUNTER — TELEPHONE (OUTPATIENT)
Dept: GERIATRICS | Facility: CLINIC | Age: 79
End: 2024-02-20

## 2024-02-20 LAB
ERYTHROCYTE [DISTWIDTH] IN BLOOD BY AUTOMATED COUNT: 14.4 % (ref 10–15)
HCT VFR BLD AUTO: 26.6 % (ref 35–47)
HGB BLD-MCNC: 8.5 G/DL (ref 11.7–15.7)
MCH RBC QN AUTO: 32.9 PG (ref 26.5–33)
MCHC RBC AUTO-ENTMCNC: 32 G/DL (ref 31.5–36.5)
MCV RBC AUTO: 103 FL (ref 78–100)
PLATELET # BLD AUTO: 194 10E3/UL (ref 150–450)
RBC # BLD AUTO: 2.58 10E6/UL (ref 3.8–5.2)
WBC # BLD AUTO: 5.5 10E3/UL (ref 4–11)

## 2024-02-20 PROCEDURE — P9604 ONE-WAY ALLOW PRORATED TRIP: HCPCS | Mod: ORL | Performed by: NURSE PRACTITIONER

## 2024-02-20 PROCEDURE — 85027 COMPLETE CBC AUTOMATED: CPT | Mod: ORL | Performed by: NURSE PRACTITIONER

## 2024-02-20 PROCEDURE — 36415 COLL VENOUS BLD VENIPUNCTURE: CPT | Mod: ORL | Performed by: NURSE PRACTITIONER

## 2024-02-20 NOTE — TELEPHONE ENCOUNTER
[facility]     Provider: CARYL Benz  Facility: [facility]  Facility Type:  TCU    No Known Allergies    Labs Reviewed by provider: Heme 2     Verbal Order/Direction given by Provider: No new orders.      Provider giving Order:  CARYL Benz    Verbal Order given to: Afa([phone])    Elio Salazar RN

## 2024-02-22 ENCOUNTER — TRANSITIONAL CARE UNIT VISIT (OUTPATIENT)
Dept: GERIATRICS | Facility: CLINIC | Age: 79
End: 2024-02-22
Payer: COMMERCIAL

## 2024-02-22 VITALS
HEART RATE: 64 BPM | HEIGHT: 64 IN | SYSTOLIC BLOOD PRESSURE: 133 MMHG | BODY MASS INDEX: 30.73 KG/M2 | OXYGEN SATURATION: 98 % | TEMPERATURE: 96.1 F | RESPIRATION RATE: 18 BRPM | WEIGHT: 180 LBS | DIASTOLIC BLOOD PRESSURE: 47 MMHG

## 2024-02-22 DIAGNOSIS — R21 RASH: Primary | ICD-10-CM

## 2024-02-22 PROCEDURE — 99308 SBSQ NF CARE LOW MDM 20: CPT | Performed by: NURSE PRACTITIONER

## 2024-02-22 RX ORDER — ACETAMINOPHEN 500 MG
1000 TABLET ORAL 3 TIMES DAILY
COMMUNITY

## 2024-02-22 RX ORDER — NYSTATIN 100000 [USP'U]/G
POWDER TOPICAL 2 TIMES DAILY
COMMUNITY

## 2024-02-22 NOTE — PROGRESS NOTES
[facility] GERIATRIC SERVICES  Chief Complaint   Patient presents with    JOHNNY     [facility] Medical Record Number:  [id number]  Place of Service where encounter took place:  [facility] () [[id number]]  Code Status:  unknown     HISTORY:      HPI:  Dulce Pritchard  is 78 year old (1945) undergoing physical and occupational therapy.   She is with past medical history Hypertension, CHF, HDL, Hypothyroidism who  underwent a right total hip arthroplasty with Dr. San on 2/7/2024. There were no intraoperative complications and the patient was transferred to the recovery room and later the orthopedic unit in stable condition.      Today she is seen to review vital signs, labs, routine visit and for reports of a rash.  Noted to have a rash right groin.  Nystatin powder twice daily until healed and then changed to as needed.  She denied chest pain shortness of breath cough congestion constipation or diarrhea.  She is with Aquacel dressing right hip clean dry and intact.  She will follow-up with orthopedics on Friday, 2/23/2024.    Labs reviewed 2/15/2024 magnesium and BMP within normal limits hemoglobin 8.1     ALLERGIES:Patient has no known allergies.    PAST MEDICAL HISTORY:   Past Medical History:   Diagnosis Date    Acute renal insufficiency 06/22/2023    Arthritis     Atrial fibrillation (H)     Chronic heart failure with preserved ejection fraction (H) 02/13/2023    Congestive heart failure (H)     Depressive disorder     Hyperlipidemia     Hypertension     Obese     Primary hypertension 03/27/2023    Pulmonary hypertension (H)     Thyroid disease        PAST SURGICAL HISTORY:   has a past surgical history that includes Hysterectomy; c/section, classical; Cholecystectomy; and Arthroplasty hip (Right, 2/7/2024).    FAMILY HISTORY: family history is not on file.    SOCIAL HISTORY:  reports that she quit smoking about 21 years ago. Her smoking use included cigarettes. She started smoking about 34 years  "ago. She has a 20 pack-year smoking history. She does not have any smokeless tobacco history on file. She reports current drug use. She reports that she does not drink alcohol.    ROS:  Constitutional: Negative for activity change, appetite change, fatigue and fever.   HENT: Negative for congestion.    Respiratory: Negative for cough, shortness of breath and wheezing.    Cardiovascular: Negative for chest pain and leg swelling.   Gastrointestinal: Negative for abdominal distention, abdominal pain, constipation, diarrhea and nausea.   Genitourinary: Negative for dysuria.   Musculoskeletal: Negative for arthralgia. Negative for back pain.   Skin: Negative for color change and wound. Surgical incision right hip with dermabond dressing   Neurological: Negative for dizziness.   Psychiatric/Behavioral: Negative for agitation, behavioral problems and confusion.     Physical Exam:  Constitutional:       Appearance: Patient is well-developed.   HENT:      Head: Normocephalic.   Eyes:      Conjunctiva/sclera: Conjunctivae normal.   Neck:      Musculoskeletal: Normal range of motion.   Cardiovascular:      Rate and Rhythm: Normal rate and regular rhythm.      Heart sounds: Normal heart sounds. No murmur.   Pulmonary:      Effort: No respiratory distress.      Breath sounds: Normal breath sounds. No wheezing or rales.   Abdominal:      General: Bowel sounds are normal. There is no distension.      Palpations: Abdomen is soft.      Tenderness: There is no abdominal tenderness.   Musculoskeletal:       Normal range of motion.   Dermabond dressing right hip   Skin:General:        Skin is warm.   Neurological:         Mental Status: Patient is alert and oriented to person, place, and time.   Psychiatric:         Behavior: Behavior normal.     Vitals:/47   Pulse 64   Temp (!) 96.1  F (35.6  C)   Resp 18   Ht 1.626 m (5' 4\")   Wt 81.6 kg (180 lb)   SpO2 98%   BMI 30.90 kg/m   and Body mass index is 30.9 " kg/m .    Lab/Diagnostic data:   Recent Results (from the past 240 hour(s))   Basic metabolic panel    Collection Time: 02/15/24  5:16 AM   Result Value Ref Range    Sodium 139 135 - 145 mmol/L    Potassium 3.7 3.4 - 5.3 mmol/L    Chloride 102 98 - 107 mmol/L    Carbon Dioxide (CO2) 25 22 - 29 mmol/L    Anion Gap 12 7 - 15 mmol/L    Urea Nitrogen 24.4 (H) 8.0 - 23.0 mg/dL    Creatinine 0.96 (H) 0.51 - 0.95 mg/dL    GFR Estimate 61 >60 mL/min/1.73m2    Calcium 9.0 8.8 - 10.2 mg/dL    Glucose 108 (H) 70 - 99 mg/dL   CBC with platelets    Collection Time: 02/15/24  5:16 AM   Result Value Ref Range    WBC Count 6.4 4.0 - 11.0 10e3/uL    RBC Count 2.56 (L) 3.80 - 5.20 10e6/uL    Hemoglobin 8.1 (L) 11.7 - 15.7 g/dL    Hematocrit 25.6 (L) 35.0 - 47.0 %     78 - 100 fL    MCH 31.6 26.5 - 33.0 pg    MCHC 31.6 31.5 - 36.5 g/dL    RDW 13.7 10.0 - 15.0 %    Platelet Count 195 150 - 450 10e3/uL   Magnesium    Collection Time: 02/15/24  5:16 AM   Result Value Ref Range    Magnesium 2.0 1.7 - 2.3 mg/dL   CBC with platelets    Collection Time: 02/20/24  6:34 AM   Result Value Ref Range    WBC Count 5.5 4.0 - 11.0 10e3/uL    RBC Count 2.58 (L) 3.80 - 5.20 10e6/uL    Hemoglobin 8.5 (L) 11.7 - 15.7 g/dL    Hematocrit 26.6 (L) 35.0 - 47.0 %     (H) 78 - 100 fL    MCH 32.9 26.5 - 33.0 pg    MCHC 32.0 31.5 - 36.5 g/dL    RDW 14.4 10.0 - 15.0 %    Platelet Count 194 150 - 450 10e3/uL          Review of your medicines            Accurate as of February 22, 2024 11:59 PM. If you have any questions, ask your nurse or doctor.                CONTINUE these medicines which may have CHANGED, or have new prescriptions. If we are uncertain of the size of tablets/capsules you have at home, strength may be listed as something that might have changed.        Dose / Directions   acetaminophen 500 MG tablet  Commonly known as: TYLENOL  This may have changed: Another medication with the same name was removed. Continue taking this  medication, and follow the directions you see here.  Changed by: Neha Roe CNP      Dose: 1,000 mg  Take 1,000 mg by mouth 3 times daily  Refills: 0            CONTINUE these medicines which have NOT CHANGED        Dose / Directions   allopurinol 100 MG tablet  Commonly known as: ZYLOPRIM      Dose: 200 mg  Take 200 mg by mouth every evening  Refills: 0     aspirin 81 MG EC tablet  Used for: Primary osteoarthritis of right hip      Dose: 81 mg  Take 1 tablet (81 mg) by mouth 2 times daily  Quantity: 60 tablet  Refills: 0     atorvastatin 20 MG tablet  Commonly known as: LIPITOR  Used for: Acute heart failure with preserved ejection fraction (HFpEF) (H)      Dose: 20 mg  Take 1 tablet (20 mg) by mouth every evening  Quantity: 30 tablet  Refills: 0     FLUoxetine 20 MG capsule  Commonly known as: PROzac      Dose: 20 mg  Take 20 mg by mouth daily  Refills: 0     furosemide 40 MG tablet  Commonly known as: LASIX  Used for: Acute heart failure with preserved ejection fraction (HFpEF) (H)      Dose: 40 mg  Take 1 tablet (40 mg) by mouth 2 times daily  Quantity: 60 tablet  Refills: 0     gabapentin 300 MG capsule  Commonly known as: NEURONTIN  Used for: Neuropathy      Dose: 600 mg  Take 2 capsules (600 mg) by mouth At Bedtime  Quantity: 30 capsule  Refills: 0     hydroxychloroquine 200 MG tablet  Commonly known as: PLAQUENIL      Dose: 200 mg  Take 200 mg by mouth 2 times daily  Refills: 0     hydrOXYzine HCl 10 MG tablet  Commonly known as: ATARAX  Used for: Primary osteoarthritis of right hip      Dose: 10 mg  Take 1 tablet (10 mg) by mouth every 6 hours as needed for itching or anxiety (with pain, moderate pain)  Quantity: 30 tablet  Refills: 0     levothyroxine 25 MCG tablet  Commonly known as: SYNTHROID/LEVOTHROID  Used for: Hypothyroidism, unspecified type      Dose: 25 mcg  Take 1 tablet (25 mcg) by mouth every morning (before breakfast)  Quantity: 30 tablet  Refills: 3     methocarbamol 500 MG  tablet  Commonly known as: ROBAXIN  Used for: Primary osteoarthritis of right hip      Dose: 500 mg  Take 1 tablet (500 mg) by mouth 4 times daily  Quantity: 28 tablet  Refills: 0     nystatin 685743 UNIT/GM external powder  Commonly known as: MYCOSTATIN      Apply topically 2 times daily Until healed and then PRN BID  Refills: 0     oxyCODONE 5 MG tablet  Commonly known as: ROXICODONE  Used for: Primary osteoarthritis of right hip      Dose: 5 mg  Take 1 tablet (5 mg) by mouth every 4 hours as needed for moderate to severe pain Max 6 tabs/day  Quantity: 20 tablet  Refills: 0     polyethylene glycol 17 GM/Dose powder  Commonly known as: MIRALAX      Dose: 17 g  Take 17 g by mouth daily as needed for constipation  Refills: 0     Stool Softener/Laxative 50-8.6 MG tablet  Generic drug: senna-docusate      Dose: 1 tablet  Take 1 tablet by mouth daily as needed for constipation  Refills: 0     Vitamin D3 25 mcg (1000 units) tablet  Commonly known as: CHOLECALCIFEROL      Dose: 1,000 Units  Take 1,000 Units by mouth daily  Refills: 0            STOP taking      cefadroxil 500 MG capsule  Commonly known as: DURICEF  Stopped by: Neha Roe CNP                 ASSESSMENT/PLAN  Encounter Diagnosis   Name Primary?    Rash Yes     Congestive heart failure Daily weights, Lasix 40 mg twice daily    Physical deconditioning PT OT     Pain management scheduled extra strength Tylenol 3 times daily methocarbamol 500 mg 4 times daily, PRN Oxycodone     Gout on allopurinol     HDL on atorvastatin     Mood disorder continue Prozac    Rheumatoid arthritis continue hydroxychloroquine    Hypothyroidism on levothyroxine,    Anticoagulation management aspirin 81 mg twice daily    Rash right groin nystatin powder twice daily until healed and then changed to as needed.    Electronically signed by: Neha Roe CNP

## 2024-03-04 ENCOUNTER — TRANSITIONAL CARE UNIT VISIT (OUTPATIENT)
Dept: GERIATRICS | Facility: CLINIC | Age: 79
End: 2024-03-04
Payer: COMMERCIAL

## 2024-03-04 ENCOUNTER — LAB REQUISITION (OUTPATIENT)
Dept: LAB | Facility: CLINIC | Age: 79
End: 2024-03-04
Payer: COMMERCIAL

## 2024-03-04 VITALS
TEMPERATURE: 97.7 F | RESPIRATION RATE: 18 BRPM | HEART RATE: 62 BPM | SYSTOLIC BLOOD PRESSURE: 160 MMHG | HEIGHT: 64 IN | WEIGHT: 166 LBS | DIASTOLIC BLOOD PRESSURE: 68 MMHG | OXYGEN SATURATION: 96 % | BODY MASS INDEX: 28.34 KG/M2

## 2024-03-04 DIAGNOSIS — I50.31 ACUTE HEART FAILURE WITH PRESERVED EJECTION FRACTION (HFPEF) (H): Primary | ICD-10-CM

## 2024-03-04 DIAGNOSIS — D64.9 ANEMIA, UNSPECIFIED TYPE: ICD-10-CM

## 2024-03-04 DIAGNOSIS — R52 PAIN MANAGEMENT: ICD-10-CM

## 2024-03-04 DIAGNOSIS — R53.81 PHYSICAL DECONDITIONING: ICD-10-CM

## 2024-03-04 DIAGNOSIS — Z51.81 ENCOUNTER FOR THERAPEUTIC DRUG LEVEL MONITORING: ICD-10-CM

## 2024-03-04 PROCEDURE — 99309 SBSQ NF CARE MODERATE MDM 30: CPT | Performed by: NURSE PRACTITIONER

## 2024-03-04 NOTE — PROGRESS NOTES
Melena  Nosebleeds going to see the [facility] GERIATRIC SERVICES  Chief Complaint   Patient presents with    RECHECK     [place] Medical Record Number:  [id number]  Place of Service where encounter took place:  [facility] ([facility]) [[id number]]  Code Status:  unknown     HISTORY:      HPI:  Dulce Pritchard  is 78 year old (1945) undergoing physical and occupational therapy.   She is with past medical history Hypertension, CHF, HDL, Hypothyroidism who  underwent a right total hip arthroplasty with Dr. San on 2/7/2024. There were no intraoperative complications and the patient was transferred to the recovery room and later the orthopedic unit in stable condition.      Today she is seen to review vital signs, labs, routine visit. She denied chest pain shortness of breath cough congestion constipation or diarrhea.  Right hip incision healing well clean dry and intact open to air.  Last hemoglobin 8.5.  She does report intermittent dizziness however denied today.  She will have a recheck labs in the a.m.  Weights reviewed she is up 1.6 pounds over the last week.    ALLERGIES:Patient has no known allergies.    PAST MEDICAL HISTORY:   Past Medical History:   Diagnosis Date    Acute renal insufficiency 06/22/2023    Arthritis     Atrial fibrillation (H)     Chronic heart failure with preserved ejection fraction (H) 02/13/2023    Congestive heart failure (H)     Depressive disorder     Hyperlipidemia     Hypertension     Obese     Primary hypertension 03/27/2023    Pulmonary hypertension (H)     Thyroid disease        PAST SURGICAL HISTORY:   has a past surgical history that includes Hysterectomy; c/section, classical; Cholecystectomy; and Arthroplasty hip (Right, 2/7/2024).    FAMILY HISTORY: family history is not on file.    SOCIAL HISTORY:  reports that she quit smoking about 21 years ago. Her smoking use included cigarettes. She started smoking about 34 years ago. She has a 20 pack-year smoking history.  "She does not have any smokeless tobacco history on file. She reports current drug use. She reports that she does not drink alcohol.    ROS:  Constitutional: Negative for activity change, appetite change, fatigue and fever.   HENT: Negative for congestion.    Respiratory: Negative for cough, shortness of breath and wheezing.    Cardiovascular: Negative for chest pain and leg swelling.   Gastrointestinal: Negative for abdominal distention, abdominal pain, constipation, diarrhea and nausea.   Genitourinary: Negative for dysuria.   Musculoskeletal: Negative for arthralgia. Negative for back pain.   Skin: Negative for color change and wound. Surgical incision right hip clean dry and intact open to air  Neurological: Negative for dizziness.   Psychiatric/Behavioral: Negative for agitation, behavioral problems and confusion.     Physical Exam:  Constitutional:       Appearance: Patient is well-developed.   HENT:      Head: Normocephalic.   Eyes:      Conjunctiva/sclera: Conjunctivae normal.   Neck:      Musculoskeletal: Normal range of motion.   Cardiovascular:      Rate and Rhythm: Normal rate and regular rhythm.      Heart sounds: Normal heart sounds. No murmur.   Pulmonary:      Effort: No respiratory distress.      Breath sounds: Normal breath sounds. No wheezing or rales.   Abdominal:      General: Bowel sounds are normal. There is no distension.      Palpations: Abdomen is soft.      Tenderness: There is no abdominal tenderness.   Musculoskeletal:       Normal range of motion.   Dermabond dressing right hip   Skin:General:        Skin is warm.   Neurological:         Mental Status: Patient is alert and oriented to person, place, and time.   Psychiatric:         Behavior: Behavior normal.     Vitals:BP (!) 160/68   Pulse 62   Temp 97.7  F (36.5  C)   Resp 18   Ht 1.626 m (5' 4\")   Wt 75.3 kg (166 lb)   SpO2 96%   BMI 28.49 kg/m   and Body mass index is 28.49 kg/m .    Lab/Diagnostic data:   No results found for " this or any previous visit (from the past 240 hour(s)).         Review of your medicines            Accurate as of March 4, 2024  2:45 PM. If you have any questions, ask your nurse or doctor.                CONTINUE these medicines which have NOT CHANGED        Dose / Directions   acetaminophen 500 MG tablet  Commonly known as: TYLENOL      Dose: 1,000 mg  Take 1,000 mg by mouth 3 times daily  Refills: 0     allopurinol 100 MG tablet  Commonly known as: ZYLOPRIM      Dose: 200 mg  Take 200 mg by mouth every evening  Refills: 0     aspirin 81 MG EC tablet  Used for: Primary osteoarthritis of right hip      Dose: 81 mg  Take 1 tablet (81 mg) by mouth 2 times daily  Quantity: 60 tablet  Refills: 0     atorvastatin 20 MG tablet  Commonly known as: LIPITOR  Used for: Acute heart failure with preserved ejection fraction (HFpEF) (H)      Dose: 20 mg  Take 1 tablet (20 mg) by mouth every evening  Quantity: 30 tablet  Refills: 0     FLUoxetine 20 MG capsule  Commonly known as: PROzac      Dose: 20 mg  Take 20 mg by mouth daily  Refills: 0     furosemide 40 MG tablet  Commonly known as: LASIX  Used for: Acute heart failure with preserved ejection fraction (HFpEF) (H)      Dose: 40 mg  Take 1 tablet (40 mg) by mouth 2 times daily  Quantity: 60 tablet  Refills: 0     gabapentin 300 MG capsule  Commonly known as: NEURONTIN  Used for: Neuropathy      Dose: 600 mg  Take 2 capsules (600 mg) by mouth At Bedtime  Quantity: 30 capsule  Refills: 0     hydroxychloroquine 200 MG tablet  Commonly known as: PLAQUENIL      Dose: 200 mg  Take 200 mg by mouth 2 times daily  Refills: 0     hydrOXYzine HCl 10 MG tablet  Commonly known as: ATARAX  Used for: Primary osteoarthritis of right hip      Dose: 10 mg  Take 1 tablet (10 mg) by mouth every 6 hours as needed for itching or anxiety (with pain, moderate pain)  Quantity: 30 tablet  Refills: 0     levothyroxine 25 MCG tablet  Commonly known as: SYNTHROID/LEVOTHROID  Used for: Hypothyroidism,  unspecified type      Dose: 25 mcg  Take 1 tablet (25 mcg) by mouth every morning (before breakfast)  Quantity: 30 tablet  Refills: 3     methocarbamol 500 MG tablet  Commonly known as: ROBAXIN  Used for: Primary osteoarthritis of right hip      Dose: 500 mg  Take 1 tablet (500 mg) by mouth 4 times daily  Quantity: 28 tablet  Refills: 0     nystatin 146482 UNIT/GM external powder  Commonly known as: MYCOSTATIN      Apply topically 2 times daily Until healed and then PRN BID  Refills: 0     oxyCODONE 5 MG tablet  Commonly known as: ROXICODONE  Used for: Primary osteoarthritis of right hip      Dose: 5 mg  Take 1 tablet (5 mg) by mouth every 4 hours as needed for moderate to severe pain Max 6 tabs/day  Quantity: 20 tablet  Refills: 0     polyethylene glycol 17 GM/Dose powder  Commonly known as: MIRALAX      Dose: 17 g  Take 17 g by mouth daily as needed for constipation  Refills: 0     Stool Softener/Laxative 50-8.6 MG tablet  Generic drug: senna-docusate      Dose: 1 tablet  Take 1 tablet by mouth 2 times daily And daily prn  Refills: 0     Vitamin D3 25 mcg (1000 units) tablet  Commonly known as: CHOLECALCIFEROL      Dose: 1,000 Units  Take 1,000 Units by mouth daily  Refills: 0              ASSESSMENT/PLAN  Encounter Diagnoses   Name Primary?    Acute heart failure with preserved ejection fraction (HFpEF) (H) Yes    Pain management     Physical deconditioning     Anemia, unspecified type        Congestive heart failure Daily weights, Lasix 40 mg twice daily    Physical deconditioning PT OT     Pain management scheduled extra strength Tylenol 3 times daily methocarbamol 500 mg 4 times daily, PRN Oxycodone     Gout on allopurinol     HDL on atorvastatin     Mood disorder continue Prozac    Rheumatoid arthritis continue hydroxychloroquine    Hypothyroidism on levothyroxine,    Anticoagulation management aspirin 81 mg twice daily    Rash right groin nystatin powder twice daily until healed and then changed to as  needed.    Electronically signed by: Neha Roe CNP

## 2024-03-04 NOTE — LETTER
3/4/2024        RE: Dulce Pritchard  [address]  [place] [address]        Melena  Nosebleeds going to see the [facility] GERIATRIC SERVICES  Chief Complaint   Patient presents with     RECHECK     [place] Medical Record Number:  [id number]  Place of Service where encounter took place:  [facility] ([facility]) [[id number]]  Code Status:  unknown     HISTORY:      HPI:  Dulce Pritchard  is 78 year old (1945) undergoing physical and occupational therapy.   She is with past medical history Hypertension, CHF, HDL, Hypothyroidism who  underwent a right total hip arthroplasty with Dr. San on 2/7/2024. There were no intraoperative complications and the patient was transferred to the recovery room and later the orthopedic unit in stable condition.      Today she is seen to review vital signs, labs, routine visit. She denied chest pain shortness of breath cough congestion constipation or diarrhea.  Right hip incision healing well clean dry and intact open to air.  Last hemoglobin 8.5.  She does report intermittent dizziness however denied today.  She will have a recheck labs in the a.m.  Weights reviewed she is up 1.6 pounds over the last week.    ALLERGIES:Patient has no known allergies.    PAST MEDICAL HISTORY:   Past Medical History:   Diagnosis Date     Acute renal insufficiency 06/22/2023     Arthritis      Atrial fibrillation (H)      Chronic heart failure with preserved ejection fraction (H) 02/13/2023     Congestive heart failure (H)      Depressive disorder      Hyperlipidemia      Hypertension      Obese      Primary hypertension 03/27/2023     Pulmonary hypertension (H)      Thyroid disease        PAST SURGICAL HISTORY:   has a past surgical history that includes Hysterectomy; c/section, classical; Cholecystectomy; and Arthroplasty hip (Right, 2/7/2024).    FAMILY HISTORY: family history is not on file.    SOCIAL HISTORY:  reports that she quit smoking about 21 years ago. Her smoking  "use included cigarettes. She started smoking about 34 years ago. She has a 20 pack-year smoking history. She does not have any smokeless tobacco history on file. She reports current drug use. She reports that she does not drink alcohol.    ROS:  Constitutional: Negative for activity change, appetite change, fatigue and fever.   HENT: Negative for congestion.    Respiratory: Negative for cough, shortness of breath and wheezing.    Cardiovascular: Negative for chest pain and leg swelling.   Gastrointestinal: Negative for abdominal distention, abdominal pain, constipation, diarrhea and nausea.   Genitourinary: Negative for dysuria.   Musculoskeletal: Negative for arthralgia. Negative for back pain.   Skin: Negative for color change and wound. Surgical incision right hip clean dry and intact open to air  Neurological: Negative for dizziness.   Psychiatric/Behavioral: Negative for agitation, behavioral problems and confusion.     Physical Exam:  Constitutional:       Appearance: Patient is well-developed.   HENT:      Head: Normocephalic.   Eyes:      Conjunctiva/sclera: Conjunctivae normal.   Neck:      Musculoskeletal: Normal range of motion.   Cardiovascular:      Rate and Rhythm: Normal rate and regular rhythm.      Heart sounds: Normal heart sounds. No murmur.   Pulmonary:      Effort: No respiratory distress.      Breath sounds: Normal breath sounds. No wheezing or rales.   Abdominal:      General: Bowel sounds are normal. There is no distension.      Palpations: Abdomen is soft.      Tenderness: There is no abdominal tenderness.   Musculoskeletal:       Normal range of motion.   Dermabond dressing right hip   Skin:General:        Skin is warm.   Neurological:         Mental Status: Patient is alert and oriented to person, place, and time.   Psychiatric:         Behavior: Behavior normal.     Vitals:BP (!) 160/68   Pulse 62   Temp 97.7  F (36.5  C)   Resp 18   Ht 1.626 m (5' 4\")   Wt 75.3 kg (166 lb)   SpO2 " 96%   BMI 28.49 kg/m   and Body mass index is 28.49 kg/m .    Lab/Diagnostic data:   No results found for this or any previous visit (from the past 240 hour(s)).         Review of your medicines            Accurate as of March 4, 2024  2:45 PM. If you have any questions, ask your nurse or doctor.                CONTINUE these medicines which have NOT CHANGED        Dose / Directions   acetaminophen 500 MG tablet  Commonly known as: TYLENOL      Dose: 1,000 mg  Take 1,000 mg by mouth 3 times daily  Refills: 0     allopurinol 100 MG tablet  Commonly known as: ZYLOPRIM      Dose: 200 mg  Take 200 mg by mouth every evening  Refills: 0     aspirin 81 MG EC tablet  Used for: Primary osteoarthritis of right hip      Dose: 81 mg  Take 1 tablet (81 mg) by mouth 2 times daily  Quantity: 60 tablet  Refills: 0     atorvastatin 20 MG tablet  Commonly known as: LIPITOR  Used for: Acute heart failure with preserved ejection fraction (HFpEF) (H)      Dose: 20 mg  Take 1 tablet (20 mg) by mouth every evening  Quantity: 30 tablet  Refills: 0     FLUoxetine 20 MG capsule  Commonly known as: PROzac      Dose: 20 mg  Take 20 mg by mouth daily  Refills: 0     furosemide 40 MG tablet  Commonly known as: LASIX  Used for: Acute heart failure with preserved ejection fraction (HFpEF) (H)      Dose: 40 mg  Take 1 tablet (40 mg) by mouth 2 times daily  Quantity: 60 tablet  Refills: 0     gabapentin 300 MG capsule  Commonly known as: NEURONTIN  Used for: Neuropathy      Dose: 600 mg  Take 2 capsules (600 mg) by mouth At Bedtime  Quantity: 30 capsule  Refills: 0     hydroxychloroquine 200 MG tablet  Commonly known as: PLAQUENIL      Dose: 200 mg  Take 200 mg by mouth 2 times daily  Refills: 0     hydrOXYzine HCl 10 MG tablet  Commonly known as: ATARAX  Used for: Primary osteoarthritis of right hip      Dose: 10 mg  Take 1 tablet (10 mg) by mouth every 6 hours as needed for itching or anxiety (with pain, moderate pain)  Quantity: 30  tablet  Refills: 0     levothyroxine 25 MCG tablet  Commonly known as: SYNTHROID/LEVOTHROID  Used for: Hypothyroidism, unspecified type      Dose: 25 mcg  Take 1 tablet (25 mcg) by mouth every morning (before breakfast)  Quantity: 30 tablet  Refills: 3     methocarbamol 500 MG tablet  Commonly known as: ROBAXIN  Used for: Primary osteoarthritis of right hip      Dose: 500 mg  Take 1 tablet (500 mg) by mouth 4 times daily  Quantity: 28 tablet  Refills: 0     nystatin 069219 UNIT/GM external powder  Commonly known as: MYCOSTATIN      Apply topically 2 times daily Until healed and then PRN BID  Refills: 0     oxyCODONE 5 MG tablet  Commonly known as: ROXICODONE  Used for: Primary osteoarthritis of right hip      Dose: 5 mg  Take 1 tablet (5 mg) by mouth every 4 hours as needed for moderate to severe pain Max 6 tabs/day  Quantity: 20 tablet  Refills: 0     polyethylene glycol 17 GM/Dose powder  Commonly known as: MIRALAX      Dose: 17 g  Take 17 g by mouth daily as needed for constipation  Refills: 0     Stool Softener/Laxative 50-8.6 MG tablet  Generic drug: senna-docusate      Dose: 1 tablet  Take 1 tablet by mouth 2 times daily And daily prn  Refills: 0     Vitamin D3 25 mcg (1000 units) tablet  Commonly known as: CHOLECALCIFEROL      Dose: 1,000 Units  Take 1,000 Units by mouth daily  Refills: 0              ASSESSMENT/PLAN  Encounter Diagnoses   Name Primary?     Acute heart failure with preserved ejection fraction (HFpEF) (H) Yes     Pain management      Physical deconditioning      Anemia, unspecified type        Congestive heart failure Daily weights, Lasix 40 mg twice daily    Physical deconditioning PT OT     Pain management scheduled extra strength Tylenol 3 times daily methocarbamol 500 mg 4 times daily, PRN Oxycodone     Gout on allopurinol     HDL on atorvastatin     Mood disorder continue Prozac    Rheumatoid arthritis continue hydroxychloroquine    Hypothyroidism on levothyroxine,    Anticoagulation  management aspirin 81 mg twice daily    Rash right groin nystatin powder twice daily until healed and then changed to as needed.    Electronically signed by: Neha Roe CNP       Sincerely,        Neha Roe CNP

## 2024-03-05 ENCOUNTER — TELEPHONE (OUTPATIENT)
Dept: GERIATRICS | Facility: CLINIC | Age: 79
End: 2024-03-05

## 2024-03-05 ENCOUNTER — DISCHARGE SUMMARY NURSING HOME (OUTPATIENT)
Dept: GERIATRICS | Facility: CLINIC | Age: 79
End: 2024-03-05
Payer: COMMERCIAL

## 2024-03-05 VITALS
OXYGEN SATURATION: 96 % | WEIGHT: 185.6 LBS | RESPIRATION RATE: 19 BRPM | BODY MASS INDEX: 31.69 KG/M2 | TEMPERATURE: 97.7 F | HEIGHT: 64 IN | SYSTOLIC BLOOD PRESSURE: 160 MMHG | HEART RATE: 62 BPM | DIASTOLIC BLOOD PRESSURE: 68 MMHG

## 2024-03-05 DIAGNOSIS — D64.9 ANEMIA, UNSPECIFIED TYPE: ICD-10-CM

## 2024-03-05 DIAGNOSIS — I50.31 ACUTE HEART FAILURE WITH PRESERVED EJECTION FRACTION (HFPEF) (H): Primary | ICD-10-CM

## 2024-03-05 DIAGNOSIS — Z96.641 S/P TOTAL RIGHT HIP ARTHROPLASTY: ICD-10-CM

## 2024-03-05 DIAGNOSIS — R52 PAIN MANAGEMENT: ICD-10-CM

## 2024-03-05 LAB
BASOPHILS # BLD AUTO: 0 10E3/UL (ref 0–0.2)
BASOPHILS NFR BLD AUTO: 1 %
EOSINOPHIL # BLD AUTO: 0.2 10E3/UL (ref 0–0.7)
EOSINOPHIL NFR BLD AUTO: 4 %
ERYTHROCYTE [DISTWIDTH] IN BLOOD BY AUTOMATED COUNT: 14.5 % (ref 10–15)
HCT VFR BLD AUTO: 28.9 % (ref 35–47)
HGB BLD-MCNC: 8.8 G/DL (ref 11.7–15.7)
IMM GRANULOCYTES # BLD: 0 10E3/UL
IMM GRANULOCYTES NFR BLD: 0 %
LYMPHOCYTES # BLD AUTO: 0.6 10E3/UL (ref 0.8–5.3)
LYMPHOCYTES NFR BLD AUTO: 18 %
MCH RBC QN AUTO: 31.7 PG (ref 26.5–33)
MCHC RBC AUTO-ENTMCNC: 30.4 G/DL (ref 31.5–36.5)
MCV RBC AUTO: 104 FL (ref 78–100)
MONOCYTES # BLD AUTO: 0.3 10E3/UL (ref 0–1.3)
MONOCYTES NFR BLD AUTO: 10 %
NEUTROPHILS # BLD AUTO: 2.4 10E3/UL (ref 1.6–8.3)
NEUTROPHILS NFR BLD AUTO: 67 %
NRBC # BLD AUTO: 0 10E3/UL
NRBC BLD AUTO-RTO: 0 /100
PLATELET # BLD AUTO: 101 10E3/UL (ref 150–450)
RBC # BLD AUTO: 2.78 10E6/UL (ref 3.8–5.2)
WBC # BLD AUTO: 3.5 10E3/UL (ref 4–11)

## 2024-03-05 PROCEDURE — P9604 ONE-WAY ALLOW PRORATED TRIP: HCPCS | Mod: ORL | Performed by: NURSE PRACTITIONER

## 2024-03-05 PROCEDURE — 99316 NF DSCHRG MGMT 30 MIN+: CPT | Performed by: NURSE PRACTITIONER

## 2024-03-05 PROCEDURE — 85025 COMPLETE CBC W/AUTO DIFF WBC: CPT | Mod: ORL | Performed by: NURSE PRACTITIONER

## 2024-03-05 PROCEDURE — 36415 COLL VENOUS BLD VENIPUNCTURE: CPT | Mod: ORL | Performed by: NURSE PRACTITIONER

## 2024-03-05 NOTE — PROGRESS NOTES
Melena  Nosebleeds going to see the [facility] GERIATRIC SERVICES  Chief Complaint   Patient presents with    Discharge Summary [facility] Medical Record Number:  [id number]  Place of Service where encounter took place:  [facility] ([facility]) [[id number]]  Code Status:  unknown     HISTORY:      HPI:  Dulce Pritchard  is 78 year old (1945) undergoing physical and occupational therapy.   She is with past medical history Hypertension, CHF, HDL, Hypothyroidism who  underwent a right total hip arthroplasty with Dr. San on 2/7/2024. There were no intraoperative complications and the patient was transferred to the recovery room and later the orthopedic unit in stable condition.      Today she is seen to review vital signs, labs, routine visit.  And a face-to-face for discharge.  She will discharge to home on 3/7/2024 with current medications and treatments.  She will have home care services PT OT [place] health aide.  She denied chest pain shortness of breath cough congestion constipation or diarrhea.  Right hip incision healing well clean dry and intact open to air.  Last hemoglobin 8.5.  And she does have a lab pending for today.    Weights reviewed she is up 1.6 pounds over the last week.    ALLERGIES:Patient has no known allergies.    PAST MEDICAL HISTORY:   Past Medical History:   Diagnosis Date    Acute renal insufficiency 06/22/2023    Arthritis     Atrial fibrillation (H)     Chronic heart failure with preserved ejection fraction (H) 02/13/2023    Congestive heart failure (H)     Depressive disorder     Hyperlipidemia     Hypertension     Obese     Primary hypertension 03/27/2023    Pulmonary hypertension (H)     Thyroid disease        PAST SURGICAL HISTORY:   has a past surgical history that includes Hysterectomy; c/section, classical; Cholecystectomy; and Arthroplasty hip (Right, 2/7/2024).    FAMILY HISTORY: family history is not on file.    SOCIAL HISTORY:  reports that she quit  smoking about 21 years ago. Her smoking use included cigarettes. She started smoking about 34 years ago. She has a 20 pack-year smoking history. She does not have any smokeless tobacco history on file. She reports current drug use. She reports that she does not drink alcohol.    ROS:  Constitutional: Negative for activity change, appetite change, fatigue and fever.   HENT: Negative for congestion.    Respiratory: Negative for cough, shortness of breath and wheezing.    Cardiovascular: Negative for chest pain and leg swelling.   Gastrointestinal: Negative for abdominal distention, abdominal pain, constipation, diarrhea and nausea.   Genitourinary: Negative for dysuria.   Musculoskeletal: Negative for arthralgia. Negative for back pain.   Skin: Negative for color change and wound. Surgical incision right hip clean dry and intact open to air  Neurological: Negative for dizziness.   Psychiatric/Behavioral: Negative for agitation, behavioral problems and confusion.     Physical Exam:  Constitutional:       Appearance: Patient is well-developed.   HENT:      Head: Normocephalic.   Eyes:      Conjunctiva/sclera: Conjunctivae normal.   Neck:      Musculoskeletal: Normal range of motion.   Cardiovascular:      Rate and Rhythm: Normal rate and regular rhythm.      Heart sounds: Normal heart sounds. No murmur.   Pulmonary:      Effort: No respiratory distress.      Breath sounds: Normal breath sounds. No wheezing or rales.   Abdominal:      General: Bowel sounds are normal. There is no distension.      Palpations: Abdomen is soft.      Tenderness: There is no abdominal tenderness.   Musculoskeletal:       Normal range of motion.   Dermabond dressing right hip   Skin:General:        Skin is warm.   Neurological:         Mental Status: Patient is alert and oriented to person, place, and time.   Psychiatric:         Behavior: Behavior normal.     Vitals:BP (!) 160/68   Pulse 62   Temp 97.7  F (36.5  C)   Resp 19   Ht 1.626 m  "(5' 4\")   Wt 84.2 kg (185 lb 9.6 oz)   SpO2 96%   BMI 31.86 kg/m   and Body mass index is 31.86 kg/m .    Lab/Diagnostic data:   No results found for this or any previous visit (from the past 240 hour(s)).         Review of your medicines            Accurate as of March 5, 2024  9:01 AM. If you have any questions, ask your nurse or doctor.                CONTINUE these medicines which have NOT CHANGED        Dose / Directions   acetaminophen 500 MG tablet  Commonly known as: TYLENOL      Dose: 1,000 mg  Take 1,000 mg by mouth 3 times daily  Refills: 0     allopurinol 100 MG tablet  Commonly known as: ZYLOPRIM      Dose: 200 mg  Take 200 mg by mouth every evening  Refills: 0     aspirin 81 MG EC tablet  Used for: Primary osteoarthritis of right hip      Dose: 81 mg  Take 1 tablet (81 mg) by mouth 2 times daily  Quantity: 60 tablet  Refills: 0     atorvastatin 20 MG tablet  Commonly known as: LIPITOR  Used for: Acute heart failure with preserved ejection fraction (HFpEF) (H)      Dose: 20 mg  Take 1 tablet (20 mg) by mouth every evening  Quantity: 30 tablet  Refills: 0     FLUoxetine 20 MG capsule  Commonly known as: PROzac      Dose: 20 mg  Take 20 mg by mouth daily  Refills: 0     furosemide 40 MG tablet  Commonly known as: LASIX  Used for: Acute heart failure with preserved ejection fraction (HFpEF) (H)      Dose: 40 mg  Take 1 tablet (40 mg) by mouth 2 times daily  Quantity: 60 tablet  Refills: 0     gabapentin 300 MG capsule  Commonly known as: NEURONTIN  Used for: Neuropathy      Dose: 600 mg  Take 2 capsules (600 mg) by mouth At Bedtime  Quantity: 30 capsule  Refills: 0     hydroxychloroquine 200 MG tablet  Commonly known as: PLAQUENIL      Dose: 200 mg  Take 200 mg by mouth 2 times daily  Refills: 0     hydrOXYzine HCl 10 MG tablet  Commonly known as: ATARAX  Used for: Primary osteoarthritis of right hip      Dose: 10 mg  Take 1 tablet (10 mg) by mouth every 6 hours as needed for itching or anxiety (with " pain, moderate pain)  Quantity: 30 tablet  Refills: 0     levothyroxine 25 MCG tablet  Commonly known as: SYNTHROID/LEVOTHROID  Used for: Hypothyroidism, unspecified type      Dose: 25 mcg  Take 1 tablet (25 mcg) by mouth every morning (before breakfast)  Quantity: 30 tablet  Refills: 3     methocarbamol 500 MG tablet  Commonly known as: ROBAXIN  Used for: Primary osteoarthritis of right hip      Dose: 500 mg  Take 1 tablet (500 mg) by mouth 4 times daily  Quantity: 28 tablet  Refills: 0     nystatin 735487 UNIT/GM external powder  Commonly known as: MYCOSTATIN      Apply topically 2 times daily Until healed and then PRN BID  Refills: 0     oxyCODONE 5 MG tablet  Commonly known as: ROXICODONE  Used for: Primary osteoarthritis of right hip      Dose: 5 mg  Take 1 tablet (5 mg) by mouth every 4 hours as needed for moderate to severe pain Max 6 tabs/day  Quantity: 20 tablet  Refills: 0     polyethylene glycol 17 GM/Dose powder  Commonly known as: MIRALAX      Dose: 17 g  Take 17 g by mouth daily as needed for constipation  Refills: 0     Stool Softener/Laxative 50-8.6 MG tablet  Generic drug: senna-docusate      Dose: 1 tablet  Take 1 tablet by mouth 2 times daily And daily prn  Refills: 0     Vitamin D3 25 mcg (1000 units) tablet  Commonly known as: CHOLECALCIFEROL      Dose: 1,000 Units  Take 1,000 Units by mouth daily  Refills: 0              ASSESSMENT/PLAN  Encounter Diagnoses   Name Primary?    Acute heart failure with preserved ejection fraction (HFpEF) (H) Yes    S/P total right hip arthroplasty     Pain management     Anemia, unspecified type          Congestive heart failure Daily weights, Lasix 40 mg twice daily    Physical deconditioning she will have home care services PT OT home health aide    Pain management scheduled extra strength Tylenol 3 times daily methocarbamol 500 mg 4 times daily, PRN Oxycodone     Gout on allopurinol     HDL on atorvastatin     Mood disorder continue Prozac    Rheumatoid  arthritis continue hydroxychloroquine    Hypothyroidism on levothyroxine,    Anticoagulation management aspirin 81 mg twice daily    Rash right groin nystatin powder twice daily until healed and then changed to as needed.      DISCHARGE PLAN/FACE TO FACE:  I certify that services are/were furnished while this patient was under the care of a physician and that a physician or an allowed non-physician practitioner (NPP), had a face-to-face encounter that meets the physician face-to-face encounter requirements. The encounter was in whole, or in part, related to the primary reason for home health. The patient is confined to his/her home and needs intermittent skilled nursing, physical therapy, speech-language pathology, or the continued need for occupational therapy. A plan of care has been established by a physician and is periodically reviewed by a physician.  Date of Face-to-Face Encounter: 3/5/24    I certify that, based on my findings, the following services are medically necessary home health services: PT OT home health aide    My clinical findings support the need for the above skilled services because: PT OT for continued strength and endurance, home health aide to assist with activities of daily living    This patient is homebound because: She is deconditioned related to recent right hip arthroplasty we will continue home therapy for strengthening    The patient is, or has been, under my care and I have initiated the establishment of the plan of care. This patient will be followed by a physician who will periodically review the plan of care.    Schedule follow up visit with primary care provider within 7 days to reestablish care.    Electronically signed by: Neha Roe CNP

## 2024-03-05 NOTE — TELEPHONE ENCOUNTER
[facility] Geriatrics Lab Note     Provider: CARYL Benz  Facility: [facility]  Facility Type:  TCU    No Known Allergies    Labs Reviewed by provider: Heme 1     Verbal Order/Direction given by Provider: Check Heme 1 on 3/11/24.      Provider giving Order:  CARYL Benz    Verbal Order given to: Jhoan([phone])    Elio Salazar RN

## 2024-03-05 NOTE — LETTER
3/5/2024        RE: Dulce Pritchard  [address]  [place] [address]        Melena  Nosebleeds going to see the [facility] GERIATRIC SERVICES  Chief Complaint   Patient presents with     Discharge Summary [facility] Medical Record Number:  [id number]  Place of Service where encounter took place:  [facility] ([facility]) [[id number]]  Code Status:  unknown     HISTORY:      HPI:  Dulce Pritchard  is 78 year old (1945) undergoing physical and occupational therapy.   She is with past medical history Hypertension, CHF, HDL, Hypothyroidism who  underwent a right total hip arthroplasty with Dr. San on 2/7/2024. There were no intraoperative complications and the patient was transferred to the recovery room and later the orthopedic unit in stable condition.      Today she is seen to review vital signs, labs, routine visit.  And a face-to-face for discharge.  She will discharge to home on 3/7/2024 with current medications and treatments.  She will have home care services PT OT home health aide.  She denied chest pain shortness of breath cough congestion constipation or diarrhea.  Right hip incision healing well clean dry and intact open to air.  Last hemoglobin 8.5.  And she does have a lab pending for today.    Weights reviewed she is up 1.6 pounds over the last week.    ALLERGIES:Patient has no known allergies.    PAST MEDICAL HISTORY:   Past Medical History:   Diagnosis Date     Acute renal insufficiency 06/22/2023     Arthritis      Atrial fibrillation (H)      Chronic heart failure with preserved ejection fraction (H) 02/13/2023     Congestive heart failure (H)      Depressive disorder      Hyperlipidemia      Hypertension      Obese      Primary hypertension 03/27/2023     Pulmonary hypertension (H)      Thyroid disease        PAST SURGICAL HISTORY:   has a past surgical history that includes Hysterectomy; c/section, classical; Cholecystectomy; and Arthroplasty hip (Right,  2/7/2024).    FAMILY HISTORY: family history is not on file.    SOCIAL HISTORY:  reports that she quit smoking about 21 years ago. Her smoking use included cigarettes. She started smoking about 34 years ago. She has a 20 pack-year smoking history. She does not have any smokeless tobacco history on file. She reports current drug use. She reports that she does not drink alcohol.    ROS:  Constitutional: Negative for activity change, appetite change, fatigue and fever.   HENT: Negative for congestion.    Respiratory: Negative for cough, shortness of breath and wheezing.    Cardiovascular: Negative for chest pain and leg swelling.   Gastrointestinal: Negative for abdominal distention, abdominal pain, constipation, diarrhea and nausea.   Genitourinary: Negative for dysuria.   Musculoskeletal: Negative for arthralgia. Negative for back pain.   Skin: Negative for color change and wound. Surgical incision right hip clean dry and intact open to air  Neurological: Negative for dizziness.   Psychiatric/Behavioral: Negative for agitation, behavioral problems and confusion.     Physical Exam:  Constitutional:       Appearance: Patient is well-developed.   HENT:      Head: Normocephalic.   Eyes:      Conjunctiva/sclera: Conjunctivae normal.   Neck:      Musculoskeletal: Normal range of motion.   Cardiovascular:      Rate and Rhythm: Normal rate and regular rhythm.      Heart sounds: Normal heart sounds. No murmur.   Pulmonary:      Effort: No respiratory distress.      Breath sounds: Normal breath sounds. No wheezing or rales.   Abdominal:      General: Bowel sounds are normal. There is no distension.      Palpations: Abdomen is soft.      Tenderness: There is no abdominal tenderness.   Musculoskeletal:       Normal range of motion.   Dermabond dressing right hip   Skin:General:        Skin is warm.   Neurological:         Mental Status: Patient is alert and oriented to person, place, and time.   Psychiatric:         Behavior:  "Behavior normal.     Vitals:BP (!) 160/68   Pulse 62   Temp 97.7  F (36.5  C)   Resp 19   Ht 1.626 m (5' 4\")   Wt 84.2 kg (185 lb 9.6 oz)   SpO2 96%   BMI 31.86 kg/m   and Body mass index is 31.86 kg/m .    Lab/Diagnostic data:   No results found for this or any previous visit (from the past 240 hour(s)).         Review of your medicines            Accurate as of March 5, 2024  9:01 AM. If you have any questions, ask your nurse or doctor.                CONTINUE these medicines which have NOT CHANGED        Dose / Directions   acetaminophen 500 MG tablet  Commonly known as: TYLENOL      Dose: 1,000 mg  Take 1,000 mg by mouth 3 times daily  Refills: 0     allopurinol 100 MG tablet  Commonly known as: ZYLOPRIM      Dose: 200 mg  Take 200 mg by mouth every evening  Refills: 0     aspirin 81 MG EC tablet  Used for: Primary osteoarthritis of right hip      Dose: 81 mg  Take 1 tablet (81 mg) by mouth 2 times daily  Quantity: 60 tablet  Refills: 0     atorvastatin 20 MG tablet  Commonly known as: LIPITOR  Used for: Acute heart failure with preserved ejection fraction (HFpEF) (H)      Dose: 20 mg  Take 1 tablet (20 mg) by mouth every evening  Quantity: 30 tablet  Refills: 0     FLUoxetine 20 MG capsule  Commonly known as: PROzac      Dose: 20 mg  Take 20 mg by mouth daily  Refills: 0     furosemide 40 MG tablet  Commonly known as: LASIX  Used for: Acute heart failure with preserved ejection fraction (HFpEF) (H)      Dose: 40 mg  Take 1 tablet (40 mg) by mouth 2 times daily  Quantity: 60 tablet  Refills: 0     gabapentin 300 MG capsule  Commonly known as: NEURONTIN  Used for: Neuropathy      Dose: 600 mg  Take 2 capsules (600 mg) by mouth At Bedtime  Quantity: 30 capsule  Refills: 0     hydroxychloroquine 200 MG tablet  Commonly known as: PLAQUENIL      Dose: 200 mg  Take 200 mg by mouth 2 times daily  Refills: 0     hydrOXYzine HCl 10 MG tablet  Commonly known as: ATARAX  Used for: Primary osteoarthritis of right " hip      Dose: 10 mg  Take 1 tablet (10 mg) by mouth every 6 hours as needed for itching or anxiety (with pain, moderate pain)  Quantity: 30 tablet  Refills: 0     levothyroxine 25 MCG tablet  Commonly known as: SYNTHROID/LEVOTHROID  Used for: Hypothyroidism, unspecified type      Dose: 25 mcg  Take 1 tablet (25 mcg) by mouth every morning (before breakfast)  Quantity: 30 tablet  Refills: 3     methocarbamol 500 MG tablet  Commonly known as: ROBAXIN  Used for: Primary osteoarthritis of right hip      Dose: 500 mg  Take 1 tablet (500 mg) by mouth 4 times daily  Quantity: 28 tablet  Refills: 0     nystatin 055021 UNIT/GM external powder  Commonly known as: MYCOSTATIN      Apply topically 2 times daily Until healed and then PRN BID  Refills: 0     oxyCODONE 5 MG tablet  Commonly known as: ROXICODONE  Used for: Primary osteoarthritis of right hip      Dose: 5 mg  Take 1 tablet (5 mg) by mouth every 4 hours as needed for moderate to severe pain Max 6 tabs/day  Quantity: 20 tablet  Refills: 0     polyethylene glycol 17 GM/Dose powder  Commonly known as: MIRALAX      Dose: 17 g  Take 17 g by mouth daily as needed for constipation  Refills: 0     Stool Softener/Laxative 50-8.6 MG tablet  Generic drug: senna-docusate      Dose: 1 tablet  Take 1 tablet by mouth 2 times daily And daily prn  Refills: 0     Vitamin D3 25 mcg (1000 units) tablet  Commonly known as: CHOLECALCIFEROL      Dose: 1,000 Units  Take 1,000 Units by mouth daily  Refills: 0              ASSESSMENT/PLAN  Encounter Diagnoses   Name Primary?     Acute heart failure with preserved ejection fraction (HFpEF) (H) Yes     S/P total right hip arthroplasty      Pain management      Anemia, unspecified type          Congestive heart failure Daily weights, Lasix 40 mg twice daily    Physical deconditioning she will have home care services PT OT home health aide    Pain management scheduled extra strength Tylenol 3 times daily methocarbamol 500 mg 4 times daily, PRN  Oxycodone     Gout on allopurinol     HDL on atorvastatin     Mood disorder continue Prozac    Rheumatoid arthritis continue hydroxychloroquine    Hypothyroidism on levothyroxine,    Anticoagulation management aspirin 81 mg twice daily    Rash right groin nystatin powder twice daily until healed and then changed to as needed.      DISCHARGE PLAN/FACE TO FACE:  I certify that services are/were furnished while this patient was under the care of a physician and that a physician or an allowed non-physician practitioner (NPP), had a face-to-face encounter that meets the physician face-to-face encounter requirements. The encounter was in whole, or in part, related to the primary reason for home health. The patient is confined to his/her home and needs intermittent skilled nursing, physical therapy, speech-language pathology, or the continued need for occupational therapy. A plan of care has been established by a physician and is periodically reviewed by a physician.  Date of Face-to-Face Encounter: 3/5/24    I certify that, based on my findings, the following services are medically necessary home health services: PT OT home health aide    My clinical findings support the need for the above skilled services because: PT OT for continued strength and endurance, home health aide to assist with activities of daily living    This patient is homebound because: She is deconditioned related to recent right hip arthroplasty we will continue home therapy for strengthening    The patient is, or has been, under my care and I have initiated the establishment of the plan of care. This patient will be followed by a physician who will periodically review the plan of care.    Schedule follow up visit with primary care provider within 7 days to reestablish care.    Electronically signed by: Neha Roe CNP         Sincerely,        Neha Roe CNP

## 2024-03-15 ENCOUNTER — TRANSFERRED RECORDS (OUTPATIENT)
Dept: HEALTH INFORMATION MANAGEMENT | Facility: CLINIC | Age: 79
End: 2024-03-15
Payer: COMMERCIAL

## 2024-04-01 ENCOUNTER — OFFICE VISIT (OUTPATIENT)
Dept: CARDIOLOGY | Facility: CLINIC | Age: 79
End: 2024-04-01
Attending: NURSE PRACTITIONER
Payer: COMMERCIAL

## 2024-04-01 VITALS
OXYGEN SATURATION: 94 % | DIASTOLIC BLOOD PRESSURE: 64 MMHG | RESPIRATION RATE: 16 BRPM | SYSTOLIC BLOOD PRESSURE: 136 MMHG | HEART RATE: 55 BPM

## 2024-04-01 DIAGNOSIS — N18.31 STAGE 3A CHRONIC KIDNEY DISEASE (CKD) (H): ICD-10-CM

## 2024-04-01 DIAGNOSIS — I50.32 CHRONIC HEART FAILURE WITH PRESERVED EJECTION FRACTION (H): Primary | ICD-10-CM

## 2024-04-01 DIAGNOSIS — I10 PRIMARY HYPERTENSION: ICD-10-CM

## 2024-04-01 PROCEDURE — 99214 OFFICE O/P EST MOD 30 MIN: CPT | Performed by: NURSE PRACTITIONER

## 2024-04-01 NOTE — PATIENT INSTRUCTIONS
Dulce Pritchard,    It was a pleasure to see you today at [facility].     My recommendations after this visit include:  - Please follow up with Dr Wooten in 4 months   - Please follow up with Bev Kamara in 1 year  - Continue current medications    Bev Kamara, CNP

## 2024-04-01 NOTE — LETTER
4/1/2024    Colin Christie MD  [address]  [place] [address]    RE: Dulce RIVAS Macho       Dear Colleague,     I had the pleasure of seeing Dulce Pritchard in the [facility].        Assessment/Recommendations   Assessment:    1.  Heart failure with preserved ejection fraction, NYHA class II: Compensated.  Denies any acute heart failure symptoms.  She continues open arms to help her with low-sodium.  2.  Hypertension: Blood pressure 136/64  3.  CKD stage IIIa: She had labs mid February which were stable    Plan:  1.  Continue current medications  2.  Continue monitoring daily weights and following a low-sodium diet  3.  Continue working with therapy    Dulce Pritchard will follow up with Dr. Wooten in 4 months and in the heart failure clinic in 1 year or sooner if needed.     History of Present Illness/Subjective    Ms. Dulce Pritchard is a 78 year old female seen at [facility] heart failure clinic today for continued follow-up.  Her daughter accompanies her today.  She follows up for heart failure with preserved ejection fraction.  She had a Lexiscan in August 2024 which was negative for inducible myocardial ischemia or infarction.  She recently had a right hip replacement in February and is doing well.  She has a past medical history significant for hypertension, CKD stage IIIa, and obesity.    Today, she denies any acute heart failure symptoms.  She denies lightheadedness, shortness of breath, orthopnea, PND, palpitations, chest pain, abdominal fullness/bloating, and lower extremity edema.      She is monitoring home weights which are stable.  She is following a low sodium diet.  She receives open arms     Physical Examination Review of Systems   /64 (BP Location: Left arm, Patient Position: Sitting, Cuff Size: Adult Regular)   Pulse 55   Resp 16   SpO2 94%   There is no height or weight on file to calculate BMI.  Wt Readings from Last 3  Encounters:   03/04/24 84.2 kg (185 lb 9.6 oz)   03/04/24 75.3 kg (166 lb)   02/22/24 81.6 kg (180 lb)       General Appearance:   no acute distress   ENT/Mouth: No abnormalities   EYES:  no scleral icterus, normal conjunctivae   Neck: no thyromegaly   Chest/Lungs:   lungs are clear to auscultation, no rales or wheezing, equal chest wall expansion    Cardiovascular:   Regular. Normal first and second heart sounds with no murmurs, rubs, or gallops, no edema bilaterally    Abdomen:  bowel sounds are present   Extremities: no cyanosis or clubbing   Skin: warm   Neurologic: no tremors     Psychiatric: alert and oriented x3    Enc Vitals  BP: 136/64  Pulse: 55  Resp: 16  SpO2: 94 %  Weight:  (pt reported weight of 178lbs 4/1/24)                                         Medical History  Surgical History Family History Social History   Past Medical History:   Diagnosis Date    Acute renal insufficiency 06/22/2023    Arthritis     Atrial fibrillation (H)     Chronic heart failure with preserved ejection fraction (H) 02/13/2023    Chronic kidney disease     Congestive heart failure (H)     Depressive disorder     Hyperlipidemia     Hypertension     Obese     Primary hypertension 03/27/2023    Pulmonary hypertension (H)     Thyroid disease     Past Surgical History:   Procedure Laterality Date    ARTHROPLASTY HIP Right 2/7/2024    Procedure: RIGHT POSTERIOR TOTAL HIP ARTHROPLASTY;  Surgeon: Marco A San MD;  Location: [facility] Main OR    C/SECTION, CLASSICAL      CHOLECYSTECTOMY      HYSTERECTOMY      No family history on file. Social History     Socioeconomic History    Marital status:      Spouse name: Not on file    Number of children: Not on file    Years of education: Not on file    Highest education level: Not on file   Occupational History    Not on file   Tobacco Use    Smoking status: Former     Packs/day: 1.00     Years: 20.00     Additional pack years: 0.00     Total pack years: 20.00     Types:  Cigarettes     Start date:      Quit date:      Years since quittin.2    Smokeless tobacco: Not on file   Vaping Use    Vaping Use: Never used   Substance and Sexual Activity    Alcohol use: Never    Drug use: Yes     Comment: Tramadol 50mg BID    Sexual activity: Not on file   Other Topics Concern    Not on file   Social History Narrative    Not on file     Social Determinants of Health     Financial Resource Strain: Not on file   Food Insecurity: Not on file   Transportation Needs: Not on file   Physical Activity: Not on file   Stress: Not on file   Social Connections: Not on file   Interpersonal Safety: Not on file   Housing Stability: Not on file          Medications  Allergies   Current Outpatient Medications   Medication Sig Dispense Refill    acetaminophen (TYLENOL) 500 MG tablet Take 1,000 mg by mouth 3 times daily      allopurinol (ZYLOPRIM) 100 MG tablet Take 200 mg by mouth every evening      aspirin 81 MG EC tablet Take 1 tablet (81 mg) by mouth 2 times daily 60 tablet 0    atorvastatin (LIPITOR) 20 MG tablet Take 1 tablet (20 mg) by mouth every evening 30 tablet 0    FLUoxetine (PROZAC) 20 MG capsule Take 20 mg by mouth daily      furosemide (LASIX) 40 MG tablet Take 1 tablet (40 mg) by mouth 2 times daily 60 tablet 0    gabapentin (NEURONTIN) 300 MG capsule Take 2 capsules (600 mg) by mouth At Bedtime 30 capsule 0    hydroxychloroquine (PLAQUENIL) 200 MG tablet Take 200 mg by mouth 2 times daily      hydrOXYzine HCl (ATARAX) 10 MG tablet Take 1 tablet (10 mg) by mouth every 6 hours as needed for itching or anxiety (with pain, moderate pain) 30 tablet 0    levothyroxine (SYNTHROID/LEVOTHROID) 25 MCG tablet Take 1 tablet (25 mcg) by mouth every morning (before breakfast) 30 tablet 3    methocarbamol (ROBAXIN) 500 MG tablet Take 1 tablet (500 mg) by mouth 4 times daily 28 tablet 0    nystatin (MYCOSTATIN) 270544 UNIT/GM external powder Apply topically 2 times daily Until healed and then PRN  BID      polyethylene glycol (MIRALAX) 17 GM/Dose powder Take 17 g by mouth daily as needed for constipation      STOOL SOFTENER/LAXATIVE 50-8.6 MG tablet Take 1 tablet by mouth 2 times daily And daily prn      Vitamin D (Cholecalciferol) 25 MCG (1000 UT) TABS Take 1,000 Units by mouth daily      oxyCODONE (ROXICODONE) 5 MG tablet Take 1 tablet (5 mg) by mouth every 4 hours as needed for moderate to severe pain Max 6 tabs/day (Patient not taking: Reported on 4/1/2024) 20 tablet 0    No Known Allergies      Lab Results    Chemistry/lipid CBC Cardiac Enzymes/BNP/TSH/INR   Lab Results   Component Value Date    CHOL 146 04/21/2023    HDL 48 (L) 04/21/2023    TRIG 110 04/21/2023    BUN 24.4 (H) 02/15/2024     02/15/2024    CO2 25 02/15/2024    Lab Results   Component Value Date    WBC 3.5 (L) 03/05/2024    HGB 8.8 (L) 03/05/2024    HCT 28.9 (L) 03/05/2024     (H) 03/05/2024     (L) 03/05/2024    Lab Results   Component Value Date    TROPONINI 0.11 01/31/2023    BNP 1,597 (H) 01/30/2023    TSH 3.20 10/27/2023             This note has been dictated using voice recognition software. Any grammatical, typographical, or context distortions are unintentional and inherent to the software    30 minutes spent on the date of encounter doing chart review, review of outside records, review of test results, interpretation with above tests, patient visit, documentation, and discussion with family.                      Thank you for allowing me to participate in the care of your patient.      Sincerely,     BRE Ramos CNP     [facility]  cc:   BRE Ramos CNP  [address], [address]  [place] [address]

## 2024-04-01 NOTE — PROGRESS NOTES
Assessment/Recommendations   Assessment:    1.  Heart failure with preserved ejection fraction, NYHA class II: Compensated.  Denies any acute heart failure symptoms.  She continues open arms to help her with low-sodium.  2.  Hypertension: Blood pressure 136/64  3.  CKD stage IIIa: She had labs mid February which were stable    Plan:  1.  Continue current medications  2.  Continue monitoring daily weights and following a low-sodium diet  3.  Continue working with therapy    Dulce Pritchard will follow up with Dr. Wooten in 4 months and in the heart failure clinic in 1 year or sooner if needed.     History of Present Illness/Subjective    Ms. Dulce Pritchard is a 78 year old female seen at [facility] heart failure clinic today for continued follow-up.  Her daughter accompanies her today.  She follows up for heart failure with preserved ejection fraction.  She had a Lexiscan in August 2024 which was negative for inducible myocardial ischemia or infarction.  She recently had a right hip replacement in February and is doing well.  She has a past medical history significant for hypertension, CKD stage IIIa, and obesity.    Today, she denies any acute heart failure symptoms.  She denies lightheadedness, shortness of breath, orthopnea, PND, palpitations, chest pain, abdominal fullness/bloating, and lower extremity edema.      She is monitoring home weights which are stable.  She is following a low sodium diet.  She receives open arms     Physical Examination Review of Systems   /64 (BP Location: Left arm, Patient Position: Sitting, Cuff Size: Adult Regular)   Pulse 55   Resp 16   SpO2 94%   There is no height or weight on file to calculate BMI.  Wt Readings from Last 3 Encounters:   03/04/24 84.2 kg (185 lb 9.6 oz)   03/04/24 75.3 kg (166 lb)   02/22/24 81.6 kg (180 lb)       General Appearance:   no acute distress   ENT/Mouth: No abnormalities   EYES:  no scleral icterus, normal conjunctivae    Neck: no thyromegaly   Chest/Lungs:   lungs are clear to auscultation, no rales or wheezing, equal chest wall expansion    Cardiovascular:   Regular. Normal first and second heart sounds with no murmurs, rubs, or gallops, no edema bilaterally    Abdomen:  bowel sounds are present   Extremities: no cyanosis or clubbing   Skin: warm   Neurologic: no tremors     Psychiatric: alert and oriented x3    Enc Vitals  BP: 136/64  Pulse: 55  Resp: 16  SpO2: 94 %  Weight:  (pt reported weight of 178lbs 24)                                         Medical History  Surgical History Family History Social History   Past Medical History:   Diagnosis Date    Acute renal insufficiency 2023    Arthritis     Atrial fibrillation (H)     Chronic heart failure with preserved ejection fraction (H) 2023    Chronic kidney disease     Congestive heart failure (H)     Depressive disorder     Hyperlipidemia     Hypertension     Obese     Primary hypertension 2023    Pulmonary hypertension (H)     Thyroid disease     Past Surgical History:   Procedure Laterality Date    ARTHROPLASTY HIP Right 2024    Procedure: RIGHT POSTERIOR TOTAL HIP ARTHROPLASTY;  Surgeon: Marco A San MD;  Location: [facility] Main OR    C/SECTION, CLASSICAL      CHOLECYSTECTOMY      HYSTERECTOMY      No family history on file. Social History     Socioeconomic History    Marital status:      Spouse name: Not on file    Number of children: Not on file    Years of education: Not on file    Highest education level: Not on file   Occupational History    Not on file   Tobacco Use    Smoking status: Former     Packs/day: 1.00     Years: 20.00     Additional pack years: 0.00     Total pack years: 20.00     Types: Cigarettes     Start date:      Quit date:      Years since quittin.2    Smokeless tobacco: Not on file   Vaping Use    Vaping Use: Never used   Substance and Sexual Activity    Alcohol use: Never    Drug use: Yes      Comment: Tramadol 50mg BID    Sexual activity: Not on file   Other Topics Concern    Not on file   Social History Narrative    Not on file     Social Determinants of Health     Financial Resource Strain: Not on file   Food Insecurity: Not on file   Transportation Needs: Not on file   Physical Activity: Not on file   Stress: Not on file   Social Connections: Not on file   Interpersonal Safety: Not on file   Housing Stability: Not on file          Medications  Allergies   Current Outpatient Medications   Medication Sig Dispense Refill    acetaminophen (TYLENOL) 500 MG tablet Take 1,000 mg by mouth 3 times daily      allopurinol (ZYLOPRIM) 100 MG tablet Take 200 mg by mouth every evening      aspirin 81 MG EC tablet Take 1 tablet (81 mg) by mouth 2 times daily 60 tablet 0    atorvastatin (LIPITOR) 20 MG tablet Take 1 tablet (20 mg) by mouth every evening 30 tablet 0    FLUoxetine (PROZAC) 20 MG capsule Take 20 mg by mouth daily      furosemide (LASIX) 40 MG tablet Take 1 tablet (40 mg) by mouth 2 times daily 60 tablet 0    gabapentin (NEURONTIN) 300 MG capsule Take 2 capsules (600 mg) by mouth At Bedtime 30 capsule 0    hydroxychloroquine (PLAQUENIL) 200 MG tablet Take 200 mg by mouth 2 times daily      hydrOXYzine HCl (ATARAX) 10 MG tablet Take 1 tablet (10 mg) by mouth every 6 hours as needed for itching or anxiety (with pain, moderate pain) 30 tablet 0    levothyroxine (SYNTHROID/LEVOTHROID) 25 MCG tablet Take 1 tablet (25 mcg) by mouth every morning (before breakfast) 30 tablet 3    methocarbamol (ROBAXIN) 500 MG tablet Take 1 tablet (500 mg) by mouth 4 times daily 28 tablet 0    nystatin (MYCOSTATIN) 569520 UNIT/GM external powder Apply topically 2 times daily Until healed and then PRN BID      polyethylene glycol (MIRALAX) 17 GM/Dose powder Take 17 g by mouth daily as needed for constipation      STOOL SOFTENER/LAXATIVE 50-8.6 MG tablet Take 1 tablet by mouth 2 times daily And daily prn      Vitamin D  (Cholecalciferol) 25 MCG (1000 UT) TABS Take 1,000 Units by mouth daily      oxyCODONE (ROXICODONE) 5 MG tablet Take 1 tablet (5 mg) by mouth every 4 hours as needed for moderate to severe pain Max 6 tabs/day (Patient not taking: Reported on 4/1/2024) 20 tablet 0    No Known Allergies      Lab Results    Chemistry/lipid CBC Cardiac Enzymes/BNP/TSH/INR   Lab Results   Component Value Date    CHOL 146 04/21/2023    HDL 48 (L) 04/21/2023    TRIG 110 04/21/2023    BUN 24.4 (H) 02/15/2024     02/15/2024    CO2 25 02/15/2024    Lab Results   Component Value Date    WBC 3.5 (L) 03/05/2024    HGB 8.8 (L) 03/05/2024    HCT 28.9 (L) 03/05/2024     (H) 03/05/2024     (L) 03/05/2024    Lab Results   Component Value Date    TROPONINI 0.11 01/31/2023    BNP 1,597 (H) 01/30/2023    TSH 3.20 10/27/2023             This note has been dictated using voice recognition software. Any grammatical, typographical, or context distortions are unintentional and inherent to the software    30 minutes spent on the date of encounter doing chart review, review of outside records, review of test results, interpretation with above tests, patient visit, documentation, and discussion with family.

## 2024-10-04 ENCOUNTER — LAB REQUISITION (OUTPATIENT)
Dept: LAB | Facility: CLINIC | Age: 79
End: 2024-10-04
Payer: COMMERCIAL

## 2024-10-04 DIAGNOSIS — I13.0 HYPERTENSIVE HEART AND CHRONIC KIDNEY DISEASE WITH HEART FAILURE AND STAGE 1 THROUGH STAGE 4 CHRONIC KIDNEY DISEASE, OR UNSPECIFIED CHRONIC KIDNEY DISEASE (H): ICD-10-CM

## 2024-10-04 PROCEDURE — 80053 COMPREHEN METABOLIC PANEL: CPT | Mod: ORL | Performed by: FAMILY MEDICINE

## 2024-10-05 LAB
ALBUMIN SERPL BCG-MCNC: 4 G/DL (ref 3.5–5.2)
ALP SERPL-CCNC: 119 U/L (ref 40–150)
ALT SERPL W P-5'-P-CCNC: 17 U/L (ref 0–50)
ANION GAP SERPL CALCULATED.3IONS-SCNC: 14 MMOL/L (ref 7–15)
AST SERPL W P-5'-P-CCNC: 34 U/L (ref 0–45)
BILIRUB SERPL-MCNC: 0.4 MG/DL
BUN SERPL-MCNC: 21.7 MG/DL (ref 8–23)
CALCIUM SERPL-MCNC: 9.7 MG/DL (ref 8.8–10.4)
CHLORIDE SERPL-SCNC: 101 MMOL/L (ref 98–107)
CREAT SERPL-MCNC: 1.19 MG/DL (ref 0.51–0.95)
EGFRCR SERPLBLD CKD-EPI 2021: 47 ML/MIN/1.73M2
GLUCOSE SERPL-MCNC: 118 MG/DL (ref 70–99)
HCO3 SERPL-SCNC: 26 MMOL/L (ref 22–29)
POTASSIUM SERPL-SCNC: 4.4 MMOL/L (ref 3.4–5.3)
PROT SERPL-MCNC: 6.8 G/DL (ref 6.4–8.3)
SODIUM SERPL-SCNC: 141 MMOL/L (ref 135–145)

## 2024-10-17 ENCOUNTER — LAB REQUISITION (OUTPATIENT)
Dept: LAB | Facility: CLINIC | Age: 79
End: 2024-10-17
Payer: COMMERCIAL

## 2024-10-17 ENCOUNTER — TRANSFERRED RECORDS (OUTPATIENT)
Dept: HEALTH INFORMATION MANAGEMENT | Facility: CLINIC | Age: 79
End: 2024-10-17

## 2024-10-17 DIAGNOSIS — E03.9 HYPOTHYROIDISM, UNSPECIFIED: ICD-10-CM

## 2024-10-17 DIAGNOSIS — N18.32 CHRONIC KIDNEY DISEASE, STAGE 3B (H): ICD-10-CM

## 2024-10-17 DIAGNOSIS — E78.2 MIXED HYPERLIPIDEMIA: ICD-10-CM

## 2024-10-17 LAB
BASOPHILS # BLD AUTO: 0 10E3/UL (ref 0–0.2)
BASOPHILS NFR BLD AUTO: 1 %
CHOLEST SERPL-MCNC: 126 MG/DL
EOSINOPHIL # BLD AUTO: 0.2 10E3/UL (ref 0–0.7)
EOSINOPHIL NFR BLD AUTO: 5 %
ERYTHROCYTE [DISTWIDTH] IN BLOOD BY AUTOMATED COUNT: 13.7 % (ref 10–15)
FASTING STATUS PATIENT QL REPORTED: NORMAL
HCT VFR BLD AUTO: 37.6 % (ref 35–47)
HDLC SERPL-MCNC: 59 MG/DL
HGB BLD-MCNC: 11.8 G/DL (ref 11.7–15.7)
IMM GRANULOCYTES # BLD: 0 10E3/UL
IMM GRANULOCYTES NFR BLD: 0 %
LDLC SERPL CALC-MCNC: 53 MG/DL
LYMPHOCYTES # BLD AUTO: 0.7 10E3/UL (ref 0.8–5.3)
LYMPHOCYTES NFR BLD AUTO: 19 %
MCH RBC QN AUTO: 31.6 PG (ref 26.5–33)
MCHC RBC AUTO-ENTMCNC: 31.4 G/DL (ref 31.5–36.5)
MCV RBC AUTO: 101 FL (ref 78–100)
MONOCYTES # BLD AUTO: 0.3 10E3/UL (ref 0–1.3)
MONOCYTES NFR BLD AUTO: 8 %
NEUTROPHILS # BLD AUTO: 2.6 10E3/UL (ref 1.6–8.3)
NEUTROPHILS NFR BLD AUTO: 68 %
NONHDLC SERPL-MCNC: 67 MG/DL
NRBC # BLD AUTO: 0 10E3/UL
NRBC BLD AUTO-RTO: 0 /100
PLATELET # BLD AUTO: 81 10E3/UL (ref 150–450)
RBC # BLD AUTO: 3.74 10E6/UL (ref 3.8–5.2)
TRIGL SERPL-MCNC: 70 MG/DL
TSH SERPL DL<=0.005 MIU/L-ACNC: 3.25 UIU/ML (ref 0.3–4.2)
WBC # BLD AUTO: 3.8 10E3/UL (ref 4–11)

## 2024-10-17 PROCEDURE — 85025 COMPLETE CBC W/AUTO DIFF WBC: CPT | Mod: ORL | Performed by: FAMILY MEDICINE

## 2024-10-17 PROCEDURE — 84443 ASSAY THYROID STIM HORMONE: CPT | Mod: ORL | Performed by: FAMILY MEDICINE

## 2024-10-17 PROCEDURE — 80061 LIPID PANEL: CPT | Mod: ORL | Performed by: FAMILY MEDICINE

## 2024-11-14 ENCOUNTER — OFFICE VISIT (OUTPATIENT)
Dept: CARDIOLOGY | Facility: CLINIC | Age: 79
End: 2024-11-14
Payer: COMMERCIAL

## 2024-11-14 VITALS
WEIGHT: 185 LBS | DIASTOLIC BLOOD PRESSURE: 71 MMHG | SYSTOLIC BLOOD PRESSURE: 116 MMHG | BODY MASS INDEX: 31.76 KG/M2 | OXYGEN SATURATION: 98 % | HEART RATE: 54 BPM

## 2024-11-14 DIAGNOSIS — I34.0 MITRAL VALVE INSUFFICIENCY, UNSPECIFIED ETIOLOGY: ICD-10-CM

## 2024-11-14 DIAGNOSIS — I10 HYPERTENSION, UNSPECIFIED TYPE: Primary | ICD-10-CM

## 2024-11-14 DIAGNOSIS — E78.5 DYSLIPIDEMIA: ICD-10-CM

## 2024-11-14 DIAGNOSIS — I50.30 HEART FAILURE WITH PRESERVED EJECTION FRACTION, NYHA CLASS I (H): ICD-10-CM

## 2024-11-14 PROCEDURE — 99214 OFFICE O/P EST MOD 30 MIN: CPT | Performed by: INTERNAL MEDICINE

## 2024-11-14 PROCEDURE — G2211 COMPLEX E/M VISIT ADD ON: HCPCS | Performed by: INTERNAL MEDICINE

## 2024-11-14 NOTE — PROGRESS NOTES
[facility] [address], [place] [address]   [email or web address]   OFFICE: [phone]          Impression and Plan     1.  Coronary artery disease.  Dulce has known coronary artery disease by virtue of CT scan February 2023 revealing severe coronary calcification.  I do feel it be reasonable and prudent to pursue ischemic evaluation to evaluate for silent ischemia particularly given her hospitalization for fluid overload/pulmonary edema and January/February 2023.    Regadenoson nuclear perfusion study 24 August 2023 revealed no evidence of ischemia (ejection fraction 61% on that study).  Continue 81 mg aspirin.     2.  Mitral insufficiency.  This is felt mild-moderate in degree on echocardiogram 31 January 2023.     3.  Arrhythmia.  ECGs were reviewed during February 2023 hospitalization.  She was noted to have sinus rhythm with sinus arrhythmia/bradycardia and intermittent high junctional escape earlier this year when hospitalized.  She also had some intermittent second-degree heart block type I.  For this reason, beta-blocker therapy was discontinued/deferred.    Follow-up ambulatory monitor 12  days 6 June 2023 through 9 July 2023 revealed no concerning rhythm disturbances.     4.  Hypertension.    Blood pressure is reasonable in the office today at 116/71 mmHg.     5.  Dyslipidemia.  Lipid profile 17 October 2024 revealed LDL 53 mg/dL and HDL 59 mg/dL.  Continue atorvastatin 20 mg daily.     6.  History of fluid retention felt in part related to impaired diastolic filling.  Echocardiogram 31 January 2023 revealed normal left ventricular systolic performance with ejection fraction of 50 to 55%.  She appears volume neutral on exam and weights have been stable.    Plan on follow-up in 1 year.    35 minutes spent reviewing prior records (including documentation, laboratory studies, cardiac testing/imaging), interview with patient along with physical  exam, planning, and subsequent documentation/crafting of note).           History of Present Illness    Once again I would like to thank you again for asking me to participate in the care of your patient, Dulce Pritchard.  As you know, but to reiterate for my own records, Dulce Pritchard is a 79 year old female with coronary artery disease by virtue of CT scan of chest 30 January 2023 when hospitalized revealing evidence of severe coronary calcification.     Dulce had been hospitalized late January/early February 2023 at the time did he have evidence of fluid retention felt in part related to impaired diastolic filling.  Dulce has been doing well from a cardiac standpoint.  This is confirmed by her daughter who joined in the visit today.  She specifically denies any worsening shortness of breath.  No chest pain symptoms.  Her weights have been stable. She denies any shortness of breath at night to suggest PND/orthopnea.  No palpitations    Further review of systems is otherwise negative/noncontributory (medical record and 13 point review of systems reviewed as well and pertinent positives noted).         Cardiac Diagnostics      Echocardiogram 31 January 2023:  Mild left ventricular enlargement with normal left ventricular systolic performance.  Ejection fraction 50-55%.  Abnormal septal motion consistent with conduction abnormality.  Mild-moderate mitral insufficiency.  Normal right ventricular size and systolic performance.  Mild biatrial enlargement.  Right ventricular systolic pressure relative to right atrial pressure is mildly increased.  The pulmonary artery pressure is estimated to be 45-50 mmHg plus right atrial pressure.     Regadenoson nuclear perfusion study 24 August 2023:  The regadenoson nuclear stress test is probably negative for inducible myocardial ischemia or infarction. Image quality is suboptimal due to the patient arm being down during both rest and stress image acquisition, causing  significant overlying soft tissue attenuation.  The left ventricular ejection fraction at stress is 61%.    Ambulatory monitor x 12  days 6 June 2023 through 9 July 2023:  Baseline rhythm was sinus rhythm.    Reported heart rate range 50 to 120bpm, average 62bpm.  One symptom triggers (other symptoms) correlated to sinus rhythm.  One automated recordings included sinus rhythm with PAC.  No sustained tachyarrhythmias.  No atrial fibrillation.  There were no pauses noted.  Supraventricular and ventricular ectopic beat frequency are not reported on this monitoring modality.     Twelve-lead ECG (personally reviewed) 30 January 2023: Sinus rhythm.  PACs.     CT scan of chest 30 January 2023:  No acute pulmonary embolism.  Enlarged right and left atria. Severe atheromatous coronary calcifications.  Small right trace left pleural effusions.  Bronchial wall thickening and peribronchial and interstitial opacities in both lungs, most mixed interstitial and alveolar lung edema. Pattern suggests acute congestive failure.  Coronary calcification: Severe.            Physical Examination       /71 (BP Location: Right arm, Patient Position: Sitting, Cuff Size: Adult Large)   Pulse 54   Wt 83.9 kg (185 lb)   SpO2 98%   BMI 31.76 kg/m          Wt Readings from Last 3 Encounters:   11/14/24 83.9 kg (185 lb)   03/04/24 84.2 kg (185 lb 9.6 oz)   03/04/24 75.3 kg (166 lb)       The patient is alert and oriented times three. Sclerae are anicteric. Mucosal membranes are moist. Jugular venous pressure is normal. No significant adenopathy/thyromegally appreciated. Lungs are clear with good expansion. On cardiovascular exam, the patient has a regular S1 and S2. Abdomen is soft and non-tender. Extremities reveal no clubbing, cyanosis, with mild lower extremity edema only.         Family History/Social History/Risk Factors   Patient does not smoke.  Family history reviewed.         Medical History  Surgical History Family History  Social History   Past Medical History:   Diagnosis Date    Acute renal insufficiency 2023    Arthritis     Atrial fibrillation (H)     Chronic heart failure with preserved ejection fraction (H) 2023    Chronic kidney disease     Congestive heart failure (H)     Depressive disorder     Hyperlipidemia     Hypertension     Obese     Primary hypertension 2023    Pulmonary hypertension (H)     Thyroid disease      Past Surgical History:   Procedure Laterality Date    ARTHROPLASTY HIP Right 2024    Procedure: RIGHT POSTERIOR TOTAL HIP ARTHROPLASTY;  Surgeon: Marco A San MD;  Location: [facility] Main OR    C/SECTION, CLASSICAL      CHOLECYSTECTOMY      HYSTERECTOMY       No family history on file.     Social History     Socioeconomic History    Marital status:      Spouse name: Not on file    Number of children: Not on file    Years of education: Not on file    Highest education level: Not on file   Occupational History    Not on file   Tobacco Use    Smoking status: Former     Current packs/day: 0.00     Average packs/day: 1 pack/day for 20.0 years (20.0 ttl pk-yrs)     Types: Cigarettes     Start date:      Quit date:      Years since quittin.8    Smokeless tobacco: Not on file   Vaping Use    Vaping status: Never Used   Substance and Sexual Activity    Alcohol use: Never    Drug use: Yes     Comment: Tramadol 50mg BID    Sexual activity: Not on file   Other Topics Concern    Not on file   Social History Narrative    Not on file     Social Drivers of Health     Financial Resource Strain: Not on file   Food Insecurity: Not on file   Transportation Needs: Not on file   Physical Activity: Not on file   Stress: Not on file   Social Connections: Not on file   Interpersonal Safety: Not on file   Housing Stability: Not on file           Medications  Allergies   Current Outpatient Medications   Medication Sig Dispense Refill    acetaminophen (TYLENOL) 500 MG tablet Take 1,000 mg by  mouth 3 times daily      allopurinol (ZYLOPRIM) 100 MG tablet Take 200 mg by mouth every evening      aspirin 81 MG EC tablet Take 1 tablet (81 mg) by mouth 2 times daily 60 tablet 0    atorvastatin (LIPITOR) 20 MG tablet Take 1 tablet (20 mg) by mouth every evening 30 tablet 0    FLUoxetine (PROZAC) 20 MG capsule Take 20 mg by mouth daily      furosemide (LASIX) 40 MG tablet Take 1 tablet (40 mg) by mouth 2 times daily 60 tablet 0    gabapentin (NEURONTIN) 300 MG capsule Take 2 capsules (600 mg) by mouth At Bedtime 30 capsule 0    hydroxychloroquine (PLAQUENIL) 200 MG tablet Take 200 mg by mouth 2 times daily      levothyroxine (SYNTHROID/LEVOTHROID) 25 MCG tablet Take 1 tablet (25 mcg) by mouth every morning (before breakfast) 30 tablet 3    nystatin (MYCOSTATIN) 781291 UNIT/GM external powder Apply topically 2 times daily Until healed and then PRN BID      polyethylene glycol (MIRALAX) 17 GM/Dose powder Take 17 g by mouth daily as needed for constipation      STOOL SOFTENER/LAXATIVE 50-8.6 MG tablet Take 1 tablet by mouth 2 times daily And daily prn      hydrOXYzine HCl (ATARAX) 10 MG tablet Take 1 tablet (10 mg) by mouth every 6 hours as needed for itching or anxiety (with pain, moderate pain) (Patient not taking: Reported on 11/14/2024) 30 tablet 0    methocarbamol (ROBAXIN) 500 MG tablet Take 1 tablet (500 mg) by mouth 4 times daily (Patient not taking: Reported on 11/14/2024) 28 tablet 0    oxyCODONE (ROXICODONE) 5 MG tablet Take 1 tablet (5 mg) by mouth every 4 hours as needed for moderate to severe pain Max 6 tabs/day (Patient not taking: Reported on 11/14/2024) 20 tablet 0    Vitamin D (Cholecalciferol) 25 MCG (1000 UT) TABS Take 1,000 Units by mouth daily (Patient not taking: Reported on 11/14/2024)       No Known Allergies       Lab Results    Chemistry/lipid CBC Cardiac Enzymes/BNP/TSH/INR   Recent Labs   Lab Test 10/17/24  0930   CHOL 126   HDL 59   LDL 53   TRIG 70     Recent Labs   Lab Test  "10/17/24  0930 04/21/23  0948 03/27/23  0949   LDL 53 76 71     Recent Labs   Lab Test 10/04/24  1049      POTASSIUM 4.4   CHLORIDE 101   CO2 26   *   BUN 21.7   CR 1.19*   GFRESTIMATED 47*   EDGAR 9.7     Recent Labs   Lab Test 10/04/24  1049 02/15/24  0516 02/08/24  1433   CR 1.19* 0.96* 0.95     Recent Labs   Lab Test 01/17/19  1041 01/18/18  1821   A1C 5.9 6.0          Recent Labs   Lab Test 10/17/24  0930   WBC 3.8*   HGB 11.8   HCT 37.6   *   PLT 81*     Recent Labs   Lab Test 10/17/24  0930 03/05/24  0942 02/20/24  0634   HGB 11.8 8.8* 8.5*    Recent Labs   Lab Test 01/31/23  0722 01/31/23  0110 01/30/23  1926   TROPONINI 0.11 0.12 0.12     Recent Labs   Lab Test 01/30/23  1926   BNP 1,597*     Recent Labs   Lab Test 10/17/24  0930   TSH 3.25     No results for input(s): \"INR\" in the last [id number] hours.       Medications  Allergies   Current Outpatient Medications   Medication Sig Dispense Refill    acetaminophen (TYLENOL) 500 MG tablet Take 1,000 mg by mouth 3 times daily      allopurinol (ZYLOPRIM) 100 MG tablet Take 200 mg by mouth every evening      aspirin 81 MG EC tablet Take 1 tablet (81 mg) by mouth 2 times daily 60 tablet 0    atorvastatin (LIPITOR) 20 MG tablet Take 1 tablet (20 mg) by mouth every evening 30 tablet 0    FLUoxetine (PROZAC) 20 MG capsule Take 20 mg by mouth daily      furosemide (LASIX) 40 MG tablet Take 1 tablet (40 mg) by mouth 2 times daily 60 tablet 0    gabapentin (NEURONTIN) 300 MG capsule Take 2 capsules (600 mg) by mouth At Bedtime 30 capsule 0    hydroxychloroquine (PLAQUENIL) 200 MG tablet Take 200 mg by mouth 2 times daily      levothyroxine (SYNTHROID/LEVOTHROID) 25 MCG tablet Take 1 tablet (25 mcg) by mouth every morning (before breakfast) 30 tablet 3    nystatin (MYCOSTATIN) 434788 UNIT/GM external powder Apply topically 2 times daily Until healed and then PRN BID      polyethylene glycol (MIRALAX) 17 GM/Dose powder Take 17 g by mouth daily as needed for " "constipation      STOOL SOFTENER/LAXATIVE 50-8.6 MG tablet Take 1 tablet by mouth 2 times daily And daily prn      hydrOXYzine HCl (ATARAX) 10 MG tablet Take 1 tablet (10 mg) by mouth every 6 hours as needed for itching or anxiety (with pain, moderate pain) (Patient not taking: Reported on 11/14/2024) 30 tablet 0    methocarbamol (ROBAXIN) 500 MG tablet Take 1 tablet (500 mg) by mouth 4 times daily (Patient not taking: Reported on 11/14/2024) 28 tablet 0    oxyCODONE (ROXICODONE) 5 MG tablet Take 1 tablet (5 mg) by mouth every 4 hours as needed for moderate to severe pain Max 6 tabs/day (Patient not taking: Reported on 11/14/2024) 20 tablet 0    Vitamin D (Cholecalciferol) 25 MCG (1000 UT) TABS Take 1,000 Units by mouth daily (Patient not taking: Reported on 11/14/2024)        No Known Allergies       Lab Results   Lab Results   Component Value Date     10/04/2024    CO2 26 10/04/2024    CO2 27 03/27/2023    BUN 21.7 10/04/2024    BUN 21 03/27/2023     Lab Results   Component Value Date    WBC 3.8 10/17/2024    HGB 11.8 10/17/2024    HCT 37.6 10/17/2024     10/17/2024    PLT 81 10/17/2024     Lab Results   Component Value Date    CHOL 126 10/17/2024    TRIG 70 10/17/2024    HDL 59 10/17/2024     No results found for: \"INR\"  Lab Results   Component Value Date    BNP 1,597 01/30/2023     Lab Results   Component Value Date    TROPONINI 0.11 01/31/2023    TROPONINI 0.12 01/31/2023    TROPONINI 0.12 01/30/2023     Lab Results   Component Value Date    TSH 3.25 10/17/2024                  "

## 2024-11-14 NOTE — LETTER
11/14/2024    Colin Christie MD  [address]  [place] [address]    RE: Dulce Pritchard       Dear Colleague,     I had the pleasure of seeing Dulce Pritchard in the [facility].         [facility]   [address], [place] [address]   [email or web address]   OFFICE: [phone]          Impression and Plan     1.  Coronary artery disease.  Dulce has known coronary artery disease by virtue of CT scan February 2023 revealing severe coronary calcification.  I do feel it be reasonable and prudent to pursue ischemic evaluation to evaluate for silent ischemia particularly given her hospitalization for fluid overload/pulmonary edema and January/February 2023.    Regadenoson nuclear perfusion study 24 August 2023 revealed no evidence of ischemia (ejection fraction 61% on that study).  Continue 81 mg aspirin.     2.  Mitral insufficiency.  This is felt mild-moderate in degree on echocardiogram 31 January 2023.     3.  Arrhythmia.  ECGs were reviewed during February 2023 hospitalization.  She was noted to have sinus rhythm with sinus arrhythmia/bradycardia and intermittent high junctional escape earlier this year when hospitalized.  She also had some intermittent second-degree heart block type I.  For this reason, beta-blocker therapy was discontinued/deferred.    Follow-up ambulatory monitor 12  days 6 June 2023 through 9 July 2023 revealed no concerning rhythm disturbances.     4.  Hypertension.    Blood pressure is reasonable in the office today at 116/71 mmHg.     5.  Dyslipidemia.  Lipid profile 17 October 2024 revealed LDL 53 mg/dL and HDL 59 mg/dL.  Continue atorvastatin 20 mg daily.     6.  History of fluid retention felt in part related to impaired diastolic filling.  Echocardiogram 31 January 2023 revealed normal left ventricular systolic performance with ejection fraction of 50 to 55%.  She appears volume neutral  on exam and weights have been stable.    Plan on follow-up in 1 year.    35 minutes spent reviewing prior records (including documentation, laboratory studies, cardiac testing/imaging), interview with patient along with physical exam, planning, and subsequent documentation/crafting of note).           History of Present Illness    Once again I would like to thank you again for asking me to participate in the care of your patient, Dulce Pritchard.  As you know, but to reiterate for my own records, Dulce Pritchard is a 79 year old female with coronary artery disease by virtue of CT scan of chest 30 January 2023 when hospitalized revealing evidence of severe coronary calcification.     Dulce had been hospitalized late January/early February 2023 at the time did he have evidence of fluid retention felt in part related to impaired diastolic filling.  Dulce has been doing well from a cardiac standpoint.  This is confirmed by her daughter who joined in the visit today.  She specifically denies any worsening shortness of breath.  No chest pain symptoms.  Her weights have been stable. She denies any shortness of breath at night to suggest PND/orthopnea.  No palpitations    Further review of systems is otherwise negative/noncontributory (medical record and 13 point review of systems reviewed as well and pertinent positives noted).         Cardiac Diagnostics      Echocardiogram 31 January 2023:  Mild left ventricular enlargement with normal left ventricular systolic performance.  Ejection fraction 50-55%.  Abnormal septal motion consistent with conduction abnormality.  Mild-moderate mitral insufficiency.  Normal right ventricular size and systolic performance.  Mild biatrial enlargement.  Right ventricular systolic pressure relative to right atrial pressure is mildly increased.  The pulmonary artery pressure is estimated to be 45-50 mmHg plus right atrial pressure.     Regadenoson nuclear perfusion study 24 August  2023:  The regadenoson nuclear stress test is probably negative for inducible myocardial ischemia or infarction. Image quality is suboptimal due to the patient arm being down during both rest and stress image acquisition, causing significant overlying soft tissue attenuation.  The left ventricular ejection fraction at stress is 61%.    Ambulatory monitor x 12  days 6 June 2023 through 9 July 2023:  Baseline rhythm was sinus rhythm.    Reported heart rate range 50 to 120bpm, average 62bpm.  One symptom triggers (other symptoms) correlated to sinus rhythm.  One automated recordings included sinus rhythm with PAC.  No sustained tachyarrhythmias.  No atrial fibrillation.  There were no pauses noted.  Supraventricular and ventricular ectopic beat frequency are not reported on this monitoring modality.     Twelve-lead ECG (personally reviewed) 30 January 2023: Sinus rhythm.  PACs.     CT scan of chest 30 January 2023:  No acute pulmonary embolism.  Enlarged right and left atria. Severe atheromatous coronary calcifications.  Small right trace left pleural effusions.  Bronchial wall thickening and peribronchial and interstitial opacities in both lungs, most mixed interstitial and alveolar lung edema. Pattern suggests acute congestive failure.  Coronary calcification: Severe.            Physical Examination       /71 (BP Location: Right arm, Patient Position: Sitting, Cuff Size: Adult Large)   Pulse 54   Wt 83.9 kg (185 lb)   SpO2 98%   BMI 31.76 kg/m          Wt Readings from Last 3 Encounters:   11/14/24 83.9 kg (185 lb)   03/04/24 84.2 kg (185 lb 9.6 oz)   03/04/24 75.3 kg (166 lb)       The patient is alert and oriented times three. Sclerae are anicteric. Mucosal membranes are moist. Jugular venous pressure is normal. No significant adenopathy/thyromegally appreciated. Lungs are clear with good expansion. On cardiovascular exam, the patient has a regular S1 and S2. Abdomen is soft and non-tender. Extremities  reveal no clubbing, cyanosis, with mild lower extremity edema only.         Family History/Social History/Risk Factors   Patient does not smoke.  Family history reviewed.         Medical History  Surgical History Family History Social History   Past Medical History:   Diagnosis Date     Acute renal insufficiency 2023     Arthritis      Atrial fibrillation (H)      Chronic heart failure with preserved ejection fraction (H) 2023     Chronic kidney disease      Congestive heart failure (H)      Depressive disorder      Hyperlipidemia      Hypertension      Obese      Primary hypertension 2023     Pulmonary hypertension (H)      Thyroid disease      Past Surgical History:   Procedure Laterality Date     ARTHROPLASTY HIP Right 2024    Procedure: RIGHT POSTERIOR TOTAL HIP ARTHROPLASTY;  Surgeon: Marco A San MD;  Location: [facility] Main OR     C/SECTION, CLASSICAL       CHOLECYSTECTOMY       HYSTERECTOMY       No family history on file.     Social History     Socioeconomic History     Marital status:      Spouse name: Not on file     Number of children: Not on file     Years of education: Not on file     Highest education level: Not on file   Occupational History     Not on file   Tobacco Use     Smoking status: Former     Current packs/day: 0.00     Average packs/day: 1 pack/day for 20.0 years (20.0 ttl pk-yrs)     Types: Cigarettes     Start date:      Quit date:      Years since quittin.8     Smokeless tobacco: Not on file   Vaping Use     Vaping status: Never Used   Substance and Sexual Activity     Alcohol use: Never     Drug use: Yes     Comment: Tramadol 50mg BID     Sexual activity: Not on file   Other Topics Concern     Not on file   Social History Narrative     Not on file     Social Drivers of Health     Financial Resource Strain: Not on file   Food Insecurity: Not on file   Transportation Needs: Not on file   Physical Activity: Not on file   Stress: Not on file    Social Connections: Not on file   Interpersonal Safety: Not on file   Housing Stability: Not on file           Medications  Allergies   Current Outpatient Medications   Medication Sig Dispense Refill     acetaminophen (TYLENOL) 500 MG tablet Take 1,000 mg by mouth 3 times daily       allopurinol (ZYLOPRIM) 100 MG tablet Take 200 mg by mouth every evening       aspirin 81 MG EC tablet Take 1 tablet (81 mg) by mouth 2 times daily 60 tablet 0     atorvastatin (LIPITOR) 20 MG tablet Take 1 tablet (20 mg) by mouth every evening 30 tablet 0     FLUoxetine (PROZAC) 20 MG capsule Take 20 mg by mouth daily       furosemide (LASIX) 40 MG tablet Take 1 tablet (40 mg) by mouth 2 times daily 60 tablet 0     gabapentin (NEURONTIN) 300 MG capsule Take 2 capsules (600 mg) by mouth At Bedtime 30 capsule 0     hydroxychloroquine (PLAQUENIL) 200 MG tablet Take 200 mg by mouth 2 times daily       levothyroxine (SYNTHROID/LEVOTHROID) 25 MCG tablet Take 1 tablet (25 mcg) by mouth every morning (before breakfast) 30 tablet 3     nystatin (MYCOSTATIN) 306372 UNIT/GM external powder Apply topically 2 times daily Until healed and then PRN BID       polyethylene glycol (MIRALAX) 17 GM/Dose powder Take 17 g by mouth daily as needed for constipation       STOOL SOFTENER/LAXATIVE 50-8.6 MG tablet Take 1 tablet by mouth 2 times daily And daily prn       hydrOXYzine HCl (ATARAX) 10 MG tablet Take 1 tablet (10 mg) by mouth every 6 hours as needed for itching or anxiety (with pain, moderate pain) (Patient not taking: Reported on 11/14/2024) 30 tablet 0     methocarbamol (ROBAXIN) 500 MG tablet Take 1 tablet (500 mg) by mouth 4 times daily (Patient not taking: Reported on 11/14/2024) 28 tablet 0     oxyCODONE (ROXICODONE) 5 MG tablet Take 1 tablet (5 mg) by mouth every 4 hours as needed for moderate to severe pain Max 6 tabs/day (Patient not taking: Reported on 11/14/2024) 20 tablet 0     Vitamin D (Cholecalciferol) 25 MCG (1000 UT) TABS Take 1,000  "Units by mouth daily (Patient not taking: Reported on 11/14/2024)       No Known Allergies       Lab Results    Chemistry/lipid CBC Cardiac Enzymes/BNP/TSH/INR   Recent Labs   Lab Test 10/17/24  0930   CHOL 126   HDL 59   LDL 53   TRIG 70     Recent Labs   Lab Test 10/17/24  0930 04/21/23  0948 03/27/23  0949   LDL 53 76 71     Recent Labs   Lab Test 10/04/24  1049      POTASSIUM 4.4   CHLORIDE 101   CO2 26   *   BUN 21.7   CR 1.19*   GFRESTIMATED 47*   EDGAR 9.7     Recent Labs   Lab Test 10/04/24  1049 02/15/24  0516 02/08/24  1433   CR 1.19* 0.96* 0.95     Recent Labs   Lab Test 01/17/19  1041 01/18/18  1821   A1C 5.9 6.0          Recent Labs   Lab Test 10/17/24  0930   WBC 3.8*   HGB 11.8   HCT 37.6   *   PLT 81*     Recent Labs   Lab Test 10/17/24  0930 03/05/24  0942 02/20/24  0634   HGB 11.8 8.8* 8.5*    Recent Labs   Lab Test 01/31/23  0722 01/31/23  0110 01/30/23  1926   TROPONINI 0.11 0.12 0.12     Recent Labs   Lab Test 01/30/23  1926   BNP 1,597*     Recent Labs   Lab Test 10/17/24  0930   TSH 3.25     No results for input(s): \"INR\" in the last [id number] hours.       Medications  Allergies   Current Outpatient Medications   Medication Sig Dispense Refill     acetaminophen (TYLENOL) 500 MG tablet Take 1,000 mg by mouth 3 times daily       allopurinol (ZYLOPRIM) 100 MG tablet Take 200 mg by mouth every evening       aspirin 81 MG EC tablet Take 1 tablet (81 mg) by mouth 2 times daily 60 tablet 0     atorvastatin (LIPITOR) 20 MG tablet Take 1 tablet (20 mg) by mouth every evening 30 tablet 0     FLUoxetine (PROZAC) 20 MG capsule Take 20 mg by mouth daily       furosemide (LASIX) 40 MG tablet Take 1 tablet (40 mg) by mouth 2 times daily 60 tablet 0     gabapentin (NEURONTIN) 300 MG capsule Take 2 capsules (600 mg) by mouth At Bedtime 30 capsule 0     hydroxychloroquine (PLAQUENIL) 200 MG tablet Take 200 mg by mouth 2 times daily       levothyroxine (SYNTHROID/LEVOTHROID) 25 MCG tablet Take 1 " "tablet (25 mcg) by mouth every morning (before breakfast) 30 tablet 3     nystatin (MYCOSTATIN) 765164 UNIT/GM external powder Apply topically 2 times daily Until healed and then PRN BID       polyethylene glycol (MIRALAX) 17 GM/Dose powder Take 17 g by mouth daily as needed for constipation       STOOL SOFTENER/LAXATIVE 50-8.6 MG tablet Take 1 tablet by mouth 2 times daily And daily prn       hydrOXYzine HCl (ATARAX) 10 MG tablet Take 1 tablet (10 mg) by mouth every 6 hours as needed for itching or anxiety (with pain, moderate pain) (Patient not taking: Reported on 11/14/2024) 30 tablet 0     methocarbamol (ROBAXIN) 500 MG tablet Take 1 tablet (500 mg) by mouth 4 times daily (Patient not taking: Reported on 11/14/2024) 28 tablet 0     oxyCODONE (ROXICODONE) 5 MG tablet Take 1 tablet (5 mg) by mouth every 4 hours as needed for moderate to severe pain Max 6 tabs/day (Patient not taking: Reported on 11/14/2024) 20 tablet 0     Vitamin D (Cholecalciferol) 25 MCG (1000 UT) TABS Take 1,000 Units by mouth daily (Patient not taking: Reported on 11/14/2024)        No Known Allergies       Lab Results   Lab Results   Component Value Date     10/04/2024    CO2 26 10/04/2024    CO2 27 03/27/2023    BUN 21.7 10/04/2024    BUN 21 03/27/2023     Lab Results   Component Value Date    WBC 3.8 10/17/2024    HGB 11.8 10/17/2024    HCT 37.6 10/17/2024     10/17/2024    PLT 81 10/17/2024     Lab Results   Component Value Date    CHOL 126 10/17/2024    TRIG 70 10/17/2024    HDL 59 10/17/2024     No results found for: \"INR\"  Lab Results   Component Value Date    BNP 1,597 01/30/2023     Lab Results   Component Value Date    TROPONINI 0.11 01/31/2023    TROPONINI 0.12 01/31/2023    TROPONINI 0.12 01/30/2023     Lab Results   Component Value Date    TSH 3.25 10/17/2024                      Thank you for allowing me to participate in the care of your patient.      Sincerely,     Hank Wooten MD     [facility]" [facility]  cc:   Hank Wooten MD  [address]  [place] [address]

## 2025-01-10 NOTE — PROGRESS NOTES
Care Management Discharge Note    Discharge Date: 06/26/2023       Discharge Disposition: Home Care    Discharge Services:      Discharge DME:      Discharge Transportation: family or friend will provide    Private pay costs discussed: Not applicable    Does the patient's insurance plan have a 3 day qualifying hospital stay waiver?  No    PAS Confirmation Code:    Patient/family educated on Medicare website which has current facility and service quality ratings:      Education Provided on the Discharge Plan:    Persons Notified of Discharge Plans: patient   Patient/Family in Agreement with the Plan:      Handoff Referral Completed: No     Additional Information:  Discharge orders faxed to Leda Saenz RN         [phone] Type 2 diabetes mellitus COVID

## 2025-05-09 ENCOUNTER — LAB REQUISITION (OUTPATIENT)
Dept: LAB | Facility: CLINIC | Age: 80
End: 2025-05-09
Payer: COMMERCIAL

## 2025-05-09 DIAGNOSIS — D69.6 THROMBOCYTOPENIA, UNSPECIFIED: ICD-10-CM

## 2025-05-09 DIAGNOSIS — N18.32 CHRONIC KIDNEY DISEASE, STAGE 3B (H): ICD-10-CM

## 2025-05-09 LAB
BASOPHILS # BLD AUTO: 0 10E3/UL (ref 0–0.2)
BASOPHILS NFR BLD AUTO: 1 %
EOSINOPHIL # BLD AUTO: 0.2 10E3/UL (ref 0–0.7)
EOSINOPHIL NFR BLD AUTO: 4 %
ERYTHROCYTE [DISTWIDTH] IN BLOOD BY AUTOMATED COUNT: 12.9 % (ref 10–15)
HCT VFR BLD AUTO: 39.7 % (ref 35–47)
HGB BLD-MCNC: 12.6 G/DL (ref 11.7–15.7)
IMM GRANULOCYTES # BLD: 0 10E3/UL
IMM GRANULOCYTES NFR BLD: 1 %
LYMPHOCYTES # BLD AUTO: 0.7 10E3/UL (ref 0.8–5.3)
LYMPHOCYTES NFR BLD AUTO: 17 %
MCH RBC QN AUTO: 31.2 PG (ref 26.5–33)
MCHC RBC AUTO-ENTMCNC: 31.7 G/DL (ref 31.5–36.5)
MCV RBC AUTO: 98 FL (ref 78–100)
MONOCYTES # BLD AUTO: 0.2 10E3/UL (ref 0–1.3)
MONOCYTES NFR BLD AUTO: 5 %
NEUTROPHILS # BLD AUTO: 3.2 10E3/UL (ref 1.6–8.3)
NEUTROPHILS NFR BLD AUTO: 73 %
NRBC # BLD AUTO: 0 10E3/UL
NRBC BLD AUTO-RTO: 0 /100
PLATELET # BLD AUTO: 104 10E3/UL (ref 150–450)
RBC # BLD AUTO: 4.04 10E6/UL (ref 3.8–5.2)
WBC # BLD AUTO: 4.4 10E3/UL (ref 4–11)

## 2025-05-09 PROCEDURE — 85025 COMPLETE CBC W/AUTO DIFF WBC: CPT | Mod: ORL | Performed by: FAMILY MEDICINE

## 2025-05-09 PROCEDURE — 80053 COMPREHEN METABOLIC PANEL: CPT | Mod: ORL | Performed by: FAMILY MEDICINE

## 2025-05-10 LAB
ALBUMIN SERPL BCG-MCNC: 4.1 G/DL (ref 3.5–5.2)
ALP SERPL-CCNC: 129 U/L (ref 40–150)
ALT SERPL W P-5'-P-CCNC: 15 U/L (ref 0–50)
ANION GAP SERPL CALCULATED.3IONS-SCNC: 12 MMOL/L (ref 7–15)
AST SERPL W P-5'-P-CCNC: 32 U/L (ref 0–45)
BILIRUB SERPL-MCNC: 0.4 MG/DL
BUN SERPL-MCNC: 24 MG/DL (ref 8–23)
CALCIUM SERPL-MCNC: 9.7 MG/DL (ref 8.8–10.4)
CHLORIDE SERPL-SCNC: 104 MMOL/L (ref 98–107)
CREAT SERPL-MCNC: 1.15 MG/DL (ref 0.51–0.95)
EGFRCR SERPLBLD CKD-EPI 2021: 49 ML/MIN/1.73M2
GLUCOSE SERPL-MCNC: 160 MG/DL (ref 70–99)
HCO3 SERPL-SCNC: 25 MMOL/L (ref 22–29)
POTASSIUM SERPL-SCNC: 3.8 MMOL/L (ref 3.4–5.3)
PROT SERPL-MCNC: 6.7 G/DL (ref 6.4–8.3)
SODIUM SERPL-SCNC: 141 MMOL/L (ref 135–145)

## (undated) DEVICE — DRAPE SHEET REV FOLD 3/4 9349

## (undated) DEVICE — DRAPE TOWEL IMPERVIOUS X2 7553

## (undated) DEVICE — PREP CHLORAPREP 26ML TINTED HI-LITE ORANGE 930815

## (undated) DEVICE — SUCTION MANIFOLD NEPTUNE 2 SYS 4 PORT 0702-020-000

## (undated) DEVICE — A3 SUPPLIES- SEE NURSING INFO PAGE

## (undated) DEVICE — SUCTION TIP FLEXI CLEAR TIP DISP K62

## (undated) DEVICE — NEEDLE HYPO 18X1-1/2 SAFETY 305918

## (undated) DEVICE — ESU PENCIL SMOKE EVAC W/ROCKER SWITCH 0703-047-000

## (undated) DEVICE — GOWN IMPERVIOUS BREATHABLE 2XL/XLONG

## (undated) DEVICE — DRAPE CONVERTORS U-DRAPE 60X72" 8476

## (undated) DEVICE — DRAPE BACK TABLE PADDED 60X90

## (undated) DEVICE — GLOVE BIOGEL INDICATOR 7.5 LF 41675

## (undated) DEVICE — BONE CLEANING TIP INTERPULSE  0210-010-000

## (undated) DEVICE — ELECTRODE PATIENT RETURN ADULT L10 FT 2 PLATE CORD 0855C

## (undated) DEVICE — CUSTOM PACK GEN MAJOR SBA5BGMHEA

## (undated) DEVICE — PACK MINOR SINGLE BASIN SSK3001

## (undated) DEVICE — BLADE SAW SAGITTAL STRK WIDE 25.4X85X1.2MM 2108-151-000

## (undated) DEVICE — SOL WATER IRRIG 1000ML BOTTLE 2F7114

## (undated) DEVICE — PITCHER STERILE 1000ML  SSK9004A

## (undated) DEVICE — DRAPE IOBAN INCISE 23X17" 6650EZ

## (undated) DEVICE — GLOVE SURG PI ULTRA TOUCH M SZ 7-1/2 LF

## (undated) DEVICE — SOL NACL 0.9% INJ 1000ML BAG 2B1324X

## (undated) DEVICE — DRAPE STERI U 1015

## (undated) DEVICE — DRESSING MEPILEX AG SILVER 4X8 395890

## (undated) DEVICE — SUTURE MONOCRYL 3-0 18 PS2 UND MCP497G

## (undated) DEVICE — SOL NACL 0.9% IRRIG 1000ML BOTTLE 2F7124

## (undated) DEVICE — HOLDER LIMB VELCRO OR 0814-1533

## (undated) DEVICE — SUCTION IRR SYSTEM W/O TIP INTERPULSE HANDPIECE 0210-100-000

## (undated) DEVICE — DRAPE STOCKINETTE IMPERVIOUS 12" 1587

## (undated) DEVICE — SUTURE VICRYL+ 0 27IN CT-1 UND VCP260H

## (undated) DEVICE — DRAPE SHEET BILAT LIMB W/ ARM COVERS

## (undated) DEVICE — SUTURE VICRYL+ 1 27IN CT-1 UND VCP261H

## (undated) DEVICE — ESU ELEC BLADE 6" COATED E1450-6

## (undated) DEVICE — GLOVE UNDER INDICATOR PI SZ 7.0 LF 41670

## (undated) DEVICE — PLATE GROUNDING ADULT W/CORD 9165L

## (undated) DEVICE — SU STRATAFIX PDS PLUS 1 CT-1 18" SXPP1A404

## (undated) DEVICE — DECANTER VIAL 2006S

## (undated) DEVICE — SU ETHIBOND 5 V-37 4X30" MB66G

## (undated) DEVICE — PAD HIP POSITIONING MCGUIRE 707-CPM

## (undated) DEVICE — SUCTION MANIFOLD NEPTUNE 2 SYS 1 PORT 702-025-000

## (undated) DEVICE — SYR 30ML LL W/O NDL 302832

## (undated) DEVICE — SUTURE VICRYL+ 2-0 27IN CT-1 UND VCP259H

## (undated) DEVICE — BLADE PRECISION 25X1.27X9 6725127090

## (undated) DEVICE — CUSTOM PACK TOTAL HIP SOP5BTHHEA

## (undated) DEVICE — DRAPE STERI TOWEL LG 1010

## (undated) DEVICE — Device

## (undated) DEVICE — MEGADYNE PLATE

## (undated) RX ORDER — PROPOFOL 10 MG/ML
INJECTION, EMULSION INTRAVENOUS
Status: DISPENSED
Start: 2024-02-07

## (undated) RX ORDER — LIDOCAINE HYDROCHLORIDE 10 MG/ML
INJECTION, SOLUTION EPIDURAL; INFILTRATION; INTRACAUDAL; PERINEURAL
Status: DISPENSED
Start: 2023-11-13

## (undated) RX ORDER — ONDANSETRON 2 MG/ML
INJECTION INTRAMUSCULAR; INTRAVENOUS
Status: DISPENSED
Start: 2024-02-07

## (undated) RX ORDER — LIDOCAINE HYDROCHLORIDE 10 MG/ML
INJECTION, SOLUTION EPIDURAL; INFILTRATION; INTRACAUDAL; PERINEURAL
Status: DISPENSED
Start: 2024-02-07

## (undated) RX ORDER — FENTANYL CITRATE 50 UG/ML
INJECTION, SOLUTION INTRAMUSCULAR; INTRAVENOUS
Status: DISPENSED
Start: 2023-11-13

## (undated) RX ORDER — VASOPRESSIN 20 U/ML
INJECTION PARENTERAL
Status: DISPENSED
Start: 2024-02-07

## (undated) RX ORDER — FENTANYL CITRATE 50 UG/ML
INJECTION, SOLUTION INTRAMUSCULAR; INTRAVENOUS
Status: DISPENSED
Start: 2024-02-07